# Patient Record
Sex: FEMALE | Race: WHITE | NOT HISPANIC OR LATINO | Employment: OTHER | RURAL
[De-identification: names, ages, dates, MRNs, and addresses within clinical notes are randomized per-mention and may not be internally consistent; named-entity substitution may affect disease eponyms.]

---

## 2020-04-07 ENCOUNTER — HISTORICAL (OUTPATIENT)
Dept: ADMINISTRATIVE | Facility: HOSPITAL | Age: 75
End: 2020-04-07

## 2020-04-07 LAB
ALBUMIN SERPL BCP-MCNC: 3.7 G/DL (ref 3.5–5)
ALBUMIN/GLOB SERPL: 1.1 {RATIO}
ALP SERPL-CCNC: 70 U/L (ref 55–142)
ALT SERPL W P-5'-P-CCNC: 18 U/L (ref 13–56)
AST SERPL W P-5'-P-CCNC: 18 U/L (ref 15–37)
BASOPHILS # BLD AUTO: 0.02 X10E3/UL (ref 0–0.2)
BASOPHILS NFR BLD AUTO: 0.2 % (ref 0–1)
BILIRUB SERPL-MCNC: 0.5 MG/DL (ref 0–1.2)
BILIRUB UR QL STRIP: NEGATIVE MG/DL
BUN SERPL-MCNC: 35 MG/DL (ref 7–18)
CALCIUM SERPL-MCNC: 9 MG/DL (ref 8.5–10.1)
CHLORIDE SERPL-SCNC: 100 MMOL/L (ref 98–107)
CHOLEST SERPL-MCNC: 182 MG/DL (ref 0–200)
CLARITY UR: CLEAR
CO2 SERPL-SCNC: 26 MMOL/L (ref 21–32)
COLOR UR: YELLOW
CREAT SERPL-MCNC: 1.5 MG/DL (ref 0.55–1.02)
EOSINOPHIL # BLD AUTO: 0.18 X10E3/UL (ref 0–0.5)
EOSINOPHIL NFR BLD AUTO: 2.1 % (ref 1–4)
ERYTHROCYTE [DISTWIDTH] IN BLOOD BY AUTOMATED COUNT: 14 % (ref 11.5–14.5)
EST. AVERAGE GLUCOSE BLD GHB EST-MCNC: 127 MG/DL
GLOBULIN SER-MCNC: 3.5 G/DL (ref 2–4)
GLUCOSE SERPL-MCNC: 96 MG/DL (ref 74–106)
GLUCOSE UR STRIP-MCNC: NORMAL MG/DL
HBA1C MFR BLD HPLC: 6.4 % (ref 4.2–6.5)
HCT VFR BLD AUTO: 40.1 % (ref 38–47)
HDLC SERPL-MCNC: 29 MG/DL
HGB BLD-MCNC: 13 G/DL (ref 12–16)
KETONES UR STRIP-SCNC: NEGATIVE MG/DL
LDLC SERPL CALC-MCNC: 111 MG/ML
LEUKOCYTE ESTERASE UR QL STRIP: NEGATIVE LEU/UL
LYMPHOCYTES # BLD AUTO: 2.68 X10E3/UL (ref 1–4.8)
LYMPHOCYTES NFR BLD AUTO: 30.6 % (ref 27–41)
MCH RBC QN AUTO: 28.9 PG (ref 27–31)
MCHC RBC AUTO-ENTMCNC: 32.4 G/DL (ref 32–36)
MCV RBC AUTO: 89 FL (ref 80–96)
MONOCYTES # BLD AUTO: 0.75 X10E3/UL (ref 0–0.8)
MONOCYTES NFR BLD AUTO: 8.6 % (ref 2–6)
MPC BLD CALC-MCNC: 10 FL (ref 9.4–12.4)
NEUTROPHILS # BLD AUTO: 5.13 X10E3/UL (ref 1.8–7.7)
NEUTROPHILS NFR BLD AUTO: 58.5 % (ref 53–65)
NITRITE UR QL STRIP: NEGATIVE
PH UR STRIP: 5.5 PH UNITS (ref 5–8)
PLATELET # BLD AUTO: 255 X10E3/UL (ref 150–450)
POTASSIUM SERPL-SCNC: 4.6 MMOL/L (ref 3.5–5.1)
PROT SERPL-MCNC: 7.2 G/DL (ref 6.4–8.2)
PROT UR QL STRIP: NEGATIVE MG/DL
RBC # BLD AUTO: 4.5 X10E6/UL (ref 4.2–5.4)
RBC # UR STRIP: NEGATIVE ERY/UL
SODIUM SERPL-SCNC: 137 MMOL/L (ref 136–145)
SP GR UR STRIP: 1.02 (ref 1–1.03)
TRIGL SERPL-MCNC: 212 MG/DL
TSH SERPL DL<=0.005 MIU/L-ACNC: 3.77 UIU/ML (ref 0.36–3.7)
UROBILINOGEN UR STRIP-ACNC: 0.2 MG/DL
VLDLC SERPL-MCNC: 42 MG/DL
WBC # BLD AUTO: 8.76 X10E3/UL (ref 4.5–11)

## 2020-04-08 LAB — 25(OH)D3 SERPL-MCNC: 22.6 NG/ML

## 2020-08-12 ENCOUNTER — HISTORICAL (OUTPATIENT)
Dept: ADMINISTRATIVE | Facility: HOSPITAL | Age: 75
End: 2020-08-12

## 2020-08-12 LAB — TSH SERPL DL<=0.005 MIU/L-ACNC: 4.52 UIU/ML (ref 0.36–3.7)

## 2020-09-23 ENCOUNTER — HISTORICAL (OUTPATIENT)
Dept: ADMINISTRATIVE | Facility: HOSPITAL | Age: 75
End: 2020-09-23

## 2020-09-23 LAB
25(OH)D3 SERPL-MCNC: 14.5 NG/ML
ALBUMIN SERPL BCP-MCNC: 3.5 G/DL (ref 3.5–5)
ALBUMIN/GLOB SERPL: 0.9 {RATIO}
ALP SERPL-CCNC: 85 U/L (ref 55–142)
ALT SERPL W P-5'-P-CCNC: 21 U/L (ref 13–56)
ANION GAP SERPL CALCULATED.3IONS-SCNC: 11 MMOL/L (ref 7–16)
AST SERPL W P-5'-P-CCNC: 17 U/L (ref 15–37)
BASOPHILS # BLD AUTO: 0.01 X10E3/UL (ref 0–0.2)
BASOPHILS NFR BLD AUTO: 0.1 % (ref 0–1)
BILIRUB SERPL-MCNC: 0.3 MG/DL (ref 0–1.2)
BILIRUB UR QL STRIP: NEGATIVE MG/DL
BUN SERPL-MCNC: 26 MG/DL (ref 7–18)
CALCIUM SERPL-MCNC: 8.9 MG/DL (ref 8.5–10.1)
CHLORIDE SERPL-SCNC: 103 MMOL/L (ref 98–107)
CHOLEST SERPL-MCNC: 172 MG/DL (ref 0–200)
CLARITY UR: CLEAR
CO2 SERPL-SCNC: 25 MMOL/L (ref 21–32)
COLOR UR: YELLOW
CREAT SERPL-MCNC: 1.49 MG/DL (ref 0.55–1.02)
EOSINOPHIL # BLD AUTO: 0.2 X10E3/UL (ref 0–0.5)
EOSINOPHIL NFR BLD AUTO: 2.3 % (ref 1–4)
ERYTHROCYTE [DISTWIDTH] IN BLOOD BY AUTOMATED COUNT: 13.8 % (ref 11.5–14.5)
EST. AVERAGE GLUCOSE BLD GHB EST-MCNC: 87 MG/DL
GLOBULIN SER-MCNC: 3.9 G/DL (ref 2–4)
GLUCOSE SERPL-MCNC: 143 MG/DL (ref 74–106)
GLUCOSE UR STRIP-MCNC: NORMAL MG/DL
HBA1C MFR BLD HPLC: 5.2 % (ref 4.2–6.5)
HCT VFR BLD AUTO: 39.9 % (ref 38–47)
HDLC SERPL-MCNC: 29 MG/DL
HGB BLD-MCNC: 12.5 G/DL (ref 12–16)
KETONES UR STRIP-SCNC: NEGATIVE MG/DL
LDLC SERPL CALC-MCNC: 100 MG/ML
LEUKOCYTE ESTERASE UR QL STRIP: NEGATIVE LEU/UL
LYMPHOCYTES # BLD AUTO: 2.77 X10E3/UL (ref 1–4.8)
LYMPHOCYTES NFR BLD AUTO: 31.7 % (ref 27–41)
MCH RBC QN AUTO: 28.2 PG (ref 27–31)
MCHC RBC AUTO-ENTMCNC: 31.3 G/DL (ref 32–36)
MCV RBC AUTO: 90 FL (ref 80–96)
MONOCYTES # BLD AUTO: 0.72 X10E3/UL (ref 0–0.8)
MONOCYTES NFR BLD AUTO: 8.2 % (ref 2–6)
MPC BLD CALC-MCNC: 9.9 FL (ref 9.4–12.4)
NEUTROPHILS # BLD AUTO: 5.05 X10E3/UL (ref 1.8–7.7)
NEUTROPHILS NFR BLD AUTO: 57.7 % (ref 53–65)
NITRITE UR QL STRIP: NEGATIVE
PH UR STRIP: 6 PH UNITS (ref 5–8)
PLATELET # BLD AUTO: 245 X10E3/UL (ref 150–450)
POTASSIUM SERPL-SCNC: 4.2 MMOL/L (ref 3.5–5.1)
PROT SERPL-MCNC: 7.4 G/DL (ref 6.4–8.2)
PROT UR QL STRIP: NEGATIVE MG/DL
RBC # BLD AUTO: 4.43 X10E6/UL (ref 4.2–5.4)
RBC # UR STRIP: NEGATIVE ERY/UL
SODIUM SERPL-SCNC: 135 MMOL/L (ref 136–145)
SP GR UR STRIP: 1.01 (ref 1–1.03)
TRIGL SERPL-MCNC: 217 MG/DL
TSH SERPL DL<=0.005 MIU/L-ACNC: 6.32 UIU/ML (ref 0.36–3.7)
UROBILINOGEN UR STRIP-ACNC: 0.2 MG/DL
VLDLC SERPL-MCNC: 43 MG/DL
WBC # BLD AUTO: 8.75 X10E3/UL (ref 4.5–11)

## 2020-10-19 ENCOUNTER — HISTORICAL (OUTPATIENT)
Dept: ADMINISTRATIVE | Facility: HOSPITAL | Age: 75
End: 2020-10-19

## 2020-10-19 LAB
BACTERIA #/AREA URNS HPF: ABNORMAL /HPF
BILIRUB UR QL STRIP: NEGATIVE MG/DL
CLARITY UR: ABNORMAL
CLARITY UR: ABNORMAL
COLOR UR: ABNORMAL
COLOR UR: ABNORMAL
GLUCOSE UR STRIP-MCNC: NORMAL MG/DL
KETONES UR STRIP-SCNC: NEGATIVE MG/DL
LEUKOCYTE ESTERASE UR QL STRIP: ABNORMAL LEU/UL
NITRITE UR QL STRIP: POSITIVE
PH UR STRIP: 6.5 PH UNITS (ref 5–8)
PROT UR QL STRIP: ABNORMAL MG/DL
RBC # UR STRIP: ABNORMAL ERY/UL
RBC #/AREA URNS HPF: ABNORMAL /HPF (ref 0–3)
SP GR UR STRIP: 1.02 (ref 1–1.03)
UROBILINOGEN UR STRIP-ACNC: 1 MG/DL
WBC #/AREA URNS HPF: ABNORMAL /HPF (ref 0–5)
YEAST #/AREA URNS HPF: ABNORMAL /HPF

## 2020-10-22 LAB
REPORT: 38
REPORT: NORMAL

## 2020-10-30 ENCOUNTER — HISTORICAL (OUTPATIENT)
Dept: ADMINISTRATIVE | Facility: HOSPITAL | Age: 75
End: 2020-10-30

## 2020-10-30 LAB
BILIRUB UR QL STRIP: NEGATIVE MG/DL
CLARITY UR: CLEAR
COLOR UR: YELLOW
GLUCOSE UR STRIP-MCNC: NORMAL MG/DL
KETONES UR STRIP-SCNC: NEGATIVE MG/DL
LEUKOCYTE ESTERASE UR QL STRIP: NEGATIVE LEU/UL
NITRITE UR QL STRIP: NEGATIVE
PH UR STRIP: 6 PH UNITS (ref 5–8)
PROT UR QL STRIP: NEGATIVE MG/DL
RBC # UR STRIP: NEGATIVE ERY/UL
SP GR UR STRIP: 1.01 (ref 1–1.03)
UROBILINOGEN UR STRIP-ACNC: 0.2 MG/DL

## 2020-11-16 ENCOUNTER — HISTORICAL (OUTPATIENT)
Dept: ADMINISTRATIVE | Facility: HOSPITAL | Age: 75
End: 2020-11-16

## 2020-11-16 LAB
BACTERIA #/AREA URNS HPF: ABNORMAL /HPF
BILIRUB UR QL STRIP: NEGATIVE MG/DL
CLARITY UR: ABNORMAL
CLARITY UR: ABNORMAL
COLOR UR: YELLOW
COLOR UR: YELLOW
GLUCOSE UR STRIP-MCNC: NORMAL MG/DL
KETONES UR STRIP-SCNC: NEGATIVE MG/DL
LEUKOCYTE ESTERASE UR QL STRIP: ABNORMAL LEU/UL
NITRITE UR QL STRIP: NEGATIVE
PH UR STRIP: 5.5 PH UNITS (ref 5–8)
PROT UR QL STRIP: NEGATIVE MG/DL
RBC # UR STRIP: ABNORMAL ERY/UL
RBC #/AREA URNS HPF: ABNORMAL /HPF (ref 0–3)
SP GR UR STRIP: 1.02 (ref 1–1.03)
TSH SERPL DL<=0.005 MIU/L-ACNC: 3.99 UIU/ML (ref 0.36–3.7)
UROBILINOGEN UR STRIP-ACNC: 0.2 MG/DL
WBC #/AREA URNS HPF: ABNORMAL /HPF (ref 0–5)

## 2020-11-18 LAB
REPORT: 38
REPORT: NORMAL

## 2020-11-23 ENCOUNTER — HISTORICAL (OUTPATIENT)
Dept: ADMINISTRATIVE | Facility: HOSPITAL | Age: 75
End: 2020-11-23

## 2020-11-23 LAB
BACTERIA #/AREA URNS HPF: ABNORMAL /HPF
BILIRUB UR QL STRIP: NEGATIVE MG/DL
CLARITY UR: CLEAR
CLARITY UR: CLEAR
COLOR UR: YELLOW
COLOR UR: YELLOW
GLUCOSE UR STRIP-MCNC: NORMAL MG/DL
KETONES UR STRIP-SCNC: NEGATIVE MG/DL
LEUKOCYTE ESTERASE UR QL STRIP: ABNORMAL LEU/UL
NITRITE UR QL STRIP: NEGATIVE
PH UR STRIP: 6 PH UNITS (ref 5–8)
PROT UR QL STRIP: NEGATIVE MG/DL
RBC # UR STRIP: NEGATIVE ERY/UL
RBC #/AREA URNS HPF: ABNORMAL /HPF (ref 0–3)
SP GR UR STRIP: 1.02 (ref 1–1.03)
UROBILINOGEN UR STRIP-ACNC: 0.2 MG/DL
WBC #/AREA URNS HPF: ABNORMAL /HPF (ref 0–5)

## 2021-01-06 ENCOUNTER — HISTORICAL (OUTPATIENT)
Dept: ADMINISTRATIVE | Facility: HOSPITAL | Age: 76
End: 2021-01-06

## 2021-01-06 LAB
ALBUMIN SERPL BCP-MCNC: 3.6 G/DL (ref 3.5–5)
ALBUMIN/GLOB SERPL: 0.8 {RATIO}
ALP SERPL-CCNC: 71 U/L (ref 55–142)
ALT SERPL W P-5'-P-CCNC: 18 U/L (ref 13–56)
ANION GAP SERPL CALCULATED.3IONS-SCNC: 9.2 MMOL/L (ref 7–16)
AST SERPL W P-5'-P-CCNC: 9 U/L (ref 15–37)
BACTERIA #/AREA URNS HPF: ABNORMAL /HPF
BASOPHILS # BLD AUTO: 0.04 X10E3/UL (ref 0–0.2)
BASOPHILS NFR BLD AUTO: 0.5 % (ref 0–1)
BILIRUB SERPL-MCNC: 0.5 MG/DL (ref 0–1.2)
BILIRUB UR QL STRIP: NEGATIVE MG/DL
BUN SERPL-MCNC: 32 MG/DL (ref 7–18)
BUN/CREAT SERPL: 27
CALCIUM SERPL-MCNC: 9.1 MG/DL (ref 8.5–10.1)
CHLORIDE SERPL-SCNC: 103 MMOL/L (ref 98–107)
CHOLEST SERPL-MCNC: 192 MG/DL
CHOLEST/HDLC SERPL: 5.2 {RATIO}
CLARITY UR: ABNORMAL
CO2 SERPL-SCNC: 28 MMOL/L (ref 21–32)
COLOR UR: YELLOW
CREAT SERPL-MCNC: 1.2 MG/DL (ref 0.5–1.02)
CREAT UR-MCNC: 89 MG/DL (ref 28–217)
EOSINOPHIL # BLD AUTO: 0.14 X10E3/UL (ref 0–0.5)
EOSINOPHIL NFR BLD AUTO: 1.8 % (ref 1–4)
ERYTHROCYTE [DISTWIDTH] IN BLOOD BY AUTOMATED COUNT: 14.4 % (ref 11.5–14.5)
EST. AVERAGE GLUCOSE BLD GHB EST-MCNC: 137 MG/DL
GLOBULIN SER-MCNC: 4.4 G/DL (ref 2–4)
GLUCOSE SERPL-MCNC: 122 MG/DL (ref 74–106)
GLUCOSE UR STRIP-MCNC: NEGATIVE MG/DL
HBA1C MFR BLD HPLC: 6.7 %
HCT VFR BLD AUTO: 43.7 % (ref 38–47)
HDLC SERPL-MCNC: 37 MG/DL
HGB BLD-MCNC: 13.4 G/DL (ref 12–16)
IMM GRANULOCYTES # BLD AUTO: 0.03 X10E3/UL (ref 0–0.04)
IMM GRANULOCYTES NFR BLD: 0.4 % (ref 0–0.4)
KETONES UR STRIP-SCNC: NEGATIVE MG/DL
LDLC SERPL CALC-MCNC: 125 MG/DL
LEUKOCYTE ESTERASE UR QL STRIP: ABNORMAL LEU/UL
LYMPHOCYTES # BLD AUTO: 2.09 X10E3/UL (ref 1–4.8)
LYMPHOCYTES NFR BLD AUTO: 26.9 % (ref 27–41)
MCH RBC QN AUTO: 27.3 PG (ref 27–31)
MCHC RBC AUTO-ENTMCNC: 30.7 G/DL (ref 32–36)
MCV RBC AUTO: 89.2 FL (ref 80–96)
MICROALBUMIN UR-MCNC: 6.7 MG/DL (ref 0–2.8)
MICROALBUMIN/CREAT RATIO PNL UR: 75.3 MG/G (ref 0–30)
MONOCYTES # BLD AUTO: 0.56 X10E3/UL (ref 0–0.8)
MONOCYTES NFR BLD AUTO: 7.2 % (ref 2–6)
MPC BLD CALC-MCNC: 11.1 FL (ref 9.4–12.4)
NEUTROPHILS # BLD AUTO: 4.9 X10E3/UL (ref 1.8–7.7)
NEUTROPHILS NFR BLD AUTO: 63.2 % (ref 53–65)
NITRITE UR QL STRIP: NEGATIVE
NRBC # BLD AUTO: 0 X10E3/UL (ref 0–0)
NRBC, AUTO (.00): 0 /100 (ref 0–0)
PH UR STRIP: 6 PH UNITS (ref 5–8)
PLATELET # BLD AUTO: 243 X10E3/UL (ref 150–400)
POTASSIUM SERPL-SCNC: 4.2 MMOL/L (ref 3.5–5.1)
PROT SERPL-MCNC: 8 G/DL (ref 6.4–8.2)
PROT UR QL STRIP: NEGATIVE MG/DL
RBC # BLD AUTO: 4.9 X10E6/UL (ref 4.2–5.4)
RBC # UR STRIP: ABNORMAL ERY/UL
RBC #/AREA URNS HPF: ABNORMAL /HPF (ref 0–3)
SODIUM SERPL-SCNC: 136 MMOL/L (ref 136–145)
SP GR UR STRIP: 1.02 (ref 1–1.03)
SQUAMOUS #/AREA URNS LPF: ABNORMAL /LPF
T4 FREE SERPL-MCNC: 0.96 NG/DL (ref 0.76–1.46)
TRIGL SERPL-MCNC: 148 MG/DL
TSH SERPL DL<=0.005 MIU/L-ACNC: 7.05 UIU/ML (ref 0.36–3.74)
UROBILINOGEN UR STRIP-ACNC: 0.2 EU/DL
WBC # BLD AUTO: 7.76 X10E3/UL (ref 4.5–11)
WBC #/AREA URNS HPF: ABNORMAL /HPF (ref 0–5)

## 2021-05-11 ENCOUNTER — HISTORICAL (OUTPATIENT)
Dept: ADMINISTRATIVE | Facility: HOSPITAL | Age: 76
End: 2021-05-11

## 2021-05-11 LAB
ALBUMIN SERPL BCP-MCNC: 3.4 G/DL (ref 3.5–5)
ALBUMIN/GLOB SERPL: 0.9 {RATIO}
ALP SERPL-CCNC: 71 U/L (ref 55–142)
ALT SERPL W P-5'-P-CCNC: 51 U/L (ref 13–56)
ANION GAP SERPL CALCULATED.3IONS-SCNC: 11 MMOL/L (ref 7–16)
AST SERPL W P-5'-P-CCNC: 33 U/L (ref 15–37)
BASOPHILS # BLD AUTO: 0.02 X10E3/UL (ref 0–0.2)
BASOPHILS NFR BLD AUTO: 0.3 % (ref 0–1)
BILIRUB SERPL-MCNC: 0.5 MG/DL (ref 0–1.2)
BUN SERPL-MCNC: 29 MG/DL (ref 7–18)
CALCIUM SERPL-MCNC: 8.8 MG/DL (ref 8.5–10.1)
CHLORIDE SERPL-SCNC: 96 MMOL/L (ref 98–107)
CHOLEST SERPL-MCNC: 167 MG/DL (ref 0–200)
CO2 SERPL-SCNC: 28 MMOL/L (ref 21–32)
CREAT SERPL-MCNC: 1.26 MG/DL (ref 0.55–1.02)
EOSINOPHIL # BLD AUTO: 0.25 X10E3/UL (ref 0–0.5)
EOSINOPHIL NFR BLD AUTO: 3.9 % (ref 1–4)
ERYTHROCYTE [DISTWIDTH] IN BLOOD BY AUTOMATED COUNT: 13.9 % (ref 11.5–14.5)
EST. AVERAGE GLUCOSE BLD GHB EST-MCNC: 140 MG/DL
GLOBULIN SER-MCNC: 4 G/DL (ref 2–4)
GLUCOSE SERPL-MCNC: 106 MG/DL (ref 74–106)
HBA1C MFR BLD HPLC: 6.8 % (ref 4.2–6.5)
HCT VFR BLD AUTO: 38.8 % (ref 38–47)
HDLC SERPL-MCNC: 26 MG/DL
HGB BLD-MCNC: 12.2 G/DL (ref 12–16)
LDLC SERPL CALC-MCNC: 89 MG/ML
LYMPHOCYTES # BLD AUTO: 1.73 X10E3/UL (ref 1–4.8)
LYMPHOCYTES NFR BLD AUTO: 26.9 % (ref 27–41)
MCH RBC QN AUTO: 28.6 PG (ref 27–31)
MCHC RBC AUTO-ENTMCNC: 31.4 G/DL (ref 32–36)
MCV RBC AUTO: 91 FL (ref 80–96)
MONOCYTES # BLD AUTO: 0.69 X10E3/UL (ref 0–0.8)
MONOCYTES NFR BLD AUTO: 10.7 % (ref 2–6)
MPC BLD CALC-MCNC: 10.1 FL (ref 9.4–12.4)
NEUTROPHILS # BLD AUTO: 3.75 X10E3/UL (ref 1.8–7.7)
NEUTROPHILS NFR BLD AUTO: 58.2 % (ref 53–65)
PLATELET # BLD AUTO: 223 X10E3/UL (ref 150–450)
POTASSIUM SERPL-SCNC: 4.9 MMOL/L (ref 3.5–5.1)
PROT SERPL-MCNC: 7.4 G/DL (ref 6.4–8.2)
RBC # BLD AUTO: 4.27 X10E6/UL (ref 4.2–5.4)
SODIUM SERPL-SCNC: 130 MMOL/L (ref 136–145)
TRIGL SERPL-MCNC: 258 MG/DL
VLDLC SERPL-MCNC: 52 MG/DL
WBC # BLD AUTO: 6.44 X10E3/UL (ref 4.5–11)

## 2021-05-12 ENCOUNTER — HISTORICAL (OUTPATIENT)
Dept: ADMINISTRATIVE | Facility: HOSPITAL | Age: 76
End: 2021-05-12

## 2021-05-13 LAB
CREAT UR-MCNC: 38 MG/DL (ref 28–217)
MICROALBUMIN UR-MCNC: <0.5 MG/DL (ref 0–2.8)
MICROALBUMIN/CREAT RATIO PNL UR: <30 MG/G (ref 0–30)

## 2021-07-19 ENCOUNTER — LAB REQUISITION (OUTPATIENT)
Dept: LAB | Facility: HOSPITAL | Age: 76
End: 2021-07-19
Payer: MEDICARE

## 2021-07-19 DIAGNOSIS — R30.0 DYSURIA: ICD-10-CM

## 2021-07-19 DIAGNOSIS — R35.0 FREQUENCY OF MICTURITION: ICD-10-CM

## 2021-07-19 LAB
BACTERIA #/AREA URNS HPF: ABNORMAL /HPF
BILIRUB UR QL STRIP: NEGATIVE
CLARITY UR: CLEAR
COLOR UR: YELLOW
GLUCOSE UR STRIP-MCNC: NEGATIVE MG/DL
KETONES UR STRIP-SCNC: NEGATIVE MG/DL
LEUKOCYTE ESTERASE UR QL STRIP: ABNORMAL
NITRITE UR QL STRIP: NEGATIVE
PH UR STRIP: 5.5 PH UNITS
PROT UR QL STRIP: 30
RBC # UR STRIP: ABNORMAL /UL
RBC #/AREA URNS HPF: ABNORMAL /HPF
SP GR UR STRIP: 1.02
SQUAMOUS #/AREA URNS LPF: ABNORMAL /LPF
UROBILINOGEN UR STRIP-ACNC: 0.2 MG/DL
WBC #/AREA URNS HPF: ABNORMAL /HPF

## 2021-07-19 PROCEDURE — 81001 URINALYSIS AUTO W/SCOPE: CPT

## 2021-07-19 PROCEDURE — 81003 URINALYSIS AUTO W/O SCOPE: CPT

## 2021-07-19 PROCEDURE — 87077 CULTURE AEROBIC IDENTIFY: CPT

## 2021-07-19 PROCEDURE — 87186 SC STD MICRODIL/AGAR DIL: CPT | Mod: 91

## 2021-07-21 LAB
UA COMPLETE W REFLEX CULTURE PNL UR: ABNORMAL
UA COMPLETE W REFLEX CULTURE PNL UR: ABNORMAL

## 2021-09-29 ENCOUNTER — OFFICE VISIT (OUTPATIENT)
Dept: FAMILY MEDICINE | Facility: CLINIC | Age: 76
End: 2021-09-29
Payer: MEDICARE

## 2021-09-29 VITALS
HEIGHT: 66 IN | TEMPERATURE: 98 F | SYSTOLIC BLOOD PRESSURE: 189 MMHG | HEART RATE: 59 BPM | BODY MASS INDEX: 35.2 KG/M2 | RESPIRATION RATE: 18 BRPM | DIASTOLIC BLOOD PRESSURE: 83 MMHG | WEIGHT: 219 LBS | OXYGEN SATURATION: 97 %

## 2021-09-29 DIAGNOSIS — E03.9 HYPOTHYROIDISM, UNSPECIFIED TYPE: ICD-10-CM

## 2021-09-29 DIAGNOSIS — I10 ESSENTIAL HYPERTENSION: ICD-10-CM

## 2021-09-29 DIAGNOSIS — F41.9 ANXIETY: ICD-10-CM

## 2021-09-29 DIAGNOSIS — E78.5 HYPERLIPIDEMIA, UNSPECIFIED HYPERLIPIDEMIA TYPE: ICD-10-CM

## 2021-09-29 DIAGNOSIS — E11.9 TYPE 2 DIABETES MELLITUS WITHOUT COMPLICATION, WITHOUT LONG-TERM CURRENT USE OF INSULIN: Primary | ICD-10-CM

## 2021-09-29 LAB
ALBUMIN SERPL BCP-MCNC: 3.6 G/DL (ref 3.5–5)
ALBUMIN/GLOB SERPL: 0.9 {RATIO}
ALP SERPL-CCNC: 68 U/L (ref 55–142)
ALT SERPL W P-5'-P-CCNC: 22 U/L (ref 13–56)
ANION GAP SERPL CALCULATED.3IONS-SCNC: 8 MMOL/L (ref 7–16)
AST SERPL W P-5'-P-CCNC: 17 U/L (ref 15–37)
BASOPHILS # BLD AUTO: 0.03 K/UL (ref 0–0.2)
BASOPHILS NFR BLD AUTO: 0.4 % (ref 0–1)
BILIRUB SERPL-MCNC: 0.5 MG/DL (ref 0–1.2)
BUN SERPL-MCNC: 31 MG/DL (ref 7–18)
BUN/CREAT SERPL: 23 (ref 6–20)
CALCIUM SERPL-MCNC: 9.9 MG/DL (ref 8.5–10.1)
CHLORIDE SERPL-SCNC: 106 MMOL/L (ref 98–107)
CO2 SERPL-SCNC: 29 MMOL/L (ref 21–32)
CREAT SERPL-MCNC: 1.36 MG/DL (ref 0.55–1.02)
DIFFERENTIAL METHOD BLD: ABNORMAL
EOSINOPHIL # BLD AUTO: 0.16 K/UL (ref 0–0.5)
EOSINOPHIL NFR BLD AUTO: 2.2 % (ref 1–4)
ERYTHROCYTE [DISTWIDTH] IN BLOOD BY AUTOMATED COUNT: 14.2 % (ref 11.5–14.5)
EST. AVERAGE GLUCOSE BLD GHB EST-MCNC: 134 MG/DL
GLOBULIN SER-MCNC: 4 G/DL (ref 2–4)
GLUCOSE SERPL-MCNC: 122 MG/DL (ref 74–106)
HBA1C MFR BLD HPLC: 6.6 % (ref 4.5–6.6)
HCT VFR BLD AUTO: 42.4 % (ref 38–47)
HGB BLD-MCNC: 13.3 G/DL (ref 12–16)
IMM GRANULOCYTES # BLD AUTO: 0.04 K/UL (ref 0–0.04)
IMM GRANULOCYTES NFR BLD: 0.6 % (ref 0–0.4)
LYMPHOCYTES # BLD AUTO: 1.71 K/UL (ref 1–4.8)
LYMPHOCYTES NFR BLD AUTO: 23.6 % (ref 27–41)
MCH RBC QN AUTO: 28.7 PG (ref 27–31)
MCHC RBC AUTO-ENTMCNC: 31.4 G/DL (ref 32–36)
MCV RBC AUTO: 91.6 FL (ref 80–96)
MONOCYTES # BLD AUTO: 0.61 K/UL (ref 0–0.8)
MONOCYTES NFR BLD AUTO: 8.4 % (ref 2–6)
MPC BLD CALC-MCNC: 10.2 FL (ref 9.4–12.4)
NEUTROPHILS # BLD AUTO: 4.69 K/UL (ref 1.8–7.7)
NEUTROPHILS NFR BLD AUTO: 64.8 % (ref 53–65)
NRBC # BLD AUTO: 0 X10E3/UL
NRBC, AUTO (.00): 0 %
PLATELET # BLD AUTO: 235 K/UL (ref 150–400)
POTASSIUM SERPL-SCNC: 4.5 MMOL/L (ref 3.5–5.1)
PROT SERPL-MCNC: 7.6 G/DL (ref 6.4–8.2)
RBC # BLD AUTO: 4.63 M/UL (ref 4.2–5.4)
SODIUM SERPL-SCNC: 138 MMOL/L (ref 136–145)
T4 SERPL-MCNC: 10 ΜG/DL (ref 4.8–13.9)
TSH SERPL DL<=0.005 MIU/L-ACNC: 3.87 UIU/ML (ref 0.36–3.74)
WBC # BLD AUTO: 7.24 K/UL (ref 4.5–11)

## 2021-09-29 PROCEDURE — 80053 COMPREHENSIVE METABOLIC PANEL: ICD-10-PCS | Mod: ,,, | Performed by: CLINICAL MEDICAL LABORATORY

## 2021-09-29 PROCEDURE — 99213 OFFICE O/P EST LOW 20 MIN: CPT | Mod: ,,, | Performed by: NURSE PRACTITIONER

## 2021-09-29 PROCEDURE — 85025 COMPLETE CBC W/AUTO DIFF WBC: CPT | Mod: ,,, | Performed by: CLINICAL MEDICAL LABORATORY

## 2021-09-29 PROCEDURE — 83036 HEMOGLOBIN A1C: ICD-10-PCS | Mod: ,,, | Performed by: CLINICAL MEDICAL LABORATORY

## 2021-09-29 PROCEDURE — 80053 COMPREHEN METABOLIC PANEL: CPT | Mod: ,,, | Performed by: CLINICAL MEDICAL LABORATORY

## 2021-09-29 PROCEDURE — 99213 PR OFFICE/OUTPT VISIT, EST, LEVL III, 20-29 MIN: ICD-10-PCS | Mod: ,,, | Performed by: NURSE PRACTITIONER

## 2021-09-29 PROCEDURE — 83036 HEMOGLOBIN GLYCOSYLATED A1C: CPT | Mod: ,,, | Performed by: CLINICAL MEDICAL LABORATORY

## 2021-09-29 PROCEDURE — 84436 ASSAY OF TOTAL THYROXINE: CPT | Mod: ,,, | Performed by: CLINICAL MEDICAL LABORATORY

## 2021-09-29 PROCEDURE — 85025 CBC WITH DIFFERENTIAL: ICD-10-PCS | Mod: ,,, | Performed by: CLINICAL MEDICAL LABORATORY

## 2021-09-29 PROCEDURE — 84436 T4: ICD-10-PCS | Mod: ,,, | Performed by: CLINICAL MEDICAL LABORATORY

## 2021-09-29 PROCEDURE — 84443 TSH: ICD-10-PCS | Mod: ,,, | Performed by: CLINICAL MEDICAL LABORATORY

## 2021-09-29 PROCEDURE — 84443 ASSAY THYROID STIM HORMONE: CPT | Mod: ,,, | Performed by: CLINICAL MEDICAL LABORATORY

## 2021-09-29 RX ORDER — NAPROXEN SODIUM 220 MG/1
81 TABLET, FILM COATED ORAL DAILY
COMMUNITY

## 2021-09-29 RX ORDER — OLMESARTAN MEDOXOMIL AND HYDROCHLOROTHIAZIDE 40/25 40; 25 MG/1; MG/1
1 TABLET ORAL DAILY
Qty: 90 TABLET | Refills: 0 | Status: SHIPPED | OUTPATIENT
Start: 2021-09-29 | End: 2021-10-28 | Stop reason: SDUPTHER

## 2021-09-29 RX ORDER — FENOFIBRATE 145 MG/1
145 TABLET, FILM COATED ORAL DAILY
Qty: 90 TABLET | Refills: 0 | Status: SHIPPED | OUTPATIENT
Start: 2021-09-29 | End: 2021-10-28 | Stop reason: SDUPTHER

## 2021-09-29 RX ORDER — NEBIVOLOL 10 MG/1
10 TABLET ORAL DAILY
COMMUNITY
End: 2021-09-29 | Stop reason: SDUPTHER

## 2021-09-29 RX ORDER — LEVOTHYROXINE SODIUM 75 UG/1
75 TABLET ORAL
Qty: 90 TABLET | Refills: 0 | Status: SHIPPED | OUTPATIENT
Start: 2021-09-29 | End: 2021-10-28 | Stop reason: SDUPTHER

## 2021-09-29 RX ORDER — CLOPIDOGREL BISULFATE 75 MG/1
75 TABLET ORAL DAILY
Qty: 90 TABLET | Refills: 0 | Status: SHIPPED | OUTPATIENT
Start: 2021-09-29 | End: 2021-10-28 | Stop reason: SDUPTHER

## 2021-09-29 RX ORDER — NEBIVOLOL 10 MG/1
10 TABLET ORAL DAILY
Qty: 90 TABLET | Refills: 0 | Status: SHIPPED | OUTPATIENT
Start: 2021-09-29 | End: 2021-10-28 | Stop reason: SDUPTHER

## 2021-09-29 RX ORDER — CHLORPROMAZINE HYDROCHLORIDE 25 MG/1
25 TABLET, FILM COATED ORAL 3 TIMES DAILY PRN
Qty: 90 TABLET | Refills: 0 | Status: SHIPPED | OUTPATIENT
Start: 2021-09-29 | End: 2021-10-28 | Stop reason: SDUPTHER

## 2021-09-29 RX ORDER — METFORMIN HYDROCHLORIDE 500 MG/1
500 TABLET ORAL 2 TIMES DAILY WITH MEALS
COMMUNITY
End: 2021-09-29 | Stop reason: SDUPTHER

## 2021-09-29 RX ORDER — METFORMIN HYDROCHLORIDE 500 MG/1
500 TABLET ORAL 2 TIMES DAILY WITH MEALS
Qty: 360 TABLET | Refills: 0 | Status: SHIPPED | OUTPATIENT
Start: 2021-09-29 | End: 2021-10-28 | Stop reason: SDUPTHER

## 2021-09-29 RX ORDER — OLMESARTAN MEDOXOMIL AND HYDROCHLOROTHIAZIDE 40/25 40; 25 MG/1; MG/1
1 TABLET ORAL DAILY
COMMUNITY
End: 2021-09-29 | Stop reason: SDUPTHER

## 2021-09-29 RX ORDER — CLOPIDOGREL BISULFATE 75 MG/1
75 TABLET ORAL DAILY
COMMUNITY
End: 2021-09-29 | Stop reason: SDUPTHER

## 2021-10-28 ENCOUNTER — OFFICE VISIT (OUTPATIENT)
Dept: FAMILY MEDICINE | Facility: CLINIC | Age: 76
End: 2021-10-28
Payer: MEDICARE

## 2021-10-28 VITALS
HEIGHT: 66 IN | WEIGHT: 215.38 LBS | SYSTOLIC BLOOD PRESSURE: 150 MMHG | BODY MASS INDEX: 34.61 KG/M2 | OXYGEN SATURATION: 98 % | TEMPERATURE: 97 F | DIASTOLIC BLOOD PRESSURE: 76 MMHG | HEART RATE: 65 BPM

## 2021-10-28 DIAGNOSIS — I10 ESSENTIAL HYPERTENSION: ICD-10-CM

## 2021-10-28 DIAGNOSIS — L03.115 CELLULITIS OF RIGHT LOWER LEG: ICD-10-CM

## 2021-10-28 DIAGNOSIS — E78.5 HYPERLIPIDEMIA, UNSPECIFIED HYPERLIPIDEMIA TYPE: ICD-10-CM

## 2021-10-28 DIAGNOSIS — F41.9 ANXIETY: ICD-10-CM

## 2021-10-28 DIAGNOSIS — E11.9 TYPE 2 DIABETES MELLITUS WITHOUT COMPLICATION, WITHOUT LONG-TERM CURRENT USE OF INSULIN: ICD-10-CM

## 2021-10-28 DIAGNOSIS — E03.9 HYPOTHYROIDISM, UNSPECIFIED TYPE: ICD-10-CM

## 2021-10-28 PROCEDURE — 99214 PR OFFICE/OUTPT VISIT, EST, LEVL IV, 30-39 MIN: ICD-10-PCS | Mod: ,,, | Performed by: NURSE PRACTITIONER

## 2021-10-28 PROCEDURE — 99214 OFFICE O/P EST MOD 30 MIN: CPT | Mod: ,,, | Performed by: NURSE PRACTITIONER

## 2021-10-28 RX ORDER — FENOFIBRATE 145 MG/1
145 TABLET, FILM COATED ORAL DAILY
Qty: 90 TABLET | Refills: 0 | Status: SHIPPED | OUTPATIENT
Start: 2021-10-28 | End: 2022-04-26

## 2021-10-28 RX ORDER — LEVOTHYROXINE SODIUM 75 UG/1
75 TABLET ORAL
Qty: 90 TABLET | Refills: 0 | Status: SHIPPED | OUTPATIENT
Start: 2021-10-28 | End: 2022-01-26

## 2021-10-28 RX ORDER — CEPHALEXIN 500 MG/1
500 CAPSULE ORAL EVERY 12 HOURS
COMMUNITY
End: 2022-05-13

## 2021-10-28 RX ORDER — NEBIVOLOL 10 MG/1
10 TABLET ORAL DAILY
Qty: 90 TABLET | Refills: 0 | Status: SHIPPED | OUTPATIENT
Start: 2021-10-28 | End: 2022-01-26

## 2021-10-28 RX ORDER — FLUCONAZOLE 150 MG/1
150 TABLET ORAL DAILY
Qty: 2 TABLET | Refills: 0 | Status: SHIPPED | OUTPATIENT
Start: 2021-10-28 | End: 2021-10-30

## 2021-10-28 RX ORDER — CLOPIDOGREL BISULFATE 75 MG/1
75 TABLET ORAL DAILY
Qty: 90 TABLET | Refills: 0 | Status: SHIPPED | OUTPATIENT
Start: 2021-10-28 | End: 2022-01-26

## 2021-10-28 RX ORDER — VALACYCLOVIR HYDROCHLORIDE 1 G/1
1000 TABLET, FILM COATED ORAL 2 TIMES DAILY
Qty: 28 TABLET | Refills: 1 | Status: SHIPPED | OUTPATIENT
Start: 2021-10-28 | End: 2022-05-13

## 2021-10-28 RX ORDER — ATORVASTATIN CALCIUM 40 MG/1
TABLET, FILM COATED ORAL
Qty: 30 TABLET | Refills: 0 | Status: SHIPPED | OUTPATIENT
Start: 2021-10-28 | End: 2021-12-06 | Stop reason: SDUPTHER

## 2021-10-28 RX ORDER — OLMESARTAN MEDOXOMIL AND HYDROCHLOROTHIAZIDE 40/25 40; 25 MG/1; MG/1
1 TABLET ORAL DAILY
Qty: 90 TABLET | Refills: 0 | Status: SHIPPED | OUTPATIENT
Start: 2021-10-28 | End: 2022-06-02

## 2021-10-28 RX ORDER — NYSTATIN 100000 [USP'U]/ML
4 SUSPENSION ORAL EVERY 6 HOURS
COMMUNITY
End: 2022-05-13

## 2021-10-28 RX ORDER — METFORMIN HYDROCHLORIDE 500 MG/1
500 TABLET ORAL 2 TIMES DAILY WITH MEALS
Qty: 360 TABLET | Refills: 0 | Status: SHIPPED | OUTPATIENT
Start: 2021-10-28 | End: 2022-04-26

## 2021-10-28 RX ORDER — CHLORPROMAZINE HYDROCHLORIDE 25 MG/1
25 TABLET, FILM COATED ORAL 3 TIMES DAILY PRN
Qty: 90 TABLET | Refills: 0 | Status: SHIPPED | OUTPATIENT
Start: 2021-10-28 | End: 2021-12-06 | Stop reason: SDUPTHER

## 2021-10-29 PROBLEM — L03.115 CELLULITIS OF RIGHT LOWER LEG: Status: ACTIVE | Noted: 2021-10-29

## 2021-11-17 DIAGNOSIS — R60.0 EDEMA OF BOTH LOWER LEGS DUE TO PERIPHERAL VENOUS INSUFFICIENCY: Primary | ICD-10-CM

## 2021-11-17 DIAGNOSIS — I87.2 EDEMA OF BOTH LOWER LEGS DUE TO PERIPHERAL VENOUS INSUFFICIENCY: Primary | ICD-10-CM

## 2021-11-21 DIAGNOSIS — I73.9 PVD (PERIPHERAL VASCULAR DISEASE): Primary | ICD-10-CM

## 2021-12-06 DIAGNOSIS — F41.9 ANXIETY: ICD-10-CM

## 2021-12-06 DIAGNOSIS — F29 PSYCHOSIS, UNSPECIFIED PSYCHOSIS TYPE: ICD-10-CM

## 2021-12-06 DIAGNOSIS — E78.5 HYPERLIPIDEMIA, UNSPECIFIED HYPERLIPIDEMIA TYPE: Primary | ICD-10-CM

## 2021-12-06 RX ORDER — ATORVASTATIN CALCIUM 40 MG/1
TABLET, FILM COATED ORAL
Qty: 90 TABLET | Refills: 0 | Status: SHIPPED | OUTPATIENT
Start: 2021-12-06

## 2021-12-06 RX ORDER — CHLORPROMAZINE HYDROCHLORIDE 25 MG/1
25 TABLET, FILM COATED ORAL 3 TIMES DAILY
Qty: 90 TABLET | Refills: 0 | Status: ON HOLD | OUTPATIENT
Start: 2021-12-06 | End: 2022-06-02 | Stop reason: HOSPADM

## 2021-12-21 ENCOUNTER — LAB REQUISITION (OUTPATIENT)
Dept: LAB | Facility: HOSPITAL | Age: 76
End: 2021-12-21
Attending: NURSE PRACTITIONER
Payer: MEDICARE

## 2021-12-21 DIAGNOSIS — E11.9 TYPE 2 DIABETES MELLITUS WITHOUT COMPLICATIONS: ICD-10-CM

## 2021-12-21 DIAGNOSIS — R05.9 COUGH, UNSPECIFIED: ICD-10-CM

## 2021-12-21 LAB
BACTERIA #/AREA URNS HPF: ABNORMAL /HPF
BASOPHILS # BLD AUTO: 0.01 K/UL (ref 0–0.2)
BASOPHILS NFR BLD AUTO: 0.1 % (ref 0–1)
BILIRUB UR QL STRIP: NEGATIVE
CLARITY UR: ABNORMAL
COLOR UR: YELLOW
DIFFERENTIAL METHOD BLD: ABNORMAL
EOSINOPHIL # BLD AUTO: 0.22 K/UL (ref 0–0.5)
EOSINOPHIL NFR BLD AUTO: 2.3 % (ref 1–4)
ERYTHROCYTE [DISTWIDTH] IN BLOOD BY AUTOMATED COUNT: 13.6 % (ref 11.5–14.5)
GLUCOSE UR STRIP-MCNC: NEGATIVE MG/DL
HCT VFR BLD AUTO: 36.5 % (ref 38–47)
HGB BLD-MCNC: 11.9 G/DL (ref 12–16)
KETONES UR STRIP-SCNC: NEGATIVE MG/DL
LEUKOCYTE ESTERASE UR QL STRIP: ABNORMAL
LYMPHOCYTES # BLD AUTO: 1.43 K/UL (ref 1–4.8)
LYMPHOCYTES NFR BLD AUTO: 14.7 % (ref 27–41)
MCH RBC QN AUTO: 29.2 PG (ref 27–31)
MCHC RBC AUTO-ENTMCNC: 32.6 G/DL (ref 32–36)
MCV RBC AUTO: 89.7 FL (ref 80–96)
MONOCYTES # BLD AUTO: 0.8 K/UL (ref 0–0.8)
MONOCYTES NFR BLD AUTO: 8.2 % (ref 2–6)
MPC BLD CALC-MCNC: 11.4 FL (ref 9.4–12.4)
NEUTROPHILS # BLD AUTO: 7.27 K/UL (ref 1.8–7.7)
NEUTROPHILS NFR BLD AUTO: 74.7 % (ref 53–65)
NITRITE UR QL STRIP: POSITIVE
PH UR STRIP: 5.5 PH UNITS
PLATELET # BLD AUTO: 143 K/UL (ref 150–400)
PROT UR QL STRIP: NEGATIVE
RBC # BLD AUTO: 4.07 M/UL (ref 4.2–5.4)
RBC # UR STRIP: ABNORMAL /UL
RBC #/AREA URNS HPF: ABNORMAL /HPF
SP GR UR STRIP: 1.02
UROBILINOGEN UR STRIP-ACNC: 0.2 MG/DL
WBC # BLD AUTO: 9.73 K/UL (ref 4.5–11)
WBC #/AREA URNS HPF: ABNORMAL /HPF

## 2021-12-21 PROCEDURE — 81001 URINALYSIS AUTO W/SCOPE: CPT

## 2021-12-21 PROCEDURE — 85025 COMPLETE CBC W/AUTO DIFF WBC: CPT

## 2021-12-21 PROCEDURE — 87077 CULTURE AEROBIC IDENTIFY: CPT

## 2021-12-21 PROCEDURE — 81003 URINALYSIS AUTO W/O SCOPE: CPT

## 2021-12-21 PROCEDURE — 87086 URINE CULTURE/COLONY COUNT: CPT

## 2021-12-23 LAB — UA COMPLETE W REFLEX CULTURE PNL UR: ABNORMAL

## 2021-12-29 RX ORDER — AZITHROMYCIN 250 MG/1
TABLET, FILM COATED ORAL
COMMUNITY
Start: 2021-12-21 | End: 2022-05-13

## 2022-01-05 ENCOUNTER — LAB REQUISITION (OUTPATIENT)
Dept: LAB | Facility: HOSPITAL | Age: 77
End: 2022-01-05
Attending: FAMILY MEDICINE
Payer: MEDICARE

## 2022-01-05 DIAGNOSIS — R09.81 NASAL CONGESTION: ICD-10-CM

## 2022-01-05 DIAGNOSIS — R05.9 COUGH, UNSPECIFIED: ICD-10-CM

## 2022-01-05 DIAGNOSIS — R50.9 FEVER, UNSPECIFIED: ICD-10-CM

## 2022-01-05 LAB
FLUAV AG UPPER RESP QL IA.RAPID: NEGATIVE
FLUBV AG UPPER RESP QL IA.RAPID: NEGATIVE
RAPID GROUP A STREP: NEGATIVE
SARS-COV+SARS-COV-2 AG RESP QL IA.RAPID: POSITIVE

## 2022-01-05 PROCEDURE — 87426 SARSCOV CORONAVIRUS AG IA: CPT

## 2022-01-05 PROCEDURE — 87804 INFLUENZA ASSAY W/OPTIC: CPT

## 2022-01-05 PROCEDURE — 87880 STREP A ASSAY W/OPTIC: CPT

## 2022-01-10 ENCOUNTER — OFFICE VISIT (OUTPATIENT)
Dept: FAMILY MEDICINE | Facility: CLINIC | Age: 77
End: 2022-01-10
Payer: MEDICARE

## 2022-01-10 DIAGNOSIS — E11.9 TYPE 2 DIABETES MELLITUS WITHOUT COMPLICATION, WITHOUT LONG-TERM CURRENT USE OF INSULIN: ICD-10-CM

## 2022-01-10 DIAGNOSIS — U07.1 COVID-19 VIRUS INFECTION: Primary | ICD-10-CM

## 2022-01-10 DIAGNOSIS — I10 BENIGN ESSENTIAL HTN: ICD-10-CM

## 2022-01-10 PROCEDURE — 99442 PR PHYSICIAN TELEPHONE EVALUATION 11-20 MIN: ICD-10-PCS | Mod: 95,,, | Performed by: FAMILY MEDICINE

## 2022-01-10 PROCEDURE — 99442 PR PHYSICIAN TELEPHONE EVALUATION 11-20 MIN: CPT | Mod: 95,,, | Performed by: FAMILY MEDICINE

## 2022-01-10 NOTE — PROGRESS NOTES
Established Patient - Audio Only Telehealth Visit     The patient location is: home  The chief complaint leading to consultation is: covid pos   Visit type: Virtual visit with audio only (telephone)  Total time spent with patient: 15 min       The reason for the audio only service rather than synchronous audio and video virtual visit was related to technical difficulties or patient preference/necessity.     Each patient to whom I provide medical services by telemedicine is:  (1) informed of the relationship between the physician and patient and the respective role of any other health care provider with respect to management of the patient; and (2) notified that they may decline to receive medical services by telemedicine and may withdraw from such care at any time. Patient verbally consented to receive this service via voice-only telephone call.       HPI: Patient has covid symptoms last week and tested positive on 1/5. Started meds and doing so far ok. Weak in general. Has HH.      Assessment and plan:  See visit problem and plan.                         This service was not originating from a related E/M service provided within the previous 7 days nor will  to an E/M service or procedure within the next 24 hours or my soonest available appointment.  Prevailing standard of care was able to be met in this audio-only visit.

## 2022-03-11 DIAGNOSIS — Z71.89 COMPLEX CARE COORDINATION: ICD-10-CM

## 2022-05-13 ENCOUNTER — HOSPITAL ENCOUNTER (INPATIENT)
Facility: HOSPITAL | Age: 77
LOS: 20 days | Discharge: HOME HEALTH | DRG: 948 | End: 2022-06-02
Attending: HOSPITALIST | Admitting: HOSPITALIST
Payer: MEDICARE

## 2022-05-13 DIAGNOSIS — Z98.890 S/P CAROTID ENDARTERECTOMY: ICD-10-CM

## 2022-05-13 PROBLEM — E03.9 HYPOTHYROIDISM: Chronic | Status: ACTIVE | Noted: 2022-05-13

## 2022-05-13 LAB — GLUCOSE SERPL-MCNC: 163 MG/DL (ref 70–105)

## 2022-05-13 PROCEDURE — 25000003 PHARM REV CODE 250: Performed by: NURSE PRACTITIONER

## 2022-05-13 PROCEDURE — 11000004 HC SNF PRIVATE

## 2022-05-13 PROCEDURE — 92523 SPEECH SOUND LANG COMPREHEN: CPT

## 2022-05-13 PROCEDURE — 82962 GLUCOSE BLOOD TEST: CPT

## 2022-05-13 PROCEDURE — 27000958

## 2022-05-13 PROCEDURE — 97161 PT EVAL LOW COMPLEX 20 MIN: CPT

## 2022-05-13 PROCEDURE — 92610 EVALUATE SWALLOWING FUNCTION: CPT

## 2022-05-13 PROCEDURE — 97166 OT EVAL MOD COMPLEX 45 MIN: CPT

## 2022-05-13 RX ORDER — NEBIVOLOL 5 MG/1
10 TABLET ORAL DAILY
Status: DISCONTINUED | OUTPATIENT
Start: 2022-05-14 | End: 2022-06-02 | Stop reason: HOSPADM

## 2022-05-13 RX ORDER — NAPROXEN SODIUM 220 MG/1
81 TABLET, FILM COATED ORAL DAILY
Status: DISCONTINUED | OUTPATIENT
Start: 2022-05-14 | End: 2022-06-02 | Stop reason: HOSPADM

## 2022-05-13 RX ORDER — MUPIROCIN 20 MG/G
OINTMENT TOPICAL DAILY
Status: DISCONTINUED | OUTPATIENT
Start: 2022-05-14 | End: 2022-06-02 | Stop reason: HOSPADM

## 2022-05-13 RX ORDER — LOSARTAN POTASSIUM 100 MG/1
100 TABLET ORAL DAILY
Status: DISCONTINUED | OUTPATIENT
Start: 2022-05-14 | End: 2022-06-02 | Stop reason: HOSPADM

## 2022-05-13 RX ORDER — CHLORPROMAZINE HYDROCHLORIDE 25 MG/1
25 TABLET, FILM COATED ORAL DAILY
Status: DISCONTINUED | OUTPATIENT
Start: 2022-05-14 | End: 2022-05-13

## 2022-05-13 RX ORDER — AMOXICILLIN 250 MG
1 CAPSULE ORAL 2 TIMES DAILY PRN
Status: DISCONTINUED | OUTPATIENT
Start: 2022-05-13 | End: 2022-06-02 | Stop reason: HOSPADM

## 2022-05-13 RX ORDER — CHLORPROMAZINE HYDROCHLORIDE 25 MG/1
25 TABLET, FILM COATED ORAL DAILY PRN
Status: DISCONTINUED | OUTPATIENT
Start: 2022-05-13 | End: 2022-06-02 | Stop reason: HOSPADM

## 2022-05-13 RX ORDER — ACETAMINOPHEN 325 MG/1
650 TABLET ORAL EVERY 6 HOURS PRN
Status: DISCONTINUED | OUTPATIENT
Start: 2022-05-13 | End: 2022-06-02 | Stop reason: HOSPADM

## 2022-05-13 RX ORDER — LEVOTHYROXINE SODIUM 25 UG/1
75 TABLET ORAL
Status: DISCONTINUED | OUTPATIENT
Start: 2022-05-14 | End: 2022-06-02 | Stop reason: HOSPADM

## 2022-05-13 RX ORDER — TALC
6 POWDER (GRAM) TOPICAL NIGHTLY PRN
Status: DISCONTINUED | OUTPATIENT
Start: 2022-05-13 | End: 2022-06-02 | Stop reason: HOSPADM

## 2022-05-13 RX ORDER — METFORMIN HYDROCHLORIDE 500 MG/1
500 TABLET ORAL 2 TIMES DAILY WITH MEALS
Status: DISCONTINUED | OUTPATIENT
Start: 2022-05-13 | End: 2022-06-02 | Stop reason: HOSPADM

## 2022-05-13 RX ORDER — FENOFIBRATE 145 MG/1
145 TABLET, FILM COATED ORAL DAILY
Status: DISCONTINUED | OUTPATIENT
Start: 2022-05-14 | End: 2022-06-02 | Stop reason: HOSPADM

## 2022-05-13 RX ORDER — CALCIUM CARBONATE 200(500)MG
500 TABLET,CHEWABLE ORAL 2 TIMES DAILY PRN
Status: DISCONTINUED | OUTPATIENT
Start: 2022-05-13 | End: 2022-06-02 | Stop reason: HOSPADM

## 2022-05-13 RX ORDER — ATORVASTATIN CALCIUM 40 MG/1
40 TABLET, FILM COATED ORAL DAILY
Status: DISCONTINUED | OUTPATIENT
Start: 2022-05-14 | End: 2022-06-02 | Stop reason: HOSPADM

## 2022-05-13 RX ORDER — CLOPIDOGREL BISULFATE 75 MG/1
75 TABLET ORAL DAILY
Status: DISCONTINUED | OUTPATIENT
Start: 2022-05-14 | End: 2022-06-02 | Stop reason: HOSPADM

## 2022-05-13 RX ORDER — HYDROCHLOROTHIAZIDE 12.5 MG/1
12.5 TABLET ORAL DAILY
Status: DISCONTINUED | OUTPATIENT
Start: 2022-05-14 | End: 2022-05-30

## 2022-05-13 RX ADMIN — METFORMIN HYDROCHLORIDE 500 MG: 500 TABLET, FILM COATED ORAL at 05:05

## 2022-05-13 NOTE — PLAN OF CARE
Problem: SLP  Goal: SLP Goal  Description: Patient will complete hard glottal attacks and tongue base retractions exercises with mod-max accuracy Independently.   Patient will complete laryngeal elevation exercises with mod-max accuracy Independently.   Patient will complete word finding tasks with 80% accuracy Independently.   Patient will complete problem solving tasks with 80% accuracy Independently.   Patient will complete verbal reasoning tasks with 80% accuracy Independently.   Patient will complete short term memory tasks with 80% accuracy Independently.   Patient will complete demonstrate speech intelligibility at phrase and sentence level with 90% accuracy.      Outcome: Ongoing, Progressing

## 2022-05-13 NOTE — SUBJECTIVE & OBJECTIVE
Interval History: upon arrival    Review of Systems   Constitutional:  Positive for activity change. Negative for fever.   Respiratory:  Negative for cough, shortness of breath and wheezing.    Cardiovascular:  Negative for chest pain.   Gastrointestinal:  Negative for abdominal distention, constipation, diarrhea, nausea and vomiting.   Genitourinary:  Negative for difficulty urinating.   Musculoskeletal:  Negative for neck pain.   Skin:  Negative for color change.   Neurological:  Positive for weakness. Negative for dizziness, light-headedness and numbness.   Objective:     Vital Signs (Most Recent):  Temp: 98.2 °F (36.8 °C) (05/13/22 1241)  Pulse: (!) 116 (05/13/22 1241)  Resp: 20 (05/13/22 1241)  BP: (!) 137/93 (05/13/22 1241)  SpO2: 96 % (05/13/22 1241)   Vital Signs (24h Range):  Temp:  [98.2 °F (36.8 °C)] 98.2 °F (36.8 °C)  Pulse:  [116] 116  Resp:  [20] 20  SpO2:  [96 %] 96 %  BP: (137)/(93) 137/93     Weight: 96.7 kg (213 lb 3 oz)  Body mass index is 34.41 kg/m².    Intake/Output Summary (Last 24 hours) at 5/13/2022 1308  Last data filed at 5/13/2022 1212  Gross per 24 hour   Intake 240 ml   Output --   Net 240 ml      Physical Exam  Constitutional:       General: She is not in acute distress.     Appearance: She is obese.   Neck:      Comments: Noted bruising  and scar from left carotid endarterectomy  Cardiovascular:      Rate and Rhythm: Regular rhythm.      Pulses: Normal pulses.   Pulmonary:      Effort: Pulmonary effort is normal.      Breath sounds: Normal breath sounds.   Abdominal:      Palpations: Abdomen is soft.      Tenderness: There is no abdominal tenderness.   Musculoskeletal:         General: Normal range of motion.      Cervical back: Normal range of motion and neck supple.   Skin:     General: Skin is warm and dry.      Findings: Bruising present.   Neurological:      General: No focal deficit present.      Mental Status: She is alert and oriented to person, place, and time.      Cranial  Nerves: No cranial nerve deficit.      Sensory: No sensory deficit.      Motor: Weakness present.      Comments: Generalized weakness   Psychiatric:         Mood and Affect: Mood normal.         Behavior: Behavior normal.       Significant Labs: All pertinent labs within the past 24 hours have been reviewed.    Significant Imaging: I have reviewed all pertinent imaging results/findings within the past 24 hours.

## 2022-05-13 NOTE — PT/OT/SLP EVAL
Physical Therapy Evaluation    Patient Name:  Michell Dhillon   MRN:  [id number]    Recommendations:     Discharge Recommendations:  home health PT, home health OT   Discharge Equipment Recommendations: walker, rolling   Barriers to discharge: Inaccessible home and Decreased caregiver support    Assessment:     Michell Dhillon is a 77 y.o. female admitted with a medical diagnosis of S/P carotid endarterectomy.  She presents with the following impairments/functional limitations:  weakness, impaired endurance, impaired self care skills, impaired functional mobilty, gait instability, impaired balance, pain, impaired coordination Patient reports she has a step to enter home and prior to admission she was ambulating without AD for part of the day and using a WC for part of the day for household mobility. She demonstrates poor balance and difficulty with standing and walking and will need rehab to return to prior level of function.     Rehab Prognosis: Good; patient would benefit from acute skilled PT services to address these deficits and reach maximum level of function.    Recent Surgery: * No surgery found *      Plan:     During this hospitalization, patient to be seen 5 x/week to address the identified rehab impairments via gait training, therapeutic activities, therapeutic exercises, wheelchair management/training and progress toward the following goals:    · Plan of Care Expires:  06/03/22    Subjective     Chief Complaint: weakness  Patient/Family Comments/goals: return home with family  Pain/Comfort:  · Pain Rating 1: 0/10  · Pain Rating Post-Intervention 1: 0/10    Patients cultural, spiritual, [employer] conflicts given the current situation: no    Living Environment:  Patient lives with family and used a WC intermittently for household mobility   Prior to admission, patients level of function was amb with no AD or used WC.  Equipment used at home: 3-in-1 commode, wheelchair.  DME owned (not currently used):  none.  Upon discharge, patient will have assistance from .    Objective:     Communicated with nurse prior to session.  Patient found supine with    upon PT entry to room.    General Precautions: Standard, fall   Orthopedic Precautions:    Braces:    Respiratory Status: Room air    Exams:  · RLE ROM: WFL  · RLE Strength: 3/5  · LLE ROM: WFL  · LLE Strength: 3/5    Functional Mobility:  · Bed Mobility:     · Supine to Sit: moderate assistance  · Sit to Supine: moderate assistance  · Transfers:     · Sit to Stand:  moderate assistance with no AD  · Gait: pateint took 3 steps to the left to the head of the bed with mod assist and poor foot clearance as well as moderate posterior lean in standing    Therapeutic Activities and Exercises:   eval only.  Educated patient on Plan of care,       AM-PAC 6 CLICK MOBILITY  Total Score:12     Patient left supine with call button in reach.    GOALS:   Multidisciplinary Problems     Physical Therapy Goals        Problem: Physical Therapy    Goal Priority Disciplines Outcome Goal Variances Interventions   Physical Therapy Goal     PT, PT/OT Ongoing, Progressing     Description: STG:  Patient will perform all bed mobility with CGA  Patient will perform sit to stand and SPT with CGA  Patient will ambulate 50 feet with RW assistive device with CGA assistance without LOB.     LTG  Patient will perform bed mobility with mod I.  Patient will perform sit to stand and SPT with SBA  Patient will ambuate 150 feet with RW assistive device with CGA assistance without LOB.   Patient will perform 10 min of LE exercise without rest break to increase LE strength to 4/5.                      History:     Past Medical History:   Diagnosis Date    Arthritis     Diabetes mellitus, type 2     Hyperlipidemia     Hypertension     Hypothyroidism     Transient cerebral ischemic attack, unspecified 04/14/2021       No past surgical history on file.    Time Tracking:     PT Received On:  05/13/22  PT Start Time: 1324     PT Stop Time: 1336  PT Total Time (min): 12 min     Billable Minutes: Evaluation 12 05/13/2022

## 2022-05-13 NOTE — NURSING
Patient arrived to floor via personal vehicle with family.  She was assisted into wheelchair and transferred to room 26.  Lunch arrived as she did, she ate approximately 75%.  Blood sugar checked prior to eating, 163.  Patient states she checks her glucose 1 time in morning when at home.  She also reports that prior to having this CVA, she was able to be up, cooking and taking care of her  and a son that lives with them.  Bandage noted to left neck (endarterectomy done on 5/8/22)  Patient has firm hand grasp bilaterally, she is able to lift both feet/legs, but needs some help to stand and transfer.  Bruising noted bilateral arms related to lab draws and IV's.  Patient denies any needs at this time.

## 2022-05-13 NOTE — PT/OT/SLP EVAL
Occupational Therapy   Evaluation    Name: Michell Dhillon  MRN: [id number]  Admitting Diagnosis:  Recent CVA and endarterectomy  Recent Surgery: L side cervical endarterectomy      Recommendations:     Discharge Recommendations: home with home health, home health OT  Discharge Equipment Recommendations:  grab bar, hip kit, walker, rolling  Barriers to discharge:  None    Assessment:     Michell Dhillon is a 77 y.o. female with a medical diagnosis of recent cva and endarterectomy left side.  She presents with weakness, low endurance, complaint of occasional back pain when standing, decreased mobility and poor balance with fear of falling . Performance deficits affecting function: weakness, impaired endurance, impaired self care skills, impaired functional mobilty, gait instability, impaired balance, decreased lower extremity function, pain.      Rehab Prognosis: Good; patient would benefit from skilled OT services to address these deficits and reach maximum level of function.       Plan:     Patient to be seen 5 x/week to address the above listed problems via self-care/home management, therapeutic activities, therapeutic exercises, wheelchair management/training  · Plan of Care Expires: 06/10/22  · Plan of Care Reviewed with: patient    Subjective     Chief Complaint: weakness and poor balance in standing  Patient/Family Comments/goals: get back home to caring for her  and doing normal activity    Occupational Profile:  Living Environment: home with  and son, one step into home, has ramp, uses w/c or walks short distances in home without AD  Previous level of function: mod I to independent in home ambulator and homemaker, does not drive or get out into community much  Roles and Routines: wife and mother  Equipment Used at Home:  3-in-1 commode, wheelchair  Assistance upon Discharge: mod I AE as needed    Pain/Comfort:  · Pain Rating 1: 0/10    Patients cultural, spiritual, Pentec[employer]alism conflicts given  the current situation: no    Objective:     Communicated with: pt prior to session.  Patient found up in chair with  (no lines or drains) upon OT entry to room.    General Precautions: Standard, fall   Orthopedic Precautions:N/A   Braces: N/A  Respiratory Status: Room air    Occupational Performance:    Bed Mobility:    · Patient completed Rolling/Turning to Left with  contact guard assistance and minimum assistance  · Patient completed Rolling/Turning to Right with contact guard assistance and minimum assistance  · Patient completed Scooting/Bridging with contact guard assistance and minimum assistance  · Patient completed Sit to Supine with minimum assistance and moderate assistance    Functional Mobility/Transfers:  · Patient completed Sit <> Stand Transfer with minimum assistance  with  hand-held assist and grab bars(s)   · Patient completed Bed <> Chair Transfer using Step Transfer technique with minimum assistance with hand-held assist and grab bars(s)  · Functional Mobility: not tested today    Activities of Daily Living:  · Feeding:  stand by assistance setup  · Grooming: minimum assistance for thoroughness  · Bathing: minimum assistance and moderate assistance UB/LB  · Upper Body Dressing: minimum assistance no AE  · Lower Body Dressing: moderate assistance might need ADL equipment  · Toileting: minimum assistance and moderate assistance for handling clothing and managing thorough pericare    Cognitive/Visual Perceptual:  Cognitive/Psychosocial Skills:     -       Oriented to: Person, Place, Time and Situation   -       Follows Commands/attention:Follows multistep  commands  -       Communication: clear/fluent  -       Memory: No Deficits noted  -       Safety awareness/insight to disability: intact   -       Mood/Affect/Coping skills/emotional control: Appropriate to situation, Cooperative and Pleasant    Physical Exam:  Balance:    -       poor in standing; good in sitting  Dominant hand:    -        right  Upper Extremity Range of Motion:     -       Right Upper Extremity: WFL  -       Left Upper Extremity: WFL  Upper Extremity Strength:    -       Right Upper Extremity: -3/5  -       Left Upper Extremity: -3/5   Strength:    -       Right Upper Extremity: 3/5  -       Left Upper Extremity: 3/5  Fine Motor Coordination:    -       Intact  Gross motor coordination:   WFL    AMPAC 6 Click ADL:  AMPAC Total Score: 18    Treatment & Education:  eval completed with OT today  Education:    Patient left HOB elevated with call button in reach    GOALS:   Multidisciplinary Problems     Occupational Therapy Goals        Problem: Occupational Therapy    Goal Priority Disciplines Outcome Interventions   Occupational Therapy Goal     OT, PT/OT Ongoing, Progressing    Description: STG: within 2 weeks  Pt will perform grooming with setup  Pt will bathe with setup  Pt will perform UE dressing with setup  Pt will perform LE dressing with setup  Pt will sit EOB x 30 min with no assistance  Pt will transfer bed/chair/bsc with sba  Pt will perform standing task x 3 min with svn assistance  Pt will tolerate 60 minutes of tx without fatigue      LTG: within 4 weeks  1.Restore to max I with self care and mobility.                      History:     Past Medical History:   Diagnosis Date    Arthritis     Diabetes mellitus, type 2     Hyperlipidemia     Hypertension     Hypothyroidism     Transient cerebral ischemic attack, unspecified 04/14/2021       No past surgical history on file.    Time Tracking:     OT Date of Treatment: 05/13/22  OT Start Time: 1223  OT Stop Time: 1238  OT Total Time (min): 15 min    Billable Minutes:Evaluation 15    5/13/2022

## 2022-05-13 NOTE — PLAN OF CARE
[facility] Medical Surgical Unit  Initial Discharge Assessment       Primary Care Provider: Daryl Moscoso NP    Admission Diagnosis: No admission diagnoses are documented for this encounter.    Admission Date: 5/13/2022  Expected Discharge Date:     Discharge Barriers Identified: None    Payor: MEDICARE / Plan: MEDICARE PART A & B / Product Type: Government /     Extended Emergency Contact Information  Primary Emergency Contact: Mikki Gallardo  Mobile Phone: [phone]  Relation: Daughter  Preferred language: English   needed? No    Discharge Plan A: Home with family, Home Health         [facility] - [place], [place] - [address]  [address]  [place] [address]  Phone: [phone] Fax: [phone]      Initial Assessment (most recent)     Adult Discharge Assessment - 05/13/22 1204        Discharge Assessment    Assessment Type Discharge Planning Assessment     Confirmed/corrected address, phone number and insurance Yes     Source of Information patient     When was your last doctors appointment? --   unable to recall    Communicated NIGEL with patient/caregiver Date not available/Unable to determine     Reason For Admission swing bed services     Lives With child(janell), adult;spouse     Facility Arrived From: [facility]     Do you expect to return to your current living situation? Yes     Do you have help at home or someone to help you manage your care at home? Yes     Who are your caregiver(s) and their phone number(s)? spouse and adult children     Prior to hospitilization cognitive status: Alert/Oriented     Current cognitive status: Alert/Oriented     Walking or Climbing Stairs Difficulty ambulation difficulty, requires equipment     Dressing/Bathing Difficulty bathing difficulty, assistance 1 person     Home Accessibility wheelchair accessible     Home Layout Able to live on 1st floor     Equipment Currently Used at Home bedside commode;wheelchair     Readmission  within 30 days? No     Patient currently being followed by outpatient case management? No     Do you currently have service(s) that help you manage your care at home? Yes     Name and Contact number of agency [facility]     Is the pt/caregiver preference to resume services with current agency Yes     Do you take prescription medications? Yes     Do you have prescription coverage? Yes     Do you have any problems affording any of your prescribed medications? No     Is the patient taking medications as prescribed? yes     Who is going to help you get home at discharge? Mikki [phone]     How do you get to doctors appointments? car, drives self;family or friend will provide     Are you on dialysis? No     Do you take coumadin? No     Discharge Plan A Home with family;Home Health     DME Needed Upon Discharge  other (see comments)   TBD    Discharge Plan discussed with: Patient     Discharge Barriers Identified None                  Mrs. Dhillon is admitted to [facility] for swing bed services following an acute stay at [facility] for CVA. She is A&Ox3 at time of assessmen.  Prior to hospitalization, patient was independent. She is followed by [facility]. PCP is Daryl Moscoso. Pharmacy is [facility]. contact Mikki [phone].

## 2022-05-13 NOTE — PLAN OF CARE
Problem: Occupational Therapy  Goal: Occupational Therapy Goal  Description: STG: within 2 weeks  Pt will perform grooming with setup  Pt will bathe with setup  Pt will perform UE dressing with setup  Pt will perform LE dressing with setup  Pt will sit EOB x 30 min with no assistance  Pt will transfer bed/chair/bsc with sba  Pt will perform standing task x 3 min with svn assistance  Pt will tolerate 60 minutes of tx without fatigue      LTG: within 4 weeks  1.Restore to max I with self care and mobility.     Outcome: Ongoing, Progressing

## 2022-05-13 NOTE — PLAN OF CARE
Problem: Physical Therapy  Goal: Physical Therapy Goal  Description: STG:  Patient will perform all bed mobility with CGA  Patient will perform sit to stand and SPT with CGA  Patient will ambulate 50 feet with RW assistive device with CGA assistance without LOB.     LTG  Patient will perform bed mobility with mod I.  Patient will perform sit to stand and SPT with SBA  Patient will ambuate 150 feet with RW assistive device with CGA assistance without LOB.   Patient will perform 10 min of LE exercise without rest break to increase LE strength to 4/5.     Outcome: Ongoing, Progressing

## 2022-05-13 NOTE — PT/OT/SLP EVAL
"Speech Language Pathology Evaluation  Cognitive/Bedside Swallow    Patient Name:  Michell Dhillon   MRN:  [id number]  Admitting Diagnosis: S/P carotid endarterectomy    Recommendations:                  General Recommendations:  Dysphagia therapy and Cognitive-linguistic therapy  Diet recommendations:  Mechanical soft, Thin (cup edge, no straws)   Aspiration Precautions: 1 bite/sip at a time, Alternating bites/sips, No straws and Standard aspiration precautions   General Precautions: Standard, fall, aspiration (see MBS regarding aspiration risk)  Communication strategies:  increased volume     History:     Past Medical History:   Diagnosis Date    Arthritis     Diabetes mellitus, type 2     Hyperlipidemia     Hypertension     Hypothyroidism     Transient cerebral ischemic attack, unspecified 04/14/2021       No past surgical history on file.    Social and Med History: Patient lives  At home with  and son. Pt with ER visit at [facility] on 5/4 with R sided weakness and slurred speech. Admission with MRI indicating CVA. Pt with L endarderectomy.      Modified Barium Swallow: MBS completed at [facility] indicating: premature loss to pyriforms, prolonged mastication, pooling at pyriforms with poor pharyngeal squeeze, penetration with straw and lack of awareness. Decreased TBR and HLE.  According to pt , "I swallow good able to eat all my food", with no recollection of speech therapy..    Chest X-Rays: see chart    Prior diet: regular thin.    Subjective     Pt alert agreeable to eval  Patient goals: ready to get back home      Pain/Comfort:  ·      Respiratory Status: Room air    Objective:     Cognitive Status:    Attention No obvious deficits observed during eval  Orientation Oriented x4  Memory short term recall moderate  Problem Solving moderate  Safety awareness moderate      Receptive Language:   Comprehension:      WFL    Pragmatics:    WFL    Expressive Language:  Verbal:    WFL      Motor " Speech:  Intelligibility mild at times, depending upon rate    Voice:   WFL  Oral Musculature Evaluation  · Oral Musculature: general weakness  · Dentition: scattered dentition  · Secretion Management: adequate  · Mucosal Quality: adequate  · Mandibular Strength and Mobility: impaired  · Oral Labial Strength and Mobility: WNL  · Lingual Strength and Mobility: impaired strength  · Velar Elevation: WNL  · Buccal Strength and Mobility: WNL  · Volitional Cough: WFL  · Volitional Swallow: WFL  · Voice Prior to PO Intake: clear    Bedside Swallow Eval:   Consistencies Assessed:  · Thin liquids WFL via cup edge x5  · Mixed consistencies WFL  · Soft solids WFL     Oral Phase:   · WFL    Pharyngeal Phase:   · decreased hyolaryngeal excursion to palpation  · no overt clinical signs/symptoms of aspiration    Compensatory Strategies  · None      Assessment:     Michell Dhillon is a 77 y.o. female with an SLP diagnosis of Dysphagia, Dysarthria and Cognitive-Linguistic Impairment.  She presents with difficulty in processing/word finding, recall, and reasoning for safety awareness, mild-moderate dysarthria, and mild- moderate dysphagia with aspiration risk secondary to lack of sensation per MBS report.    Goals:   Multidisciplinary Problems     SLP Goals        Problem: SLP    Goal Priority Disciplines Outcome   SLP Goal     SLP Ongoing, Progressing   Description: Patient will complete hard glottal attacks and tongue base retractions exercises with mod-max accuracy Independently.   Patient will complete laryngeal elevation exercises with mod-max accuracy Independently.   Patient will complete word finding tasks with 80% accuracy Independently.   Patient will complete problem solving tasks with 80% accuracy Independently.   Patient will complete verbal reasoning tasks with 80% accuracy Independently.   Patient will complete short term memory tasks with 80% accuracy Independently.   Patient will complete demonstrate speech  intelligibility at phrase and sentence level with 90% accuracy.                       Plan:     · Patient to be seen:  5 x/week   · Plan of Care expires:  06/10/22  · Plan of Care reviewed with:  patient   · SLP Follow-Up:  Yes       Discharge recommendations:  Discharge Facility/Level of Care Needs: home with home health   Barriers to Discharge:  Level of Skilled Assistance Needed currently    Time Tracking:     SLP Treatment Date:      Speech Start Time:  1300  Speech Stop Time:  1325     Speech Total Time (min):  25 min    Billable Minutes: Eval Swallow 10, Speech/Language 15     05/13/2022

## 2022-05-13 NOTE — HPI
Ms Dhillon is a pleasant 77 yr old WF who has a known hx of HTN, T2DM, Hypothyroidism, osteoarthritis, carotid stenosis, dyslipidemia.  She was admitted to [facility] 05/04 with stroke symptoms.  She was found to have left posterior basal ganglia, corona radiata and lucanar CVA.  She had left carotid endarterectomy on 5/09 and was kept at [facility] until today.  She was transferred here for [facility] for OT/PT and medical management.  Ms Dhillon lives at home with her son.  She reports before the admit, she was able to walk and care for herself.  She is found sitting up in chair, eating lunch in NAD.  She denies pain or discomfort. She is alert, oriented and answers all questions appropriately.  Skin is warm and dry with noted bruising to left side of her neck and bilateral upper arms.  She moves all extremities well but generalized weakness.

## 2022-05-13 NOTE — PROGRESS NOTES
[facility] Medicine  Progress Note    Patient Name: Michell Dhillon  MRN: [id number]  Patient Class: IP- Swing   Admission Date: 5/13/2022  Length of Stay: 0 days  Attending Physician: Elmer Rainey DO  Primary Care Provider: Daryl Moscoso NP        Subjective:     Principal Problem:S/P carotid endarterectomy        HPI:  Ms Dhillon is a pleasant 77 yr old WF who has a known hx of HTN, T2DM, Hypothyroidism, osteoarthritis, carotid stenosis, dyslipidemia.  She was admitted to [facility] 05/04 with stroke symptoms.  She was found to have left posterior basal ganglia, corona radiata and lucanar CVA.  She had left carotid endarterectomy on 5/09 and was kept at [facility] until today.  She was transferred here for [facility] for OT/PT and medical management.  Ms Dhillon lives at home with her son.  She reports before the admit, she was able to walk and care for herself.  She is found sitting up in chair, eating lunch in NAD.  She denies pain or discomfort. She is alert, oriented and answers all questions appropriately.  Skin is warm and dry with noted bruising to left side of her neck and bilateral upper arms.  She moves all extremities well but generalized weakness.  Discussed care with Dr. Rainey who will be the admitting physician.      Overview/Hospital Course:  No notes on file    Interval History: upon arrival    Review of Systems   Constitutional:  Positive for activity change. Negative for fever.   Respiratory:  Negative for cough, shortness of breath and wheezing.    Cardiovascular:  Negative for chest pain.   Gastrointestinal:  Negative for abdominal distention, constipation, diarrhea, nausea and vomiting.   Genitourinary:  Negative for difficulty urinating.   Musculoskeletal:  Negative for neck pain.   Skin:  Negative for color change.   Neurological:  Positive for weakness. Negative for dizziness, light-headedness and numbness.   Objective:     Vital Signs (Most  Recent):  Temp: 98.2 °F (36.8 °C) (05/13/22 1241)  Pulse: (!) 116 (05/13/22 1241)  Resp: 20 (05/13/22 1241)  BP: (!) 137/93 (05/13/22 1241)  SpO2: 96 % (05/13/22 1241)   Vital Signs (24h Range):  Temp:  [98.2 °F (36.8 °C)] 98.2 °F (36.8 °C)  Pulse:  [116] 116  Resp:  [20] 20  SpO2:  [96 %] 96 %  BP: (137)/(93) 137/93     Weight: 96.7 kg (213 lb 3 oz)  Body mass index is 34.41 kg/m².    Intake/Output Summary (Last 24 hours) at 5/13/2022 1308  Last data filed at 5/13/2022 1212  Gross per 24 hour   Intake 240 ml   Output --   Net 240 ml      Physical Exam  Constitutional:       General: She is not in acute distress.     Appearance: She is obese.   Neck:      Comments: Noted bruising  and scar from left carotid endarterectomy  Cardiovascular:      Rate and Rhythm: Regular rhythm.      Pulses: Normal pulses.   Pulmonary:      Effort: Pulmonary effort is normal.      Breath sounds: Normal breath sounds.   Abdominal:      Palpations: Abdomen is soft.      Tenderness: There is no abdominal tenderness.   Musculoskeletal:         General: Normal range of motion.      Cervical back: Normal range of motion and neck supple.   Skin:     General: Skin is warm and dry.      Findings: Bruising present.   Neurological:      General: No focal deficit present.      Mental Status: She is alert and oriented to person, place, and time.      Cranial Nerves: No cranial nerve deficit.      Sensory: No sensory deficit.      Motor: Weakness present.      Comments: Generalized weakness   Psychiatric:         Mood and Affect: Mood normal.         Behavior: Behavior normal.       Significant Labs: All pertinent labs within the past 24 hours have been reviewed.    Significant Imaging: I have reviewed all pertinent imaging results/findings within the past 24 hours.      Assessment/Plan:      * S/P carotid endarterectomy  Keep wound clean and dry  F/u as directed      Hypothyroidism  Continue home medication      Type 2 diabetes mellitus without  complication, without long-term current use of insulin  Monitor lab  Continue metformin        Benign essential HTN  Monitor VS  Losartan/ hctz      VTE Risk Mitigation (From admission, onward)         Ordered     Place RENITA hose  Until discontinued         05/13/22 1213     IP VTE HIGH RISK PATIENT  Once         05/13/22 1213     Place sequential compression device  Until discontinued         05/13/22 1213                Discharge Planning   NIGEL:      Code Status: Full Code   Is the patient medically ready for discharge?:     Reason for patient still in hospital (select all that apply): Other (specify) swingbed  Discharge Plan A: Home with family, Home Health                  JASSI Oh  Department of Hospital Medicine   [facility]

## 2022-05-14 LAB
ANION GAP SERPL CALCULATED.3IONS-SCNC: 14 MMOL/L (ref 7–16)
BASOPHILS # BLD AUTO: 0.03 K/UL (ref 0–0.2)
BASOPHILS NFR BLD AUTO: 0.3 % (ref 0–1)
BUN SERPL-MCNC: 44 MG/DL (ref 7–18)
BUN/CREAT SERPL: 34 (ref 6–20)
CALCIUM SERPL-MCNC: 8.9 MG/DL (ref 8.5–10.1)
CHLORIDE SERPL-SCNC: 94 MMOL/L (ref 98–107)
CO2 SERPL-SCNC: 25 MMOL/L (ref 21–32)
CREAT SERPL-MCNC: 1.28 MG/DL (ref 0.55–1.02)
DIFFERENTIAL METHOD BLD: ABNORMAL
EOSINOPHIL # BLD AUTO: 0.12 K/UL (ref 0–0.5)
EOSINOPHIL NFR BLD AUTO: 1.2 % (ref 1–4)
ERYTHROCYTE [DISTWIDTH] IN BLOOD BY AUTOMATED COUNT: 13.3 % (ref 11.5–14.5)
GLUCOSE SERPL-MCNC: 141 MG/DL (ref 74–106)
HCT VFR BLD AUTO: 40.2 % (ref 38–47)
HGB BLD-MCNC: 13.3 G/DL (ref 12–16)
LYMPHOCYTES # BLD AUTO: 2.04 K/UL (ref 1–4.8)
LYMPHOCYTES NFR BLD AUTO: 19.8 % (ref 27–41)
MCH RBC QN AUTO: 29.4 PG (ref 27–31)
MCHC RBC AUTO-ENTMCNC: 33.1 G/DL (ref 32–36)
MCV RBC AUTO: 88.7 FL (ref 80–96)
MONOCYTES # BLD AUTO: 0.88 K/UL (ref 0–0.8)
MONOCYTES NFR BLD AUTO: 8.5 % (ref 2–6)
MPC BLD CALC-MCNC: 9.1 FL (ref 9.4–12.4)
NEUTROPHILS # BLD AUTO: 7.25 K/UL (ref 1.8–7.7)
NEUTROPHILS NFR BLD AUTO: 70.2 % (ref 53–65)
PLATELET # BLD AUTO: 278 K/UL (ref 150–400)
POTASSIUM SERPL-SCNC: 3.4 MMOL/L (ref 3.5–5.1)
RBC # BLD AUTO: 4.53 M/UL (ref 4.2–5.4)
SODIUM SERPL-SCNC: 130 MMOL/L (ref 136–145)
WBC # BLD AUTO: 10.32 K/UL (ref 4.5–11)

## 2022-05-14 PROCEDURE — 80048 BASIC METABOLIC PNL TOTAL CA: CPT | Performed by: NURSE PRACTITIONER

## 2022-05-14 PROCEDURE — 27000958

## 2022-05-14 PROCEDURE — 25000003 PHARM REV CODE 250: Performed by: HOSPITALIST

## 2022-05-14 PROCEDURE — 85025 COMPLETE CBC W/AUTO DIFF WBC: CPT | Performed by: NURSE PRACTITIONER

## 2022-05-14 PROCEDURE — 36415 COLL VENOUS BLD VENIPUNCTURE: CPT | Performed by: NURSE PRACTITIONER

## 2022-05-14 PROCEDURE — 11000004 HC SNF PRIVATE

## 2022-05-14 PROCEDURE — 25000003 PHARM REV CODE 250: Performed by: NURSE PRACTITIONER

## 2022-05-14 RX ADMIN — CLOPIDOGREL 75 MG: 75 TABLET, FILM COATED ORAL at 08:05

## 2022-05-14 RX ADMIN — ATORVASTATIN CALCIUM 40 MG: 40 TABLET, FILM COATED ORAL at 08:05

## 2022-05-14 RX ADMIN — NEBIVOLOL HYDROCHLORIDE 10 MG: 5 TABLET ORAL at 08:05

## 2022-05-14 RX ADMIN — LEVOTHYROXINE SODIUM 75 MCG: 0.03 TABLET ORAL at 05:05

## 2022-05-14 RX ADMIN — MUPIROCIN: 20 OINTMENT TOPICAL at 08:05

## 2022-05-14 RX ADMIN — HYDROCHLOROTHIAZIDE 12.5 MG: 12.5 TABLET ORAL at 08:05

## 2022-05-14 RX ADMIN — ANTACID TABLETS 500 MG: 500 TABLET, CHEWABLE ORAL at 12:05

## 2022-05-14 RX ADMIN — FENOFIBRATE 145 MG: 145 TABLET, FILM COATED ORAL at 08:05

## 2022-05-14 RX ADMIN — LOSARTAN POTASSIUM 100 MG: 100 TABLET, FILM COATED ORAL at 08:05

## 2022-05-14 RX ADMIN — METFORMIN HYDROCHLORIDE 500 MG: 500 TABLET, FILM COATED ORAL at 07:05

## 2022-05-14 RX ADMIN — METFORMIN HYDROCHLORIDE 500 MG: 500 TABLET, FILM COATED ORAL at 05:05

## 2022-05-14 RX ADMIN — ASPIRIN 81 MG 81 MG: 81 TABLET ORAL at 08:05

## 2022-05-15 PROCEDURE — 25000003 PHARM REV CODE 250: Performed by: HOSPITALIST

## 2022-05-15 PROCEDURE — 27000958

## 2022-05-15 PROCEDURE — 25000003 PHARM REV CODE 250: Performed by: NURSE PRACTITIONER

## 2022-05-15 PROCEDURE — 11000004 HC SNF PRIVATE

## 2022-05-15 RX ADMIN — NEBIVOLOL HYDROCHLORIDE 10 MG: 5 TABLET ORAL at 08:05

## 2022-05-15 RX ADMIN — LOSARTAN POTASSIUM 100 MG: 100 TABLET, FILM COATED ORAL at 08:05

## 2022-05-15 RX ADMIN — ATORVASTATIN CALCIUM 40 MG: 40 TABLET, FILM COATED ORAL at 08:05

## 2022-05-15 RX ADMIN — CLOPIDOGREL 75 MG: 75 TABLET, FILM COATED ORAL at 08:05

## 2022-05-15 RX ADMIN — HYDROCHLOROTHIAZIDE 12.5 MG: 12.5 TABLET ORAL at 08:05

## 2022-05-15 RX ADMIN — FENOFIBRATE 145 MG: 145 TABLET, FILM COATED ORAL at 08:05

## 2022-05-15 RX ADMIN — MUPIROCIN: 20 OINTMENT TOPICAL at 08:05

## 2022-05-15 RX ADMIN — ASPIRIN 81 MG 81 MG: 81 TABLET ORAL at 08:05

## 2022-05-15 RX ADMIN — METFORMIN HYDROCHLORIDE 500 MG: 500 TABLET, FILM COATED ORAL at 08:05

## 2022-05-15 RX ADMIN — LEVOTHYROXINE SODIUM 75 MCG: 0.03 TABLET ORAL at 05:05

## 2022-05-15 RX ADMIN — METFORMIN HYDROCHLORIDE 500 MG: 500 TABLET, FILM COATED ORAL at 05:05

## 2022-05-16 PROBLEM — M79.644 FINGER PAIN, RIGHT: Status: ACTIVE | Noted: 2022-05-16

## 2022-05-16 LAB
ANION GAP SERPL CALCULATED.3IONS-SCNC: 10 MMOL/L (ref 7–16)
BASOPHILS # BLD AUTO: 0.03 K/UL (ref 0–0.2)
BASOPHILS NFR BLD AUTO: 0.3 % (ref 0–1)
BUN SERPL-MCNC: 37 MG/DL (ref 7–18)
BUN/CREAT SERPL: 29 (ref 6–20)
CALCIUM SERPL-MCNC: 8.8 MG/DL (ref 8.5–10.1)
CHLORIDE SERPL-SCNC: 92 MMOL/L (ref 98–107)
CO2 SERPL-SCNC: 30 MMOL/L (ref 21–32)
CREAT SERPL-MCNC: 1.26 MG/DL (ref 0.55–1.02)
DIFFERENTIAL METHOD BLD: ABNORMAL
EOSINOPHIL # BLD AUTO: 0.05 K/UL (ref 0–0.5)
EOSINOPHIL NFR BLD AUTO: 0.5 % (ref 1–4)
ERYTHROCYTE [DISTWIDTH] IN BLOOD BY AUTOMATED COUNT: 13.3 % (ref 11.5–14.5)
ERYTHROCYTE [SEDIMENTATION RATE] IN BLOOD BY WESTERGREN METHOD: 45 MM/HR (ref 0–30)
GLUCOSE SERPL-MCNC: 216 MG/DL (ref 74–106)
HCT VFR BLD AUTO: 42 % (ref 38–47)
HGB BLD-MCNC: 13.9 G/DL (ref 12–16)
LYMPHOCYTES # BLD AUTO: 1.16 K/UL (ref 1–4.8)
LYMPHOCYTES NFR BLD AUTO: 11 % (ref 27–41)
MCH RBC QN AUTO: 29.6 PG (ref 27–31)
MCHC RBC AUTO-ENTMCNC: 33.1 G/DL (ref 32–36)
MCV RBC AUTO: 89.6 FL (ref 80–96)
MONOCYTES # BLD AUTO: 0.63 K/UL (ref 0–0.8)
MONOCYTES NFR BLD AUTO: 6 % (ref 2–6)
MPC BLD CALC-MCNC: 9.1 FL (ref 9.4–12.4)
NEUTROPHILS # BLD AUTO: 8.7 K/UL (ref 1.8–7.7)
NEUTROPHILS NFR BLD AUTO: 82.2 % (ref 53–65)
PLATELET # BLD AUTO: 343 K/UL (ref 150–400)
POTASSIUM SERPL-SCNC: 3.6 MMOL/L (ref 3.5–5.1)
RBC # BLD AUTO: 4.69 M/UL (ref 4.2–5.4)
SODIUM SERPL-SCNC: 128 MMOL/L (ref 136–145)
URATE SERPL-MCNC: 6.3 MG/DL (ref 2.6–6)
WBC # BLD AUTO: 10.57 K/UL (ref 4.5–11)

## 2022-05-16 PROCEDURE — 25000003 PHARM REV CODE 250: Performed by: HOSPITALIST

## 2022-05-16 PROCEDURE — 97110 THERAPEUTIC EXERCISES: CPT

## 2022-05-16 PROCEDURE — 85651 RBC SED RATE NONAUTOMATED: CPT | Performed by: HOSPITALIST

## 2022-05-16 PROCEDURE — 36415 COLL VENOUS BLD VENIPUNCTURE: CPT | Performed by: HOSPITALIST

## 2022-05-16 PROCEDURE — 11000004 HC SNF PRIVATE

## 2022-05-16 PROCEDURE — 92507 TX SP LANG VOICE COMM INDIV: CPT

## 2022-05-16 PROCEDURE — 80048 BASIC METABOLIC PNL TOTAL CA: CPT | Performed by: HOSPITALIST

## 2022-05-16 PROCEDURE — 25000003 PHARM REV CODE 250: Performed by: NURSE PRACTITIONER

## 2022-05-16 PROCEDURE — 99305 1ST NF CARE MODERATE MDM 35: CPT | Mod: AI,,, | Performed by: HOSPITALIST

## 2022-05-16 PROCEDURE — 84550 ASSAY OF BLOOD/URIC ACID: CPT | Performed by: HOSPITALIST

## 2022-05-16 PROCEDURE — 85025 COMPLETE CBC W/AUTO DIFF WBC: CPT | Performed by: HOSPITALIST

## 2022-05-16 PROCEDURE — 92526 ORAL FUNCTION THERAPY: CPT

## 2022-05-16 PROCEDURE — 99305 PR NURSING FACILITY CARE, INIT, MOD SEVERITY: ICD-10-PCS | Mod: AI,,, | Performed by: HOSPITALIST

## 2022-05-16 PROCEDURE — 97116 GAIT TRAINING THERAPY: CPT

## 2022-05-16 PROCEDURE — 27000958

## 2022-05-16 PROCEDURE — 97535 SELF CARE MNGMENT TRAINING: CPT

## 2022-05-16 RX ADMIN — NEBIVOLOL HYDROCHLORIDE 10 MG: 5 TABLET ORAL at 08:05

## 2022-05-16 RX ADMIN — ASPIRIN 81 MG 81 MG: 81 TABLET ORAL at 08:05

## 2022-05-16 RX ADMIN — HYDROCHLOROTHIAZIDE 12.5 MG: 12.5 TABLET ORAL at 08:05

## 2022-05-16 RX ADMIN — METFORMIN HYDROCHLORIDE 500 MG: 500 TABLET, FILM COATED ORAL at 04:05

## 2022-05-16 RX ADMIN — METFORMIN HYDROCHLORIDE 500 MG: 500 TABLET, FILM COATED ORAL at 08:05

## 2022-05-16 RX ADMIN — ATORVASTATIN CALCIUM 40 MG: 40 TABLET, FILM COATED ORAL at 08:05

## 2022-05-16 RX ADMIN — CLOPIDOGREL 75 MG: 75 TABLET, FILM COATED ORAL at 08:05

## 2022-05-16 RX ADMIN — MUPIROCIN: 20 OINTMENT TOPICAL at 08:05

## 2022-05-16 RX ADMIN — FENOFIBRATE 145 MG: 145 TABLET, FILM COATED ORAL at 08:05

## 2022-05-16 RX ADMIN — LEVOTHYROXINE SODIUM 75 MCG: 0.03 TABLET ORAL at 05:05

## 2022-05-16 RX ADMIN — LOSARTAN POTASSIUM 100 MG: 100 TABLET, FILM COATED ORAL at 08:05

## 2022-05-16 NOTE — PROGRESS NOTES
Clinical Pharmacy Chart Review Note      Admit Date: 5/13/2022   LOS: 3 days       Michell Dhillon is a 77 y.o. female admitted to SNF for PT/OT after hospitalization for CVA.    Active Hospital Problems    Diagnosis  POA    *S/P carotid endarterectomy [Z98.890]  Not Applicable    Finger pain, right [M79.644]  Yes    Hypothyroidism [E03.9]  Yes     Chronic    Benign essential HTN [I10]  Yes    Type 2 diabetes mellitus without complication, without long-term current use of insulin [E11.9]  Yes      Resolved Hospital Problems   No resolved problems to display.     Review of patient's allergies indicates:   Allergen Reactions    Bactrim [sulfamethoxazole-trimethoprim]     Norvasc [amlodipine]     Omnicef [cefdinir]     Penicillin      Patient Active Problem List    Diagnosis Date Noted    Finger pain, right 05/16/2022    S/P carotid endarterectomy 05/13/2022    Hypothyroidism 05/13/2022    COVID-19 virus infection 01/10/2022    Benign essential HTN 01/10/2022    Type 2 diabetes mellitus without complication, without long-term current use of insulin 01/10/2022    Cellulitis of right lower leg 10/29/2021       Scheduled Meds:    aspirin  81 mg Oral Daily    atorvastatin  40 mg Oral Daily    clopidogreL  75 mg Oral Daily    fenofibrate  145 mg Oral Daily    hydroCHLOROthiazide  12.5 mg Oral Daily    levothyroxine  75 mcg Oral Before breakfast    losartan  100 mg Oral Daily    metFORMIN  500 mg Oral BID WM    mupirocin   Topical (Top) Daily    nebivoloL  10 mg Oral Daily     Continuous Infusions:   PRN Meds: acetaminophen, calcium carbonate, chlorproMAZINE, melatonin, senna-docusate 8.6-50 mg    OBJECTIVE:     Vital Signs (Last 24H)  Temp:  [97.4 °F (36.3 °C)-97.9 °F (36.6 °C)]   Pulse:  [69-70]   Resp:  [20]   BP: (128-137)/(67-68)   SpO2:  [94 %-95 %]     Laboratory:  CBC:   Recent Labs   Lab 05/14/22  0512 05/16/22  0936   WBC 10.32 10.57   RBC 4.53 4.69   HGB 13.3 13.9   HCT 40.2 42.0   PLT  278 343   MCV 88.7 89.6   MCH 29.4 29.6   MCHC 33.1 33.1     BMP:   Recent Labs   Lab 05/14/22  0512   *   *   K 3.4*   CL 94*   CO2 25   BUN 44*   CREATININE 1.28*   CALCIUM 8.9     .    ASSESSMENT/PLAN:     ECrCl=42.5.  Weekly swing bed medication regimen review complete.  Will continue to monitor. TSH 09-29-21 was 3.870, with T4 total 10.0.  Tyler Will, Pharm. D.  Director of Pharmacy  [facility]  [phone]  [email or web address]

## 2022-05-16 NOTE — PT/OT/SLP PROGRESS
"Occupational Therapy  Treatment    Michell Dhillon   MRN: [id number]   Admitting Diagnosis: S/P carotid endarterectomy    OT Date of Treatment: 05/16/22   OT Start Time: 1100  OT Stop Time: 1125  OT Total Time (min): 25 min    Billable Minutes:  Self Care/Home Management 8 and Therapeutic Activity 17    OT/YONI: OT          General Precautions: Standard, fall  Orthopedic Precautions: N/A  Braces:    Respiratory Status: Room air         Subjective:  Communicated with pt and CNA prior to session.  Pt reports having some back pain today; OT recommended moist heat to lower back, but pt refused stating, "It will be ok."       Objective:        Functional Mobility:  Bed Mobility:  Not tested today       Transfers: not attempted today        Functional Ambulation: not attempted today with OT    Activities of Daily Living:     Today, pt able to complete hand washing at sinkside with svn and setup    Balance:   Static Sit: FAIR+: Able to take MINIMAL challenges from all directions  Dynamic Sit: FAIR+: Maintains balance through MINIMAL excursions of active trunk motion  Static Stand: not tested  Dynamic stand: not tested    Therapeutic Activities and Exercises:  OT engaged pt in UBE x level 1 lowest level resist with B hands in pain free motion as OT assessed pt throughout exercise for comfort.  Pt completed slowly but with only min RB's and F/R motion as directed by OT.  After this, OT assisted pt back to her room to complete handwashing to prepare for lunch being served.  Pt handled sinkside grooming well.  She asked if she could rest in bed for awhile after OT session; OT recommended sitting upright for lunch, then resting afterward in bed.  Pt agreeable.      AM-PAC 6 CLICK ADL   How much help from another person does this patient currently need?   1 = Unable, Total/Dependent Assistance  2 = A lot, Maximum/Moderate Assistance  3 = A little, Minimum/Contact Guard/Supervision  4 = None, Modified [place]/Independent      " "    AM-PAC Raw Score CMS "G-Code Modifier Level of Impairment Assistance   6 % Total / Unable   7 - 8 CM 80 - 100% Maximal Assist   9-13 CL 60 - 80% Moderate Assist   14 - 19 CK 40 - 60% Moderate Assist   20 - 22 CJ 20 - 40% Minimal Assist   23 CI 1-20% SBA / CGA   24 CH 0% Independent/ Mod I       Patient left up in chair with call button in reach    ASSESSMENT:  Michell Dhillon is a 77 y.o. female with a medical diagnosis of S/P carotid endarterectomy and presents with no new complaints.    Rehab identified problem list/impairments: Rehab identified problem list/impairments: weakness, impaired endurance, impaired self care skills, impaired functional mobilty, gait instability, impaired balance, decreased lower extremity function, pain    Rehab potential is excellent.    Activity tolerance: Excellent    Discharge recommendations: Discharge Facility/Level of Care Needs: home with home health, home health OT     Barriers to discharge: Barriers to Discharge: None    Equipment recommendations: grab bar, hip kit, walker, rolling     GOALS:   Multidisciplinary Problems     Occupational Therapy Goals        Problem: Occupational Therapy    Goal Priority Disciplines Outcome Interventions   Occupational Therapy Goal     OT, PT/OT Ongoing, Progressing    Description: STG: within 2 weeks  Pt will perform grooming with setup  Pt will bathe with setup  Pt will perform UE dressing with setup  Pt will perform LE dressing with setup  Pt will sit EOB x 30 min with no assistance  Pt will transfer bed/chair/bsc with sba  Pt will perform standing task x 3 min with svn assistance  Pt will tolerate 60 minutes of tx without fatigue      LTG: within 4 weeks  1.Restore to max I with self care and mobility.                      Plan:  Patient to be seen 5 x/week to address the above listed problems via self-care/home management, therapeutic activities, therapeutic exercises, wheelchair management/training  Plan of Care expires: " 06/10/22  Plan of Care reviewed with: patient         05/16/2022

## 2022-05-16 NOTE — PT/OT/SLP PROGRESS
Speech Language Pathology Treatment    Patient Name:  Michell Dhillon   MRN:  [id number]  Admitting Diagnosis: S/P carotid endarterectomy    Recommendations:                 General Recommendations:  Dysphagia therapy and Cognitive-linguistic therapy  Diet recommendations:  Mechanical soft, Liquid Diet Level: Thin (cup edge, no straws)   Aspiration Precautions: 1 bite/sip at a time, No straws, Remain upright 30 minutes post meal and Standard aspiration precautions   General Precautions: Standard, fall, aspiration (see MBS regarding aspiration risk)  Communication strategies:  none    Subjective     Pt alert at bedside for skilled services  Patient goals: none stated      Respiratory Status: Room air    Objective:     Has the patient been evaluated by SLP for swallowing?   Yes  Keep patient NPO? No   Current Respiratory Status:        [facility] patient regarding swallowing history in prior hospital  With recommendation via MBS of no straws, soft diet. Pt with return understanding and indicating increased coughing episodes with water filled mug via straw at bedside. [facility] for cup edge with retrieval of water for patient.  Trials cup edge x7, no overt s/sx aspiration,  with upright positioning of bed via patient, given model via ST.     Hard glottal attacks x25 reps in increments of 5,  with model via ST and carryover with min verbal instructions. Pt completed TBR tasks x10 reps with ease.     Aide assisted with restroom.    ST return with tasks for word finding and processing x30 trials with 100% accuracy and increased response time noted. Verbal reasoning tasks x12 trials with 87% accuracy, mild delay.     Assessment:     Michell Dhillon is a 77 y.o. female with an SLP diagnosis of Dysphagia, Dysarthria and Cognitive-Linguistic Impairment.  She presents with difficulty in processing/word finding, recall, and reasoning for safety awareness, mild-moderate dysarthria, and mild- moderate dysphagia with aspiration risk  secondary to lack of sensation per MBS report.    Goals:   Multidisciplinary Problems     SLP Goals        Problem: SLP    Goal Priority Disciplines Outcome   SLP Goal     SLP Ongoing, Progressing   Description: Patient will complete hard glottal attacks and tongue base retractions exercises with mod-max accuracy Independently.   Patient will complete laryngeal elevation exercises with mod-max accuracy Independently.   Patient will complete word finding tasks with 80% accuracy Independently.   Patient will complete problem solving tasks with 80% accuracy Independently.   Patient will complete verbal reasoning tasks with 80% accuracy Independently.   Patient will complete short term memory tasks with 80% accuracy Independently.   Patient will complete demonstrate speech intelligibility at phrase and sentence level with 90% accuracy.                       Plan:     · Patient to be seen:  5 x/week   · Plan of Care expires:  06/10/22  · Plan of Care reviewed with:  patient   · SLP Follow-Up:  Yes       Discharge recommendations:  home with home health   Barriers to Discharge:  Level of Skilled Assistance Needed currently    Time Tracking:     SLP Treatment Date:   05/16/22  Speech Start Time:  1440  Speech Stop Time:  1510     Speech Total Time (min):  30 min    Billable Minutes: Speech Therapy Individual 15 Swallow DysfunctionTreatment 15    05/16/2022

## 2022-05-16 NOTE — PLAN OF CARE
Problem: Diabetes Comorbidity  Goal: Blood Glucose Level Within Targeted Range  Outcome: Ongoing, Progressing     Problem: Fall Injury Risk  Goal: Absence of Fall and Fall-Related Injury  Outcome: Ongoing, Progressing     Problem: Skin Injury Risk Increased  Goal: Skin Health and Integrity  Outcome: Ongoing, Progressing

## 2022-05-16 NOTE — PT/OT/SLP PROGRESS
Physical Therapy Treatment    Patient Name:  Michell Dhillon   MRN:  [id number]    Recommendations:     Discharge Recommendations:  home health PT, home health OT   Discharge Equipment Recommendations: walker, rolling   Barriers to discharge: Decreased caregiver support    Assessment:     Michell Dhillon is a 77 y.o. female admitted with a medical diagnosis of S/P carotid endarterectomy.  She presents with the following impairments/functional limitations:  weakness, impaired endurance, impaired self care skills, impaired functional mobilty, gait instability, impaired balance Patient with much improved mobility today verses eval on Friday.  Some fatigue noted but much improved overall.    Rehab Prognosis: Good; patient would benefit from acute skilled PT services to address these deficits and reach maximum level of function.    Recent Surgery: * No surgery found *      Plan:     During this hospitalization, patient to be seen 5 x/week to address the identified rehab impairments via gait training, therapeutic activities, therapeutic exercises and progress toward the following goals:    · Plan of Care Expires:  06/03/22    Subjective     Chief Complaint: weakness  Patient/Family Comments/goals: returnhome with family    Pain/Comfort:  · Pain Rating 1: 0/10  · Pain Rating Post-Intervention 1: 0/10      Objective:     Communicated with nurse prior to session.  Patient found supine with   upon PT entry to room.     General Precautions: Standard, fall   Orthopedic Precautions:    Braces:    Respiratory Status: Room air     Functional Mobility:  · Bed Mobility:     · Supine to Sit: minimum assistance  · Sit to Supine: minimum assistance  · Transfers:     · Sit to Stand:  minimum assistance with rolling walker  · Bed to Chair: minimum assistance with  rolling walker  using  Stand Pivot  · Gait: patient amb 40 feet with RW and CGA and frequent verbal cues for upright stance and to look ahead.  She rested x 1 min then amb 30  feet with RW and CG and verbal cues.        AM-PAC 6 CLICK MOBILITY  Turning over in bed (including adjusting bedclothes, sheets and blankets)?: 3  Sitting down on and standing up from a chair with arms (e.g., wheelchair, bedside commode, etc.): 3  Moving from lying on back to sitting on the side of the bed?: 3  Moving to and from a bed to a chair (including a wheelchair)?: 3  Need to walk in hospital room?: 3  Climbing 3-5 steps with a railing?: 1  Basic Mobility Total Score: 16       Therapeutic Activities and Exercises:   AP, LAQ, seated march, hip abd/add, add squeezes all x 20 each  Sit to stand training x 3 with min assist  SPT x 1 with min to CGA    Patient left supine with call button in reach..    GOALS:   Multidisciplinary Problems     Physical Therapy Goals        Problem: Physical Therapy    Goal Priority Disciplines Outcome Goal Variances Interventions   Physical Therapy Goal     PT, PT/OT Ongoing, Progressing     Description: STG:  Patient will perform all bed mobility with CGA  Patient will perform sit to stand and SPT with CGA  Patient will ambulate 50 feet with RW assistive device with CGA assistance without LOB.     LTG  Patient will perform bed mobility with mod I.  Patient will perform sit to stand and SPT with SBA  Patient will ambuate 150 feet with RW assistive device with CGA assistance without LOB.   Patient will perform 10 min of LE exercise without rest break to increase LE strength to 4/5.                      Time Tracking:     PT Received On: 05/16/22  PT Start Time: 1245     PT Stop Time: 1309  PT Total Time (min): 24 min     Billable Minutes: Gait Training 10 and Therapeutic Exercise 13    Treatment Type: Treatment  PT/PTA: PT     PTA Visit Number: 0     05/16/2022

## 2022-05-16 NOTE — SUBJECTIVE & OBJECTIVE
Past Medical History:   Diagnosis Date    Arthritis     Diabetes mellitus, type 2     Hyperlipidemia     Hypertension     Hypothyroidism     Transient cerebral ischemic attack, unspecified 04/14/2021       No past surgical history on file.    Review of patient's allergies indicates:   Allergen Reactions    Bactrim [sulfamethoxazole-trimethoprim]     Norvasc [amlodipine]     Omnicef [cefdinir]     Penicillin        No current facility-administered medications on file prior to encounter.     Current Outpatient Medications on File Prior to Encounter   Medication Sig    aspirin 81 MG Chew Take 81 mg by mouth once daily.    atorvastatin (LIPITOR) 40 MG tablet TAKE 1 TABLET ONCE DAILY FOR CHOLESTEROL.....    chlorproMAZINE (THORAZINE) 25 MG tablet Take 1 tablet (25 mg total) by mouth 3 (three) times daily.    clopidogreL (PLAVIX) 75 mg tablet Take 1 tablet (75 mg total) by mouth once daily.    fenofibrate (TRICOR) 145 MG tablet Take 1 tablet (145 mg total) by mouth once daily.    levothyroxine (SYNTHROID) 75 MCG tablet Take 1 tablet (75 mcg total) by mouth before breakfast.    metFORMIN (GLUCOPHAGE) 500 MG tablet Take 1 tablet (500 mg total) by mouth 2 (two) times daily with meals.    nebivoloL (BYSTOLIC) 10 MG Tab Take 1 tablet (10 mg total) by mouth once daily.    olmesartan-hydrochlorothiazide (BENICAR HCT) 40-25 mg per tablet Take 1 tablet by mouth once daily.     Family History       Problem Relation (Age of Onset)    Hypertension Mother, Father, Sister          Tobacco Use    Smoking status: Never Smoker    Smokeless tobacco: Never Used   Substance and Sexual Activity    Alcohol use: Never    Drug use: Never    Sexual activity: Not on file     Review of Systems   Constitutional:  Negative for appetite change, chills and fever.   Respiratory:  Negative for cough, shortness of breath and wheezing.    Cardiovascular:  Negative for chest pain, palpitations and leg swelling.   Gastrointestinal:  Negative for abdominal  distention, diarrhea, nausea and vomiting.   Genitourinary:  Negative for dysuria.   Musculoskeletal:  Positive for arthralgias.   Skin:  Negative for rash.   Neurological:  Positive for weakness. Negative for dizziness, seizures and syncope.   Psychiatric/Behavioral:  Negative for agitation, behavioral problems and confusion.    All other systems reviewed and are negative.  Objective:     Vital Signs (Most Recent):  Temp: 97.4 °F (36.3 °C) (05/16/22 0702)  Pulse: 69 (05/16/22 0702)  Resp: 20 (05/16/22 0702)  BP: 128/68 (05/16/22 0702)  SpO2: (!) 94 % (05/16/22 0702)   Vital Signs (24h Range):  Temp:  [97.4 °F (36.3 °C)-97.9 °F (36.6 °C)] 97.4 °F (36.3 °C)  Pulse:  [69-70] 69  Resp:  [20] 20  SpO2:  [94 %-95 %] 94 %  BP: (128-137)/(67-68) 128/68     Weight: 96.7 kg (213 lb 3 oz)  Body mass index is 34.41 kg/m².    Physical Exam  Vitals reviewed.   Constitutional:       General: She is not in acute distress.     Appearance: Normal appearance.   HENT:      Head: Normocephalic and atraumatic.   Eyes:      General: No scleral icterus.     Extraocular Movements: Extraocular movements intact.      Conjunctiva/sclera: Conjunctivae normal.      Pupils: Pupils are equal, round, and reactive to light.   Cardiovascular:      Rate and Rhythm: Normal rate and regular rhythm.      Heart sounds: No murmur heard.    No friction rub. No gallop.   Pulmonary:      Effort: Pulmonary effort is normal. No respiratory distress.      Breath sounds: Normal breath sounds. No wheezing or rales.   Abdominal:      General: Abdomen is flat. Bowel sounds are normal. There is no distension.      Palpations: Abdomen is soft.      Tenderness: There is no abdominal tenderness. There is no guarding.   Musculoskeletal:         General: No swelling.      Comments: 1st digit on R hand slightly swollen, tender    Skin:     General: Skin is warm and dry.      Coloration: Skin is not jaundiced.      Findings: No rash.      Comments: Incision on Left side of  neck, dressed, no drainage    Neurological:      General: No focal deficit present.      Mental Status: She is alert and oriented to person, place, and time.      Sensory: No sensory deficit.      Motor: Weakness present.   Psychiatric:         Mood and Affect: Mood normal.         Thought Content: Thought content normal.         Judgment: Judgment normal.         CRANIAL NERVES     CN III, IV, VI   Pupils are equal, round, and reactive to light.     Significant Labs: All pertinent labs within the past 24 hours have been reviewed.  Recent Lab Results       None            Significant Imaging: I have reviewed all pertinent imaging results/findings within the past 24 hours.

## 2022-05-16 NOTE — H&P
[facility] Surgical [facility] Medicine  History & Physical    Patient Name: Michell Dhillon  MRN: [id number]  Patient Class: IP- Swing  Admission Date: 5/13/2022  Attending Physician: Elmer Rainey DO   Primary Care Provider: Daryl Moscoso NP         Patient information was obtained from patient, past medical records and ER records.     Subjective:     Principal Problem:S/P carotid endarterectomy    Chief Complaint: No chief complaint on file.       HPI: Ms Dhillon is a pleasant 77 yr old WF who has a known hx of HTN, T2DM, Hypothyroidism, osteoarthritis, carotid stenosis, dyslipidemia.  She was admitted to [facility] 05/04 with stroke symptoms.  She was found to have left posterior basal ganglia, corona radiata and lucanar CVA.  She had left carotid endarterectomy on 5/09 and was kept at [facility] until today[facility]  [facility].  Ms Dhillon lives at home with her son.  She reports before the admit, she was able to walk and care for herself.  She is found sitting up in chair, eating lunch in NAD.  She denies pain or discomfort. She is alert, oriented and answers all questions appropriately.  Skin is warm and dry with noted bruising to left side of her neck and bilateral upper arms.  She moves all extremities well but generalized weakness.        Past Medical History:   Diagnosis Date    Arthritis     Diabetes mellitus, type 2     Hyperlipidemia     Hypertension     Hypothyroidism     Transient cerebral ischemic attack, unspecified 04/14/2021       No past surgical history on file.    Review of patient's allergies indicates:   Allergen Reactions    Bactrim [sulfamethoxazole-trimethoprim]     Norvasc [amlodipine]     Omnicef [cefdinir]     Penicillin        No current facility-administered medications on file prior to encounter.     Current Outpatient Medications on File Prior to Encounter   Medication Sig    aspirin 81 MG  Chew Take 81 mg by mouth once daily.    atorvastatin (LIPITOR) 40 MG tablet TAKE 1 TABLET ONCE DAILY FOR CHOLESTEROL.....    chlorproMAZINE (THORAZINE) 25 MG tablet Take 1 tablet (25 mg total) by mouth 3 (three) times daily.    clopidogreL (PLAVIX) 75 mg tablet Take 1 tablet (75 mg total) by mouth once daily.    fenofibrate (TRICOR) 145 MG tablet Take 1 tablet (145 mg total) by mouth once daily.    levothyroxine (SYNTHROID) 75 MCG tablet Take 1 tablet (75 mcg total) by mouth before breakfast.    metFORMIN (GLUCOPHAGE) 500 MG tablet Take 1 tablet (500 mg total) by mouth 2 (two) times daily with meals.    nebivoloL (BYSTOLIC) 10 MG Tab Take 1 tablet (10 mg total) by mouth once daily.    olmesartan-hydrochlorothiazide (BENICAR HCT) 40-25 mg per tablet Take 1 tablet by mouth once daily.     Family History       Problem Relation (Age of Onset)    Hypertension Mother, Father, Sister          Tobacco Use    Smoking status: Never Smoker    Smokeless tobacco: Never Used   Substance and Sexual Activity    Alcohol use: Never    Drug use: Never    Sexual activity: Not on file     Review of Systems   Constitutional:  Negative for appetite change, chills and fever.   Respiratory:  Negative for cough, shortness of breath and wheezing.    Cardiovascular:  Negative for chest pain, palpitations and leg swelling.   Gastrointestinal:  Negative for abdominal distention, diarrhea, nausea and vomiting.   Genitourinary:  Negative for dysuria.   Musculoskeletal:  Positive for arthralgias.   Skin:  Negative for rash.   Neurological:  Positive for weakness. Negative for dizziness, seizures and syncope.   Psychiatric/Behavioral:  Negative for agitation, behavioral problems and confusion.    All other systems reviewed and are negative.  Objective:     Vital Signs (Most Recent):  Temp: 97.4 °F (36.3 °C) (05/16/22 0702)  Pulse: 69 (05/16/22 0702)  Resp: 20 (05/16/22 0702)  BP: 128/68 (05/16/22 0702)  SpO2: (!) 94 % (05/16/22 0702)    Vital Signs (24h Range):  Temp:  [97.4 °F (36.3 °C)-97.9 °F (36.6 °C)] 97.4 °F (36.3 °C)  Pulse:  [69-70] 69  Resp:  [20] 20  SpO2:  [94 %-95 %] 94 %  BP: (128-137)/(67-68) 128/68     Weight: 96.7 kg (213 lb 3 oz)  Body mass index is 34.41 kg/m².    Physical Exam  Vitals reviewed.   Constitutional:       General: She is not in acute distress.     Appearance: Normal appearance.   HENT:      Head: Normocephalic and atraumatic.   Eyes:      General: No scleral icterus.     Extraocular Movements: Extraocular movements intact.      Conjunctiva/sclera: Conjunctivae normal.      Pupils: Pupils are equal, round, and reactive to light.   Cardiovascular:      Rate and Rhythm: Normal rate and regular rhythm.      Heart sounds: No murmur heard.    No friction rub. No gallop.   Pulmonary:      Effort: Pulmonary effort is normal. No respiratory distress.      Breath sounds: Normal breath sounds. No wheezing or rales.   Abdominal:      General: Abdomen is flat. Bowel sounds are normal. There is no distension.      Palpations: Abdomen is soft.      Tenderness: There is no abdominal tenderness. There is no guarding.   Musculoskeletal:         General: No swelling.      Comments: 1st digit on R hand slightly swollen, tender    Skin:     General: Skin is warm and dry.      Coloration: Skin is not jaundiced.      Findings: No rash.      Comments: Incision on Left side of neck, dressed, no drainage    Neurological:      General: No focal deficit present.      Mental Status: She is alert and oriented to person, place, and time.      Sensory: No sensory deficit.      Motor: Weakness present.   Psychiatric:         Mood and Affect: Mood normal.         Thought Content: Thought content normal.         Judgment: Judgment normal.         CRANIAL NERVES     CN III, IV, VI   Pupils are equal, round, and reactive to light.     Significant Labs: All pertinent labs within the past 24 hours have been reviewed.  Recent Lab Results       None             Significant Imaging: I have reviewed all pertinent imaging results/findings within the past 24 hours.    Assessment/Plan:     * S/P carotid endarterectomy  Keep wound clean and dry  F/u as directed      Finger pain, right  Could by OA, check uric acid.       Hypothyroidism  Continue home medication      Type 2 diabetes mellitus without complication, without long-term current use of insulin  Monitor lab  Continue metformin        Benign essential HTN  Monitor VS  Losartan/ hctz        VTE Risk Mitigation (From admission, onward)         Ordered     Place RENITA hose  Until discontinued         05/13/22 1213     IP VTE HIGH RISK PATIENT  Once         05/13/22 1213     Place sequential compression device  Until discontinued         05/13/22 1213                   Elmer Rainey DO  Department of Hospital Medicine   [facility] - Medical Surgical Unit

## 2022-05-17 PROCEDURE — 97530 THERAPEUTIC ACTIVITIES: CPT

## 2022-05-17 PROCEDURE — 97535 SELF CARE MNGMENT TRAINING: CPT

## 2022-05-17 PROCEDURE — 92526 ORAL FUNCTION THERAPY: CPT

## 2022-05-17 PROCEDURE — 25000003 PHARM REV CODE 250: Performed by: NURSE PRACTITIONER

## 2022-05-17 PROCEDURE — 11000004 HC SNF PRIVATE

## 2022-05-17 PROCEDURE — 25000003 PHARM REV CODE 250: Performed by: HOSPITALIST

## 2022-05-17 PROCEDURE — 97110 THERAPEUTIC EXERCISES: CPT | Mod: CQ

## 2022-05-17 PROCEDURE — 97110 THERAPEUTIC EXERCISES: CPT

## 2022-05-17 PROCEDURE — 97116 GAIT TRAINING THERAPY: CPT | Mod: CQ

## 2022-05-17 PROCEDURE — 27000958

## 2022-05-17 RX ADMIN — MUPIROCIN: 20 OINTMENT TOPICAL at 08:05

## 2022-05-17 RX ADMIN — ASPIRIN 81 MG 81 MG: 81 TABLET ORAL at 08:05

## 2022-05-17 RX ADMIN — FENOFIBRATE 145 MG: 145 TABLET, FILM COATED ORAL at 08:05

## 2022-05-17 RX ADMIN — METFORMIN HYDROCHLORIDE 500 MG: 500 TABLET, FILM COATED ORAL at 08:05

## 2022-05-17 RX ADMIN — HYDROCHLOROTHIAZIDE 12.5 MG: 12.5 TABLET ORAL at 08:05

## 2022-05-17 RX ADMIN — NEBIVOLOL HYDROCHLORIDE 10 MG: 5 TABLET ORAL at 08:05

## 2022-05-17 RX ADMIN — LOSARTAN POTASSIUM 100 MG: 100 TABLET, FILM COATED ORAL at 08:05

## 2022-05-17 RX ADMIN — ATORVASTATIN CALCIUM 40 MG: 40 TABLET, FILM COATED ORAL at 08:05

## 2022-05-17 RX ADMIN — LEVOTHYROXINE SODIUM 75 MCG: 0.03 TABLET ORAL at 06:05

## 2022-05-17 RX ADMIN — METFORMIN HYDROCHLORIDE 500 MG: 500 TABLET, FILM COATED ORAL at 04:05

## 2022-05-17 RX ADMIN — CLOPIDOGREL 75 MG: 75 TABLET, FILM COATED ORAL at 08:05

## 2022-05-17 NOTE — PT/OT/SLP PROGRESS
Physical Therapy Treatment    Patient Name:  Michell Dhillon   MRN:  [id number]    Recommendations:     Discharge Recommendations:  home health OT, home health PT   Discharge Equipment Recommendations: walker, rolling   Barriers to discharge: Inaccessible home and Decreased caregiver support    Assessment:     Michell Dhillon is a 77 y.o. female admitted with a medical diagnosis of S/P carotid endarterectomy.  She presents with the following impairments/functional limitations:  weakness, impaired endurance, impaired self care skills, impaired functional mobilty, gait instability, impaired balance. Pt.displays good motivation and participation with PT tx. Good progress toward goals with inproved tolerance to functional activity displayed.     Rehab Prognosis: Good; patient would benefit from acute skilled PT services to address these deficits and reach maximum level of function.    Recent Surgery: * No surgery found *      Plan:     During this hospitalization, patient to be seen 5 x/week to address the identified rehab impairments via gait training, therapeutic activities, therapeutic exercises and progress toward the following goals:    · Plan of Care Expires:  06/03/22    Subjective     Chief Complaint: Weakness in Right Knee  Patient/Family Comments/goals: Pt. States she wants to try in therapy so she can get home.  Pain/Comfort:  · Pain Rating 1: 0/10      Objective:     Communicated with SN and OT prior to session.  Patient found supine with   upon PT entry to room.     General Precautions: Standard, fall   Orthopedic Precautions:N/A   Braces: N/A  Respiratory Status: Room air     Functional Mobility:  · Bed Mobility:     · Rolling Left:  minimum assistance  · Scooting: stand by assistance  · Bridging: supervision  · Supine to Sit: minimum assistance with VC for proper form for ease of transfer  · Sit to Supine: minimum assistance to negotiate B LEs into bed.   · Transfers:     · Sit to Stand:  minimum  assistance and moderate assistance with rolling walker  · Bed to Chair: minimum assistance and moderate assistance with  rolling walker  using  Step Transfer  · Toilet Transfer: minimum assistance with  rolling walker  using  Step Transfer  · Gait: Pt. performed 2 x 42 feet amb. with RW and CGA to Min A @ gait belt for balance and wt. shift with VC for increased step lengthm upright posture and forward gaze.       AM-PAC 6 CLICK MOBILITY  Turning over in bed (including adjusting bedclothes, sheets and blankets)?: 3  Sitting down on and standing up from a chair with arms (e.g., wheelchair, bedside commode, etc.): 3  Moving from lying on back to sitting on the side of the bed?: 3  Moving to and from a bed to a chair (including a wheelchair)?: 3  Need to walk in hospital room?: 3  Climbing 3-5 steps with a railing?: 1  Basic Mobility Total Score: 16       Therapeutic Activities and Exercises:   Pt. Performed Supine: Hip ABD/ ADD, Heel slides with manual resist into ext and SLR x 10 reps each B and seated LAQs with 10 second hols x 5 reps each B.    Patient left HOB elevated with call button in reach and Aide notified of pt. Having used BSC during PT tx.     GOALS:   Multidisciplinary Problems     Physical Therapy Goals        Problem: Physical Therapy    Goal Priority Disciplines Outcome Goal Variances Interventions   Physical Therapy Goal     PT, PT/OT Ongoing, Progressing     Description: STG:  Patient will perform all bed mobility with CGA  Patient will perform sit to stand and SPT with CGA  Patient will ambulate 50 feet with RW assistive device with CGA assistance without LOB.     LTG  Patient will perform bed mobility with mod I.  Patient will perform sit to stand and SPT with SBA  Patient will ambuate 150 feet with RW assistive device with CGA assistance without LOB.   Patient will perform 10 min of LE exercise without rest break to increase LE strength to 4/5.                      Time Tracking:     PT Received  On: 05/17/22  PT Start Time: 1410     PT Stop Time: 1444  PT Total Time (min): 34 min     Billable Minutes: Gait Training 13 and Therapeutic Exercise 19    Treatment Type: Treatment  PT/PTA: PTA     PTA Visit Number: 1     05/17/2022

## 2022-05-17 NOTE — PT/OT/SLP PROGRESS
Speech Language Pathology Treatment    Patient Name:  Michell Dhillon   MRN:  [id number]  Admitting Diagnosis: S/P carotid endarterectomy    Recommendations:                 General Recommendations:  Dysphagia therapy and Cognitive-linguistic therapy  Diet recommendations:  Mechanical soft, Liquid Diet Level: Thin (cup edge, no straws)   Aspiration Precautions: 1 bite/sip at a time, No straws, Remain upright 30 minutes post meal and Standard aspiration precautions   General Precautions: Standard, fall, aspiration (see MBS regarding aspiration risk)  Communication strategies:  none    Subjective     Pt alert at bedside for skilled services  Patient goals: none stated      Respiratory Status: Room air    Objective:     Has the patient been evaluated by SLP for swallowing?   Yes  Keep patient NPO? No   Current Respiratory Status:        Hard glottal attacks x25 reps in increments of 5,  with model via ST and carryover with min verbal instructions. Pt completed TBR tasks x15 reps with ease.   Breath hold tasks for increased hyolaryngeal excursion x8 reps with ease.  Pt completed PO trials x8 via cup edge with no overt s/sx aspiration.      Assessment:     Michell Dhillon is a 77 y.o. female with an SLP diagnosis of Dysphagia, Dysarthria and Cognitive-Linguistic Impairment.  She presents with difficulty in processing/word finding, recall, and reasoning for safety awareness, mild-moderate dysarthria, and mild- moderate dysphagia with aspiration risk secondary to lack of sensation per MBS report.    Goals:   Multidisciplinary Problems     SLP Goals        Problem: SLP    Goal Priority Disciplines Outcome   SLP Goal     SLP Ongoing, Progressing   Description: Patient will complete hard glottal attacks and tongue base retractions exercises with mod-max accuracy Independently.   Patient will complete laryngeal elevation exercises with mod-max accuracy Independently.   Patient will complete word finding tasks with 80% accuracy  Independently.   Patient will complete problem solving tasks with 80% accuracy Independently.   Patient will complete verbal reasoning tasks with 80% accuracy Independently.   Patient will complete short term memory tasks with 80% accuracy Independently.   Patient will complete demonstrate speech intelligibility at phrase and sentence level with 90% accuracy.                       Plan:     · Patient to be seen:  5 x/week   · Plan of Care expires:  06/10/22  · Plan of Care reviewed with:  patient   · SLP Follow-Up:  Yes       Discharge recommendations:  home with home health   Barriers to Discharge:  Level of Skilled Assistance Needed currently    Time Tracking:     SLP Treatment Date:   05/17/22  Speech Start Time:  1255  Speech Stop Time:  1315     Speech Total Time (min):  20 min    Billable MinutesSwallow DysfunctionTreatment 20    05/17/2022

## 2022-05-17 NOTE — PLAN OF CARE
Problem: Adult Inpatient Plan of Care  Goal: Absence of Hospital-Acquired Illness or Injury  Outcome: Ongoing, Progressing   Hand washing is encouraged.    Ongoing SW/CM Assessment/Plan of Care Note     See SW/CM flowsheets for goals and other objective data.    Patient/Family discharge goal (s):  Goal #1: Psychosocial needs assessed  Goal #2: Extended Care Facility discharge arranged  Goal #3: Establish present or future health care decison-maker    PT Recommendation:  Recommendation for Discharge: PT WI: Home       OT Recommendation:  Recommendations for Discharge: OT WI: Post acute therapy, Sub-acute nursing home       SLP Recommendation:       Disposition:       Progress note:   Updated pt that he was medically accepted to [facility] (pending insurance authorization). Pt is now refusing to go to a SNF. He said his S.O, Loretta, works part time and his teenage (16 & 17 yr olds) children are home to help. Writer informed pt that it is not considered a \"safe\" d/c to entrust his care to underage children (he disagrees). Writer also told him he will need to demonstrate he can safely use a WW; transfer and ambulate for PT to clear him for d/c. Pt wants to plan d/c today at 1:30pm. He is receptive to home therapy if recommended. Writer sent a Secure Chat to CANDACE Martinez; Junie, RN; Merline RN; Carolina, PT and MAKI Mosher re: above

## 2022-05-17 NOTE — PROGRESS NOTES
[facility] Surgical Unit  Adult Nutrition  First Assessment Note         Reason for Assessment  Reason For Assessment: consult  Nutrition Risk Screen: no indicators present  Malnutrition  Is Patient Malnourished: No  Nutrition Diagnosis  Overweight   related to PTA, predictive history of excessive caloric intake and inadequate physical activity as evidenced by   BMI 34.41    Nutrition Diagnosis Status: Chronic/ continues      Nutrition Risk  Level of Risk/Frequency of Follow-up: moderate   Chewing or Swallowing Difficulty?: Poor Dentition  Estimated/Assessed Needs  RMR (Lassen-St. Jeor Equation): 1463.75 Activity Factor: 1 Injury Factor: 1     Temp: 97.9 °F (36.6 °C)Oral  Weight Used For Calorie Calculations: 96.7 kg (213 lb 3 oz)   Energy Need Method: Lassen-St Jeor Energy Calorie Requirements (kcal): 1464  Weight Used For Protein Calculations: 68 kg (149 lb 14.6 oz)  Protein Requirements: 55-68  Estimated Fluid Requirement Method: RDA Method    RDA Method (mL): 1464     Nutrition Prescription / Recommendations  Recommendation/Intervention: continue current diet  Goals: pt will meet est nutr needs  Nutrition Goal Status: new  Communication of RD Recs: discussed on rounds  Current Diet Order: cardiac mech soft  Nutrition Order Comments: 50-75% meal intake  Recommended Diet: Heart Healthy and Mechanical Soft with chopped meats  Recommended Oral Supplement: No Oral Supplements  Is Nutrition Support Recommended: No  Is Education Recommended: No  Monitor and Evaluation  % current Intake: P.O. intake of 50 - 75 %  % intake to meet estimated needs: 50 - 75 %  Food and Nutrient Intake: energy intake, food and beverage intake  Food and Nutrient Adminstration: diet order  Anthropometric Measurements: height/length, weight, weight change, body mass index  Biochemical Data, Medical Tests and Procedures: electrolyte and renal panel, glucose/endocrine profile  Nutrition-Focused Physical Findings: skin  Oral  "Calories (kcal): 1800  Oral Protein (gm): 75  Energy Calories Required: meeting needs  Protein Required: meeting needs  Tolerance: tolerating  Current Medical Diagnosis and Past Medical History     Past Medical History:   Diagnosis Date    Arthritis     Diabetes mellitus, type 2     Hyperlipidemia     Hypertension     Hypothyroidism     Transient cerebral ischemic attack, unspecified 04/14/2021     Nutrition/Diet History  Spiritual, Cultural Beliefs, Congregation Practices, Values that Affect Care: no  Food Allergies: NKFA  Lab/Procedures/Meds  Recent Labs   Lab 05/16/22  0936   *   K 3.6   BUN 37*   CREATININE 1.26*   *   CALCIUM 8.8   CL 92*     Last A1c:   Lab Results   Component Value Date    HGBA1C 6.7 (H) 05/04/2022     Lab Results   Component Value Date    RBC 4.69 05/16/2022    HGB 13.9 05/16/2022    HCT 42.0 05/16/2022    MCV 89.6 05/16/2022    MCH 29.6 05/16/2022    MCHC 33.1 05/16/2022     Pertinent Labs Reviewed: reviewed  Pertinent Labs Comments: Na 128. Cl 92, BUN 37, Cr1.26, Glu 216  Pertinent Medications Reviewed: reviewed  Pertinent Medications Comments: atorvastatin, HCTZ, losartan, metformin  Anthropometrics  Temp: 97.9 °F (36.6 °C)  Height Method: Stated  Height: 5' 6" (167.6 cm)  Height (inches): 66 in  Weight Method: Bed Scale  Weight: 96.7 kg (213 lb 3 oz)  Weight (lb): 213.19 lb  Ideal Body Weight (IBW), Female: 130 lb  % Ideal Body Weight, Female (lb): 163.99 %  BMI (Calculated): 34.4  BMI Grade: 30 - 34.9- obesity - grade I     Nutrition by Nursing  Diet/Nutrition Received: mechanical/dental soft  Intake (%): 50%  Diet/Feeding Assistance: tray set-up  Diet/Feeding Tolerance: fair  Last Bowel Movement: 05/16/22              Nutrition Follow-Up  RD Follow-up?: Yes  Assessment and Plan  No new Assessment & Plan notes have been filed under this hospital service since the last note was generated.  Service: Nutrition       "

## 2022-05-17 NOTE — PT/OT/SLP PROGRESS
Occupational Therapy   Treatment    Name: Michell Dhillon  MRN: [id number]  Admitting Diagnosis:  S/P carotid endarterectomy       Recommendations:     Discharge Recommendations: home with home health, home health OT  Discharge Equipment Recommendations:  grab bar, hip kit, walker, rolling  Barriers to discharge:  None    Assessment:     Michell Dhillon is a 77 y.o. female with a medical diagnosis of S/P carotid endarterectomy.  She presents with no new complaints; needs encouragement to remain upright in w/c throughout day, asking to go back to bed for resting due to back pain. Performance deficits affecting function are weakness, impaired endurance, impaired self care skills, impaired functional mobilty, gait instability, impaired balance, decreased lower extremity function, pain.     Rehab Prognosis:  Good; patient would benefit from continued skilled OT services to address these deficits and reach maximum level of function.       Plan:     Patient to be seen 5 x/week to address the above listed problems via self-care/home management, therapeutic activities, therapeutic exercises, wheelchair management/training  · Plan of Care Expires: 06/10/22  · Plan of Care Reviewed with: patient    Subjective     Pain/Comfort:  ·  no complaints today during session    Objective:     Communicated with: pt prior to session.  Patient found up in chair with  (no lines or drains) upon OT entry to room.    General Precautions: Standard, fall   Orthopedic Precautions:N/A   Braces:    Respiratory Status: Room air     Occupational Performance:     Bed Mobility:  Not addressed today    Functional Mobility/Transfers:    · Functional Mobility: not addressed today    Activities of Daily Living:  · Grooming: modified independence sinkside from seated position      [facility] 6 Click ADL:      Treatment & Education:  OT engaged pt in UBE x 15 min with UBE at level 2 resist for endurance training; then , pt able to complete pinch and pull task  with green theraputty to demonstrate improved FM and  for impacting self care skills overall.  OT assisted pt back to her room to accomplish hand washing at sinkside and preparation of lunch items.  Pt needed min assist to open small containers and adjust items as needed for optimal effectiveness.    Patient left up in chair with call button in reachEducation:      GOALS:   Multidisciplinary Problems     Occupational Therapy Goals        Problem: Occupational Therapy    Goal Priority Disciplines Outcome Interventions   Occupational Therapy Goal     OT, PT/OT Ongoing, Progressing    Description: STG: within 2 weeks  Pt will perform grooming with setup  Pt will bathe with setup  Pt will perform UE dressing with setup  Pt will perform LE dressing with setup  Pt will sit EOB x 30 min with no assistance  Pt will transfer bed/chair/bsc with sba  Pt will perform standing task x 3 min with svn assistance  Pt will tolerate 60 minutes of tx without fatigue      LTG: within 4 weeks  1.Restore to max I with self care and mobility.                      Time Tracking:     OT Date of Treatment: 05/17/22  OT Start Time: 1046  OT Stop Time: 1130  OT Total Time (min): 44 min    Billable Minutes:Self Care/Home Management 8  Therapeutic Activity 20  Therapeutic Exercise 16    OT/YONI: OT          5/17/2022

## 2022-05-17 NOTE — PLAN OF CARE
Problem: Diabetes Comorbidity  Goal: Blood Glucose Level Within Targeted Range  Outcome: Ongoing, Progressing     Problem: Impaired Wound Healing  Goal: Optimal Wound Healing  Outcome: Ongoing, Progressing

## 2022-05-18 LAB
BACTERIA #/AREA URNS HPF: ABNORMAL /HPF
BILIRUB UR QL STRIP: NEGATIVE
CLARITY UR: ABNORMAL
COLOR UR: YELLOW
GLUCOSE SERPL-MCNC: 172 MG/DL (ref 70–105)
GLUCOSE SERPL-MCNC: 189 MG/DL (ref 70–105)
GLUCOSE UR STRIP-MCNC: NEGATIVE MG/DL
KETONES UR STRIP-SCNC: NEGATIVE MG/DL
LEUKOCYTE ESTERASE UR QL STRIP: ABNORMAL
NITRITE UR QL STRIP: NEGATIVE
PH UR STRIP: 5.5 PH UNITS
PROT UR QL STRIP: >=300
RBC # UR STRIP: ABNORMAL /UL
RBC #/AREA URNS HPF: ABNORMAL /HPF
SP GR UR STRIP: 1.02
UROBILINOGEN UR STRIP-ACNC: 0.2 MG/DL
WBC #/AREA URNS HPF: ABNORMAL /HPF

## 2022-05-18 PROCEDURE — 87086 URINE CULTURE/COLONY COUNT: CPT | Performed by: NURSE PRACTITIONER

## 2022-05-18 PROCEDURE — 97116 GAIT TRAINING THERAPY: CPT

## 2022-05-18 PROCEDURE — 81001 URINALYSIS AUTO W/SCOPE: CPT | Performed by: NURSE PRACTITIONER

## 2022-05-18 PROCEDURE — 25000003 PHARM REV CODE 250: Performed by: NURSE PRACTITIONER

## 2022-05-18 PROCEDURE — 99307 SBSQ NF CARE SF MDM 10: CPT | Mod: ,,, | Performed by: HOSPITALIST

## 2022-05-18 PROCEDURE — 27000958

## 2022-05-18 PROCEDURE — 25000003 PHARM REV CODE 250: Performed by: HOSPITALIST

## 2022-05-18 PROCEDURE — 87077 CULTURE AEROBIC IDENTIFY: CPT | Performed by: NURSE PRACTITIONER

## 2022-05-18 PROCEDURE — 82962 GLUCOSE BLOOD TEST: CPT

## 2022-05-18 PROCEDURE — 97110 THERAPEUTIC EXERCISES: CPT

## 2022-05-18 PROCEDURE — 99307 PR NURSING FAC CARE, SUBSEQ, IMPROVING: ICD-10-PCS | Mod: ,,, | Performed by: HOSPITALIST

## 2022-05-18 PROCEDURE — 11000004 HC SNF PRIVATE

## 2022-05-18 PROCEDURE — 92507 TX SP LANG VOICE COMM INDIV: CPT

## 2022-05-18 PROCEDURE — 63600175 PHARM REV CODE 636 W HCPCS: Performed by: HOSPITALIST

## 2022-05-18 RX ORDER — CIPROFLOXACIN 250 MG/1
250 TABLET, FILM COATED ORAL EVERY 12 HOURS
Status: DISCONTINUED | OUTPATIENT
Start: 2022-05-19 | End: 2022-05-21

## 2022-05-18 RX ORDER — ALLOPURINOL 100 MG/1
100 TABLET ORAL DAILY
Status: DISCONTINUED | OUTPATIENT
Start: 2022-05-18 | End: 2022-06-02 | Stop reason: HOSPADM

## 2022-05-18 RX ADMIN — ALLOPURINOL 100 MG: 100 TABLET ORAL at 11:05

## 2022-05-18 RX ADMIN — MUPIROCIN: 20 OINTMENT TOPICAL at 08:05

## 2022-05-18 RX ADMIN — NEBIVOLOL HYDROCHLORIDE 10 MG: 5 TABLET ORAL at 08:05

## 2022-05-18 RX ADMIN — METFORMIN HYDROCHLORIDE 500 MG: 500 TABLET, FILM COATED ORAL at 07:05

## 2022-05-18 RX ADMIN — HYDROCHLOROTHIAZIDE 12.5 MG: 12.5 TABLET ORAL at 08:05

## 2022-05-18 RX ADMIN — ASPIRIN 81 MG 81 MG: 81 TABLET ORAL at 08:05

## 2022-05-18 RX ADMIN — CLOPIDOGREL 75 MG: 75 TABLET, FILM COATED ORAL at 08:05

## 2022-05-18 RX ADMIN — LOSARTAN POTASSIUM 100 MG: 100 TABLET, FILM COATED ORAL at 08:05

## 2022-05-18 RX ADMIN — FENOFIBRATE 145 MG: 145 TABLET, FILM COATED ORAL at 08:05

## 2022-05-18 RX ADMIN — LIDOCAINE HYDROCHLORIDE 1 G: 10 INJECTION, SOLUTION INFILTRATION; PERINEURAL at 08:05

## 2022-05-18 RX ADMIN — LEVOTHYROXINE SODIUM 75 MCG: 0.03 TABLET ORAL at 06:05

## 2022-05-18 RX ADMIN — METFORMIN HYDROCHLORIDE 500 MG: 500 TABLET, FILM COATED ORAL at 04:05

## 2022-05-18 RX ADMIN — ATORVASTATIN CALCIUM 40 MG: 40 TABLET, FILM COATED ORAL at 08:05

## 2022-05-18 NOTE — PT/OT/SLP PROGRESS
Occupational Therapy   Treatment    Name: Michell Dhillon  MRN: [id number]  Admitting Diagnosis:  S/P carotid endarterectomy       Recommendations:     Discharge Recommendations: home with home health, home health OT  Discharge Equipment Recommendations:  grab bar, hip kit, walker, rolling  Barriers to discharge:  None    Assessment:     Michell Dhillon is a 77 y.o. female with a medical diagnosis of S/P carotid endarterectomy.  She presents with no new px's. Performance deficits affecting function are weakness, impaired endurance, impaired self care skills, impaired functional mobilty, gait instability, impaired balance, decreased lower extremity function, pain.     Rehab Prognosis:  Good; patient would benefit from continued skilled OT services to address these deficits and reach maximum level of function.       Plan:     Patient to be seen 5 x/week to address the above listed problems via self-care/home management, therapeutic activities, therapeutic exercises, wheelchair management/training  · Plan of Care Expires: 06/10/22  · Plan of Care Reviewed with: patient    Subjective     Pain/Comfort:  ·  none reported to OT today    Objective:     Communicated with: pt prior to session.  Patient found up in chair with  (no lines or drains) upon OT entry to room.    General Precautions: Standard, fall   Orthopedic Precautions:N/A   Braces:    Respiratory Status: Room air     Occupational Performance:     Bed Mobility:    · Not assessed today    Functional Mobility/Transfers:  See PT    Activities of Daily Living:  · Feeding:  supervision to setup  · Grooming: supervision with occasional vc's      [facility] 6 Click ADL:      Treatment & Education:  OT engaged pt in b pulleys to promote AROM and improved reach and mobility  Of UB for impacting self care including UB dressing.  In addition, pt performed UBE with BUE, lowest resist due to endarterectomy, for conditioning and endurance.  Pt tolerance improving, thought pt tired  after completing PT session prior to OT session today.      Patient left up in chair with call button in reach and nurse and pt lunch on table presentEducation:      GOALS:   Multidisciplinary Problems     Occupational Therapy Goals        Problem: Occupational Therapy    Goal Priority Disciplines Outcome Interventions   Occupational Therapy Goal     OT, PT/OT Ongoing, Progressing    Description: STG: within 2 weeks  Pt will perform grooming with setup  Pt will bathe with setup  Pt will perform UE dressing with setup  Pt will perform LE dressing with setup  Pt will sit EOB x 30 min with no assistance  Pt will transfer bed/chair/bsc with sba  Pt will perform standing task x 3 min with svn assistance  Pt will tolerate 60 minutes of tx without fatigue      LTG: within 4 weeks  1.Restore to max I with self care and mobility.                      Time Tracking:     OT Date of Treatment: 05/18/22  OT Start Time: 1047  OT Stop Time: 1115  OT Total Time (min): 28 min    Billable Minutes:Therapeutic Exercise 28    OT/YONI: OT          5/18/2022

## 2022-05-18 NOTE — PT/OT/SLP PROGRESS
Speech Language Pathology Treatment    Patient Name:  Michell Dhillon   MRN:  [id number]  Admitting Diagnosis: S/P carotid endarterectomy    Recommendations:                 General Recommendations:  Dysphagia therapy and Cognitive-linguistic therapy  Diet recommendations:  Mechanical soft, Liquid Diet Level: Thin (cup edge, no straws)   Aspiration Precautions: 1 bite/sip at a time, No straws, Remain upright 30 minutes post meal and Standard aspiration precautions   General Precautions: Standard, fall, aspiration (see MBS regarding aspiration risk)  Communication strategies:  none    Subjective     Pt alert this am with treatment in therapy room  Patient goals: none stated      Respiratory Status: Room air    Objective:     Has the patient been evaluated by SLP for swallowing?   Yes  Keep patient NPO? No   Current Respiratory Status:        Tasks for attention, reasoning, recall completed via hands on task. Pt required verbal instructions and model for initiation with 90% accuracy noted. Task for sequential thought process completed with redirection x2 and adequate time. Pt with error awareness.     Assessment:     Michell Dhillon is a 77 y.o. female with an SLP diagnosis of Dysphagia, Dysarthria and Cognitive-Linguistic Impairment.  She presents with difficulty in processing/word finding, recall, and reasoning for safety awareness, mild-moderate dysarthria, and mild- moderate dysphagia with aspiration risk secondary to lack of sensation per MBS report.    Goals:   Multidisciplinary Problems     SLP Goals        Problem: SLP    Goal Priority Disciplines Outcome   SLP Goal     SLP Ongoing, Progressing   Description: Patient will complete hard glottal attacks and tongue base retractions exercises with mod-max accuracy Independently.   Patient will complete laryngeal elevation exercises with mod-max accuracy Independently.   Patient will complete word finding tasks with 80% accuracy Independently.   Patient will  complete problem solving tasks with 80% accuracy Independently.   Patient will complete verbal reasoning tasks with 80% accuracy Independently.   Patient will complete short term memory tasks with 80% accuracy Independently.   Patient will complete demonstrate speech intelligibility at phrase and sentence level with 90% accuracy.                       Plan:     · Patient to be seen:  5 x/week   · Plan of Care expires:  06/10/22  · Plan of Care reviewed with:  patient   · SLP Follow-Up:  Yes       Discharge recommendations:  home with home health   Barriers to Discharge:  Level of Skilled Assistance Needed currently    Time Tracking:     SLP Treatment Date:   05/18/22  Speech Start Time:  0852  Speech Stop Time:  0920  Speech Total Time (min):  28    Billable MinutesSwallow DysfunctionTreatment 28    05/18/2022

## 2022-05-18 NOTE — HOSPITAL COURSE
5/18 Follow up today, she is doing much better since Friday.  Was only able to stand.  Now ambulating 20-30 feet with breaks with walker.  Good mood.  Motivated.     5/20 Yesterday was more lethargic, UA checked and full of bacteria, started on ABX.  She is better, still a little sluggish but more alert and did a little more with therapy yesterday.     5/21 Urine cultured results show resistance to Cipro, will change to Bactrim DS, patient stated that her allergy to Bactrim is itching, will give Benadryl to help relieve itching if that occurs.     5/22; Patient c/o reddened area to left wrist, patient states that it is not itching, will order hydrocortisone cream to be applied BID.     5/27 Patient laying supine in bed with eyes open in NAD. Daughter at bedside. States she is feeling better today. Did chair exercises per PT today because she said she didn't feel like walking today. Patient did walk 35 steps yesterday. Patient encouraged to participate in full PT regimen. Repeat UA and BMP done. Urine shows no infection. Sodium has increased from 130 yesterday to 135 today. Patient rounding performed with Dr. Aguilar per video.    05/28 Na up to 135, discussed pt status and lab with Dr. Rainey, Will DC IV fluids.     5/30 Feels good, ready to go home.  Will need equipment set up.     6/1 Plan was to go home but now family wants NH placement.     Now patient's family decided to take her home.  Will DC home.  Follow up with PCP and specialists.  Stopped HCTZ due to dehydration.

## 2022-05-18 NOTE — SUBJECTIVE & OBJECTIVE
Interval History: ambulating now with walker, further daily.     Review of Systems   Respiratory:  Negative for shortness of breath.    Cardiovascular:  Negative for chest pain.   Gastrointestinal:  Negative for abdominal pain, nausea and vomiting.   Psychiatric/Behavioral:  Negative for agitation and confusion.    All other systems reviewed and are negative.  Objective:     Vital Signs (Most Recent):  Temp: 98.1 °F (36.7 °C) (05/18/22 0702)  Pulse: 74 (05/18/22 0702)  Resp: 20 (05/18/22 0702)  BP: 122/65 (05/18/22 0702)  SpO2: 95 % (05/18/22 0702) Vital Signs (24h Range):  Temp:  [98.1 °F (36.7 °C)-98.6 °F (37 °C)] 98.1 °F (36.7 °C)  Pulse:  [74-80] 74  Resp:  [20] 20  SpO2:  [95 %-97 %] 95 %  BP: (122-145)/(65-68) 122/65     Weight: 96.7 kg (213 lb 3 oz)  Body mass index is 34.41 kg/m².    Intake/Output Summary (Last 24 hours) at 5/18/2022 1007  Last data filed at 5/18/2022 0632  Gross per 24 hour   Intake 960 ml   Output --   Net 960 ml      Physical Exam  Vitals reviewed.   Constitutional:       General: She is not in acute distress.     Appearance: Normal appearance.   HENT:      Head: Normocephalic and atraumatic.   Eyes:      General: No scleral icterus.     Extraocular Movements: Extraocular movements intact.      Conjunctiva/sclera: Conjunctivae normal.      Pupils: Pupils are equal, round, and reactive to light.   Cardiovascular:      Rate and Rhythm: Normal rate and regular rhythm.      Heart sounds: No murmur heard.    No friction rub. No gallop.   Pulmonary:      Effort: Pulmonary effort is normal. No respiratory distress.      Breath sounds: Normal breath sounds. No wheezing or rales.   Abdominal:      General: Abdomen is flat. Bowel sounds are normal. There is no distension.      Palpations: Abdomen is soft.      Tenderness: There is no abdominal tenderness. There is no guarding.   Musculoskeletal:         General: No swelling.      Comments: 1st digit on R hand slightly swollen, tender    Skin:      General: Skin is warm and dry.      Coloration: Skin is not jaundiced.      Findings: No rash.      Comments: Incision on Left side of neck, dressed, no drainage    Neurological:      General: No focal deficit present.      Mental Status: She is alert and oriented to person, place, and time.      Sensory: No sensory deficit.      Comments: Getting better with ambulation.    Psychiatric:         Mood and Affect: Mood normal.         Thought Content: Thought content normal.         Judgment: Judgment normal.       Significant Labs: All pertinent labs within the past 24 hours have been reviewed.  Recent Lab Results       None            Significant Imaging: I have reviewed all pertinent imaging results/findings within the past 24 hours.

## 2022-05-18 NOTE — NURSING
During patient visitation family noticed  Increased slurring in speech and weakness. New symptoms reported to NP for evaluation and assessment. CT head and UA ordered. Currently awaiting test results. Small skin tear to left lower extremity due to bumping wheelchair during transfer. Skin fold positioned on top of open area and covered with non adherent dressing.

## 2022-05-18 NOTE — PROGRESS NOTES
[facility] Medicine  Progress Note    Patient Name: Michell Dhillon  MRN: [id number]  Patient Class: IP- Swing   Admission Date: 5/13/2022  Length of Stay: 5 days  Attending Physician: Elmer Rainey DO  Primary Care Provider: Daryl Moscoso NP        Subjective:     Principal Problem:S/P carotid endarterectomy    [facility] Admission Certification    I certify that skilled nursing facility services are required to be given on an inpatient basis due to the following required skilled nursing and/or skilled rehabilitation services on a continuing basis:    management & evaluation of a patient plan of care that requires skilled nursing or rehabilitation services    I estimate that the additional period of SNF inpatient care will be 7-14 days.    Plans for post SNF care are: Home Care  ______________________________________________________________________        HPI:  Ms Dhillon is a pleasant 77 yr old WF who has a known hx of HTN, T2DM, Hypothyroidism, osteoarthritis, carotid stenosis, dyslipidemia.  She was admitted to [facility] 05/04 with stroke symptoms.  She was found to have left posterior basal ganglia, corona radiata and lucanar CVA.  She had left carotid endarterectomy on 5/09 and was kept at [facility] until today.  She was transferred here for [facility] for OT/PT and medical management.  Ms Dhillon lives at home with her son.  She reports before the admit, she was able to walk and care for herself.  She is found sitting up in chair, eating lunch in NAD.  She denies pain or discomfort. She is alert, oriented and answers all questions appropriately.  Skin is warm and dry with noted bruising to left side of her neck and bilateral upper arms.  She moves all extremities well but generalized weakness.        Overview/Hospital Course:  5/18 Follow up today, she is doing much better since Friday.  Was only able to stand.  Now  ambulating 20-30 feet with breaks with walker.  Good mood.  Motivated.       Interval History: ambulating now with walker, further daily.     Review of Systems   Respiratory:  Negative for shortness of breath.    Cardiovascular:  Negative for chest pain.   Gastrointestinal:  Negative for abdominal pain, nausea and vomiting.   Psychiatric/Behavioral:  Negative for agitation and confusion.    All other systems reviewed and are negative.  Objective:     Vital Signs (Most Recent):  Temp: 98.1 °F (36.7 °C) (05/18/22 0702)  Pulse: 74 (05/18/22 0702)  Resp: 20 (05/18/22 0702)  BP: 122/65 (05/18/22 0702)  SpO2: 95 % (05/18/22 0702) Vital Signs (24h Range):  Temp:  [98.1 °F (36.7 °C)-98.6 °F (37 °C)] 98.1 °F (36.7 °C)  Pulse:  [74-80] 74  Resp:  [20] 20  SpO2:  [95 %-97 %] 95 %  BP: (122-145)/(65-68) 122/65     Weight: 96.7 kg (213 lb 3 oz)  Body mass index is 34.41 kg/m².    Intake/Output Summary (Last 24 hours) at 5/18/2022 1007  Last data filed at 5/18/2022 0632  Gross per 24 hour   Intake 960 ml   Output --   Net 960 ml      Physical Exam  Vitals reviewed.   Constitutional:       General: She is not in acute distress.     Appearance: Normal appearance.   HENT:      Head: Normocephalic and atraumatic.   Eyes:      General: No scleral icterus.     Extraocular Movements: Extraocular movements intact.      Conjunctiva/sclera: Conjunctivae normal.      Pupils: Pupils are equal, round, and reactive to light.   Cardiovascular:      Rate and Rhythm: Normal rate and regular rhythm.      Heart sounds: No murmur heard.    No friction rub. No gallop.   Pulmonary:      Effort: Pulmonary effort is normal. No respiratory distress.      Breath sounds: Normal breath sounds. No wheezing or rales.   Abdominal:      General: Abdomen is flat. Bowel sounds are normal. There is no distension.      Palpations: Abdomen is soft.      Tenderness: There is no abdominal tenderness. There is no guarding.   Musculoskeletal:         General: No  swelling.      Comments: 1st digit on R hand slightly swollen, tender    Skin:     General: Skin is warm and dry.      Coloration: Skin is not jaundiced.      Findings: No rash.      Comments: Incision on Left side of neck, dressed, no drainage    Neurological:      General: No focal deficit present.      Mental Status: She is alert and oriented to person, place, and time.      Sensory: No sensory deficit.      Comments: Getting better with ambulation.    Psychiatric:         Mood and Affect: Mood normal.         Thought Content: Thought content normal.         Judgment: Judgment normal.       Significant Labs: All pertinent labs within the past 24 hours have been reviewed.  Recent Lab Results       None            Significant Imaging: I have reviewed all pertinent imaging results/findings within the past 24 hours.      Assessment/Plan:      * S/P carotid endarterectomy  Keep wound clean and dry  F/u as directed      Finger pain, right  ESR 45, Uric acid up.  Start Allopurinol.     Hypothyroidism  Continue home medication      Type 2 diabetes mellitus without complication, without long-term current use of insulin  Monitor lab  Continue metformin        Benign essential HTN  Monitor VS  Losartan/ hctz        VTE Risk Mitigation (From admission, onward)         Ordered     Place RENITA hose  Until discontinued         05/13/22 1213     IP VTE HIGH RISK PATIENT  Once         05/13/22 1213     Place sequential compression device  Until discontinued         05/13/22 1213                Discharge Planning   NIGEL:      Code Status: Full Code   Is the patient medically ready for discharge?:     Reason for patient still in hospital (select all that apply): Laboratory test, Treatment and PT / OT recommendations  Discharge Plan A: Home with family, Home                  Elmer Rainey DO  Department of Hospital Medicine   [facility] Surgical [facility]

## 2022-05-18 NOTE — PT/OT/SLP PROGRESS
Physical Therapy Treatment    Patient Name:  Michell Dhillon   MRN:  [id number]    Recommendations:     Discharge Recommendations:  home health PT, home health OT   Discharge Equipment Recommendations: walker, rolling   Barriers to discharge: Decreased caregiver support    Assessment:     Michell Dhillon is a 77 y.o. female admitted with a medical diagnosis of S/P carotid endarterectomy.  She presents with the following impairments/functional limitations:  weakness, impaired endurance, impaired self care skills, impaired functional mobilty, gait instability, impaired balance Patient with much improved gait distance today and less fatigue noted.    Rehab Prognosis: Good; patient would benefit from acute skilled PT services to address these deficits and reach maximum level of function.    Recent Surgery: * No surgery found *      Plan:     During this hospitalization, patient to be seen 5 x/week to address the identified rehab impairments via gait training, therapeutic activities, therapeutic exercises and progress toward the following goals:    · Plan of Care Expires:  06/03/22    Subjective     Chief Complaint: weakness  Patient/Family Comments/goals: return home with family   Pain/Comfort:  · Pain Rating 1: 0/10  · Pain Rating Post-Intervention 1: 0/10      Objective:     Communicated with nurse prior to session.  Patient found up in chair with   upon PT entry to room.     General Precautions: Standard, fall   Orthopedic Precautions:N/A   Braces:    Respiratory Status: Room air     Functional Mobility:  · Bed Mobility:     · Sit to Supine: minimum assistance  · Transfers:     · Sit to Stand:  contact guard assistance with rolling walker  · Gait: patient ambulated 62 feet with RW and CGA with intermittent verbal cues to look ahead and stand upright.  She rested x 1 min then ambulated 100 feet with RW and CGA with improved upright stance and improved forward gaze with mild fatigue with gait.       AM-PAC 6 CLICK  MOBILITY  Turning over in bed (including adjusting bedclothes, sheets and blankets)?: 3  Sitting down on and standing up from a chair with arms (e.g., wheelchair, bedside commode, etc.): 3  Moving from lying on back to sitting on the side of the bed?: 3  Moving to and from a bed to a chair (including a wheelchair)?: 3  Need to walk in hospital room?: 3  Climbing 3-5 steps with a railing?: 2  Basic Mobility Total Score: 17       Therapeutic Activities and Exercises:   AP, LAQ, seated march, hip abd/add and hip add squeezes x 30 each LE  Sit to stand x 5 with CGA and verbal cues for eccentric control with lowering   SPT training x 2 with verbal cues for safety and to ensure seat is directly behind her before sitting.     Patient left supine with call button in reach..    GOALS:   Multidisciplinary Problems     Physical Therapy Goals        Problem: Physical Therapy    Goal Priority Disciplines Outcome Goal Variances Interventions   Physical Therapy Goal     PT, PT/OT Ongoing, Progressing     Description: STG:  Patient will perform all bed mobility with CGA  Patient will perform sit to stand and SPT with CGA  Patient will ambulate 50 feet with RW assistive device with CGA assistance without LOB.     LTG  Patient will perform bed mobility with mod I.  Patient will perform sit to stand and SPT with SBA  Patient will ambuate 150 feet with RW assistive device with CGA assistance without LOB.   Patient will perform 10 min of LE exercise without rest break to increase LE strength to 4/5.                      Time Tracking:     PT Received On: 05/18/22  PT Start Time: 0948     PT Stop Time: 1014  PT Total Time (min): 26 min     Billable Minutes: Gait Training 15 and Therapeutic Exercise 11    Treatment Type: Treatment  PT/PTA: PT     PTA Visit Number: 0     05/18/2022

## 2022-05-18 NOTE — PLAN OF CARE
[facility] Medical Surgical Unit  Discharge Reassessment    Primary Care Provider: Daryl Moscoso NP    Expected Discharge Date:     Reassessment (most recent)     Discharge Reassessment - 05/18/22 0824        Discharge Reassessment    Assessment Type Discharge Planning Reassessment     Did the patient's condition or plan change since previous assessment? No     Discharge Plan discussed with: Patient     Communicated NIGEL with patient/caregiver Yes     Discharge Plan A Home with family;Home     DME Needed Upon Discharge  other (see comments)   TBD    Discharge Barriers Identified None     Why the patient remains in the hospital Requires continued medical care               Patient discussed in multidisciplinary meeting. Patient is doing well in therapy. [facility]

## 2022-05-19 PROCEDURE — 25000003 PHARM REV CODE 250: Performed by: NURSE PRACTITIONER

## 2022-05-19 PROCEDURE — 92507 TX SP LANG VOICE COMM INDIV: CPT

## 2022-05-19 PROCEDURE — 92526 ORAL FUNCTION THERAPY: CPT

## 2022-05-19 PROCEDURE — 97110 THERAPEUTIC EXERCISES: CPT

## 2022-05-19 PROCEDURE — 25000003 PHARM REV CODE 250: Performed by: HOSPITALIST

## 2022-05-19 PROCEDURE — 11000004 HC SNF PRIVATE

## 2022-05-19 PROCEDURE — 27000958

## 2022-05-19 PROCEDURE — 97110 THERAPEUTIC EXERCISES: CPT | Mod: CQ

## 2022-05-19 PROCEDURE — 97530 THERAPEUTIC ACTIVITIES: CPT | Mod: CQ

## 2022-05-19 RX ADMIN — ALLOPURINOL 100 MG: 100 TABLET ORAL at 08:05

## 2022-05-19 RX ADMIN — MUPIROCIN: 20 OINTMENT TOPICAL at 08:05

## 2022-05-19 RX ADMIN — LEVOTHYROXINE SODIUM 75 MCG: 0.03 TABLET ORAL at 06:05

## 2022-05-19 RX ADMIN — NEBIVOLOL HYDROCHLORIDE 10 MG: 5 TABLET ORAL at 08:05

## 2022-05-19 RX ADMIN — LOSARTAN POTASSIUM 100 MG: 100 TABLET, FILM COATED ORAL at 08:05

## 2022-05-19 RX ADMIN — CIPROFLOXACIN 250 MG: 250 TABLET, FILM COATED ORAL at 08:05

## 2022-05-19 RX ADMIN — ASPIRIN 81 MG 81 MG: 81 TABLET ORAL at 08:05

## 2022-05-19 RX ADMIN — HYDROCHLOROTHIAZIDE 12.5 MG: 12.5 TABLET ORAL at 08:05

## 2022-05-19 RX ADMIN — METFORMIN HYDROCHLORIDE 500 MG: 500 TABLET, FILM COATED ORAL at 04:05

## 2022-05-19 RX ADMIN — ATORVASTATIN CALCIUM 40 MG: 40 TABLET, FILM COATED ORAL at 08:05

## 2022-05-19 RX ADMIN — CLOPIDOGREL 75 MG: 75 TABLET, FILM COATED ORAL at 08:05

## 2022-05-19 RX ADMIN — METFORMIN HYDROCHLORIDE 500 MG: 500 TABLET, FILM COATED ORAL at 08:05

## 2022-05-19 RX ADMIN — FENOFIBRATE 145 MG: 145 TABLET, FILM COATED ORAL at 08:05

## 2022-05-19 NOTE — PT/OT/SLP PROGRESS
"Speech Language Pathology Treatment    Patient Name:  Michell Dhillon   MRN:  [id number]  Admitting Diagnosis: S/P carotid endarterectomy    Recommendations:                 General Recommendations:  Dysphagia therapy and Cognitive-linguistic therapy  Diet recommendations:  Mechanical soft, Liquid Diet Level: Thin (cup edge, no straws)   Aspiration Precautions: 1 bite/sip at a time, No straws, Remain upright 30 minutes post meal and Standard aspiration precautions   General Precautions: Standard, fall, aspiration (see MBS regarding aspiration risk)  Communication strategies:  none    Subjective     Pt alert in treatment room.  Patient goals: none stated      Respiratory Status: Room air    Objective:     Has the patient been evaluated by SLP for swallowing?   Yes  Keep patient NPO? No   Current Respiratory Status:        Tasks for attention, recall of verbal directions for patterncompleted via hands on task.  Pt with 80% accuracy, delayed response at times with repetition required.  Decreased alertness noted.     Pt with tasks for increased pharyngeal strength and response via HGA and pharyngeal strengthening tasks x14 reps with mod assist for increased effort.     Pt indicated not feeling well today, nursing indicated UTI with "spell" last night of weakness. Nursing assisted with pt transfer to bed with daughter present.     Assessment:     Michell Dhillon is a 77 y.o. female with an SLP diagnosis of Dysphagia, Dysarthria and Cognitive-Linguistic Impairment.  She presents with difficulty in processing/word finding, recall, and reasoning for safety awareness, mild-moderate dysarthria, and mild- moderate dysphagia with aspiration risk secondary to lack of sensation per MBS report.    Goals:   Multidisciplinary Problems     SLP Goals        Problem: SLP    Goal Priority Disciplines Outcome   SLP Goal     SLP Ongoing, Progressing   Description: Patient will complete hard glottal attacks and tongue base retractions " exercises with mod-max accuracy Independently.   Patient will complete laryngeal elevation exercises with mod-max accuracy Independently.   Patient will complete word finding tasks with 80% accuracy Independently.   Patient will complete problem solving tasks with 80% accuracy Independently.   Patient will complete verbal reasoning tasks with 80% accuracy Independently.   Patient will complete short term memory tasks with 80% accuracy Independently.   Patient will complete demonstrate speech intelligibility at phrase and sentence level with 90% accuracy.                       Plan:     · Patient to be seen: 3x/week, POC modified to 3x/week treatment secondary to overall progression  · Plan of Care expires:  06/10/22  · Plan of Care reviewed with:  patient   · SLP Follow-Up:  Yes       Discharge recommendations:  home with home health   Barriers to Discharge:  Level of Skilled Assistance Needed currently    Time Tracking:     SLP Treatment Date:   05/19/22  Speech Start Time:  1430  Speech Stop Time:  1455  Speech Total Time (min):  25    Billable Minutes   Speech Language 15 Swallow DysfunctionTreatment 10    05/19/2022

## 2022-05-19 NOTE — PT/OT/SLP PROGRESS
Physical Therapy Treatment    Patient Name:  Michell Dhillon   MRN:  [id number]    Recommendations:     Discharge Recommendations:  home health PT, home health OT   Discharge Equipment Recommendations: walker, rolling   Barriers to discharge: Decreased caregiver support    Assessment:     Michell Dhillon is a 77 y.o. female admitted with a medical diagnosis of S/P carotid endarterectomy.  She presents with the following impairments/functional limitations:  weakness, impaired endurance, impaired self care skills, impaired functional mobilty, gait instability, impaired balance Patient required increased assistance with transfer and gait training this treatment session. Patient required visual, verbal, and tactile cues for proper form and to stay on tasks with exercises. Patient with no reports of adverse effects after completion of treatment.     Rehab Prognosis: Good; patient would benefit from acute skilled PT services to address these deficits and reach maximum level of function.    Recent Surgery: * No surgery found *      Plan:     During this hospitalization, patient to be seen 5 x/week to address the identified rehab impairments via gait training, therapeutic activities, therapeutic exercises and progress toward the following goals:    · Plan of Care Expires:  06/03/22    Subjective     Chief Complaint: weakness  Patient/Family Comments/goals: Patient reported that she felt weak, but had no other complaints.   Pain/Comfort:  Pain Rating 1: 0/10      Objective:     Communicated with nurse prior to session.  Patient found supine with  son present upon entry to room.     General Precautions: Standard, fall   Orthopedic Precautions:N/A   Braces: N/A  Respiratory Status: Room air     Functional Mobility:  · Bed Mobility:     · Rolling Left:  moderate assistance  · Supine to Sit: moderate assistance  · Transfers:     · Sit to Stand:  moderate assistance with rolling walker  · Bed to Chair: moderate assistance with   "rolling walker  using  Stand Pivot  · Gait: 38 feet with moderate assistance due to posterior and right side leaning.   · Balance: sitting and standing balance with moderate assistance to mainatin balance.       AM-PAC 6 CLICK MOBILITY  Turning over in bed (including adjusting bedclothes, sheets and blankets)?: 3  Sitting down on and standing up from a chair with arms (e.g., wheelchair, bedside commode, etc.): 2  Moving from lying on back to sitting on the side of the bed?: 2  Moving to and from a bed to a chair (including a wheelchair)?: 2  Need to walk in hospital room?: 2  Climbing 3-5 steps with a railing?: 2  Basic Mobility Total Score: 13       Therapeutic Activities and Exercises:  LAQ, seated march, hip add squeezes, seated heel raises x 20 each LE, Glute sets 20 x 3"   Sit to stand x 5 with moderate assistance and verbal cues for eccentric control with lowering     Patient left up in chair with call button in reach and Son present..    GOALS:   Multidisciplinary Problems     Physical Therapy Goals        Problem: Physical Therapy    Goal Priority Disciplines Outcome Goal Variances Interventions   Physical Therapy Goal     PT, PT/OT Ongoing, Progressing     Description: STG:  Patient will perform all bed mobility with CGA  Patient will perform sit to stand and SPT with CGA  Patient will ambulate 50 feet with RW assistive device with CGA assistance without LOB.     LTG  Patient will perform bed mobility with mod I.  Patient will perform sit to stand and SPT with SBA  Patient will ambuate 150 feet with RW assistive device with CGA assistance without LOB.   Patient will perform 10 min of LE exercise without rest break to increase LE strength to 4/5.                      Time Tracking:     PT Received On: 05/19/22  PT Start Time: 1250     PT Stop Time: 1325  PT Total Time (min): 35 min     Billable Minutes: Gait Training 5, Therapeutic Activity 10 and Therapeutic Exercise 20    Treatment Type: Treatment  PT/PTA: " PTA     PTA Visit Number: 1     IZABEL Balderas   05/19/2022

## 2022-05-19 NOTE — PT/OT/SLP PROGRESS
Occupational Therapy   Treatment    Name: Michell Dhillon  MRN: [id number]  Admitting Diagnosis:  S/P carotid endarterectomy       Recommendations:     Discharge Recommendations: home with home health, home health OT  Discharge Equipment Recommendations:  grab bar, hip kit, walker, rolling  Barriers to discharge:       Assessment:     Michell Dhillon is a 77 y.o. female with a medical diagnosis of S/P carotid endarterectomy.  She presents with generalized muscle weakness and a decline in functional abilities. Performance deficits affecting function are  .     Rehab Prognosis:  Good and Fair; patient would benefit from acute skilled OT services to address these deficits and reach maximum level of function.       Plan:     Patient to be seen 5 x/week to address the above listed problems via self-care/home management, therapeutic activities, therapeutic exercises, wheelchair management/training  · Plan of Care Expires: 06/10/22  · Plan of Care Reviewed with: patient    Subjective     Pain/Comfort:       Objective:     Communicated with: nurse prior to session.  Patient found up in chair with   upon OT entry to room.    General Precautions: Standard, fall   Orthopedic Precautions:N/A   Braces:    Respiratory Status: Room air     Treatment & Education:  Patient facilitated in BUE strengthening and endurance building exercise as it relates to increased safety and independence with self care and functional mobility tasks.  Patient completed the following exercise:  UBE with minimum resistance: 3 minute intervals x 3 with Patient provided with a 2 minute rest break between each interval due to overall deconditioned status.   Patient performed 1# dumbbell exercises bicep curls 3x10 BUE  Patient performed calibrated  strengthening tool @ level: 1 minute interval x 2 at bilateral hands    Patient left up in chair awaiting SLPEducation:      GOALS:   Multidisciplinary Problems     Occupational Therapy Goals        Problem:  Occupational Therapy    Goal Priority Disciplines Outcome Interventions   Occupational Therapy Goal     OT, PT/OT Ongoing, Progressing    Description: STG: within 2 weeks  Pt will perform grooming with setup  Pt will bathe with setup  Pt will perform UE dressing with setup  Pt will perform LE dressing with setup  Pt will sit EOB x 30 min with no assistance  Pt will transfer bed/chair/bsc with sba  Pt will perform standing task x 3 min with svn assistance  Pt will tolerate 60 minutes of tx without fatigue      LTG: within 4 weeks  1.Restore to max I with self care and mobility.                      Time Tracking:     OT Date of Treatment: 05/19/22  OT Start Time: 1401  OT Stop Time: 1427  OT Total Time (min): 26 min    Billable Minutes:Therapeutic Exercise x26               5/19/2022

## 2022-05-20 PROBLEM — N39.0 UTI (URINARY TRACT INFECTION): Status: ACTIVE | Noted: 2022-05-20

## 2022-05-20 PROCEDURE — 99308 SBSQ NF CARE LOW MDM 20: CPT | Mod: ,,, | Performed by: HOSPITALIST

## 2022-05-20 PROCEDURE — 97110 THERAPEUTIC EXERCISES: CPT | Mod: CQ

## 2022-05-20 PROCEDURE — 11000004 HC SNF PRIVATE

## 2022-05-20 PROCEDURE — 97530 THERAPEUTIC ACTIVITIES: CPT

## 2022-05-20 PROCEDURE — 25000003 PHARM REV CODE 250: Performed by: NURSE PRACTITIONER

## 2022-05-20 PROCEDURE — 99308 PR NURSING FAC CARE, SUBSEQ, MINOR COMPLIC: ICD-10-PCS | Mod: ,,, | Performed by: HOSPITALIST

## 2022-05-20 PROCEDURE — 97110 THERAPEUTIC EXERCISES: CPT

## 2022-05-20 PROCEDURE — 97116 GAIT TRAINING THERAPY: CPT | Mod: CQ

## 2022-05-20 PROCEDURE — 27000958

## 2022-05-20 PROCEDURE — 25000003 PHARM REV CODE 250: Performed by: HOSPITALIST

## 2022-05-20 RX ADMIN — ASPIRIN 81 MG 81 MG: 81 TABLET ORAL at 08:05

## 2022-05-20 RX ADMIN — CLOPIDOGREL 75 MG: 75 TABLET, FILM COATED ORAL at 08:05

## 2022-05-20 RX ADMIN — METFORMIN HYDROCHLORIDE 500 MG: 500 TABLET, FILM COATED ORAL at 04:05

## 2022-05-20 RX ADMIN — LOSARTAN POTASSIUM 100 MG: 100 TABLET, FILM COATED ORAL at 08:05

## 2022-05-20 RX ADMIN — HYDROCHLOROTHIAZIDE 12.5 MG: 12.5 TABLET ORAL at 08:05

## 2022-05-20 RX ADMIN — CIPROFLOXACIN 250 MG: 250 TABLET, FILM COATED ORAL at 08:05

## 2022-05-20 RX ADMIN — ATORVASTATIN CALCIUM 40 MG: 40 TABLET, FILM COATED ORAL at 08:05

## 2022-05-20 RX ADMIN — METFORMIN HYDROCHLORIDE 500 MG: 500 TABLET, FILM COATED ORAL at 08:05

## 2022-05-20 RX ADMIN — MUPIROCIN: 20 OINTMENT TOPICAL at 08:05

## 2022-05-20 RX ADMIN — NEBIVOLOL HYDROCHLORIDE 10 MG: 5 TABLET ORAL at 08:05

## 2022-05-20 RX ADMIN — ALLOPURINOL 100 MG: 100 TABLET ORAL at 08:05

## 2022-05-20 RX ADMIN — LEVOTHYROXINE SODIUM 75 MCG: 0.03 TABLET ORAL at 05:05

## 2022-05-20 RX ADMIN — FENOFIBRATE 145 MG: 145 TABLET, FILM COATED ORAL at 08:05

## 2022-05-20 RX ADMIN — ACETAMINOPHEN 650 MG: 325 TABLET ORAL at 07:05

## 2022-05-20 NOTE — SUBJECTIVE & OBJECTIVE
Interval History: a little sluggish yesterday but better since on ABX    Review of Systems   Respiratory:  Negative for shortness of breath.    Cardiovascular:  Negative for chest pain.   Gastrointestinal:  Negative for abdominal pain, nausea and vomiting.   Neurological:  Positive for weakness.   Psychiatric/Behavioral:  Negative for agitation and confusion.    All other systems reviewed and are negative.  Objective:     Vital Signs (Most Recent):  Temp: 97.9 °F (36.6 °C) (05/20/22 0702)  Pulse: 75 (05/20/22 0702)  Resp: 18 (05/20/22 0702)  BP: (!) 107/56 (05/20/22 0702)  SpO2: 98 % (05/20/22 0702)   Vital Signs (24h Range):  Temp:  [97.5 °F (36.4 °C)-97.9 °F (36.6 °C)] 97.9 °F (36.6 °C)  Pulse:  [69-75] 75  Resp:  [18] 18  SpO2:  [98 %-99 %] 98 %  BP: (107-109)/(50-56) 107/56     Weight: 96.7 kg (213 lb 3 oz)  Body mass index is 34.41 kg/m².    Intake/Output Summary (Last 24 hours) at 5/20/2022 1017  Last data filed at 5/20/2022 0915  Gross per 24 hour   Intake 780 ml   Output --   Net 780 ml      Physical Exam  Vitals reviewed.   Constitutional:       General: She is not in acute distress.     Appearance: Normal appearance.   HENT:      Head: Normocephalic and atraumatic.   Eyes:      General: No scleral icterus.     Extraocular Movements: Extraocular movements intact.      Conjunctiva/sclera: Conjunctivae normal.      Pupils: Pupils are equal, round, and reactive to light.   Cardiovascular:      Rate and Rhythm: Normal rate and regular rhythm.      Heart sounds: No murmur heard.    No friction rub. No gallop.   Pulmonary:      Effort: Pulmonary effort is normal. No respiratory distress.      Breath sounds: Normal breath sounds. No wheezing or rales.   Abdominal:      General: Abdomen is flat. Bowel sounds are normal. There is no distension.      Palpations: Abdomen is soft.      Tenderness: There is no abdominal tenderness. There is no guarding.   Musculoskeletal:         General: No swelling.   Skin:      General: Skin is warm and dry.      Coloration: Skin is not jaundiced.      Findings: No rash.      Comments: Incision on Left side of neck, dressed, no drainage    Neurological:      General: No focal deficit present.      Mental Status: She is alert and oriented to person, place, and time.      Sensory: No sensory deficit.      Comments: Getting better with ambulation.    Psychiatric:         Mood and Affect: Mood normal.         Thought Content: Thought content normal.         Judgment: Judgment normal.       Significant Labs: All pertinent labs within the past 24 hours have been reviewed.  Recent Lab Results       None            Significant Imaging: I have reviewed all pertinent imaging results/findings within the past 24 hours.

## 2022-05-20 NOTE — PROGRESS NOTES
[facility] Surgical [facility] Medicine  Progress Note    Patient Name: Michell Dhillon  MRN: [id number]  Patient Class: IP- Swing   Admission Date: 5/13/2022  Length of Stay: 7 days  Attending Physician: Elmer Rainey DO  Primary Care Provider: Daryl Moscoso NP        Subjective:     Principal Problem:S/P carotid endarterectomy        HPI:  Ms Dhillon is a pleasant 77 yr old WF who has a known hx of HTN, T2DM, Hypothyroidism, osteoarthritis, carotid stenosis, dyslipidemia.  She was admitted to [facility] 05/04 with stroke symptoms.  She was found to have left posterior basal ganglia, corona radiata and lucanar CVA.  She had left carotid endarterectomy on 5/09 and was kept at [facility] until today.  She was transferred here for [facility] for OT/PT and medical management.  Ms Dhillon lives at home with her son.  She reports before the admit, she was able to walk and care for herself.  She is found sitting up in chair, eating lunch in NAD.  She denies pain or discomfort. She is alert, oriented and answers all questions appropriately.  Skin is warm and dry with noted bruising to left side of her neck and bilateral upper arms.  She moves all extremities well but generalized weakness.        Overview/Hospital Course:  5/18 Follow up today, she is doing much better since Friday.  Was only able to stand.  Now ambulating 20-30 feet with breaks with walker.  Good mood.  Motivated.     5/20 Yesterday was more lethargic, UA checked and full of bacteria, started on ABX.  She is better, still a little sluggish but more alert and did a little more with therapy yesterday.       Interval History: a little sluggish yesterday but better since on ABX    Review of Systems   Respiratory:  Negative for shortness of breath.    Cardiovascular:  Negative for chest pain.   Gastrointestinal:  Negative for abdominal pain, nausea and vomiting.   Neurological:  Positive for weakness.   Psychiatric/Behavioral:  Negative  for agitation and confusion.    All other systems reviewed and are negative.  Objective:     Vital Signs (Most Recent):  Temp: 97.9 °F (36.6 °C) (05/20/22 0702)  Pulse: 75 (05/20/22 0702)  Resp: 18 (05/20/22 0702)  BP: (!) 107/56 (05/20/22 0702)  SpO2: 98 % (05/20/22 0702)   Vital Signs (24h Range):  Temp:  [97.5 °F (36.4 °C)-97.9 °F (36.6 °C)] 97.9 °F (36.6 °C)  Pulse:  [69-75] 75  Resp:  [18] 18  SpO2:  [98 %-99 %] 98 %  BP: (107-109)/(50-56) 107/56     Weight: 96.7 kg (213 lb 3 oz)  Body mass index is 34.41 kg/m².    Intake/Output Summary (Last 24 hours) at 5/20/2022 1017  Last data filed at 5/20/2022 0915  Gross per 24 hour   Intake 780 ml   Output --   Net 780 ml      Physical Exam  Vitals reviewed.   Constitutional:       General: She is not in acute distress.     Appearance: Normal appearance.   HENT:      Head: Normocephalic and atraumatic.   Eyes:      General: No scleral icterus.     Extraocular Movements: Extraocular movements intact.      Conjunctiva/sclera: Conjunctivae normal.      Pupils: Pupils are equal, round, and reactive to light.   Cardiovascular:      Rate and Rhythm: Normal rate and regular rhythm.      Heart sounds: No murmur heard.    No friction rub. No gallop.   Pulmonary:      Effort: Pulmonary effort is normal. No respiratory distress.      Breath sounds: Normal breath sounds. No wheezing or rales.   Abdominal:      General: Abdomen is flat. Bowel sounds are normal. There is no distension.      Palpations: Abdomen is soft.      Tenderness: There is no abdominal tenderness. There is no guarding.   Musculoskeletal:         General: No swelling.   Skin:     General: Skin is warm and dry.      Coloration: Skin is not jaundiced.      Findings: No rash.      Comments: Incision on Left side of neck, dressed, no drainage    Neurological:      General: No focal deficit present.      Mental Status: She is alert and oriented to person, place, and time.      Sensory: No sensory deficit.       Comments: Getting better with ambulation.    Psychiatric:         Mood and Affect: Mood normal.         Thought Content: Thought content normal.         Judgment: Judgment normal.       Significant Labs: All pertinent labs within the past 24 hours have been reviewed.  Recent Lab Results       None            Significant Imaging: I have reviewed all pertinent imaging results/findings within the past 24 hours.      Assessment/Plan:      * S/P carotid endarterectomy  Keep wound clean and dry  F/u as directed      UTI (urinary tract infection)  Started ABX, follow culture      Finger pain, right  ESR 45, Uric acid up.  Start Allopurinol.     Hypothyroidism  Continue home medication      Type 2 diabetes mellitus without complication, without long-term current use of insulin  Monitor lab  Continue metformin        Benign essential HTN  Monitor VS  Losartan/ hctz        VTE Risk Mitigation (From admission, onward)         Ordered     Place RENITA hose  Until discontinued         05/13/22 1213     IP VTE HIGH RISK PATIENT  Once         05/13/22 1213     Place sequential compression device  Until discontinued         05/13/22 1213                Discharge Planning   NIGEL:      Code Status: Full Code   Is the patient medically ready for discharge?:     Reason for patient still in hospital (select all that apply): Laboratory test, Treatment and PT / OT recommendations  Discharge Plan A: Home with family, Home                  Elmer Rainey DO  Department of Hospital Medicine   [facility] Surgical Unit

## 2022-05-20 NOTE — PT/OT/SLP PROGRESS
Physical Therapy Treatment    Patient Name:  Michell Dhillon   MRN:  [id number]    Recommendations:     Discharge Recommendations:  home health PT, home health OT   Discharge Equipment Recommendations: walker, rolling   Barriers to discharge: None    Assessment:     Michell Dhillon is a 77 y.o. female admitted with a medical diagnosis of S/P carotid endarterectomy.  She presents with the following impairments/functional limitations:  weakness .    Rehab Prognosis: Good; patient would benefit from acute skilled PT services to address these deficits and reach maximum level of function.    Recent Surgery: * No surgery found *       Received Plan of Care per Liat Douglass PT     Plan:     During this hospitalization, patient to be seen 5 x/week to address the identified rehab impairments via gait training, therapeutic activities, therapeutic exercises and progress toward the following goals:    · Plan of Care Expires:  06/03/22    Subjective     Chief Complaint: none  Patient/Family Comments/goals: pt in bed on arrival, has been up in the chair, but agrees to PT Tx; family (son?) present  Pain/Comfort:  · Pain Rating 1: 0/10      Objective:     Communicated with AVA Alfonso prior to session.  Patient found supine with   upon PT entry to room.     General Precautions: Standard, fall   Orthopedic Precautions:N/A   Braces:    Respiratory Status: Room air     Functional Mobility:  · Bed Mobility:     · Supine to Sit: minimum assistance  · Sit to Supine: minimum assistance  · Transfers:     · Sit to Stand:  minimum assistance with rolling walker  · Bed to Chair: minimum assistance and moderate assistance with  rolling walker  using  Step Transfer  · Gait: 45ft x 2 with RW, min assist for proper and safe technique and walker use as pt tends to let it get too far ahead; pt fatigues easliy and develops forward and/or right lateral lean      AM-PAC 6 CLICK MOBILITY  Turning over in bed (including adjusting bedclothes,  sheets and blankets)?: 3  Sitting down on and standing up from a chair with arms (e.g., wheelchair, bedside commode, etc.): 3  Moving from lying on back to sitting on the side of the bed?: 3  Moving to and from a bed to a chair (including a wheelchair)?: 3  Need to walk in hospital room?: 3  Climbing 3-5 steps with a railing?: 2  Basic Mobility Total Score: 17       Therapeutic Activities and Exercises:   supine BLE exercises x 20 repetitions each: ankle pumps, hip abduction, straight leg raises, heel slides, bridging  Sitting edge of bed x 15 repetitions each: marching, long arc quads, hip abduction, ankle rocking     Patient left supine with call button in reach and family present..    GOALS:   Multidisciplinary Problems     Physical Therapy Goals        Problem: Physical Therapy    Goal Priority Disciplines Outcome Goal Variances Interventions   Physical Therapy Goal     PT, PT/OT Ongoing, Progressing     Description: STG:  Patient will perform all bed mobility with CGA  Patient will perform sit to stand and SPT with CGA  Patient will ambulate 50 feet with RW assistive device with CGA assistance without LOB.     LTG  Patient will perform bed mobility with mod I.  Patient will perform sit to stand and SPT with SBA  Patient will ambuate 150 feet with RW assistive device with CGA assistance without LOB.   Patient will perform 10 min of LE exercise without rest break to increase LE strength to 4/5.                      Time Tracking:     PT Received On: 05/20/22  PT Start Time: 1355     PT Stop Time: 1427  PT Total Time (min): 32 min     Billable Minutes: Gait Training 16 and Therapeutic Exercise 16    Treatment Type: Treatment  PT/PTA: PTA     PTA Visit Number: 2     05/20/2022

## 2022-05-20 NOTE — PT/OT/SLP PROGRESS
Occupational Therapy   Treatment    Name: Michell Dhillon  MRN: [id number]  Admitting Diagnosis:  S/P carotid endarterectomy       Recommendations:     Discharge Recommendations: home with home health, home health OT  Discharge Equipment Recommendations:  grab bar, hip kit, walker, rolling  Barriers to discharge:  None    Assessment:     Michell Dhillon is a 77 y.o. female with a medical diagnosis of S/P carotid endarterectomy.  She presents with complaints of fatigue and tested positive for UTI yesterday. Performance deficits affecting function are weakness, impaired endurance, impaired self care skills, impaired functional mobilty, gait instability, impaired balance, decreased lower extremity function, pain.     Rehab Prognosis:  Good; patient would benefit from continued skilled OT services to address these deficits and reach maximum level of function.       Plan:     Patient to be seen 5 x/week to address the above listed problems via self-care/home management, therapeutic activities, therapeutic exercises, wheelchair management/training  · Plan of Care Expires: 06/10/22  · Plan of Care Reviewed with: patient    Subjective     Pain/Comfort:  ·  overall no complaints other than fatigue    Objective:     Communicated with: pt and her son prior to session.  Patient found up in chair with  (no lines or drains) upon OT entry to room.    General Precautions: Standard, fall   Orthopedic Precautions:N/A   Braces:    Respiratory Status: Room air     Occupational Performance:     Bed Mobility:    · Patient completed Rolling/Turning to Right with minimum assistance and moderate assistance  · Patient completed Scooting/Bridging with minimum assistance and moderate assistance  · Patient completed Supine to Sit with moderate assistance     Functional Mobility/Transfers:  · Patient completed Sit <> Stand Transfer with minimum assistance  with  hand-held assist and grab bars(s)   · Patient completed Bed <> Chair Transfer using  Step Transfer technique with minimum assistance with hand-held assist and grab bars(s)  · Functional Mobility: not attempted today    Activities of Daily Living:  · Grooming: supervision needing vc's today for progression to next sequenced step of handwashing, mentally foggy      AMPAC 6 Click ADL:      Treatment & Education:  OT engaged pt in tabletop towel push 0# x 6 min in multiple directions to impact core stamina/strength and forward leaning ability as well as UB endurance.  Then, she completed green putty at tabletop searching for small objects and promoting strength of fingers and hands for impacting self care and overall handling of small objects for grooming and hygiene and self feeding.    Patient left up in chair with call button in reach and son presentEducation:      GOALS:   Multidisciplinary Problems     Occupational Therapy Goals        Problem: Occupational Therapy    Goal Priority Disciplines Outcome Interventions   Occupational Therapy Goal     OT, PT/OT Ongoing, Progressing    Description: STG: within 2 weeks  Pt will perform grooming with setup  Pt will bathe with setup  Pt will perform UE dressing with setup  Pt will perform LE dressing with setup  Pt will sit EOB x 30 min with no assistance  Pt will transfer bed/chair/bsc with sba  Pt will perform standing task x 3 min with svn assistance  Pt will tolerate 60 minutes of tx without fatigue      LTG: within 4 weeks  1.Restore to max I with self care and mobility.                      Time Tracking:     OT Date of Treatment: 05/20/22  OT Start Time: 1031  OT Stop Time: 1115  OT Total Time (min): 44 min    Billable Minutes:Therapeutic Activity 25  Therapeutic Exercise 19    OT/YONI: OT          5/20/2022

## 2022-05-20 NOTE — PLAN OF CARE
Problem: Impaired Wound Healing  Goal: Optimal Wound Healing  Outcome: Ongoing, Progressing  Intervention: Promote Wound Healing  Flowsheets (Taken 5/20/2022 1631)  Oral Nutrition Promotion:   rest periods promoted   safe use of adaptive equipment encouraged  Sleep/Rest Enhancement: room darkened  Activity Management: Ambulated -L4  Pain Management Interventions: breathing exercises utilized   Plan of care reviewed with patient. Patients status ongoing progressing.

## 2022-05-20 NOTE — PLAN OF CARE
Problem: SLP  Goal: SLP Goal  Description: Patient will complete hard glottal attacks and tongue base retractions exercises with mod-max accuracy Independently.   Patient will complete laryngeal elevation exercises with mod-max accuracy Independently.   Patient will complete verbal reasoning tasks with 80% accuracy Independently.   Patient will complete short term memory tasks with 90% accuracy Independently.         Outcome: Ongoing, Progressing

## 2022-05-21 LAB
ANION GAP SERPL CALCULATED.3IONS-SCNC: 12 MMOL/L (ref 7–16)
BASOPHILS # BLD AUTO: 0.01 K/UL (ref 0–0.2)
BASOPHILS NFR BLD AUTO: 0.1 % (ref 0–1)
BUN SERPL-MCNC: 50 MG/DL (ref 7–18)
BUN/CREAT SERPL: 26 (ref 6–20)
CALCIUM SERPL-MCNC: 8.1 MG/DL (ref 8.5–10.1)
CHLORIDE SERPL-SCNC: 87 MMOL/L (ref 98–107)
CO2 SERPL-SCNC: 27 MMOL/L (ref 21–32)
CREAT SERPL-MCNC: 1.9 MG/DL (ref 0.55–1.02)
DIFFERENTIAL METHOD BLD: ABNORMAL
EOSINOPHIL # BLD AUTO: 0.01 K/UL (ref 0–0.5)
EOSINOPHIL NFR BLD AUTO: 0.1 % (ref 1–4)
ERYTHROCYTE [DISTWIDTH] IN BLOOD BY AUTOMATED COUNT: 13 % (ref 11.5–14.5)
GLUCOSE SERPL-MCNC: 118 MG/DL (ref 74–106)
HCT VFR BLD AUTO: 35.7 % (ref 38–47)
HGB BLD-MCNC: 12 G/DL (ref 12–16)
LYMPHOCYTES # BLD AUTO: 0.59 K/UL (ref 1–4.8)
LYMPHOCYTES NFR BLD AUTO: 5.4 % (ref 27–41)
MCH RBC QN AUTO: 29.1 PG (ref 27–31)
MCHC RBC AUTO-ENTMCNC: 33.6 G/DL (ref 32–36)
MCV RBC AUTO: 86.4 FL (ref 80–96)
MONOCYTES # BLD AUTO: 0.78 K/UL (ref 0–0.8)
MONOCYTES NFR BLD AUTO: 7.2 % (ref 2–6)
MPC BLD CALC-MCNC: 9.4 FL (ref 9.4–12.4)
NEUTROPHILS # BLD AUTO: 9.46 K/UL (ref 1.8–7.7)
NEUTROPHILS NFR BLD AUTO: 87.2 % (ref 53–65)
PLATELET # BLD AUTO: 180 K/UL (ref 150–400)
POTASSIUM SERPL-SCNC: 4.4 MMOL/L (ref 3.5–5.1)
RBC # BLD AUTO: 4.13 M/UL (ref 4.2–5.4)
SODIUM SERPL-SCNC: 122 MMOL/L (ref 136–145)
UA COMPLETE W REFLEX CULTURE PNL UR: ABNORMAL
WBC # BLD AUTO: 10.85 K/UL (ref 4.5–11)

## 2022-05-21 PROCEDURE — 36415 COLL VENOUS BLD VENIPUNCTURE: CPT | Performed by: NURSE PRACTITIONER

## 2022-05-21 PROCEDURE — 27000958

## 2022-05-21 PROCEDURE — 80048 BASIC METABOLIC PNL TOTAL CA: CPT | Performed by: NURSE PRACTITIONER

## 2022-05-21 PROCEDURE — 25000003 PHARM REV CODE 250: Performed by: HOSPITALIST

## 2022-05-21 PROCEDURE — 85025 COMPLETE CBC W/AUTO DIFF WBC: CPT | Performed by: NURSE PRACTITIONER

## 2022-05-21 PROCEDURE — 25000003 PHARM REV CODE 250

## 2022-05-21 PROCEDURE — 11000004 HC SNF PRIVATE

## 2022-05-21 PROCEDURE — 25000003 PHARM REV CODE 250: Performed by: NURSE PRACTITIONER

## 2022-05-21 RX ORDER — DIPHENHYDRAMINE HCL 25 MG
25 TABLET ORAL EVERY 6 HOURS PRN
Status: DISCONTINUED | OUTPATIENT
Start: 2022-05-21 | End: 2022-06-02 | Stop reason: HOSPADM

## 2022-05-21 RX ORDER — SULFAMETHOXAZOLE AND TRIMETHOPRIM 800; 160 MG/1; MG/1
1 TABLET ORAL 2 TIMES DAILY
Status: COMPLETED | OUTPATIENT
Start: 2022-05-21 | End: 2022-05-26

## 2022-05-21 RX ADMIN — CIPROFLOXACIN 250 MG: 250 TABLET, FILM COATED ORAL at 08:05

## 2022-05-21 RX ADMIN — DIPHENHYDRAMINE HYDROCHLORIDE 25 MG: 25 TABLET ORAL at 08:05

## 2022-05-21 RX ADMIN — MUPIROCIN: 20 OINTMENT TOPICAL at 08:05

## 2022-05-21 RX ADMIN — ASPIRIN 81 MG 81 MG: 81 TABLET ORAL at 08:05

## 2022-05-21 RX ADMIN — LOSARTAN POTASSIUM 100 MG: 100 TABLET, FILM COATED ORAL at 08:05

## 2022-05-21 RX ADMIN — ACETAMINOPHEN 650 MG: 325 TABLET ORAL at 04:05

## 2022-05-21 RX ADMIN — FENOFIBRATE 145 MG: 145 TABLET, FILM COATED ORAL at 08:05

## 2022-05-21 RX ADMIN — HYDROCHLOROTHIAZIDE 12.5 MG: 12.5 TABLET ORAL at 08:05

## 2022-05-21 RX ADMIN — METFORMIN HYDROCHLORIDE 500 MG: 500 TABLET, FILM COATED ORAL at 08:05

## 2022-05-21 RX ADMIN — LEVOTHYROXINE SODIUM 75 MCG: 0.03 TABLET ORAL at 06:05

## 2022-05-21 RX ADMIN — METFORMIN HYDROCHLORIDE 500 MG: 500 TABLET, FILM COATED ORAL at 05:05

## 2022-05-21 RX ADMIN — ATORVASTATIN CALCIUM 40 MG: 40 TABLET, FILM COATED ORAL at 08:05

## 2022-05-21 RX ADMIN — CLOPIDOGREL 75 MG: 75 TABLET, FILM COATED ORAL at 08:05

## 2022-05-21 RX ADMIN — SULFAMETHOXAZOLE AND TRIMETHOPRIM 1 TABLET: 800; 160 TABLET ORAL at 08:05

## 2022-05-21 RX ADMIN — ALLOPURINOL 100 MG: 100 TABLET ORAL at 08:05

## 2022-05-21 RX ADMIN — NEBIVOLOL HYDROCHLORIDE 10 MG: 5 TABLET ORAL at 08:05

## 2022-05-22 PROCEDURE — 11000004 HC SNF PRIVATE

## 2022-05-22 PROCEDURE — 25000003 PHARM REV CODE 250: Performed by: HOSPITALIST

## 2022-05-22 PROCEDURE — 27000958

## 2022-05-22 PROCEDURE — 25000003 PHARM REV CODE 250: Performed by: NURSE PRACTITIONER

## 2022-05-22 PROCEDURE — 25000003 PHARM REV CODE 250

## 2022-05-22 RX ADMIN — LEVOTHYROXINE SODIUM 75 MCG: 0.03 TABLET ORAL at 05:05

## 2022-05-22 RX ADMIN — LOSARTAN POTASSIUM 100 MG: 100 TABLET, FILM COATED ORAL at 08:05

## 2022-05-22 RX ADMIN — MUPIROCIN: 20 OINTMENT TOPICAL at 08:05

## 2022-05-22 RX ADMIN — NEBIVOLOL HYDROCHLORIDE 10 MG: 5 TABLET ORAL at 08:05

## 2022-05-22 RX ADMIN — HYDROCHLOROTHIAZIDE 12.5 MG: 12.5 TABLET ORAL at 08:05

## 2022-05-22 RX ADMIN — METFORMIN HYDROCHLORIDE 500 MG: 500 TABLET, FILM COATED ORAL at 04:05

## 2022-05-22 RX ADMIN — DIPHENHYDRAMINE HYDROCHLORIDE 25 MG: 25 TABLET ORAL at 09:05

## 2022-05-22 RX ADMIN — METFORMIN HYDROCHLORIDE 500 MG: 500 TABLET, FILM COATED ORAL at 07:05

## 2022-05-22 RX ADMIN — SULFAMETHOXAZOLE AND TRIMETHOPRIM 1 TABLET: 800; 160 TABLET ORAL at 08:05

## 2022-05-22 RX ADMIN — FENOFIBRATE 145 MG: 145 TABLET, FILM COATED ORAL at 08:05

## 2022-05-22 RX ADMIN — ASPIRIN 81 MG 81 MG: 81 TABLET ORAL at 08:05

## 2022-05-22 RX ADMIN — ALLOPURINOL 100 MG: 100 TABLET ORAL at 08:05

## 2022-05-22 RX ADMIN — CLOPIDOGREL 75 MG: 75 TABLET, FILM COATED ORAL at 08:05

## 2022-05-22 RX ADMIN — SULFAMETHOXAZOLE AND TRIMETHOPRIM 1 TABLET: 800; 160 TABLET ORAL at 09:05

## 2022-05-22 RX ADMIN — DIPHENHYDRAMINE HYDROCHLORIDE 25 MG: 25 TABLET ORAL at 08:05

## 2022-05-22 RX ADMIN — ATORVASTATIN CALCIUM 40 MG: 40 TABLET, FILM COATED ORAL at 08:05

## 2022-05-22 NOTE — NURSING
"While making rounds this am, noted 3 small red areas on left arm, not raised, patient states "they don't itch", daughter in room, states "she had that the last time she took bactrim", no other c/o, notifed ER NP, ENRIQUE Carrasco, stated to give bactrim with benadryl.  "

## 2022-05-22 NOTE — NURSING
Patient sitting up in w/c visiting with family, no c/o voiced, no further rash noted at present, no c/o itching.

## 2022-05-23 PROCEDURE — 97530 THERAPEUTIC ACTIVITIES: CPT

## 2022-05-23 PROCEDURE — 25000003 PHARM REV CODE 250: Performed by: NURSE PRACTITIONER

## 2022-05-23 PROCEDURE — 97116 GAIT TRAINING THERAPY: CPT | Mod: CQ

## 2022-05-23 PROCEDURE — 97110 THERAPEUTIC EXERCISES: CPT | Mod: CQ

## 2022-05-23 PROCEDURE — 25000003 PHARM REV CODE 250

## 2022-05-23 PROCEDURE — 11000004 HC SNF PRIVATE

## 2022-05-23 PROCEDURE — 97110 THERAPEUTIC EXERCISES: CPT

## 2022-05-23 PROCEDURE — 25000003 PHARM REV CODE 250: Performed by: HOSPITALIST

## 2022-05-23 PROCEDURE — 27000958

## 2022-05-23 RX ORDER — SODIUM CHLORIDE 9 MG/ML
INJECTION, SOLUTION INTRAVENOUS CONTINUOUS
Status: DISCONTINUED | OUTPATIENT
Start: 2022-05-23 | End: 2022-05-28

## 2022-05-23 RX ADMIN — LOSARTAN POTASSIUM 100 MG: 100 TABLET, FILM COATED ORAL at 08:05

## 2022-05-23 RX ADMIN — SODIUM CHLORIDE: 9 INJECTION, SOLUTION INTRAVENOUS at 10:05

## 2022-05-23 RX ADMIN — MELATONIN 6 MG: at 08:05

## 2022-05-23 RX ADMIN — SULFAMETHOXAZOLE AND TRIMETHOPRIM 1 TABLET: 800; 160 TABLET ORAL at 08:05

## 2022-05-23 RX ADMIN — LEVOTHYROXINE SODIUM 75 MCG: 0.03 TABLET ORAL at 05:05

## 2022-05-23 RX ADMIN — ALLOPURINOL 100 MG: 100 TABLET ORAL at 08:05

## 2022-05-23 RX ADMIN — METFORMIN HYDROCHLORIDE 500 MG: 500 TABLET, FILM COATED ORAL at 08:05

## 2022-05-23 RX ADMIN — NEBIVOLOL HYDROCHLORIDE 10 MG: 5 TABLET ORAL at 08:05

## 2022-05-23 RX ADMIN — DIPHENHYDRAMINE HYDROCHLORIDE 25 MG: 25 TABLET ORAL at 08:05

## 2022-05-23 RX ADMIN — FENOFIBRATE 145 MG: 145 TABLET, FILM COATED ORAL at 08:05

## 2022-05-23 RX ADMIN — CLOPIDOGREL 75 MG: 75 TABLET, FILM COATED ORAL at 08:05

## 2022-05-23 RX ADMIN — MUPIROCIN: 20 OINTMENT TOPICAL at 08:05

## 2022-05-23 RX ADMIN — ATORVASTATIN CALCIUM 40 MG: 40 TABLET, FILM COATED ORAL at 08:05

## 2022-05-23 RX ADMIN — ANTACID TABLETS 500 MG: 500 TABLET, CHEWABLE ORAL at 08:05

## 2022-05-23 RX ADMIN — SODIUM CHLORIDE: 9 INJECTION, SOLUTION INTRAVENOUS at 09:05

## 2022-05-23 RX ADMIN — ASPIRIN 81 MG 81 MG: 81 TABLET ORAL at 08:05

## 2022-05-23 RX ADMIN — METFORMIN HYDROCHLORIDE 500 MG: 500 TABLET, FILM COATED ORAL at 04:05

## 2022-05-23 RX ADMIN — HYDROCHLOROTHIAZIDE 12.5 MG: 12.5 TABLET ORAL at 08:05

## 2022-05-23 NOTE — PLAN OF CARE
Problem: Electrolyte Imbalance  Goal: Electrolyte Balance  Outcome: Ongoing, Progressing  Intervention: Monitor and Manage Electrolyte Imbalance  Flowsheets (Taken 5/23/2022 1606)  Fluid/Electrolyte Management: intravenous fluid replacement initiated   Plan of care reviewed with patient. Patients status ongoing progressing.

## 2022-05-23 NOTE — PROGRESS NOTES
Clinical Pharmacy Chart Review Note      Admit Date: 5/13/2022   LOS: 10 days       Michell Dhillon is a 77 y.o. female admitted to SNF for PT/OT after hospitalization for CVA    Active Hospital Problems    Diagnosis  POA    *S/P carotid endarterectomy [Z98.890]  Not Applicable    UTI (urinary tract infection) [N39.0]  Yes    Finger pain, right [M79.644]  Yes    Hypothyroidism [E03.9]  Yes     Chronic    Benign essential HTN [I10]  Yes    Type 2 diabetes mellitus without complication, without long-term current use of insulin [E11.9]  Yes      Resolved Hospital Problems   No resolved problems to display.     Review of patient's allergies indicates:   Allergen Reactions    Bactrim [sulfamethoxazole-trimethoprim]     Norvasc [amlodipine]     Omnicef [cefdinir]     Penicillin      Patient Active Problem List    Diagnosis Date Noted    UTI (urinary tract infection) 05/20/2022    Finger pain, right 05/16/2022    S/P carotid endarterectomy 05/13/2022    Hypothyroidism 05/13/2022    COVID-19 virus infection 01/10/2022    Benign essential HTN 01/10/2022    Type 2 diabetes mellitus without complication, without long-term current use of insulin 01/10/2022    Cellulitis of right lower leg 10/29/2021       Scheduled Meds:    allopurinoL  100 mg Oral Daily    aspirin  81 mg Oral Daily    atorvastatin  40 mg Oral Daily    clopidogreL  75 mg Oral Daily    fenofibrate  145 mg Oral Daily    hydroCHLOROthiazide  12.5 mg Oral Daily    levothyroxine  75 mcg Oral Before breakfast    losartan  100 mg Oral Daily    metFORMIN  500 mg Oral BID WM    mupirocin   Topical (Top) Daily    nebivoloL  10 mg Oral Daily    sulfamethoxazole-trimethoprim 800-160mg  1 tablet Oral BID     Continuous Infusions:    sodium chloride 0.9% 75 mL/hr at 05/23/22 0928     PRN Meds: acetaminophen, calcium carbonate, chlorproMAZINE, diphenhydrAMINE, melatonin, senna-docusate 8.6-50 mg    OBJECTIVE:     Vital Signs (Last 24H)  Temp:   [97.6 °F (36.4 °C)-97.9 °F (36.6 °C)]   Pulse:  [63-68]   Resp:  [18-20]   BP: (134-137)/(64-71)   SpO2:  [95 %]     Laboratory:  CBC:   Recent Labs   Lab 05/21/22  0508   WBC 10.85   RBC 4.13*   HGB 12.0   HCT 35.7*      MCV 86.4   MCH 29.1   MCHC 33.6     BMP:   Recent Labs   Lab 05/21/22  0508   *   *   K 4.4   CL 87*   CO2 27   BUN 50*   CREATININE 1.90*   CALCIUM 8.1*     .    ASSESSMENT/PLAN:     ECrCl=28.9.  Reported Na 122 to hospitalist and NP today.  Fluids started.  Awaiting repeat lab for bactrim ds dosing and metformin dosing.  Weekly swing bed medication regimen review complete.  Will continue to monitor.  Tyler Will, Pharm. D.  Director of Pharmacy  [facility]  [phone]  [email or web address]

## 2022-05-23 NOTE — PT/OT/SLP PROGRESS
Physical Therapy Treatment    Patient Name:  Michell Dhillon   MRN:  [id number]    Recommendations:     Discharge Recommendations:  home health PT, home health OT   Discharge Equipment Recommendations: walker, rolling   Barriers to discharge: None    Assessment:     Michell Dhillon is a 77 y.o. female admitted with a medical diagnosis of S/P carotid endarterectomy.  She presents with the following impairments/functional limitations:  weakness . Patient is motivated and agreeable to participate in therapy tasks. Patient required occasional rest breaks due to fatigue. Patient with no adverse effects after completion of treatment session.     Rehab Prognosis: Good; patient would benefit from acute skilled PT services to address these deficits and reach maximum level of function.    Recent Surgery: * No surgery found *       Received Plan of Care per Liat Douglass PT     Plan:     During this hospitalization, patient to be seen 5 x/week to address the identified rehab impairments via gait training, therapeutic activities, therapeutic exercises and progress toward the following goals:    · Plan of Care Expires:  06/03/22    Subjective     Chief Complaint: Patient reported that her R foot was numb.   Patient/Family Comments/goals: Patient is received from ST and is agreeable to PT treatment.  Pain/Comfort:  Pain Rating 1: 0/10      Objective:     Communicated with ST prior to session. Patient found up in chair with peripheral IV.     General Precautions: Standard, fall   Orthopedic Precautions:N/A   Braces: N/A  Respiratory Status: Room air     Functional Mobility:  · Sit to stand with minimal assistance and cues for proper hand placement  · Gait: 20 feet with RW, min/mod assist and cues for proper and safe technique and walker use, to decrease forward lean, and pathway direction       AM-PAC 6 CLICK MOBILITY  Turning over in bed (including adjusting bedclothes, sheets and blankets)?: 3  Sitting down on and  standing up from a chair with arms (e.g., wheelchair, bedside commode, etc.): 3  Moving from lying on back to sitting on the side of the bed?: 3  Moving to and from a bed to a chair (including a wheelchair)?: 3  Need to walk in hospital room?: 2  Climbing 3-5 steps with a railing?: 2  Basic Mobility Total Score: 16       Therapeutic Activities and Exercises:  LAQs, seated march, hip add squeezes, seated heel raises, HS curls and hip abduction with red TB, all x 20 each LE,   Sit to stand x 4 with minimal assistance and verbal cues for eccentric control with lowering     Patient left up in chair with call button in reach and family present..    GOALS:   Multidisciplinary Problems     Physical Therapy Goals        Problem: Physical Therapy    Goal Priority Disciplines Outcome Goal Variances Interventions   Physical Therapy Goal     PT, PT/OT Ongoing, Progressing     Description: STG:  Patient will perform all bed mobility with CGA  Patient will perform sit to stand and SPT with CGA  Patient will ambulate 50 feet with RW assistive device with CGA assistance without LOB.     LTG  Patient will perform bed mobility with mod I.  Patient will perform sit to stand and SPT with SBA  Patient will ambuate 150 feet with RW assistive device with CGA assistance without LOB.   Patient will perform 10 min of LE exercise without rest break to increase LE strength to 4/5.                      Time Tracking:     PT Received On: 05/23/22  PT Start Time: 1349     PT Stop Time: 1433  PT Total Time (min): 44 min     Billable Minutes: Gait Training 10 and Therapeutic Exercise 34    Treatment Type: Treatment  PT/PTA: PTA     PTA Visit Number: 3     IZABEL Balderas  05/23/2022

## 2022-05-23 NOTE — PLAN OF CARE
Problem: Occupational Therapy  Goal: Occupational Therapy Goal  Description: STG: within 2 weeks  Pt will perform grooming with setup  MET  Pt will bathe with setup  ONGOING/PROGRESSING  Pt will perform UE dressing with setup ONGOING/PROGRESSING  Pt will perform LE dressing with setup  ONGOING/PROGRESSING  Pt will sit EOB x 30 min with no assistance  MET  Pt will transfer bed/chair/bsc with sba  ONGOING/PROGRESSING  Pt will perform standing task x 3 min with svn assistance  ONGOING/PROGRESSING  Pt will tolerate 60 minutes of tx without fatigue  ONGOING/PROGRESSING      LTG: within 4 weeks  1.Restore to max I with self care and mobility.    Patient has had setback of UTI and needing AB's, increased fatigue and some mild confusion noted.     Outcome: Ongoing, Progressing

## 2022-05-23 NOTE — PT/OT/SLP PROGRESS
Speech Language Pathology Treatment    Patient Name:  Michell Dhillon   MRN:  [id number]  Admitting Diagnosis: S/P carotid endarterectomy    Recommendations:                 General Recommendations:  Dysphagia therapy and Cognitive-linguistic therapy  Diet recommendations:  Mechanical soft, Liquid Diet Level: Thin (cup edge, no straws)   Aspiration Precautions: 1 bite/sip at a time, No straws, Remain upright 30 minutes post meal and Standard aspiration precautions   General Precautions: Standard, fall, aspiration (see MBS regarding aspiration risk)  Communication strategies:  none    Subjective     Pt alert in treatment room.  Patient goals: none stated      Respiratory Status: Room air    Objective:     Has the patient been evaluated by SLP for swallowing?   Yes  Keep patient NPO? No   Current Respiratory Status:        Hands on reasoning task for sustained attention and recall of directions completed with 92% accuracy and error awareness with ease.   Task for increased problem solving skills completed in 23/24 trials accurately with decreased error awareness noted.     Assessment:     Michell Dhillon is a 77 y.o. female with an SLP diagnosis of Dysphagia, Dysarthria and Cognitive-Linguistic Impairment.  She presents with difficulty in processing/word finding, recall, and reasoning for safety awareness, mild-moderate dysarthria, and mild- moderate dysphagia with aspiration risk secondary to lack of sensation per MBS report.    Goals:   Multidisciplinary Problems     SLP Goals        Problem: SLP    Goal Priority Disciplines Outcome   SLP Goal     SLP Ongoing, Progressing   Description: Patient will complete hard glottal attacks and tongue base retractions exercises with mod-max accuracy Independently.   Patient will complete laryngeal elevation exercises with mod-max accuracy Independently.   Patient will complete verbal reasoning tasks with 80% accuracy Independently.   Patient will complete short term memory  tasks with 90% accuracy Independently.                          Plan:     · Patient to be seen: 3x/week  · Plan of Care expires:  06/10/22  · Plan of Care reviewed with:  patient   · SLP Follow-Up:  Yes       Discharge recommendations:  home with home health   Barriers to Discharge:  Level of Skilled Assistance Needed currently    Time Tracking:     SLP Treatment Date:   05/23/22  Speech Start Time:  1330  Speech Stop Time:  1348  Speech Total Time (min):  18    Billable Minutes   Speech Language treatment 18    05/23/2022

## 2022-05-23 NOTE — PT/OT/SLP PROGRESS
"Occupational Therapy  Treatment    Michell Dhillon   MRN: [id number]   Admitting Diagnosis: S/P carotid endarterectomy    OT Date of Treatment: 05/23/22   OT Start Time: 1250  OT Stop Time: 1330  OT Total Time (min): 40 min    Billable Minutes:  Therapeutic Activity 15 and Therapeutic Exercise 25    OT/YONI: OT          General Precautions: Standard, fall  Orthopedic Precautions: N/A  Braces:    Respiratory Status: Room air         Subjective:  Communicated with pt and CNA prior to session.  CNA reports, "she isn't doing as much for herself now as she was when she first came in."  Pain/Comfort  Pain Rating 1: 0/10    Objective:        Functional Mobility:  Bed Mobility:  Mod a/min a       Transfers:  Min a but very slowly and increased VC's and TC's needed for completing sequencing safely        Functional Ambulation: see PT    Activities of Daily Living:     Overall min a at bedside or sinkside    Balance:   Static Sit: GOOD: Takes MODERATE challenges from all directions  Dynamic Sit: GOOD: Maintains balance through MODERATE excursions of active trunk movement  Static Stand: POOR+: Needs MINIMAL assist to maintain  Dynamic stand: POOR+: Needs MIN (minimal ) assist during gait    Therapeutic Activities and Exercises:  OT engaged pt in UBE x 15 min at level 2 resist for improving strength and endurance of UB.  Additionally, pt completed green putty jean carlos search for FM skills.  OT also assisted pt and CNA with pt transfer to [facility] earlier in the morning, providing instructions and education as needed throughout for safety and hand placement for optimal completion.    AM-PAC 6 CLICK ADL   How much help from another person does this patient currently need?   1 = Unable, Total/Dependent Assistance  2 = A lot, Maximum/Moderate Assistance  3 = A little, Minimum/Contact Guard/Supervision  4 = None, Modified [place]/Independent    Putting on and taking off regular lower body clothing? : 3  Bathing (including washing, " "rinsing, drying)?: 3  Toileting, which includes using toilet, bedpan, or urinal? : 3  Putting on and taking off regular upper body clothing?: 3  Taking care of personal grooming such as brushing teeth?: 3  Eating meals?: 4  Daily Activity Total Score: 19     AM-PAC Raw Score CMS "G-Code Modifier Level of Impairment Assistance   6 % Total / Unable   7 - 8 CM 80 - 100% Maximal Assist   9-13 CL 60 - 80% Moderate Assist   14 - 19 CK 40 - 60% Moderate Assist   20 - 22 CJ 20 - 40% Minimal Assist   23 CI 1-20% SBA / CGA   24 CH 0% Independent/ Mod I       Patient left up in chair with SLP present    ASSESSMENT:  Michell Dhillon is a 77 y.o. female with a medical diagnosis of S/P carotid endarterectomy and presents with having need of AB change since culture showed bacteria was not sensitive to first treatment; pt should make better improvement over next few days sinc this change.    Rehab identified problem list/impairments: Rehab identified problem list/impairments: weakness, impaired endurance, impaired self care skills, impaired functional mobilty, gait instability, impaired balance, decreased lower extremity function, pain    Rehab potential is good.    Activity tolerance: Good    Discharge recommendations: Discharge Facility/Level of Care Needs: home with home health, home health OT     Barriers to discharge: Barriers to Discharge: None    Equipment recommendations: grab bar, hip kit, walker, rolling     GOALS:   Multidisciplinary Problems     Occupational Therapy Goals        Problem: Occupational Therapy    Goal Priority Disciplines Outcome Interventions   Occupational Therapy Goal     OT, PT/OT Ongoing, Progressing    Description: STG: within 2 weeks  Pt will perform grooming with setup  MET  Pt will bathe with setup  ONGOING/PROGRESSING  Pt will perform UE dressing with setup ONGOING/PROGRESSING  Pt will perform LE dressing with setup  ONGOING/PROGRESSING  Pt will sit EOB x 30 min with no assistance  " MET  Pt will transfer bed/chair/bsc with sba  ONGOING/PROGRESSING  Pt will perform standing task x 3 min with svn assistance  ONGOING/PROGRESSING  Pt will tolerate 60 minutes of tx without fatigue  ONGOING/PROGRESSING      LTG: within 4 weeks  1.Restore to max I with self care and mobility.    Patient has had setback of UTI and needing AB's, increased fatigue and some mild confusion noted.                      Plan:  Patient to be seen 5 x/week to address the above listed problems via self-care/home management, therapeutic activities, therapeutic exercises, wheelchair management/training  Plan of Care expires: 06/10/22  Plan of Care reviewed with: patient         05/23/2022

## 2022-05-24 LAB
ALBUMIN SERPL BCP-MCNC: 2.4 G/DL (ref 3.5–5)
ALBUMIN/GLOB SERPL: 0.6 {RATIO}
ALP SERPL-CCNC: 62 U/L (ref 55–142)
ALT SERPL W P-5'-P-CCNC: 19 U/L (ref 13–56)
ANION GAP SERPL CALCULATED.3IONS-SCNC: 8 MMOL/L (ref 7–16)
AST SERPL W P-5'-P-CCNC: 23 U/L (ref 15–37)
BILIRUB SERPL-MCNC: 0.4 MG/DL (ref 0–1.2)
BUN SERPL-MCNC: 26 MG/DL (ref 7–18)
BUN/CREAT SERPL: 17 (ref 6–20)
CALCIUM SERPL-MCNC: 8.6 MG/DL (ref 8.5–10.1)
CHLORIDE SERPL-SCNC: 90 MMOL/L (ref 98–107)
CO2 SERPL-SCNC: 26 MMOL/L (ref 21–32)
CREAT SERPL-MCNC: 1.49 MG/DL (ref 0.55–1.02)
GLOBULIN SER-MCNC: 4.1 G/DL (ref 2–4)
GLUCOSE SERPL-MCNC: 105 MG/DL (ref 74–106)
POTASSIUM SERPL-SCNC: 4.2 MMOL/L (ref 3.5–5.1)
PROT SERPL-MCNC: 6.5 G/DL (ref 6.4–8.2)
SODIUM SERPL-SCNC: 120 MMOL/L (ref 136–145)

## 2022-05-24 PROCEDURE — 97530 THERAPEUTIC ACTIVITIES: CPT

## 2022-05-24 PROCEDURE — 11000004 HC SNF PRIVATE

## 2022-05-24 PROCEDURE — 97116 GAIT TRAINING THERAPY: CPT | Mod: CQ

## 2022-05-24 PROCEDURE — 27000958

## 2022-05-24 PROCEDURE — 80053 COMPREHEN METABOLIC PANEL: CPT

## 2022-05-24 PROCEDURE — 25000003 PHARM REV CODE 250: Performed by: HOSPITALIST

## 2022-05-24 PROCEDURE — 97110 THERAPEUTIC EXERCISES: CPT

## 2022-05-24 PROCEDURE — 36415 COLL VENOUS BLD VENIPUNCTURE: CPT

## 2022-05-24 PROCEDURE — 25000003 PHARM REV CODE 250

## 2022-05-24 PROCEDURE — 92507 TX SP LANG VOICE COMM INDIV: CPT

## 2022-05-24 PROCEDURE — 25000003 PHARM REV CODE 250: Performed by: NURSE PRACTITIONER

## 2022-05-24 PROCEDURE — 97110 THERAPEUTIC EXERCISES: CPT | Mod: CQ

## 2022-05-24 RX ADMIN — ATORVASTATIN CALCIUM 40 MG: 40 TABLET, FILM COATED ORAL at 08:05

## 2022-05-24 RX ADMIN — MELATONIN 6 MG: at 08:05

## 2022-05-24 RX ADMIN — SULFAMETHOXAZOLE AND TRIMETHOPRIM 1 TABLET: 800; 160 TABLET ORAL at 08:05

## 2022-05-24 RX ADMIN — METFORMIN HYDROCHLORIDE 500 MG: 500 TABLET, FILM COATED ORAL at 08:05

## 2022-05-24 RX ADMIN — SODIUM CHLORIDE: 9 INJECTION, SOLUTION INTRAVENOUS at 11:05

## 2022-05-24 RX ADMIN — LOSARTAN POTASSIUM 100 MG: 100 TABLET, FILM COATED ORAL at 08:05

## 2022-05-24 RX ADMIN — FENOFIBRATE 145 MG: 145 TABLET, FILM COATED ORAL at 08:05

## 2022-05-24 RX ADMIN — NEBIVOLOL HYDROCHLORIDE 10 MG: 5 TABLET ORAL at 08:05

## 2022-05-24 RX ADMIN — HYDROCHLOROTHIAZIDE 12.5 MG: 12.5 TABLET ORAL at 08:05

## 2022-05-24 RX ADMIN — CLOPIDOGREL 75 MG: 75 TABLET, FILM COATED ORAL at 08:05

## 2022-05-24 RX ADMIN — ALLOPURINOL 100 MG: 100 TABLET ORAL at 08:05

## 2022-05-24 RX ADMIN — ASPIRIN 81 MG 81 MG: 81 TABLET ORAL at 08:05

## 2022-05-24 RX ADMIN — METFORMIN HYDROCHLORIDE 500 MG: 500 TABLET, FILM COATED ORAL at 04:05

## 2022-05-24 RX ADMIN — MUPIROCIN: 20 OINTMENT TOPICAL at 09:05

## 2022-05-24 RX ADMIN — LEVOTHYROXINE SODIUM 75 MCG: 0.03 TABLET ORAL at 06:05

## 2022-05-24 RX ADMIN — DIPHENHYDRAMINE HYDROCHLORIDE 25 MG: 25 TABLET ORAL at 08:05

## 2022-05-24 NOTE — PT/OT/SLP PROGRESS
Occupational Therapy   Treatment    Name: Michell Dhillon  MRN: [id number]  Admitting Diagnosis:  S/P carotid endarterectomy       Recommendations:     Discharge Recommendations: home with home health, home health OT  Discharge Equipment Recommendations:  grab bar, hip kit, walker, rolling  Barriers to discharge:  None    Assessment:     Michell Dhillon is a 77 y.o. female with a medical diagnosis of S/P carotid endarterectomy.  She presents with no new complaints, agreeable to tx. Performance deficits affecting function are weakness, impaired endurance, impaired self care skills, impaired functional mobilty, gait instability, impaired balance, decreased lower extremity function, pain.     Rehab Prognosis:  Good; patient would benefit from continued skilled OT services to address these deficits and reach maximum level of function.       Plan:     Patient to be seen 5 x/week to address the above listed problems via self-care/home management, therapeutic activities, therapeutic exercises, wheelchair management/training  · Plan of Care Expires: 06/10/22  · Plan of Care Reviewed with: patient    Subjective     Pain/Comfort:  ·      Objective:     Communicated with: pt and pt son prior to session.  Patient found up in chair with  (no lines or drains) upon OT entry to room.    General Precautions: Standard, fall   Orthopedic Precautions:N/A   Braces:    Respiratory Status: Room air     Occupational Performance:     Bed Mobility:    · Not attempted today    Functional Mobility/Transfers:    · Functional Mobility: none attempted with OT today    Activities of Daily Living:  · Feeding:  supervision for setup of meal tray      [facility] 6 Click ADL:      Treatment & Education:  OT engaged pt in endurance and strength training exercises including UBE x 15 min with minimal RB's and level 2 resist, then 2# dowel exercises in frontal pom x 20 reps each direction with good results.  Education provided to pt and son regarding activity  and discussion of current meds and hydration needs.      Patient left up in chair with call button in reach and son presentEducation:      GOALS:   Multidisciplinary Problems     Occupational Therapy Goals        Problem: Occupational Therapy    Goal Priority Disciplines Outcome Interventions   Occupational Therapy Goal     OT, PT/OT Ongoing, Progressing    Description: STG: within 2 weeks  Pt will perform grooming with setup  MET  Pt will bathe with setup  ONGOING/PROGRESSING  Pt will perform UE dressing with setup ONGOING/PROGRESSING  Pt will perform LE dressing with setup  ONGOING/PROGRESSING  Pt will sit EOB x 30 min with no assistance  MET  Pt will transfer bed/chair/bsc with sba  ONGOING/PROGRESSING  Pt will perform standing task x 3 min with svn assistance  ONGOING/PROGRESSING  Pt will tolerate 60 minutes of tx without fatigue  ONGOING/PROGRESSING      LTG: within 4 weeks  1.Restore to max I with self care and mobility.    Patient has had setback of UTI and needing AB's, increased fatigue and some mild confusion noted.                      Time Tracking:     OT Date of Treatment: 05/24/22  OT Start Time: 1230  OT Stop Time: 1309  OT Total Time (min): 39 min    Billable Minutes:Therapeutic Activity 8  Therapeutic Exercise 31    OT/YONI: OT          5/24/2022

## 2022-05-24 NOTE — PT/OT/SLP PROGRESS
Speech Language Pathology Treatment    Patient Name:  Michell Dhillon   MRN:  [id number]  Admitting Diagnosis: S/P carotid endarterectomy    Recommendations:                 General Recommendations:  Dysphagia therapy and Cognitive-linguistic therapy  Diet recommendations:  Mechanical soft, Liquid Diet Level: Thin (cup edge, no straws)   Aspiration Precautions: 1 bite/sip at a time, No straws, Remain upright 30 minutes post meal and Standard aspiration precautions   General Precautions: Standard, fall, aspiration (see MBS regarding aspiration risk)  Communication strategies:  none    Subjective     Pt alert in treatment room.  Patient goals: none stated      Respiratory Status: Room air    Objective:     Has the patient been evaluated by SLP for swallowing?   Yes  Keep patient NPO? No   Current Respiratory Status:        Hands on reasoning task for sorting and following multistep directions completed with 88% accuracy.   Task for increased word finding skills x10 trials with mod cues for initiation increased independence as continued.     Assessment:     Michell Dhillon is a 77 y.o. female with an SLP diagnosis of Dysphagia, Dysarthria and Cognitive-Linguistic Impairment.  She presents with difficulty in processing/word finding, recall, and reasoning for safety awareness, mild-moderate dysarthria, and mild- moderate dysphagia with aspiration risk secondary to lack of sensation per MBS report.    Goals:   Multidisciplinary Problems     SLP Goals        Problem: SLP    Goal Priority Disciplines Outcome   SLP Goal     SLP Ongoing, Progressing   Description: Patient will complete hard glottal attacks and tongue base retractions exercises with mod-max accuracy Independently.   Patient will complete laryngeal elevation exercises with mod-max accuracy Independently.   Patient will complete verbal reasoning tasks with 80% accuracy Independently.   Patient will complete short term memory tasks with 90% accuracy  Independently.                          Plan:     · Patient to be seen: 3x/week  · Plan of Care expires:  06/10/22  · Plan of Care reviewed with:  patient   · SLP Follow-Up:  Yes       Discharge recommendations:  home with home health   Barriers to Discharge:  Level of Skilled Assistance Needed currently    Time Tracking:     SLP Treatment Date:   05/24/22  Speech Start Time:  1055  Speech Stop Time:  1030  Speech Total Time (min):  25    Billable Minutes   Speech Language treatment 25 05/24/2022

## 2022-05-24 NOTE — PT/OT/SLP PROGRESS
Physical Therapy Treatment    Patient Name:  Michell Dhillon   MRN:  [id number]    Recommendations:     Discharge Recommendations:  home health PT, home health OT   Discharge Equipment Recommendations: walker, rolling   Barriers to discharge: None    Assessment:     Michell Dhillon is a 77 y.o. female admitted with a medical diagnosis of S/P carotid endarterectomy.  She presents with the following impairments/functional limitations:  weakness .    Rehab Prognosis: Good; patient would benefit from acute skilled PT services to address these deficits and reach maximum level of function.    Recent Surgery: * No surgery found *      Plan:     During this hospitalization, patient to be seen 5 x/week to address the identified rehab impairments via gait training, therapeutic activities, therapeutic exercises and progress toward the following goals:    · Plan of Care Expires:  06/03/22    Subjective     Chief Complaint: tired  Patient/Family Comments/goals: pt agrees to PT Tx after just having occupational therapy Tx; son present  Pain/Comfort:  · Pain Rating 1: 0/10      Objective:     Communicated with AVA Alfonso prior to session.  Patient found up in chair with peripheral IV upon PT entry to room.     General Precautions: Standard, fall   Orthopedic Precautions:N/A   Braces:    Respiratory Status: Room air     Functional Mobility:  · Bed Mobility:     · Scooting: stand by assistance  · Bridging: stand by assistance  · Sit to Supine: contact guard assistance and minimum assistance  · Transfers:     · Sit to Stand:  contact guard assistance and minimum assistance with rolling walker  · Bed to Chair: minimum assistance with  rolling walker  using  Step Transfer  · Toilet Transfer: minimum assistance with  rolling walker  using  Step Transfer  · Gait: 25ft with RW, CGA/Filemon for IV pole; focus on safety with walker use, posture correction      AM-PAC 6 CLICK MOBILITY  Turning over in bed (including adjusting bedclothes,  sheets and blankets)?: 3  Sitting down on and standing up from a chair with arms (e.g., wheelchair, bedside commode, etc.): 3  Moving from lying on back to sitting on the side of the bed?: 3  Moving to and from a bed to a chair (including a wheelchair)?: 3  Need to walk in hospital room?: 3  Climbing 3-5 steps with a railing?: 2  Basic Mobility Total Score: 17       Therapeutic Activities and Exercises:  Sitting BLE exercises x 20 repetitions each: marching, long arc quads, hip abduction, ankle rocking  Supine BLE exercises x 20 repetitions each: ankle pumps, quad sets, glute sets, hip abduction, straight leg raises, heel slides, bridging    Patient left supine with all lines intact, call button in reach and son present..    GOALS:   Multidisciplinary Problems     Physical Therapy Goals        Problem: Physical Therapy    Goal Priority Disciplines Outcome Goal Variances Interventions   Physical Therapy Goal     PT, PT/OT Ongoing, Progressing     Description: STG:  Patient will perform all bed mobility with CGA  Patient will perform sit to stand and SPT with CGA  Patient will ambulate 50 feet with RW assistive device with CGA assistance without LOB.     LTG  Patient will perform bed mobility with mod I.  Patient will perform sit to stand and SPT with SBA  Patient will ambuate 150 feet with RW assistive device with CGA assistance without LOB.   Patient will perform 10 min of LE exercise without rest break to increase LE strength to 4/5.                      Time Tracking:     PT Received On: 05/24/22  PT Start Time: 1312     PT Stop Time: 1337  PT Total Time (min): 25 min     Billable Minutes: Gait Training 12 and Therapeutic Exercise 13    Treatment Type: Treatment  PT/PTA: PTA     PTA Visit Number: 4     05/24/2022

## 2022-05-24 NOTE — PLAN OF CARE
Problem: Diabetes Comorbidity  Goal: Blood Glucose Level Within Targeted Range  5/24/2022 1701 by Naty Yates RN  Outcome: Ongoing, Progressing  5/24/2022 1701 by Naty Yates RN  Outcome: Ongoing, Progressing  Intervention: Monitor and Manage Glycemia  Flowsheets (Taken 5/24/2022 1701)  Glycemic Management:   carbohydrate replacement provided   oral hydration promoted

## 2022-05-25 PROCEDURE — 92507 TX SP LANG VOICE COMM INDIV: CPT

## 2022-05-25 PROCEDURE — 97530 THERAPEUTIC ACTIVITIES: CPT

## 2022-05-25 PROCEDURE — 25000003 PHARM REV CODE 250: Performed by: NURSE PRACTITIONER

## 2022-05-25 PROCEDURE — 97110 THERAPEUTIC EXERCISES: CPT | Mod: CQ

## 2022-05-25 PROCEDURE — 27000958

## 2022-05-25 PROCEDURE — 11000004 HC SNF PRIVATE

## 2022-05-25 PROCEDURE — 97530 THERAPEUTIC ACTIVITIES: CPT | Mod: CQ

## 2022-05-25 PROCEDURE — 25000003 PHARM REV CODE 250

## 2022-05-25 PROCEDURE — 25000003 PHARM REV CODE 250: Performed by: HOSPITALIST

## 2022-05-25 PROCEDURE — 97110 THERAPEUTIC EXERCISES: CPT

## 2022-05-25 PROCEDURE — 97116 GAIT TRAINING THERAPY: CPT | Mod: CQ

## 2022-05-25 RX ADMIN — MUPIROCIN: 20 OINTMENT TOPICAL at 08:05

## 2022-05-25 RX ADMIN — LEVOTHYROXINE SODIUM 75 MCG: 0.03 TABLET ORAL at 06:05

## 2022-05-25 RX ADMIN — SULFAMETHOXAZOLE AND TRIMETHOPRIM 1 TABLET: 800; 160 TABLET ORAL at 08:05

## 2022-05-25 RX ADMIN — SODIUM CHLORIDE: 9 INJECTION, SOLUTION INTRAVENOUS at 03:05

## 2022-05-25 RX ADMIN — DIPHENHYDRAMINE HYDROCHLORIDE 25 MG: 25 TABLET ORAL at 08:05

## 2022-05-25 RX ADMIN — LOSARTAN POTASSIUM 100 MG: 100 TABLET, FILM COATED ORAL at 08:05

## 2022-05-25 RX ADMIN — METFORMIN HYDROCHLORIDE 500 MG: 500 TABLET, FILM COATED ORAL at 05:05

## 2022-05-25 RX ADMIN — ATORVASTATIN CALCIUM 40 MG: 40 TABLET, FILM COATED ORAL at 08:05

## 2022-05-25 RX ADMIN — HYDROCHLOROTHIAZIDE 12.5 MG: 12.5 TABLET ORAL at 08:05

## 2022-05-25 RX ADMIN — CLOPIDOGREL 75 MG: 75 TABLET, FILM COATED ORAL at 08:05

## 2022-05-25 RX ADMIN — SODIUM CHLORIDE: 9 INJECTION, SOLUTION INTRAVENOUS at 02:05

## 2022-05-25 RX ADMIN — FENOFIBRATE 145 MG: 145 TABLET, FILM COATED ORAL at 08:05

## 2022-05-25 RX ADMIN — NEBIVOLOL HYDROCHLORIDE 10 MG: 5 TABLET ORAL at 08:05

## 2022-05-25 RX ADMIN — ALLOPURINOL 100 MG: 100 TABLET ORAL at 08:05

## 2022-05-25 RX ADMIN — ASPIRIN 81 MG 81 MG: 81 TABLET ORAL at 08:05

## 2022-05-25 RX ADMIN — METFORMIN HYDROCHLORIDE 500 MG: 500 TABLET, FILM COATED ORAL at 08:05

## 2022-05-25 NOTE — PT/OT/SLP PROGRESS
Physical Therapy Treatment    Patient Name:  Michell Dhillon   MRN:  [id number]    Recommendations:     Discharge Recommendations:  home health PT, home health OT   Discharge Equipment Recommendations: walker, rolling   Barriers to discharge: None    Assessment:     Michell Dhillon is a 77 y.o. female admitted with a medical diagnosis of S/P carotid endarterectomy.  She presents with the following impairments/functional limitations:  weakness. LPTA provided patient with visual and verbal cues for correct form, speed of movement, and for correct hand placement during sit to stands and while standing to increase safety. Patient with no reports of increased pain throughout treatment session.    Rehab Prognosis: Good; patient would benefit from acute skilled PT services to address these deficits and reach maximum level of function.    Recent Surgery: * No surgery found *      Plan:     During this hospitalization, patient to be seen 5 x/week to address the identified rehab impairments via gait training, therapeutic activities, therapeutic exercises and progress toward the following goals:    · Plan of Care Expires:  06/03/22    Subjective     Chief Complaint: Pt without complaints   Patient/Family Comments/goals: Pt agreeable to PT treatment session.   Pain/Comfort:  Pain Rating 1: 0/10      Objective:      Patient found supine with peripheral IV upon PT entry to room.     General Precautions: Standard, fall   Orthopedic Precautions:N/A   Braces: N/A  Respiratory Status: Room air     Functional Mobility:  · Bed Mobility:     · Supine to sitting EOB SBA   · Sit to Stand:  Moderate and minimum assistance with rolling walker  · Bed to Chair: moderate assistance with  rolling walker  using  Step Transfer  · Gait: 40ft with RW, Filemon; and with cues for posture and pathway direction       AM-PAC 6 CLICK MOBILITY  Turning over in bed (including adjusting bedclothes, sheets and blankets)?: 4  Sitting down on and standing up from  a chair with arms (e.g., wheelchair, bedside commode, etc.): 3  Moving from lying on back to sitting on the side of the bed?: 4  Moving to and from a bed to a chair (including a wheelchair)?: 2  Need to walk in hospital room?: 3  Climbing 3-5 steps with a railing?: 2  Basic Mobility Total Score: 18       Therapeutic Activities and Exercises:  Sitting EOB BLE exercises x 20 repetitions each: marching, long arc quads, hip adduction, heel raises, heel slides  Sit to stands 2 x from EOB x4 from wheel chair with min to moderate assistance to maintain balance.   Patient left up in chair with all lines intact, call button in reach and daughter in law present..    GOALS:   Multidisciplinary Problems     Physical Therapy Goals        Problem: Physical Therapy    Goal Priority Disciplines Outcome Goal Variances Interventions   Physical Therapy Goal     PT, PT/OT Ongoing, Progressing     Description: STG:  Patient will perform all bed mobility with CGA  Patient will perform sit to stand and SPT with CGA  Patient will ambulate 50 feet with RW assistive device with CGA assistance without LOB.     LTG  Patient will perform bed mobility with mod I.  Patient will perform sit to stand and SPT with SBA  Patient will ambuate 150 feet with RW assistive device with CGA assistance without LOB.   Patient will perform 10 min of LE exercise without rest break to increase LE strength to 4/5.                      Time Tracking:     PT Received On: 05/25/22  PT Start Time: 1515     PT Stop Time: 1555  PT Total Time (min): 40 min     Billable Minutes: Gait Training 10, Therapeutic Activity 10 and Therapeutic Exercise 20    Treatment Type: Treatment  PT/PTA: PTA     PTA Visit Number: 5   IZABEL Balderas   05/25/2022

## 2022-05-25 NOTE — PLAN OF CARE
[facility] Medical Surgical Unit  Discharge Reassessment    Primary Care Provider: Daryl Moscoso NP    Expected Discharge Date:     Reassessment (most recent)     Discharge Reassessment - 05/25/22 1057        Discharge Reassessment    Assessment Type Discharge Planning Reassessment     Did the patient's condition or plan change since previous assessment? No     Discharge Plan discussed with: Patient;Adult children     Communicated NIGEL with patient/caregiver Yes     Discharge Plan A Home with family;Home Health     DME Needed Upon Discharge  none     Discharge Barriers Identified None     Why the patient remains in the hospital Requires continued medical care        Post-Acute Status    Discharge Delays None known at this time               Patient discussed in multidisciplinary meeting. PT/OT reports she is doing well but continues encouragement to participate. NIGEL 6/3. Family aware

## 2022-05-25 NOTE — PLAN OF CARE
Problem: SLP  Goal: SLP Goal  Description: Patient will complete hard glottal attacks and tongue base retractions exercises with mod-max accuracy Independently. MET  Patient will complete laryngeal elevation exercises with mod-max accuracy Independently. MET  Patient will complete verbal reasoning tasks with 80% accuracy Independently. progressing  Patient will complete short term memory tasks with 90% accuracy Independently. progressing        Outcome: Ongoing, Progressing

## 2022-05-25 NOTE — PT/OT/SLP PROGRESS
Speech Language Pathology Treatment    Patient Name:  Michell Dhillon   MRN:  [id number]  Admitting Diagnosis: S/P carotid endarterectomy    Recommendations:                 General Recommendations:  Dysphagia therapy and Cognitive-linguistic therapy  Diet recommendations:  Mechanical soft, Liquid Diet Level: Thin (cup edge, no straws)   Aspiration Precautions: 1 bite/sip at a time, No straws, Remain upright 30 minutes post meal and Standard aspiration precautions   General Precautions: Standard, fall, aspiration (see MBS regarding aspiration risk)  Communication strategies:  none    Subjective     Pt alert in treatment room this date.   Patient goals: none stated      Respiratory Status: Room air    Objective:     Has the patient been evaluated by SLP for swallowing?   Yes  Keep patient NPO? No   Current Respiratory Status:        Hands on reasoning task for increased attention and determining items that are alike with 85% accuracy given assist at times, rest break as needed. Extra time required for task completion this date.     Assessment:     Michell Dhillon is a 77 y.o. female with an SLP diagnosis of Dysphagia, Dysarthria and Cognitive-Linguistic Impairment.  She presents with difficulty in processing/word finding, recall, and reasoning for safety awareness, mild-moderate dysarthria, and mild- moderate dysphagia with aspiration risk secondary to lack of sensation per MBS report.    Goals:   Multidisciplinary Problems     SLP Goals        Problem: SLP    Goal Priority Disciplines Outcome   SLP Goal     SLP Ongoing, Progressing   Description: Patient will complete hard glottal attacks and tongue base retractions exercises with mod-max accuracy Independently. MET  Patient will complete laryngeal elevation exercises with mod-max accuracy Independently. MET  Patient will complete verbal reasoning tasks with 80% accuracy Independently. progressing  Patient will complete short term memory tasks with 90% accuracy  Independently. progressing                         Plan:     · Patient to be seen: 3x/week  · Plan of Care expires:  06/10/22  · Plan of Care reviewed with:  patient   · SLP Follow-Up:  Yes       Discharge recommendations:  home with home health   Barriers to Discharge:  Level of Skilled Assistance Needed currently    Time Tracking:     SLP Treatment Date:   05/25/22  Speech Start Time:  0948  Speech Stop Time: 1015  Speech Total Time (min):  27    Billable Minutes   Speech Language treatment 27    05/25/2022

## 2022-05-25 NOTE — PT/OT/SLP PROGRESS
Occupational Therapy   Treatment    Name: Michell Dhillon  MRN: [id number]  Admitting Diagnosis:  S/P carotid endarterectomy       Recommendations:     Discharge Recommendations: home with home health, home health OT  Discharge Equipment Recommendations:  grab bar, hip kit, walker, rolling  Barriers to discharge:  None    Assessment:     Michell Dhillon is a 77 y.o. female with a medical diagnosis of S/P carotid endarterectomy.  She presents with no new complaints. Performance deficits affecting function are weakness, impaired endurance, impaired self care skills, impaired functional mobilty, gait instability, impaired balance, decreased lower extremity function, pain.     Rehab Prognosis:  Fair; patient would benefit from continued skilled OT services to address these deficits and reach maximum level of function.       Plan:     Patient to be seen 5 x/week to address the above listed problems via self-care/home management, therapeutic activities, therapeutic exercises, wheelchair management/training  · Plan of Care Expires: 06/10/22  · Plan of Care Reviewed with: patient    Subjective     Pain/Comfort:  ·  none reported    Objective:     Communicated with: pt and SLP prior to session.  Patient found up in chair with IV line upon intitiating OT tx    General Precautions: Standard, fall   Orthopedic Precautions:N/A   Braces:    Respiratory Status: Room air     Occupational Performance:     Bed Mobility:    Pt already up in w/c upon OT initiation today    Functional Mobility/Transfers:  · See PT    Activities of Daily Living:  · Not addressed today; pt CNA states pt will wash her UB but cannot reach her LB for self care; CNA states pt also appears to be limited in motivation to progress to more independent level.  Plan to discuss with pt and family at next available time for d/c planning needs.      [facility] 6 Click ADL:      Treatment & Education:  OT engaged pt in UE strength training and FM skills including UBE at  level 3 resist x 10 min, then tabletop scrub x 8 min with 5# weighted towel in multiple directions to promote UB strength and stamina with subsequent core ability as well to promote skills for light home mgmt from seated position.  Pt also completed green putty jean carlos search today for FM skill improvement.      Patient left up in chair with CNA notified and son presentEducation:      GOALS:   Multidisciplinary Problems     Occupational Therapy Goals        Problem: Occupational Therapy    Goal Priority Disciplines Outcome Interventions   Occupational Therapy Goal     OT, PT/OT Ongoing, Progressing    Description: STG: within 2 weeks  Pt will perform grooming with setup  MET  Pt will bathe with setup  ONGOING/PROGRESSING  Pt will perform UE dressing with setup ONGOING/PROGRESSING  Pt will perform LE dressing with setup  ONGOING/PROGRESSING  Pt will sit EOB x 30 min with no assistance  MET  Pt will transfer bed/chair/bsc with sba  ONGOING/PROGRESSING  Pt will perform standing task x 3 min with svn assistance  ONGOING/PROGRESSING  Pt will tolerate 60 minutes of tx without fatigue  ONGOING/PROGRESSING      LTG: within 4 weeks  1.Restore to max I with self care and mobility.    Patient has had setback of UTI and needing AB's, increased fatigue and some mild confusion noted.                      Time Tracking:     OT Date of Treatment: 05/25/22  OT Start Time: 1025  OT Stop Time: 1103  OT Total Time (min): 38 min    Billable Minutes:Therapeutic Activity 15  Therapeutic Exercise 23    OT/YNOI: OT          5/25/2022

## 2022-05-26 LAB
ANION GAP SERPL CALCULATED.3IONS-SCNC: 12 MMOL/L (ref 7–16)
BUN SERPL-MCNC: 26 MG/DL (ref 7–18)
BUN/CREAT SERPL: 17 (ref 6–20)
CALCIUM SERPL-MCNC: 9 MG/DL (ref 8.5–10.1)
CHLORIDE SERPL-SCNC: 97 MMOL/L (ref 98–107)
CO2 SERPL-SCNC: 25 MMOL/L (ref 21–32)
CREAT SERPL-MCNC: 1.57 MG/DL (ref 0.55–1.02)
GLUCOSE SERPL-MCNC: 123 MG/DL (ref 74–106)
POTASSIUM SERPL-SCNC: 4.4 MMOL/L (ref 3.5–5.1)
SODIUM SERPL-SCNC: 130 MMOL/L (ref 136–145)

## 2022-05-26 PROCEDURE — 97116 GAIT TRAINING THERAPY: CPT

## 2022-05-26 PROCEDURE — 25000003 PHARM REV CODE 250

## 2022-05-26 PROCEDURE — 97110 THERAPEUTIC EXERCISES: CPT

## 2022-05-26 PROCEDURE — 27000958

## 2022-05-26 PROCEDURE — 36415 COLL VENOUS BLD VENIPUNCTURE: CPT | Performed by: NURSE PRACTITIONER

## 2022-05-26 PROCEDURE — 11000004 HC SNF PRIVATE

## 2022-05-26 PROCEDURE — 97530 THERAPEUTIC ACTIVITIES: CPT

## 2022-05-26 PROCEDURE — 80048 BASIC METABOLIC PNL TOTAL CA: CPT | Performed by: NURSE PRACTITIONER

## 2022-05-26 PROCEDURE — 25000003 PHARM REV CODE 250: Performed by: NURSE PRACTITIONER

## 2022-05-26 PROCEDURE — 25000003 PHARM REV CODE 250: Performed by: HOSPITALIST

## 2022-05-26 RX ADMIN — LEVOTHYROXINE SODIUM 75 MCG: 0.03 TABLET ORAL at 05:05

## 2022-05-26 RX ADMIN — ALLOPURINOL 100 MG: 100 TABLET ORAL at 08:05

## 2022-05-26 RX ADMIN — HYDROCHLOROTHIAZIDE 12.5 MG: 12.5 TABLET ORAL at 08:05

## 2022-05-26 RX ADMIN — MUPIROCIN: 20 OINTMENT TOPICAL at 08:05

## 2022-05-26 RX ADMIN — METFORMIN HYDROCHLORIDE 500 MG: 500 TABLET, FILM COATED ORAL at 04:05

## 2022-05-26 RX ADMIN — NEBIVOLOL HYDROCHLORIDE 10 MG: 5 TABLET ORAL at 08:05

## 2022-05-26 RX ADMIN — ATORVASTATIN CALCIUM 40 MG: 40 TABLET, FILM COATED ORAL at 08:05

## 2022-05-26 RX ADMIN — SULFAMETHOXAZOLE AND TRIMETHOPRIM 1 TABLET: 800; 160 TABLET ORAL at 08:05

## 2022-05-26 RX ADMIN — CLOPIDOGREL 75 MG: 75 TABLET, FILM COATED ORAL at 08:05

## 2022-05-26 RX ADMIN — ASPIRIN 81 MG 81 MG: 81 TABLET ORAL at 08:05

## 2022-05-26 RX ADMIN — SODIUM CHLORIDE: 9 INJECTION, SOLUTION INTRAVENOUS at 07:05

## 2022-05-26 RX ADMIN — METFORMIN HYDROCHLORIDE 500 MG: 500 TABLET, FILM COATED ORAL at 08:05

## 2022-05-26 RX ADMIN — DIPHENHYDRAMINE HYDROCHLORIDE 25 MG: 25 TABLET ORAL at 08:05

## 2022-05-26 RX ADMIN — FENOFIBRATE 145 MG: 145 TABLET, FILM COATED ORAL at 08:05

## 2022-05-26 RX ADMIN — LOSARTAN POTASSIUM 100 MG: 100 TABLET, FILM COATED ORAL at 08:05

## 2022-05-26 RX ADMIN — SODIUM CHLORIDE: 9 INJECTION, SOLUTION INTRAVENOUS at 04:05

## 2022-05-26 NOTE — PT/OT/SLP PROGRESS
Occupational Therapy   Treatment    Name: Michell Dhillon  MRN: [id number]  Admitting Diagnosis:  S/P carotid endarterectomy       Recommendations:     Discharge Recommendations: home with home health, home health OT  Discharge Equipment Recommendations:  grab bar, hip kit, walker, rolling  Barriers to discharge:  None    Assessment:     Michell Dhillon is a 77 y.o. female with a medical diagnosis of S/P carotid endarterectomy.  She presents with no new complaints today. Performance deficits affecting function are weakness, impaired endurance, impaired self care skills, impaired functional mobilty, gait instability, impaired balance, decreased lower extremity function, pain.     Rehab Prognosis:  Fair, pt has exhibited learned helplessness within hospital environment and is expected to become more independent within home environment in caregiver role to her family; patient would benefit from continued skilled OT services to address these deficits and reach maximum level of function.   Recommend home health upon return home to family    Plan:     Patient to be seen 5 x/week to address the above listed problems via self-care/home management, therapeutic activities, therapeutic exercises, wheelchair management/training  · Plan of Care Expires: 06/10/22  · Plan of Care Reviewed with: patient    Subjective     Pain/Comfort:  ·  no new complaints    Objective:     Communicated with: pt and her son prior to session.  Patient found up in chair with IV line upon OT entry to room.  Educated pt and her son that pt will need to increase her  Level of independence within this environment between now and d/c to home.    General Precautions: Standard, fall   Orthopedic Precautions:N/A   Braces:    Respiratory Status: Room air     Occupational Performance:     Bed Mobility:    · Min a overall    Functional Mobility/Transfers:  See PT    Activities of Daily Living:  · Feeding:  supervision setup only  · Grooming: supervision needs  VC's occasionally  · Bathing: minimum assistance sinkside; pt reports she has a walk in shower at home  · Upper Body Dressing: minimum assistance no AE  · Lower Body Dressing: minimum assistance no AE  · Toileting: minimum assistance BSC      [facility] 6 Click ADL:      Treatment & Education:  OT engaged pt in pulleys x 8 min with VC's and TC's for proper technique and needed a few RB's throughout.  After this, pt completed UBE x 15 min at level 2 resist, needing multiple RB's to complete with slower speed today.  OT also educated pt and her son on progression of tasks and assessing her skills over next week in determination of her self care and functional mobility skills.  Both agreed verbally they understood but did not appear to be motivated to comply sufficiently.    Patient left up in chair with call button in reachEducation:      GOALS:   Multidisciplinary Problems     Occupational Therapy Goals        Problem: Occupational Therapy    Goal Priority Disciplines Outcome Interventions   Occupational Therapy Goal     OT, PT/OT Ongoing, Progressing    Description: STG: within 2 weeks  Pt will perform grooming with setup  MET  Pt will bathe with setup  ONGOING/PROGRESSING  Pt will perform UE dressing with setup ONGOING/PROGRESSING  Pt will perform LE dressing with setup  ONGOING/PROGRESSING  Pt will sit EOB x 30 min with no assistance  MET  Pt will transfer bed/chair/bsc with sba  ONGOING/PROGRESSING  Pt will perform standing task x 3 min with svn assistance  ONGOING/PROGRESSING  Pt will tolerate 60 minutes of tx without fatigue  ONGOING/PROGRESSING      LTG: within 4 weeks  1.Restore to max I with self care and mobility.    Patient has had setback of UTI and needing AB's, increased fatigue and some mild confusion noted.                      Time Tracking:     OT Date of Treatment: 05/26/22  OT Start Time: 1040  OT Stop Time: 1118  OT Total Time (min): 38 min    Billable Minutes:Therapeutic Activity 15  Therapeutic  Exercise 23    OT/YONI: OT          5/26/2022

## 2022-05-26 NOTE — PROGRESS NOTES
[facility] Surgical Unit  Adult Nutrition  Follow-up Note         Reason for Assessment  Reason For Assessment: RD follow-up  Nutrition Risk Screen: no indicators present  Malnutrition  Is Patient Malnourished: No  Nutrition Diagnosis  No Nutritional Diagnosis at this time  Nutrition Risk  Level of Risk/Frequency of Follow-up: moderate - high   Chewing or Swallowing Difficulty?: Poor Dentition  Estimated/Assessed Needs  RMR (Duryea-St. Jeor Equation): 1439.75 Activity Factor: 1 Injury Factor: 1     Temp: 98 °F (36.7 °C)Oral  Weight Used For Calorie Calculations: 94.3 kg (207 lb 14.3 oz)   Energy Need Method: Duryea-St Jeor Energy Calorie Requirements (kcal): 1440  Weight Used For Protein Calculations: 68 kg (149 lb 14.6 oz)  Protein Requirements: 55-68  Estimated Fluid Requirement Method: RDA Method    RDA Method (mL): 1440     Nutrition Prescription / Recommendations  Recommendation/Intervention: continue current diet  Goals: pt will meet est nutr needs  Nutrition Goal Status: progressing towards goal  Communication of RD Recs: discussed on rounds  Current Diet Order: cardiac diet  Nutrition Order Comments: 50-75%  meal intake documented  Recommended Diet: Heart Healthy , mech soft  Recommended Oral Supplement: No Oral Supplements  Is Nutrition Support Recommended: No  Is Education Recommended: No  Monitor and Evaluation  % current Intake: P.O. intake of 50 - 75 %  % intake to meet estimated needs: 50 - 75 %  Food and Nutrient Intake: energy intake, food and beverage intake  Food and Nutrient Adminstration: diet order  Anthropometric Measurements: height/length, weight, weight change, body mass index  Biochemical Data, Medical Tests and Procedures: electrolyte and renal panel, glucose/endocrine profile  Nutrition-Focused Physical Findings: skin  Oral Calories (kcal): 1510  Oral Protein (gm): 63  Energy Calories Required: meeting needs  Protein Required: meeting needs  Tolerance: tolerating  Current  "Medical Diagnosis and Past Medical History     Past Medical History:   Diagnosis Date    Arthritis     Diabetes mellitus, type 2     Hyperlipidemia     Hypertension     Hypothyroidism     Transient cerebral ischemic attack, unspecified 04/14/2021     Nutrition/Diet History  Spiritual, Cultural Beliefs, Christianity Practices, Values that Affect Care: no  Food Allergies: NKFA  Lab/Procedures/Meds  Recent Labs   Lab 05/24/22  0635 05/26/22  0934   * 130*   K 4.2 4.4   BUN 26* 26*   CREATININE 1.49* 1.57*    123*   CALCIUM 8.6 9.0   ALBUMIN 2.4*  --    CL 90* 97*   ALT 19  --    AST 23  --      Last A1c:   Lab Results   Component Value Date    HGBA1C 6.7 (H) 05/04/2022     Lab Results   Component Value Date    RBC 4.13 (L) 05/21/2022    HGB 12.0 05/21/2022    HCT 35.7 (L) 05/21/2022    MCV 86.4 05/21/2022    MCH 29.1 05/21/2022    MCHC 33.6 05/21/2022     Pertinent Labs Reviewed: reviewed  Pertinent Labs Comments: Na 128. Cl 92, BUN 37, Cr1.26, Glu 216  Pertinent Medications Reviewed: reviewed  Pertinent Medications Comments: atorvastatin, HCTZ, losartan, metformin  Anthropometrics  Temp: 98 °F (36.7 °C)  Height Method: Stated  Height: 5' 6" (167.6 cm)  Height (inches): 66 in  Weight Method: Standard Scale  Weight: 94.3 kg (207 lb 14.3 oz)  Weight (lb): 207.9 lb  Ideal Body Weight (IBW), Female: 130 lb  % Ideal Body Weight, Female (lb): 163.99 %  BMI (Calculated): 33.6  BMI Grade: 30 - 34.9- obesity - grade I     Nutrition by Nursing  Diet/Nutrition Received: mechanical/dental soft  Intake (%): 75%  Diet/Feeding Assistance: tray set-up  Diet/Feeding Tolerance: fair  Last Bowel Movement: 05/23/22              Nutrition Follow-Up  RD Follow-up?: Yes  Assessment and Plan  No new Assessment & Plan notes have been filed under this hospital service since the last note was generated.  Service: Nutrition     "

## 2022-05-26 NOTE — PLAN OF CARE
Problem: Physical Therapy  Goal: Physical Therapy Goal  Description: STG:  Patient will perform all bed mobility with CGA - completed with CGA todat  Patient will perform sit to stand and SPT with CGA - min to CGA needed today  Patient will ambulate 50 feet with RW assistive device with CGA assistance without LOB. - completed today    LTG ( all progressing)  Patient will perform bed mobility with mod I.  Patient will perform sit to stand and SPT with SBA  Patient will ambuate 150 feet with RW assistive device with CGA assistance without LOB.   Patient will perform 10 min of LE exercise without rest break to increase LE strength to 4/5.     Outcome: Ongoing, Progressing

## 2022-05-26 NOTE — PLAN OF CARE
Problem: Adult Inpatient Plan of Care  Goal: Patient-Specific Goal (Individualized)  Outcome: Ongoing, Progressing

## 2022-05-26 NOTE — PT/OT/SLP PROGRESS
Physical Therapy Treatment    Patient Name:  Michell Dhillon   MRN:  [id number]    Recommendations:     Discharge Recommendations:  home health OT, home health PT   Discharge Equipment Recommendations: walker, rolling   Barriers to discharge: Decreased caregiver support    Assessment:     Michell Dhillon is a 77 y.o. female admitted with a medical diagnosis of S/P carotid endarterectomy.  She presents with the following impairments/functional limitations:  weakness, impaired endurance, impaired self care skills, impaired functional mobilty, gait instability, impaired balance Patient with overall good effort and improving endurance and mobility.    Rehab Prognosis: Good; patient would benefit from acute skilled PT services to address these deficits and reach maximum level of function.    Recent Surgery: * No surgery found *      Plan:     During this hospitalization, patient to be seen 5 x/week to address the identified rehab impairments via gait training, therapeutic activities, therapeutic exercises and progress toward the following goals:    · Plan of Care Expires:  06/03/22    Subjective     Chief Complaint: weakness  Patient/Family Comments/goals: return home with family  Pain/Comfort:  · Pain Rating 1: 0/10  · Pain Rating Post-Intervention 1: 0/10      Objective:     Communicated with nurse prior to session.  Patient found supine with peripheral IV upon PT entry to room.     General Precautions: Standard, fall   Orthopedic Precautions:N/A   Braces: N/A  Respiratory Status: Room air     Functional Mobility:  · Bed Mobility:     · Supine to Sit: contact guard assistance and minimum assistance  · Transfers:     · Sit to Stand:  contact guard assistance and minimum assistance with rolling walker  · Toilet Transfer: contact guard assistance and minimum assistance with  rolling walker  using  Stand Pivot  · Gait: patient ambulated 65 feet with RW and CGA with intermittent verbal cues for upright stance and to look  ahead.  She rested x 1 min then ambulated an additional 70 feet with RW and CGA and continued verbal cues.       AM-PAC 6 CLICK MOBILITY  Turning over in bed (including adjusting bedclothes, sheets and blankets)?: 4  Sitting down on and standing up from a chair with arms (e.g., wheelchair, bedside commode, etc.): 3  Moving from lying on back to sitting on the side of the bed?: 3  Moving to and from a bed to a chair (including a wheelchair)?: 3  Need to walk in hospital room?: 3  Climbing 3-5 steps with a railing?: 2  Basic Mobility Total Score: 18       Therapeutic Activities and Exercises:   AP, LAQ, seated march, seated hip abd, and add squeezes all x 30 reps each  Sit to stand training x 8 with CGA to min assist and verbal cues for anterior weight shift during transfer  SPT training bed to [facility] with CGA to min assist and verbal cues for safety    Patient left up in chair with call button in reach and son present..    GOALS:   Multidisciplinary Problems     Physical Therapy Goals        Problem: Physical Therapy    Goal Priority Disciplines Outcome Goal Variances Interventions   Physical Therapy Goal     PT, PT/OT Ongoing, Progressing     Description: STG:  Patient will perform all bed mobility with CGA  Patient will perform sit to stand and SPT with CGA  Patient will ambulate 50 feet with RW assistive device with CGA assistance without LOB.     LTG  Patient will perform bed mobility with mod I.  Patient will perform sit to stand and SPT with SBA  Patient will ambuate 150 feet with RW assistive device with CGA assistance without LOB.   Patient will perform 10 min of LE exercise without rest break to increase LE strength to 4/5.                      Time Tracking:     PT Received On: 05/26/22  PT Start Time: 1430     PT Stop Time: 1456  PT Total Time (min): 26 min     Billable Minutes: Gait Training 15 and Therapeutic Exercise 11    Treatment Type: Treatment, 6th Visit  PT/PTA: PT     PTA Visit Number: 0      05/26/2022

## 2022-05-27 LAB
ANION GAP SERPL CALCULATED.3IONS-SCNC: 10 MMOL/L (ref 7–16)
BASOPHILS # BLD AUTO: 0.02 K/UL (ref 0–0.2)
BASOPHILS NFR BLD AUTO: 0.3 % (ref 0–1)
BILIRUB UR QL STRIP: NEGATIVE
BUN SERPL-MCNC: 23 MG/DL (ref 7–18)
BUN/CREAT SERPL: 17 (ref 6–20)
CALCIUM SERPL-MCNC: 9.1 MG/DL (ref 8.5–10.1)
CHLORIDE SERPL-SCNC: 104 MMOL/L (ref 98–107)
CLARITY UR: CLEAR
CO2 SERPL-SCNC: 26 MMOL/L (ref 21–32)
COLOR UR: YELLOW
CREAT SERPL-MCNC: 1.38 MG/DL (ref 0.55–1.02)
DIFFERENTIAL METHOD BLD: ABNORMAL
EOSINOPHIL # BLD AUTO: 0.18 K/UL (ref 0–0.5)
EOSINOPHIL NFR BLD AUTO: 2.5 % (ref 1–4)
ERYTHROCYTE [DISTWIDTH] IN BLOOD BY AUTOMATED COUNT: 14.1 % (ref 11.5–14.5)
GLUCOSE SERPL-MCNC: 106 MG/DL (ref 74–106)
GLUCOSE UR STRIP-MCNC: NEGATIVE MG/DL
HCT VFR BLD AUTO: 35.6 % (ref 38–47)
HGB BLD-MCNC: 11.3 G/DL (ref 12–16)
KETONES UR STRIP-SCNC: NEGATIVE MG/DL
LEUKOCYTE ESTERASE UR QL STRIP: NEGATIVE
LYMPHOCYTES # BLD AUTO: 1.52 K/UL (ref 1–4.8)
LYMPHOCYTES NFR BLD AUTO: 21.2 % (ref 27–41)
MCH RBC QN AUTO: 29 PG (ref 27–31)
MCHC RBC AUTO-ENTMCNC: 31.7 G/DL (ref 32–36)
MCV RBC AUTO: 91.3 FL (ref 80–96)
MONOCYTES # BLD AUTO: 0.52 K/UL (ref 0–0.8)
MONOCYTES NFR BLD AUTO: 7.3 % (ref 2–6)
MPC BLD CALC-MCNC: 8.7 FL (ref 9.4–12.4)
NEUTROPHILS # BLD AUTO: 4.92 K/UL (ref 1.8–7.7)
NEUTROPHILS NFR BLD AUTO: 68.7 % (ref 53–65)
NITRITE UR QL STRIP: NEGATIVE
PH UR STRIP: 6.5 PH UNITS
PLATELET # BLD AUTO: 266 K/UL (ref 150–400)
POTASSIUM SERPL-SCNC: 5.2 MMOL/L (ref 3.5–5.1)
PROT UR QL STRIP: NEGATIVE
RBC # BLD AUTO: 3.9 M/UL (ref 4.2–5.4)
RBC # UR STRIP: NEGATIVE /UL
SODIUM SERPL-SCNC: 135 MMOL/L (ref 136–145)
SP GR UR STRIP: 1.01
UROBILINOGEN UR STRIP-ACNC: 0.2 MG/DL
WBC # BLD AUTO: 7.16 K/UL (ref 4.5–11)

## 2022-05-27 PROCEDURE — 11000004 HC SNF PRIVATE

## 2022-05-27 PROCEDURE — 27000958

## 2022-05-27 PROCEDURE — 81003 URINALYSIS AUTO W/O SCOPE: CPT | Performed by: EMERGENCY MEDICINE

## 2022-05-27 PROCEDURE — 80048 BASIC METABOLIC PNL TOTAL CA: CPT | Performed by: EMERGENCY MEDICINE

## 2022-05-27 PROCEDURE — 97110 THERAPEUTIC EXERCISES: CPT | Mod: CQ

## 2022-05-27 PROCEDURE — 25000003 PHARM REV CODE 250: Performed by: NURSE PRACTITIONER

## 2022-05-27 PROCEDURE — 97110 THERAPEUTIC EXERCISES: CPT

## 2022-05-27 PROCEDURE — 85025 COMPLETE CBC W/AUTO DIFF WBC: CPT | Performed by: EMERGENCY MEDICINE

## 2022-05-27 PROCEDURE — 36415 COLL VENOUS BLD VENIPUNCTURE: CPT | Performed by: EMERGENCY MEDICINE

## 2022-05-27 PROCEDURE — 97535 SELF CARE MNGMENT TRAINING: CPT

## 2022-05-27 PROCEDURE — 25000003 PHARM REV CODE 250: Performed by: HOSPITALIST

## 2022-05-27 RX ADMIN — METFORMIN HYDROCHLORIDE 500 MG: 500 TABLET, FILM COATED ORAL at 05:05

## 2022-05-27 RX ADMIN — ASPIRIN 81 MG 81 MG: 81 TABLET ORAL at 08:05

## 2022-05-27 RX ADMIN — SODIUM CHLORIDE: 9 INJECTION, SOLUTION INTRAVENOUS at 10:05

## 2022-05-27 RX ADMIN — FENOFIBRATE 145 MG: 145 TABLET, FILM COATED ORAL at 08:05

## 2022-05-27 RX ADMIN — LEVOTHYROXINE SODIUM 75 MCG: 0.03 TABLET ORAL at 05:05

## 2022-05-27 RX ADMIN — NEBIVOLOL HYDROCHLORIDE 10 MG: 5 TABLET ORAL at 08:05

## 2022-05-27 RX ADMIN — ATORVASTATIN CALCIUM 40 MG: 40 TABLET, FILM COATED ORAL at 08:05

## 2022-05-27 RX ADMIN — LOSARTAN POTASSIUM 100 MG: 100 TABLET, FILM COATED ORAL at 08:05

## 2022-05-27 RX ADMIN — HYDROCHLOROTHIAZIDE 12.5 MG: 12.5 TABLET ORAL at 08:05

## 2022-05-27 RX ADMIN — SENNOSIDES AND DOCUSATE SODIUM 1 TABLET: 50; 8.6 TABLET ORAL at 08:05

## 2022-05-27 RX ADMIN — METFORMIN HYDROCHLORIDE 500 MG: 500 TABLET, FILM COATED ORAL at 08:05

## 2022-05-27 RX ADMIN — ALLOPURINOL 100 MG: 100 TABLET ORAL at 08:05

## 2022-05-27 RX ADMIN — SODIUM CHLORIDE: 9 INJECTION, SOLUTION INTRAVENOUS at 11:05

## 2022-05-27 RX ADMIN — MUPIROCIN: 20 OINTMENT TOPICAL at 08:05

## 2022-05-27 RX ADMIN — CLOPIDOGREL 75 MG: 75 TABLET, FILM COATED ORAL at 08:05

## 2022-05-27 NOTE — PLAN OF CARE
Problem: Adult Inpatient Plan of Care  Goal: Optimal Comfort and Wellbeing  Outcome: Ongoing, Progressing  Goal: Readiness for Transition of Care  Outcome: Ongoing, Progressing     Problem: Diabetes Comorbidity  Goal: Blood Glucose Level Within Targeted Range  Outcome: Ongoing, Progressing     Problem: Fall Injury Risk  Goal: Absence of Fall and Fall-Related Injury  Outcome: Ongoing, Progressing

## 2022-05-27 NOTE — PT/OT/SLP PROGRESS
Physical Therapy Treatment    Patient Name:  Michell Dhillon   MRN:  [id number]    Recommendations:     Discharge Recommendations:  home health OT, home health PT   Discharge Equipment Recommendations: walker, rolling   Barriers to discharge: None    Assessment:     Michell Dhillon is a 77 y.o. female admitted with a medical diagnosis of S/P carotid endarterectomy.  She presents with the following impairments/functional limitations:  weakness .    Rehab Prognosis: Good; patient would benefit from acute skilled PT services to address these deficits and reach maximum level of function.    Recent Surgery: * No surgery found *       continue per Plan of Care per Liat Douglass PT    Plan:     During this hospitalization, patient to be seen 5 x/week to address the identified rehab impairments via gait training, therapeutic activities, therapeutic exercises and progress toward the following goals:    · Plan of Care Expires:  06/03/22    Subjective     Chief Complaint: none  Patient/Family Comments/goals: CNA just finished assisting pt with toileting, daughter present; pt agrees to PT tx  Pain/Comfort:  · Pain Rating 1: 0/10      Objective:     Communicated with AVA Dickerson prior to session.  Patient found up in chair with peripheral IV upon PT entry to room.     General Precautions: Standard, fall   Orthopedic Precautions:N/A   Braces:    Respiratory Status: Room air     Functional Mobility:  · Bed Mobility:     · Sit to Supine: contact guard assistance  · Transfers:     · Sit to Stand:  contact guard assistance and minimum assistance with rolling walker  · Wheel Chair to bed: contact guard assistance and minimum assistance with  wc armrests and bedrail   using  Step Transfer  · Balance: static and dynamic standing balance requires BUE support, CGA-[place]; VC's/TC's for posture correction      AM-PAC 6 CLICK MOBILITY  Turning over in bed (including adjusting bedclothes, sheets and blankets)?: 4  Sitting down on and  standing up from a chair with arms (e.g., wheelchair, bedside commode, etc.): 3  Moving from lying on back to sitting on the side of the bed?: 3  Moving to and from a bed to a chair (including a wheelchair)?: 3  Need to walk in hospital room?: 3  Climbing 3-5 steps with a railing?: 2  Basic Mobility Total Score: 18       Therapeutic Activities and Exercises:  Seated BLE pedal exerciser x 6 minutes   Seated BLE exercises x 20 repetitions each: marching, long arc quads, hip abduction, ankle rocking, scapular retraction  Standing BLE exercises x 10 repetitions each with BUE support at walker: marching (f/b sitting rest break), mini squats (f/b sitting rest break), hip abduction (f/b sitting rest break), posturing (bilateral scapular retraction, head up, trunk extension to neutral  Sit to stand for a total of 5 times with exercises and transfer back to bed      Patient left supine with call button in reach and daughter present..    GOALS:   Multidisciplinary Problems     Physical Therapy Goals        Problem: Physical Therapy    Goal Priority Disciplines Outcome Goal Variances Interventions   Physical Therapy Goal     PT, PT/OT Ongoing, Progressing     Description: STG:  Patient will perform all bed mobility with CGA  Patient will perform sit to stand and SPT with CGA  Patient will ambulate 50 feet with RW assistive device with CGA assistance without LOB.     LTG  Patient will perform bed mobility with mod I.  Patient will perform sit to stand and SPT with SBA  Patient will ambuate 150 feet with RW assistive device with CGA assistance without LOB.   Patient will perform 10 min of LE exercise without rest break to increase LE strength to 4/5.                      Time Tracking:     PT Received On: 05/27/22  PT Start Time: 1230     PT Stop Time: 1258  PT Total Time (min): 28 min     Billable Minutes: Therapeutic Exercise 28    Treatment Type: Treatment  PT/PTA: PTA     PTA Visit Number: 1 05/27/2022

## 2022-05-27 NOTE — PT/OT/SLP PROGRESS
Occupational Therapy   Treatment    Name: Michell Dhillon  MRN: [id number]  Admitting Diagnosis:  S/P carotid endarterectomy       Recommendations:     Discharge Recommendations: home with home health, home health OT  Discharge Equipment Recommendations:  grab bar, hip kit, walker, rolling  Barriers to discharge:       Assessment:     Michell Dhillon is a 77 y.o. female with a medical diagnosis of S/P carotid endarterectomy.  She presents with decreased weakness and endurance. Performance deficits affecting function are  .     Rehab Prognosis:  Good; patient would benefit from acute skilled OT services to address these deficits and reach maximum level of function.       Plan:     Patient to be seen 5 x/week to address the above listed problems via self-care/home management, therapeutic activities, therapeutic exercises, wheelchair management/training  · Plan of Care Expires: 06/10/22  · Plan of Care Reviewed with: patient    Subjective     Pain/Comfort:  · Pain Rating 1: 0/10    Objective:     Communicated with: RN prior to session.  Patient found up in chair with   upon OT entry to room.    General Precautions: Standard, fall   Orthopedic Precautions:N/A   Braces:    Respiratory Status: Room air     Occupational Performance:     Bed Mobility:    · Pt up in chair     Functional Mobility/Transfers:  · Pt did not transfer this visit  · Functional Mobility: W/C mobility performed in [address]    Activities of Daily Living:  · Bathing: modified independence Pt completed bathing at [facility] 6 Click ADL: 17    Treatment & Education:  Pt used BUEs to complete 15 min on arm bike, 30 table slides in forward flex and sh IR/ER, 2 x10 elbow flex with 2# therabar, and placed graded pins on horizontal bar to increase her strength and endurance for improved performance in ADLs.    Patient left up in chair with call button in reachEducation:      GOALS:   Multidisciplinary Problems     Occupational Therapy Goals         Problem: Occupational Therapy    Goal Priority Disciplines Outcome Interventions   Occupational Therapy Goal     OT, PT/OT Ongoing, Progressing    Description: STG: within 2 weeks  Pt will perform grooming with setup  MET  Pt will bathe with setup  ONGOING/PROGRESSING  Pt will perform UE dressing with setup ONGOING/PROGRESSING  Pt will perform LE dressing with setup  ONGOING/PROGRESSING  Pt will sit EOB x 30 min with no assistance  MET  Pt will transfer bed/chair/bsc with sba  ONGOING/PROGRESSING  Pt will perform standing task x 3 min with svn assistance  ONGOING/PROGRESSING  Pt will tolerate 60 minutes of tx without fatigue  ONGOING/PROGRESSING      LTG: within 4 weeks  1.Restore to max I with self care and mobility.    Patient has had setback of UTI and needing AB's, increased fatigue and some mild confusion noted.                      Time Tracking:     OT Date of Treatment: 05/27/22  OT Start Time: 0915  OT Stop Time: 0953  OT Total Time (min): 38 min    Billable Minutes:Self Care/Home Management 15  Therapeutic Exercise 23    OT/OYNI: OT          5/27/2022

## 2022-05-28 PROCEDURE — 27000958

## 2022-05-28 PROCEDURE — 25000003 PHARM REV CODE 250: Performed by: HOSPITALIST

## 2022-05-28 PROCEDURE — 11000004 HC SNF PRIVATE

## 2022-05-28 PROCEDURE — 25000003 PHARM REV CODE 250: Performed by: NURSE PRACTITIONER

## 2022-05-28 RX ORDER — POLYVINYL ALCOHOL 14 MG/ML
1 SOLUTION/ DROPS OPHTHALMIC
Status: DISCONTINUED | OUTPATIENT
Start: 2022-05-28 | End: 2022-06-02 | Stop reason: HOSPADM

## 2022-05-28 RX ADMIN — POLYVINYL ALCOHOL 1 DROP: 14 SOLUTION/ DROPS OPHTHALMIC at 08:05

## 2022-05-28 RX ADMIN — MUPIROCIN: 20 OINTMENT TOPICAL at 08:05

## 2022-05-28 RX ADMIN — METFORMIN HYDROCHLORIDE 500 MG: 500 TABLET, FILM COATED ORAL at 08:05

## 2022-05-28 RX ADMIN — HYDROCHLOROTHIAZIDE 12.5 MG: 12.5 TABLET ORAL at 08:05

## 2022-05-28 RX ADMIN — CLOPIDOGREL 75 MG: 75 TABLET, FILM COATED ORAL at 08:05

## 2022-05-28 RX ADMIN — ASPIRIN 81 MG 81 MG: 81 TABLET ORAL at 08:05

## 2022-05-28 RX ADMIN — LOSARTAN POTASSIUM 100 MG: 100 TABLET, FILM COATED ORAL at 08:05

## 2022-05-28 RX ADMIN — ALLOPURINOL 100 MG: 100 TABLET ORAL at 08:05

## 2022-05-28 RX ADMIN — ATORVASTATIN CALCIUM 40 MG: 40 TABLET, FILM COATED ORAL at 08:05

## 2022-05-28 RX ADMIN — NEBIVOLOL HYDROCHLORIDE 10 MG: 5 TABLET ORAL at 08:05

## 2022-05-28 RX ADMIN — METFORMIN HYDROCHLORIDE 500 MG: 500 TABLET, FILM COATED ORAL at 05:05

## 2022-05-28 RX ADMIN — FENOFIBRATE 145 MG: 145 TABLET, FILM COATED ORAL at 08:05

## 2022-05-28 RX ADMIN — LEVOTHYROXINE SODIUM 75 MCG: 0.03 TABLET ORAL at 05:05

## 2022-05-28 NOTE — SUBJECTIVE & OBJECTIVE
Review of Systems   Constitutional: Negative.    HENT: Negative.     Eyes: Negative.    Respiratory:  Negative for apnea, cough, choking, chest tightness, shortness of breath, wheezing and stridor.    Cardiovascular:  Negative for chest pain, palpitations and leg swelling.   Gastrointestinal: Negative.    Endocrine: Negative.    Genitourinary: Negative.    Musculoskeletal: Negative.    Allergic/Immunologic: Negative.    Neurological: Negative.    Hematological: Negative.    Psychiatric/Behavioral: Negative.     Objective:     Vital Signs (Most Recent):  Temp: 97.8 °F (36.6 °C) (05/27/22 2001)  Pulse: 62 (05/27/22 2001)  Resp: 20 (05/27/22 2001)  BP: (!) 154/71 (05/27/22 2001)  SpO2: 97 % (05/27/22 2001)   Vital Signs (24h Range):  Temp:  [97.8 °F (36.6 °C)-98 °F (36.7 °C)] 97.8 °F (36.6 °C)  Pulse:  [62-69] 62  Resp:  [18-20] 20  SpO2:  [97 %-98 %] 97 %  BP: (141-154)/(70-71) 154/71     Weight: 99.7 kg (219 lb 12.8 oz)  Body mass index is 35.48 kg/m².    Intake/Output Summary (Last 24 hours) at 5/27/2022 2158  Last data filed at 5/27/2022 1800  Gross per 24 hour   Intake 900 ml   Output 900 ml   Net 0 ml      Physical Exam  Constitutional:       Appearance: Normal appearance.   HENT:      Mouth/Throat:      Mouth: Mucous membranes are moist.   Eyes:      Extraocular Movements: Extraocular movements intact.      Conjunctiva/sclera: Conjunctivae normal.   Cardiovascular:      Rate and Rhythm: Normal rate and regular rhythm.      Pulses: Normal pulses.      Heart sounds: Normal heart sounds. No murmur heard.    No friction rub. No gallop.   Pulmonary:      Effort: Pulmonary effort is normal.      Breath sounds: Normal breath sounds.   Abdominal:      General: Abdomen is flat. Bowel sounds are normal. There is no distension.      Palpations: Abdomen is soft. There is no mass.      Tenderness: There is no abdominal tenderness. There is no right CVA tenderness, left CVA tenderness, guarding or rebound.      Hernia: No  hernia is present.   Musculoskeletal:         General: Normal range of motion.      Cervical back: Normal range of motion and neck supple.   Skin:     General: Skin is warm and dry.      Capillary Refill: Capillary refill takes 2 to 3 seconds.   Neurological:      General: No focal deficit present.      Mental Status: She is alert and oriented to person, place, and time.   Psychiatric:         Mood and Affect: Mood normal.         Behavior: Behavior normal.         Thought Content: Thought content normal.         Judgment: Judgment normal.       Significant Labs: All pertinent labs within the past 24 hours have been reviewed.  BMP:   Recent Labs   Lab 05/27/22  1701      *   K 5.2*      CO2 26   BUN 23*   CREATININE 1.38*   CALCIUM 9.1     Urine Studies:   Recent Labs   Lab 05/27/22  1802   COLORU Yellow   APPEARANCEUA Clear   PHUR 6.5   SPECGRAV 1.015   PROTEINUA Negative   GLUCUA Negative   KETONESU Negative   BILIRUBINUA Negative   OCCULTUA Negative   NITRITE Negative   UROBILINOGEN 0.2   LEUKOCYTESUR Negative       Significant Imaging: I have reviewed all pertinent imaging results/findings within the past 24 hours.

## 2022-05-28 NOTE — PROGRESS NOTES
[facility] Medicine  Progress Note    Patient Name: Michell Dhillon  MRN: [id number]  Patient Class: IP- Swing   Admission Date: 5/13/2022  Length of Stay: 15 days  Attending Physician: Elmer Rainey DO  Primary Care Provider: Daryl Moscoso NP        Subjective:     Principal Problem:S/P carotid endarterectomy        HPI:  Ms Dhillon is a pleasant 77 yr old WF who has a known hx of HTN, T2DM, Hypothyroidism, osteoarthritis, carotid stenosis, dyslipidemia.  She was admitted to [facility] 05/04 with stroke symptoms.  She was found to have left posterior basal ganglia, corona radiata and lucanar CVA.  She had left carotid endarterectomy on 5/09 and was kept at [facility] until today.  She was transferred here for [facility] for OT/PT and medical management.  Ms Dhillon lives at home with her son.  She reports before the admit, she was able to walk and care for herself.  She is found sitting up in chair, eating lunch in NAD.  She denies pain or discomfort. She is alert, oriented and answers all questions appropriately.  Skin is warm and dry with noted bruising to left side of her neck and bilateral upper arms.  She moves all extremities well but generalized weakness.        Overview/Hospital Course:  5/18 Follow up today, she is doing much better since Friday.  Was only able to stand.  Now ambulating 20-30 feet with breaks with walker.  Good mood.  Motivated.     5/20 Yesterday was more lethargic, UA checked and full of bacteria, started on ABX.  She is better, still a little sluggish but more alert and did a little more with therapy yesterday.     5/21 Urine cultured results show resistance to Cipro, will change to Bactrim DS, patient stated that her allergy to Bactrim is itching, will give Benadryl to help relieve itching if that occurs.     5/22; Patient c/o reddened area to left wrist, patient states that it is not itching, will order hydrocortisone cream to be applied  BID.     5/27 Patient laying supine in bed with eyes open in NAD. Daughter at bedside. States she is feeling better today. Did chair exercises per PT today because she said she didn't feel like walking today. Patient did walk 35 steps yesterday. Patient encouraged to participate in full PT regimen. Repeat UA and BMP done. Urine shows no infection. Sodium has increased from 130 yesterday to 135 today. Patient rounding performed with Dr. Aguilar per video.          No new subjective & objective note has been filed under this hospital service since the last note was generated.      Assessment/Plan:      * S/P carotid endarterectomy  Keep wound clean and dry  F/u as directed      UTI (urinary tract infection)  Started ABX, follow culture      Finger pain, right  ESR 45, Uric acid up.  Start Allopurinol.     Hypothyroidism  Continue home medication      Type 2 diabetes mellitus without complication, without long-term current use of insulin  Monitor lab  Continue metformin        Benign essential HTN  Monitor VS  Losartan/ hctz      VTE Risk Mitigation (From admission, onward)         Ordered     Place RENITA hose  Until discontinued         05/13/22 1213     IP VTE HIGH RISK PATIENT  Once         05/13/22 1213     Place sequential compression device  Until discontinued         05/13/22 1213                Discharge Planning   NIGEL:      Code Status: Full Code   Is the patient medically ready for discharge?: No    Reason for patient still in hospital (select all that apply): Patient trending condition  Discharge Plan A: Home with family, Home Health   Discharge Delays: None known at this time              JASSI Tapia  Department of Hospital Medicine   [facility]

## 2022-05-28 NOTE — NURSING
"Patient requested help with bedside commode.  CNA was not finished cleaning the patient when patient's grandson came into the room.  Both CNA and patient told him that she was not finished and asked him to step out.  He refused.  Dilma Noel LPN went into room to ask him to step out.  He loudly stated "aint' nobody gonna tell me what to do with my grandma", but then stormed out of the room.  I spoke with patient's son and explained to him that the patient deserves more respect than he was showing his grandmother  and that our staff certainly deserves more respect.     Patient's son went to parking lot and instructed the grandson to come in and apologize for his actions.    "

## 2022-05-28 NOTE — PLAN OF CARE
Problem: Adult Inpatient Plan of Care  Goal: Plan of Care Review  Outcome: Ongoing, Progressing  Goal: Patient-Specific Goal (Individualized)  Outcome: Ongoing, Progressing  Goal: Absence of Hospital-Acquired Illness or Injury  Outcome: Ongoing, Progressing  Goal: Optimal Comfort and Wellbeing  Outcome: Ongoing, Progressing  Goal: Readiness for Transition of Care  Outcome: Ongoing, Progressing     Problem: Diabetes Comorbidity  Goal: Blood Glucose Level Within Targeted Range  Outcome: Ongoing, Progressing     Problem: Fall Injury Risk  Goal: Absence of Fall and Fall-Related Injury  Outcome: Ongoing, Progressing     Problem: Skin Injury Risk Increased  Goal: Skin Health and Integrity  Outcome: Ongoing, Progressing     Problem: Impaired Wound Healing  Goal: Optimal Wound Healing  Outcome: Ongoing, Progressing     Problem: Electrolyte Imbalance  Goal: Electrolyte Balance  Outcome: Ongoing, Progressing

## 2022-05-29 PROCEDURE — 27000958

## 2022-05-29 PROCEDURE — 11000004 HC SNF PRIVATE

## 2022-05-29 PROCEDURE — 25000003 PHARM REV CODE 250: Performed by: NURSE PRACTITIONER

## 2022-05-29 PROCEDURE — 25000003 PHARM REV CODE 250: Performed by: HOSPITALIST

## 2022-05-29 RX ADMIN — ATORVASTATIN CALCIUM 40 MG: 40 TABLET, FILM COATED ORAL at 08:05

## 2022-05-29 RX ADMIN — LOSARTAN POTASSIUM 100 MG: 100 TABLET, FILM COATED ORAL at 08:05

## 2022-05-29 RX ADMIN — METFORMIN HYDROCHLORIDE 500 MG: 500 TABLET, FILM COATED ORAL at 08:05

## 2022-05-29 RX ADMIN — NEBIVOLOL HYDROCHLORIDE 10 MG: 5 TABLET ORAL at 08:05

## 2022-05-29 RX ADMIN — HYDROCHLOROTHIAZIDE 12.5 MG: 12.5 TABLET ORAL at 08:05

## 2022-05-29 RX ADMIN — ASPIRIN 81 MG 81 MG: 81 TABLET ORAL at 08:05

## 2022-05-29 RX ADMIN — ANTACID TABLETS 500 MG: 500 TABLET, CHEWABLE ORAL at 03:05

## 2022-05-29 RX ADMIN — LEVOTHYROXINE SODIUM 75 MCG: 0.03 TABLET ORAL at 05:05

## 2022-05-29 RX ADMIN — CLOPIDOGREL 75 MG: 75 TABLET, FILM COATED ORAL at 08:05

## 2022-05-29 RX ADMIN — MUPIROCIN: 20 OINTMENT TOPICAL at 08:05

## 2022-05-29 RX ADMIN — ALLOPURINOL 100 MG: 100 TABLET ORAL at 08:05

## 2022-05-29 RX ADMIN — METFORMIN HYDROCHLORIDE 500 MG: 500 TABLET, FILM COATED ORAL at 05:05

## 2022-05-29 RX ADMIN — POLYVINYL ALCOHOL 1 DROP: 14 SOLUTION/ DROPS OPHTHALMIC at 01:05

## 2022-05-29 RX ADMIN — FENOFIBRATE 145 MG: 145 TABLET, FILM COATED ORAL at 08:05

## 2022-05-29 NOTE — NURSING
Rounded on pt and pt c/o of watery eyes , reported it to FORTINO Bajwa NP in the  ER, New order given.

## 2022-05-30 PROBLEM — N17.9 ACUTE ON CHRONIC RENAL FAILURE: Status: ACTIVE | Noted: 2022-05-30

## 2022-05-30 PROBLEM — N18.9 ACUTE ON CHRONIC RENAL FAILURE: Status: ACTIVE | Noted: 2022-05-30

## 2022-05-30 PROCEDURE — 25000003 PHARM REV CODE 250: Performed by: NURSE PRACTITIONER

## 2022-05-30 PROCEDURE — 97535 SELF CARE MNGMENT TRAINING: CPT

## 2022-05-30 PROCEDURE — 25000003 PHARM REV CODE 250: Performed by: HOSPITALIST

## 2022-05-30 PROCEDURE — 99308 SBSQ NF CARE LOW MDM 20: CPT | Mod: ,,, | Performed by: HOSPITALIST

## 2022-05-30 PROCEDURE — 25000003 PHARM REV CODE 250

## 2022-05-30 PROCEDURE — 27000958

## 2022-05-30 PROCEDURE — 99308 PR NURSING FAC CARE, SUBSEQ, MINOR COMPLIC: ICD-10-PCS | Mod: ,,, | Performed by: HOSPITALIST

## 2022-05-30 PROCEDURE — 11000004 HC SNF PRIVATE

## 2022-05-30 PROCEDURE — 97110 THERAPEUTIC EXERCISES: CPT

## 2022-05-30 RX ADMIN — ALLOPURINOL 100 MG: 100 TABLET ORAL at 08:05

## 2022-05-30 RX ADMIN — HYDROCHLOROTHIAZIDE 12.5 MG: 12.5 TABLET ORAL at 08:05

## 2022-05-30 RX ADMIN — NEBIVOLOL HYDROCHLORIDE 10 MG: 5 TABLET ORAL at 08:05

## 2022-05-30 RX ADMIN — ASPIRIN 81 MG 81 MG: 81 TABLET ORAL at 08:05

## 2022-05-30 RX ADMIN — ATORVASTATIN CALCIUM 40 MG: 40 TABLET, FILM COATED ORAL at 08:05

## 2022-05-30 RX ADMIN — LOSARTAN POTASSIUM 100 MG: 100 TABLET, FILM COATED ORAL at 08:05

## 2022-05-30 RX ADMIN — METFORMIN HYDROCHLORIDE 500 MG: 500 TABLET, FILM COATED ORAL at 07:05

## 2022-05-30 RX ADMIN — MUPIROCIN: 20 OINTMENT TOPICAL at 08:05

## 2022-05-30 RX ADMIN — FENOFIBRATE 145 MG: 145 TABLET, FILM COATED ORAL at 08:05

## 2022-05-30 RX ADMIN — CLOPIDOGREL 75 MG: 75 TABLET, FILM COATED ORAL at 08:05

## 2022-05-30 RX ADMIN — LEVOTHYROXINE SODIUM 75 MCG: 0.03 TABLET ORAL at 06:05

## 2022-05-30 RX ADMIN — DIPHENHYDRAMINE HYDROCHLORIDE 25 MG: 25 TABLET ORAL at 04:05

## 2022-05-30 RX ADMIN — METFORMIN HYDROCHLORIDE 500 MG: 500 TABLET, FILM COATED ORAL at 04:05

## 2022-05-30 NOTE — PT/OT/SLP PROGRESS
Occupational Therapy   Treatment    Name: Michell Dhillon  MRN: [id number]  Admitting Diagnosis:  S/P carotid endarterectomy       Recommendations:     Discharge Recommendations: home with home health, home health OT  Discharge Equipment Recommendations:  grab bar, hip kit, walker, rolling  Barriers to discharge:       Assessment:     Michell Dhillon is a 77 y.o. female with a medical diagnosis of S/P carotid endarterectomy.  She presents with weakness. Performance deficits affecting function are  .     Rehab Prognosis:  Good; patient would benefit from acute skilled OT services to address these deficits and reach maximum level of function.       Plan:     Patient to be seen 5 x/week to address the above listed problems via self-care/home management, therapeutic activities, therapeutic exercises, wheelchair management/training  · Plan of Care Expires: 06/10/22  · Plan of Care Reviewed with: patient    Subjective     Pain/Comfort:  ·      Objective:     Communicated with: RN prior to session.  Patient found supine with   upon OT entry to room.    General Precautions: Standard, fall   Orthopedic Precautions:N/A   Braces:    Respiratory Status: Room air     Occupational Performance:     Bed Mobility:    · Patient completed Supine to Sit with minimum assistance     Functional Mobility/Transfers:  · Patient completed Sit <> Stand Transfer with moderate assistance  with  hand-held assist   · Patient completed Toilet Transfer Stand Pivot technique with moderate assistance with  hand-held assist  · Functional Mobility:     Activities of Daily Living:  · Bathing: modified independence Pt completed Upper/loser body dressing with Min A after set up  · Upper Body Dressing: modified independence Pt donned pullover gown I'ly after setup  · Lower Body Dressing: moderate assistance Pt donned panties over B LEs I'ly, Pt required mod assist to stand up and pull panties up       [facility] 6 Click ADL:      Treatment & Education:  Pt  completed bathing seating in WC at sink. Pt required Assistance to stand up and to transfer to BSC. Pt was I  With toileting hygiene. Pt tolerated all skilled OT services with no complaints.    Patient left up in chair with call button in reachEducation:      GOALS:   Multidisciplinary Problems     Occupational Therapy Goals        Problem: Occupational Therapy    Goal Priority Disciplines Outcome Interventions   Occupational Therapy Goal     OT, PT/OT Ongoing, Progressing    Description: STG: within 2 weeks  Pt will perform grooming with setup  MET  Pt will bathe with setup  ONGOING/PROGRESSING  Pt will perform UE dressing with setup ONGOING/PROGRESSING  Pt will perform LE dressing with setup  ONGOING/PROGRESSING  Pt will sit EOB x 30 min with no assistance  MET  Pt will transfer bed/chair/bsc with sba  ONGOING/PROGRESSING  Pt will perform standing task x 3 min with svn assistance  ONGOING/PROGRESSING  Pt will tolerate 60 minutes of tx without fatigue  ONGOING/PROGRESSING      LTG: within 4 weeks  1.Restore to max I with self care and mobility.    Patient has had setback of UTI and needing AB's, increased fatigue and some mild confusion noted.                      Time Tracking:     OT Date of Treatment: 05/30/22  OT Start Time: 0835  OT Stop Time: 0900  OT Total Time (min): 25 min    Billable Minutes:Self Care/Home Management 25    OT/YONI: OT          5/30/2022

## 2022-05-30 NOTE — PROGRESS NOTES
[facility] Medicine  Progress Note    Patient Name: Michell Dhillon  MRN: [id number]  Patient Class: IP- Swing   Admission Date: 5/13/2022  Length of Stay: 17 days  Attending Physician: Elmer Rainey DO  Primary Care Provider: Daryl Moscoso NP        Subjective:     Principal Problem:S/P carotid endarterectomy        HPI:  Ms Dhillon is a pleasant 77 yr old WF who has a known hx of HTN, T2DM, Hypothyroidism, osteoarthritis, carotid stenosis, dyslipidemia.  She was admitted to [facility] 05/04 with stroke symptoms.  She was found to have left posterior basal ganglia, corona radiata and lucanar CVA.  She had left carotid endarterectomy on 5/09 and was kept at [facility] until today.  She was transferred here for [facility] for OT/PT and medical management.  Ms Dhillon lives at home with her son.  She reports before the admit, she was able to walk and care for herself.  She is found sitting up in chair, eating lunch in NAD.  She denies pain or discomfort. She is alert, oriented and answers all questions appropriately.  Skin is warm and dry with noted bruising to left side of her neck and bilateral upper arms.  She moves all extremities well but generalized weakness.        Overview/Hospital Course:  5/18 Follow up today, she is doing much better since Friday.  Was only able to stand.  Now ambulating 20-30 feet with breaks with walker.  Good mood.  Motivated.     5/20 Yesterday was more lethargic, UA checked and full of bacteria, started on ABX.  She is better, still a little sluggish but more alert and did a little more with therapy yesterday.     5/21 Urine cultured results show resistance to Cipro, will change to Bactrim DS, patient stated that her allergy to Bactrim is itching, will give Benadryl to help relieve itching if that occurs.     5/22; Patient c/o reddened area to left wrist, patient states that it is not itching, will order hydrocortisone cream to be applied  BID.     5/27 Patient laying supine in bed with eyes open in NAD. Daughter at bedside. States she is feeling better today. Did chair exercises per PT today because she said she didn't feel like walking today. Patient did walk 35 steps yesterday. Patient encouraged to participate in full PT regimen. Repeat UA and BMP done. Urine shows no infection. Sodium has increased from 130 yesterday to 135 today. Patient rounding performed with Dr. Aguilar per video.    05/28 Na up to 135, discussed pt status and lab with Dr. Rainey, Will DC IV fluids.     5/30 Feels good, ready to go home.  Will need equipment set up.       Interval History: No major issues, doing well.     Review of Systems   Respiratory:  Negative for shortness of breath.    Cardiovascular:  Negative for chest pain.   Gastrointestinal:  Negative for abdominal pain, nausea and vomiting.   Psychiatric/Behavioral:  Negative for agitation and confusion.    All other systems reviewed and are negative.  Objective:     Vital Signs (Most Recent):  Temp: 98 °F (36.7 °C) (05/30/22 0702)  Pulse: 78 (05/30/22 0702)  Resp: 18 (05/30/22 0702)  BP: (!) 180/90 (05/30/22 0702)  SpO2: 96 % (05/30/22 0702)   Vital Signs (24h Range):  Temp:  [98 °F (36.7 °C)-98.1 °F (36.7 °C)] 98 °F (36.7 °C)  Pulse:  [77-78] 78  Resp:  [18-20] 18  SpO2:  [96 %-97 %] 96 %  BP: (180-181)/(90-92) 180/90     Weight: 99.7 kg (219 lb 12.8 oz)  Body mass index is 35.48 kg/m².    Intake/Output Summary (Last 24 hours) at 5/30/2022 0941  Last data filed at 5/30/2022 0855  Gross per 24 hour   Intake 2010 ml   Output 500 ml   Net 1510 ml      Physical Exam  Vitals reviewed.   Constitutional:       General: She is not in acute distress.     Appearance: Normal appearance.   HENT:      Head: Normocephalic and atraumatic.   Eyes:      General: No scleral icterus.     Extraocular Movements: Extraocular movements intact.      Conjunctiva/sclera: Conjunctivae normal.      Pupils: Pupils are equal, round, and  reactive to light.   Cardiovascular:      Rate and Rhythm: Normal rate and regular rhythm.      Heart sounds: No murmur heard.    No friction rub. No gallop.   Pulmonary:      Effort: Pulmonary effort is normal. No respiratory distress.      Breath sounds: Normal breath sounds. No wheezing or rales.   Abdominal:      General: Abdomen is flat. Bowel sounds are normal. There is no distension.      Palpations: Abdomen is soft.      Tenderness: There is no abdominal tenderness. There is no guarding.   Musculoskeletal:         General: No swelling.   Skin:     General: Skin is warm and dry.      Coloration: Skin is not jaundiced.      Findings: No rash.      Comments: Incision on Left side of neck, dressed, no drainage    Neurological:      General: No focal deficit present.      Mental Status: She is alert and oriented to person, place, and time.      Sensory: No sensory deficit.      Comments: Getting better with ambulation.    Psychiatric:         Mood and Affect: Mood normal.         Behavior: Behavior normal.         Thought Content: Thought content normal.       Significant Labs: All pertinent labs within the past 24 hours have been reviewed.  Recent Lab Results       None            Significant Imaging: I have reviewed all pertinent imaging results/findings within the past 24 hours.      Assessment/Plan:      * S/P carotid endarterectomy  Keep wound clean and dry  F/u as directed      Acute on chronic renal failure  Better now after hydration.  Stopping diuretic.  Will need to monitor at home.       UTI (urinary tract infection)  Started ABX, follow culture      Finger pain, right  ESR 45, Uric acid up.  Start Allopurinol.     Hypothyroidism  Continue home medication      Type 2 diabetes mellitus without complication, without long-term current use of insulin  Monitor lab  Continue metformin        Benign essential HTN  Monitor VS  Losartan/ hctz        VTE Risk Mitigation (From admission, onward)         Ordered      Place RENITA hose  Until discontinued         05/13/22 1213     IP VTE HIGH RISK PATIENT  Once         05/13/22 1213     Place sequential compression device  Until discontinued         05/13/22 1213                Discharge Planning   NIGEL:      Code Status: Full Code   Is the patient medically ready for discharge?: No    Reason for patient still in hospital (select all that apply): Patient trending condition and PT / OT recommendations  Discharge Plan A: Home with family, Home Health   Discharge Delays: None known at this time              Elmer Rainey DO  Department of Hospital Medicine   [facility]

## 2022-05-30 NOTE — PLAN OF CARE
Problem: Adult Inpatient Plan of Care  Goal: Plan of Care Review  Outcome: Ongoing, Progressing  Goal: Patient-Specific Goal (Individualized)  Outcome: Ongoing, Progressing  Goal: Absence of Hospital-Acquired Illness or Injury  Outcome: Ongoing, Progressing  Goal: Optimal Comfort and Wellbeing  Outcome: Ongoing, Progressing  Goal: Readiness for Transition of Care  Outcome: Ongoing, Progressing     Problem: Diabetes Comorbidity  Goal: Blood Glucose Level Within Targeted Range  Outcome: Ongoing, Progressing     Problem: Fall Injury Risk  Goal: Absence of Fall and Fall-Related Injury  Outcome: Ongoing, Progressing     Problem: Skin Injury Risk Increased  Goal: Skin Health and Integrity  Outcome: Ongoing, Progressing     Problem: Impaired Wound Healing  Goal: Optimal Wound Healing  Outcome: Ongoing, Progressing     Problem: Electrolyte Imbalance  Goal: Electrolyte Balance  Outcome: Ongoing, Progressing     Problem: Fluid and Electrolyte Imbalance (Acute Kidney Injury/Impairment)  Goal: Fluid and Electrolyte Balance  Outcome: Ongoing, Progressing     Problem: Oral Intake Inadequate (Acute Kidney Injury/Impairment)  Goal: Optimal Nutrition Intake  Outcome: Ongoing, Progressing     Problem: Renal Function Impairment (Acute Kidney Injury/Impairment)  Goal: Effective Renal Function  Outcome: Ongoing, Progressing

## 2022-05-30 NOTE — NURSING
No further c/o itching to rt. Lower leg at this time, rt.lower leg remains red. Will monitor. Call bell within reach.

## 2022-05-30 NOTE — PT/OT/SLP PROGRESS
Occupational Therapy   Treatment    Name: Michell Dhillon  MRN: [id number]  Admitting Diagnosis:  S/P carotid endarterectomy       Recommendations:     Discharge Recommendations: home with home health, home health OT  Discharge Equipment Recommendations:  grab bar, hip kit, walker, rolling  Barriers to discharge:       Assessment:     Michell Dhillon is a 77 y.o. female with a medical diagnosis of S/P carotid endarterectomy.  She presents with weakness. Performance deficits affecting function are  .     Rehab Prognosis:  Good; patient would benefit from acute skilled OT services to address these deficits and reach maximum level of function.       Plan:     Patient to be seen 5 x/week to address the above listed problems via self-care/home management, therapeutic activities, therapeutic exercises, wheelchair management/training  · Plan of Care Expires: 06/10/22  · Plan of Care Reviewed with: patient    Subjective     Pain/Comfort:  ·      Objective:     Communicated with: RN prior to session.  Patient found up in chair with   upon OT entry to room.    General Precautions: Standard, fall   Orthopedic Precautions:N/A   Braces:    Respiratory Status: Room air     Occupational Performance:     Bed Mobility:    ·      Functional Mobility/Transfers:  ·   · Functional Mobility: Pt ambulated ~ 20 ft using Wc as ambulation device and CGA for safety    Activities of Daily Living:  ·       [facility] 6 Click ADL:      Treatment & Education:  Pt completed 15 min on arm bike, stood at mat and placed pegs in peg board x 5 min, and Fm activity consisting of pulling pennies from green theraputty to increase her strength and endurance for improved ADL performance    Patient left up in chair with call button in reachEducation:      GOALS:   Multidisciplinary Problems     Occupational Therapy Goals        Problem: Occupational Therapy    Goal Priority Disciplines Outcome Interventions   Occupational Therapy Goal     OT, PT/OT Ongoing,  Progressing    Description: STG: within 2 weeks  Pt will perform grooming with setup  MET  Pt will bathe with setup  ONGOING/PROGRESSING  Pt will perform UE dressing with setup ONGOING/PROGRESSING  Pt will perform LE dressing with setup  ONGOING/PROGRESSING  Pt will sit EOB x 30 min with no assistance  MET  Pt will transfer bed/chair/bsc with sba  ONGOING/PROGRESSING  Pt will perform standing task x 3 min with svn assistance  ONGOING/PROGRESSING  Pt will tolerate 60 minutes of tx without fatigue  ONGOING/PROGRESSING      LTG: within 4 weeks  1.Restore to max I with self care and mobility.    Patient has had setback of UTI and needing AB's, increased fatigue and some mild confusion noted.                      Time Tracking:     OT Date of Treatment: 05/30/22  OT Start Time: 1010  OT Stop Time: 1045  OT Total Time (min): 35 min    Billable Minutes:Therapeutic Exercise 35    OT/YONI: OT          5/30/2022

## 2022-05-30 NOTE — PLAN OF CARE
Problem: Adult Inpatient Plan of Care  Goal: Readiness for Transition of Care  Outcome: Ongoing, Progressing   Patient will be able to transfer to bedside commode or wheel chair with out incidents.

## 2022-05-30 NOTE — SUBJECTIVE & OBJECTIVE
Interval History: No major issues, doing well.     Review of Systems   Respiratory:  Negative for shortness of breath.    Cardiovascular:  Negative for chest pain.   Gastrointestinal:  Negative for abdominal pain, nausea and vomiting.   Psychiatric/Behavioral:  Negative for agitation and confusion.    All other systems reviewed and are negative.  Objective:     Vital Signs (Most Recent):  Temp: 98 °F (36.7 °C) (05/30/22 0702)  Pulse: 78 (05/30/22 0702)  Resp: 18 (05/30/22 0702)  BP: (!) 180/90 (05/30/22 0702)  SpO2: 96 % (05/30/22 0702)   Vital Signs (24h Range):  Temp:  [98 °F (36.7 °C)-98.1 °F (36.7 °C)] 98 °F (36.7 °C)  Pulse:  [77-78] 78  Resp:  [18-20] 18  SpO2:  [96 %-97 %] 96 %  BP: (180-181)/(90-92) 180/90     Weight: 99.7 kg (219 lb 12.8 oz)  Body mass index is 35.48 kg/m².    Intake/Output Summary (Last 24 hours) at 5/30/2022 0941  Last data filed at 5/30/2022 0855  Gross per 24 hour   Intake 2010 ml   Output 500 ml   Net 1510 ml      Physical Exam  Vitals reviewed.   Constitutional:       General: She is not in acute distress.     Appearance: Normal appearance.   HENT:      Head: Normocephalic and atraumatic.   Eyes:      General: No scleral icterus.     Extraocular Movements: Extraocular movements intact.      Conjunctiva/sclera: Conjunctivae normal.      Pupils: Pupils are equal, round, and reactive to light.   Cardiovascular:      Rate and Rhythm: Normal rate and regular rhythm.      Heart sounds: No murmur heard.    No friction rub. No gallop.   Pulmonary:      Effort: Pulmonary effort is normal. No respiratory distress.      Breath sounds: Normal breath sounds. No wheezing or rales.   Abdominal:      General: Abdomen is flat. Bowel sounds are normal. There is no distension.      Palpations: Abdomen is soft.      Tenderness: There is no abdominal tenderness. There is no guarding.   Musculoskeletal:         General: No swelling.   Skin:     General: Skin is warm and dry.      Coloration: Skin is not  jaundiced.      Findings: No rash.      Comments: Incision on Left side of neck, dressed, no drainage    Neurological:      General: No focal deficit present.      Mental Status: She is alert and oriented to person, place, and time.      Sensory: No sensory deficit.      Comments: Getting better with ambulation.    Psychiatric:         Mood and Affect: Mood normal.         Behavior: Behavior normal.         Thought Content: Thought content normal.       Significant Labs: All pertinent labs within the past 24 hours have been reviewed.  Recent Lab Results       None            Significant Imaging: I have reviewed all pertinent imaging results/findings within the past 24 hours.

## 2022-05-30 NOTE — NURSING
Patient c/o itching on rt. Lower leg, rt. Lower leg is red, no warmth noted, notified MD, instructed to watch it ,patient and daughter verbalized understanding, benadryl 25mg given po prn, will monitor.

## 2022-05-31 PROCEDURE — 25000003 PHARM REV CODE 250: Performed by: HOSPITALIST

## 2022-05-31 PROCEDURE — 27000958

## 2022-05-31 PROCEDURE — 30200315 PPD INTRADERMAL TEST REV CODE 302

## 2022-05-31 PROCEDURE — 97530 THERAPEUTIC ACTIVITIES: CPT

## 2022-05-31 PROCEDURE — 97535 SELF CARE MNGMENT TRAINING: CPT

## 2022-05-31 PROCEDURE — 97116 GAIT TRAINING THERAPY: CPT

## 2022-05-31 PROCEDURE — 25000003 PHARM REV CODE 250: Performed by: NURSE PRACTITIONER

## 2022-05-31 PROCEDURE — 11000004 HC SNF PRIVATE

## 2022-05-31 PROCEDURE — 97110 THERAPEUTIC EXERCISES: CPT

## 2022-05-31 PROCEDURE — 86580 TB INTRADERMAL TEST: CPT

## 2022-05-31 RX ADMIN — FENOFIBRATE 145 MG: 145 TABLET, FILM COATED ORAL at 08:05

## 2022-05-31 RX ADMIN — LOSARTAN POTASSIUM 100 MG: 100 TABLET, FILM COATED ORAL at 08:05

## 2022-05-31 RX ADMIN — TUBERCULIN PURIFIED PROTEIN DERIVATIVE 5 UNITS: 5 INJECTION, SOLUTION INTRADERMAL at 01:05

## 2022-05-31 RX ADMIN — METFORMIN HYDROCHLORIDE 500 MG: 500 TABLET, FILM COATED ORAL at 08:05

## 2022-05-31 RX ADMIN — ASPIRIN 81 MG 81 MG: 81 TABLET ORAL at 08:05

## 2022-05-31 RX ADMIN — METFORMIN HYDROCHLORIDE 500 MG: 500 TABLET, FILM COATED ORAL at 04:05

## 2022-05-31 RX ADMIN — CLOPIDOGREL 75 MG: 75 TABLET, FILM COATED ORAL at 08:05

## 2022-05-31 RX ADMIN — NEBIVOLOL HYDROCHLORIDE 10 MG: 5 TABLET ORAL at 08:05

## 2022-05-31 RX ADMIN — ALLOPURINOL 100 MG: 100 TABLET ORAL at 08:05

## 2022-05-31 RX ADMIN — ATORVASTATIN CALCIUM 40 MG: 40 TABLET, FILM COATED ORAL at 08:05

## 2022-05-31 RX ADMIN — LEVOTHYROXINE SODIUM 75 MCG: 0.03 TABLET ORAL at 06:05

## 2022-05-31 RX ADMIN — MUPIROCIN: 20 OINTMENT TOPICAL at 01:05

## 2022-05-31 NOTE — PLAN OF CARE
Patient's family requesting transfer to the nursing home. Will discuss with patient and make referral.

## 2022-05-31 NOTE — PLAN OF CARE
Problem: Occupational Therapy  Goal: Occupational Therapy Goal  Description: STG: within 2 weeks  Pt will perform grooming with setup  MET  Pt will bathe with setup  MET  Pt will perform UE dressing with setup MET  Pt will perform LE dressing with setup  MET overall  Pt will sit EOB x 30 min with no assistance  MET  Pt will transfer bed/chair/bsc with sba  MET  Pt will perform standing task x 3 min with svn assistance  MET  Pt will tolerate 60 minutes of tx without fatigue  MET      LTG: within 4 weeks  1.Restore to max I with self care and mobility.    Patient is requesting to go home; she has achieved max gains in this environment and is expected to make a full recovery at home with family svn, though today, OT was informed that family is not seeking nursing home placement possibly.  Recommend home health services and family svn at home dependent upon pt wishes     Outcome: Ongoing, Progressing

## 2022-05-31 NOTE — PLAN OF CARE
Problem: Occupational Therapy  Goal: Occupational Therapy Goal  Description: STG: within 2 weeks  Pt will perform grooming with setup  MET  Pt will bathe with setup  MET  Pt will perform UE dressing with setup MET  Pt will perform LE dressing with setup  MET overall  Pt will sit EOB x 30 min with no assistance  MET  Pt will transfer bed/chair/bsc with sba  MET  Pt will perform standing task x 3 min with svn assistance  MET  Pt will tolerate 60 minutes of tx without fatigue  MET      LTG: within 4 weeks  1.Restore to max I with self care and mobility.    Patient is requesting to go home; she has achieved max gains in this environment and is expected to make a full recovery at home with family svn, though today, OT was informed that family is now seeking nursing home placement possibly.  Recommend home health services and family svn at home dependent upon pt wishes     5/31/2022 1102 by Charisse Pereyra OT  Outcome: Ongoing, Progressing  5/31/2022 1040 by Charisse Pereyra OT  Outcome: Ongoing, Progressing

## 2022-05-31 NOTE — PT/OT/SLP PROGRESS
Occupational Therapy  Treatment    Michell Dhillon   MRN: [id number]   Admitting Diagnosis: S/P carotid endarterectomy    OT Date of Treatment: 05/31/22   OT Start Time: 0755  OT Stop Time: 0900  OT Total Time (min): 65 min    Billable Minutes:  Self Care/Home Management 35 and Therapeutic Activity 30    OT/YONI: OT          General Precautions: Standard, fall  Orthopedic Precautions: N/A (none)  Braces: N/A  Respiratory Status: Room air         Subjective:  Communicated with pt and pt son prior to session.  Though given opportunity, Pt son did not ask any questions or provide any feedback as OT discussed with patient her current abilities and prognosis for d/c to home.  Pain/Comfort  Pain Rating 1: 0/10    Objective:  Patient found with:  (no lines or drains)     Functional Mobility:  Bed Mobility:  Mod I with hospital bed rails for assist       Transfers: bed to wheel chair with SBA         Functional Ambulation: see PT, pt completed w/c propulsion in hallway today needing tutorial regarding ensuring chair propels forward effectively and how to turn left and right more effectively    Activities of Daily Living:     Feeding adaptive equipment: none needed     UE adaptive equipment: none needed     LE adaptive equipment: none needed                    Bathing adaptive equipment: grab bar and shower chair    Balance:   Static Sit: GOOD: Takes MODERATE challenges from all directions  Dynamic Sit: GOOD: Maintains balance through MODERATE excursions of active trunk movement  Static Stand: FAIR+: Takes MINIMAL challenges from all directions  Dynamic stand: FAIR+: Needs CLOSE SUPERVISION during gait and is able to right self with minor LOB    Therapeutic Activities and Exercises:  OT engaged pt in w/c propulsion task in hallway as pt notes she uses wheelchair in home as needed for alternative method of mobility.  In addition, OT had pt complete UBE for endurance training at level 3 resist x 10 min.  Pt completed ADL's as  "noted above with OT this morning; pt and son present for discussion regarding d/c needs and recommendation of home health therapy services and family svn for optimal safety.  Pt son left upon OT intitiation of pt sinkside bathing.  Pt son did not ask any questions or attempt to make clarification of any discussion items with OT this date though opportunity provided.  Later, OT learned family had called to ask for transfer to nursing facility for pt.  Unclear what pt decision will be, but SB coordinator noted she would be discussing with pt today.    AM-PAC 6 CLICK ADL   How much help from another person does this patient currently need?   1 = Unable, Total/Dependent Assistance  2 = A lot, Maximum/Moderate Assistance  3 = A little, Minimum/Contact Guard/Supervision  4 = None, Modified [place]/Independent    Putting on and taking off regular lower body clothing? : 3  Bathing (including washing, rinsing, drying)?: 4  Toileting, which includes using toilet, bedpan, or urinal? : 4  Putting on and taking off regular upper body clothing?: 4  Taking care of personal grooming such as brushing teeth?: 4  Eating meals?: 4  Daily Activity Total Score: 23     AM-PAC Raw Score CMS "G-Code Modifier Level of Impairment Assistance   6 % Total / Unable   7 - 8 CM 80 - 100% Maximal Assist   9-13 CL 60 - 80% Moderate Assist   14 - 19 CK 40 - 60% Moderate Assist   20 - 22 CJ 20 - 40% Minimal Assist   23 CI 1-20% SBA / CGA   24 CH 0% Independent/ Mod I       Patient left up in chair with call button in reach and son present    ASSESSMENT:  Michell Dhillon is a 77 y.o. female with a medical diagnosis of S/P carotid endarterectomy and presents with no new complaints.    Rehab identified problem list/impairments: Rehab identified problem list/impairments: weakness    Rehab potential is excellent.    Activity tolerance: Excellent    Discharge recommendations: Discharge Facility/Level of Care Needs:  (family is seeking transfer " to nursing home per SB coordinator as of this morning 5/31/2022)     Barriers to discharge: Barriers to Discharge: None    Equipment recommendations: grab bar, hip kit, walker, rolling     GOALS:   Multidisciplinary Problems     Occupational Therapy Goals        Problem: Occupational Therapy    Goal Priority Disciplines Outcome Interventions   Occupational Therapy Goal     OT, PT/OT Ongoing, Progressing    Description: STG: within 2 weeks  Pt will perform grooming with setup  MET  Pt will bathe with setup  MET  Pt will perform UE dressing with setup MET  Pt will perform LE dressing with setup  MET overall  Pt will sit EOB x 30 min with no assistance  MET  Pt will transfer bed/chair/bsc with sba  MET  Pt will perform standing task x 3 min with svn assistance  MET  Pt will tolerate 60 minutes of tx without fatigue  MET      LTG: within 4 weeks  1.Restore to max I with self care and mobility.    Patient is requesting to go home; she has achieved max gains in this environment and is expected to make a full recovery at home with family svn, though today, OT was informed that family is now seeking nursing home placement possibly.  Recommend home health services and family svn at home dependent upon pt wishes                      Plan:  Patient to be seen 5 x/week to address the above listed problems via self-care/home management, therapeutic activities, therapeutic exercises, wheelchair management/training  Plan of Care expires: 06/10/22  Plan of Care reviewed with: patient, son         05/31/2022

## 2022-05-31 NOTE — PLAN OF CARE
Problem: Adult Inpatient Plan of Care  Goal: Plan of Care Review  Outcome: Met  Goal: Patient-Specific Goal (Individualized)  Outcome: Met

## 2022-05-31 NOTE — PT/OT/SLP PROGRESS
Physical Therapy Treatment    Patient Name:  Michell Dhillon   MRN:  [id number]    Recommendations:     Discharge Recommendations:  home health PT, home health OT   Discharge Equipment Recommendations: walker, rolling   Barriers to discharge: Decreased caregiver support    Assessment:     Michell Dhillon is a 77 y.o. female admitted with a medical diagnosis of S/P carotid endarterectomy.  She presents with the following impairments/functional limitations:  weakness, impaired endurance, impaired self care skills, gait instability, impaired functional mobilty, impaired balance patient with good improvement in gait distance and stability with walking   .    Rehab Prognosis: Good; patient would benefit from acute skilled PT services to address these deficits and reach maximum level of function.    Recent Surgery: * No surgery found *      Plan:     During this hospitalization, patient to be seen 5 x/week to address the identified rehab impairments via gait training, therapeutic activities, therapeutic exercises and progress toward the following goals:    · Plan of Care Expires:  06/03/22    Subjective     Chief Complaint: weakness  Patient/Family Comments/goals: return home with family  Pain/Comfort:  · Pain Rating 1: 0/10  · Pain Rating Post-Intervention 1: 0/10      Objective:     Communicated with nurse prior to session.  Patient found supine with peripheral IV upon PT entry to room.     General Precautions: Standard, fall   Orthopedic Precautions:N/A   Braces:    Respiratory Status: Room air     Functional Mobility:  · Bed Mobility:     · Supine to Sit: minimum assistance  · Sit to Supine: modified independence  · Transfers:     · Sit to Stand:  contact guard assistance with rolling walker  · Gait: patient ambulated 120 feet x 2 with RW and less verbal cuing needed to stand upright.       AM-PAC 6 CLICK MOBILITY  Turning over in bed (including adjusting bedclothes, sheets and blankets)?: 4  Sitting down on and  standing up from a chair with arms (e.g., wheelchair, bedside commode, etc.): 3  Moving from lying on back to sitting on the side of the bed?: 4  Moving to and from a bed to a chair (including a wheelchair)?: 3  Need to walk in hospital room?: 3  Climbing 3-5 steps with a railing?: 2  Basic Mobility Total Score: 19       Therapeutic Activities and Exercises:   Floor bike x 5 min  AP, LAQ, red band hip abd, red band seated marches, add squeezes, red band hip ext x 30 each     Patient left supine with call button in reach and family present..    GOALS:   Multidisciplinary Problems     Physical Therapy Goals        Problem: Physical Therapy    Goal Priority Disciplines Outcome Goal Variances Interventions   Physical Therapy Goal     PT, PT/OT Ongoing, Progressing     Description: STG:  Patient will perform all bed mobility with CGA  Patient will perform sit to stand and SPT with CGA  Patient will ambulate 50 feet with RW assistive device with CGA assistance without LOB.     LTG  Patient will perform bed mobility with mod I.  Patient will perform sit to stand and SPT with SBA  Patient will ambuate 150 feet with RW assistive device with CGA assistance without LOB.   Patient will perform 10 min of LE exercise without rest break to increase LE strength to 4/5.                      Time Tracking:     PT Received On: 05/31/22  PT Start Time: 1235     PT Stop Time: 1308  PT Total Time (min): 33 min     Billable Minutes: Gait Training 15 and Therapeutic Exercise 18    Treatment Type: Treatment  PT/PTA: PT     PTA Visit Number: 0     05/31/2022

## 2022-06-01 LAB
FLUAV AG UPPER RESP QL IA.RAPID: NEGATIVE
FLUBV AG UPPER RESP QL IA.RAPID: NEGATIVE
SARS-COV+SARS-COV-2 AG RESP QL IA.RAPID: NEGATIVE

## 2022-06-01 PROCEDURE — 97110 THERAPEUTIC EXERCISES: CPT

## 2022-06-01 PROCEDURE — 97530 THERAPEUTIC ACTIVITIES: CPT | Mod: CQ

## 2022-06-01 PROCEDURE — 99307 PR NURSING FAC CARE, SUBSEQ, IMPROVING: ICD-10-PCS | Mod: ,,, | Performed by: HOSPITALIST

## 2022-06-01 PROCEDURE — 87428 SARSCOV & INF VIR A&B AG IA: CPT

## 2022-06-01 PROCEDURE — 25000003 PHARM REV CODE 250: Performed by: NURSE PRACTITIONER

## 2022-06-01 PROCEDURE — 25000003 PHARM REV CODE 250: Performed by: HOSPITALIST

## 2022-06-01 PROCEDURE — 11000004 HC SNF PRIVATE

## 2022-06-01 PROCEDURE — 27000958

## 2022-06-01 PROCEDURE — 92507 TX SP LANG VOICE COMM INDIV: CPT

## 2022-06-01 PROCEDURE — 99307 SBSQ NF CARE SF MDM 10: CPT | Mod: ,,, | Performed by: HOSPITALIST

## 2022-06-01 PROCEDURE — 97530 THERAPEUTIC ACTIVITIES: CPT

## 2022-06-01 PROCEDURE — 97116 GAIT TRAINING THERAPY: CPT | Mod: CQ

## 2022-06-01 RX ORDER — HYDROCORTISONE 1 %
CREAM (GRAM) TOPICAL 2 TIMES DAILY
Status: DISCONTINUED | OUTPATIENT
Start: 2022-06-01 | End: 2022-06-02 | Stop reason: HOSPADM

## 2022-06-01 RX ADMIN — NEBIVOLOL HYDROCHLORIDE 10 MG: 5 TABLET ORAL at 08:06

## 2022-06-01 RX ADMIN — METFORMIN HYDROCHLORIDE 500 MG: 500 TABLET, FILM COATED ORAL at 05:06

## 2022-06-01 RX ADMIN — FENOFIBRATE 145 MG: 145 TABLET, FILM COATED ORAL at 08:06

## 2022-06-01 RX ADMIN — HYDROCORTISONE: 1 CREAM TOPICAL at 11:06

## 2022-06-01 RX ADMIN — ASPIRIN 81 MG 81 MG: 81 TABLET ORAL at 08:06

## 2022-06-01 RX ADMIN — METFORMIN HYDROCHLORIDE 500 MG: 500 TABLET, FILM COATED ORAL at 07:06

## 2022-06-01 RX ADMIN — MUPIROCIN: 20 OINTMENT TOPICAL at 08:06

## 2022-06-01 RX ADMIN — HYDROCORTISONE: 1 CREAM TOPICAL at 09:06

## 2022-06-01 RX ADMIN — LOSARTAN POTASSIUM 100 MG: 100 TABLET, FILM COATED ORAL at 08:06

## 2022-06-01 RX ADMIN — ALLOPURINOL 100 MG: 100 TABLET ORAL at 08:06

## 2022-06-01 RX ADMIN — ATORVASTATIN CALCIUM 40 MG: 40 TABLET, FILM COATED ORAL at 08:06

## 2022-06-01 RX ADMIN — CLOPIDOGREL 75 MG: 75 TABLET, FILM COATED ORAL at 08:06

## 2022-06-01 RX ADMIN — LEVOTHYROXINE SODIUM 75 MCG: 0.03 TABLET ORAL at 06:06

## 2022-06-01 NOTE — PT/OT/SLP PROGRESS
Speech Language Pathology Treatment    Patient Name:  Michell Dhillon   MRN:  [id number]  Admitting Diagnosis: S/P carotid endarterectomy    Recommendations:                 General Recommendations:  Dysphagia therapy and Cognitive-linguistic therapy  Diet recommendations:  Mechanical soft, Liquid Diet Level: Thin (cup edge, no straws)   Aspiration Precautions: 1 bite/sip at a time, No straws, Remain upright 30 minutes post meal and Standard aspiration precautions   General Precautions: Standard, fall, aspiration (see MBS regarding aspiration risk)  Communication strategies:  none    Subjective     Pt alert in room this date.   Patient goals: none stated      Respiratory Status: Room air    Objective:     Has the patient been evaluated by SLP for swallowing?   Yes  Keep patient NPO? No   Current Respiratory Status:        Hands on reasoning task for increased sequential thought, memory, and reasoning with 100% accuracy.  Sorting and problem solving task completed with 97% accuracy and min cues.  Daughter present for treatment with ST goldberg provided regarding continued cognitive tasks for ease of carryover at home.     Assessment:     Michell Dhillon is a 77 y.o. female with an SLP diagnosis of Dysphagia, Dysarthria and Cognitive-Linguistic Impairment.  She presents with difficulty in processing/word finding, recall, and reasoning for safety awareness, mild-moderate dysarthria, and mild- moderate dysphagia with aspiration risk secondary to lack of sensation per MBS report.    Goals:   Multidisciplinary Problems     SLP Goals        Problem: SLP    Goal Priority Disciplines Outcome   SLP Goal     SLP Ongoing, Progressing   Description: Patient will complete hard glottal attacks and tongue base retractions exercises with mod-max accuracy Independently. MET  Patient will complete laryngeal elevation exercises with mod-max accuracy Independently. MET  Patient will complete verbal reasoning tasks with 80% accuracy  Independently. progressing  Patient will complete short term memory tasks with 90% accuracy Independently. progressing                         Plan:     · Patient to be seen: 3x/week  · Plan of Care expires:  06/10/22  · Plan of Care reviewed with:  patient   · SLP Follow-Up:  Yes       Discharge recommendations:  home with home health   Barriers to Discharge:  Level of Skilled Assistance Needed currently    Time Tracking:     SLP Treatment Date:   05/25/22  Speech Start Time:  1325  Speech Stop Time: 1350  Speech Total Time (min):  25  Billable Minutes   Speech Language treatment 25 06/01/2022

## 2022-06-01 NOTE — PT/OT/SLP PROGRESS
"Occupational Therapy   Treatment    Name: Michell Dhillon  MRN: [id number]  Admitting Diagnosis:  S/P carotid endarterectomy       Recommendations:     Discharge Recommendations:  (family is seeking transfer to nursing home per SB coordinator as of this morning 5/31/2022)  Discharge Equipment Recommendations:  grab bar, hip kit, walker, rolling  Barriers to discharge:  None    Assessment:     Michell Dhillon is a 77 y.o. female with a medical diagnosis of S/P carotid endarterectomy.  She presents with reports of feeling much better than she did last week. Performance deficits affecting function are weakness.     Rehab Prognosis:  Good; patient would benefit from continued skilled OT services to address these deficits and reach maximum level of function in home environment with home health therapy in place; family had begun nursing home placement consult earlier this week, though it is unclear why they decided on this direction of d/c planning.   Pt daughter approached OT for discussion today regarding d/c placement and recommendations.  OT recommends trial at home with svn for next few weeks to try home health therapy and to see how pt completes home care/mgmt skills with safety and good cognitive awareness.  Daughter states, "I don't want her to go live in the nursing home; I'd rather quit my job than have her do that.  I just thought she could go over there for more therapy because my brother said they had the best therapy and could get her better."  OT supplied daughter with information regarding pt current skills and abilities including gait distance with PT in the past couple days.  Daughter relates at end of conversation, "I'll be out of school in 2 weeks, so I can be there with her.  I'll talk to her about going home, then I'll let the SB coordinator know what we plan to do."  OT also educated daughter that we will order any needed AE to go home with pt prior to d/c as well as setup home health consult.  " Daughter appeared to understand well and ready to make decision as a family with pt with new information provided.    Plan:     Patient to be seen 5 x/week to address the above listed problems via self-care/home management, therapeutic activities, therapeutic exercises, wheelchair management/training  · Plan of Care Expires: 06/10/22  · Plan of Care Reviewed with: patient, son    Subjective     Pain/Comfort:  ·  mild back pain from sitting up in w/c    Objective:     Communicated with: pt prior to session; pt daughter post session.  Patient found up in chair with  (no lines or drains) upon OT entry to room; her son lying on sofa couch.    General Precautions: Standard, fall   Orthopedic Precautions:N/A (none)   Braces:    Respiratory Status: Room air     Occupational Performance:     Bed Mobility:    · Mod i    Functional Mobility/Transfers:  · Pt completing SBA transfers at this time as well as propelling w/c in hallway with SBA, needing cueing for optimal efficiency at times    Activities of Daily Living:  · Overall SBA with sinkside bathing and dressing as well as grooming/hygiene      [facility] 6 Click ADL:      Treatment & Education:  OT engaged pt in standing task today to assess standing tolerance with tabletop task.  Pt able to stand over 6.5 minutes without complaints to complete task in simulation of kitchen or home prep task at tabletop.  Additionally, pt completed UBE x 15 min for endurance training with level 3 resist.  OT assisted pt to hallway where pt was instructed to begin w/c propulsion to get back to her room.  Pt able to propel w/c with sba and cues as needed for optimal effectiveness through doorway.  OT recommending pt have svn at home for optimal safety.  Feel with modification of home and environment, pt should be able to safely remain in home rather than permanently living in a LTC facility.  Pt remains fairly high level functioning with self care and simple home mgmt tasks with SBA in  home.    Patient left up in chair with call button in reach and son presentEducation:      GOALS:   Multidisciplinary Problems     Occupational Therapy Goals        Problem: Occupational Therapy    Goal Priority Disciplines Outcome Interventions   Occupational Therapy Goal     OT, PT/OT Ongoing, Progressing    Description: STG: within 2 weeks  Pt will perform grooming with setup  MET  Pt will bathe with setup  MET  Pt will perform UE dressing with setup MET  Pt will perform LE dressing with setup  MET overall  Pt will sit EOB x 30 min with no assistance  MET  Pt will transfer bed/chair/bsc with sba  MET  Pt will perform standing task x 3 min with svn assistance  MET  Pt will tolerate 60 minutes of tx without fatigue  MET      LTG: within 4 weeks  1.Restore to max I with self care and mobility.    Patient is requesting to go home; she has achieved max gains in this environment and is expected to make a full recovery at home with family svn, though today, OT was informed that family is now seeking nursing home placement possibly.  Recommend home health services and family svn at home dependent upon pt wishes                      Time Tracking:     OT Date of Treatment: 06/01/22  OT Start Time: 1045  OT Stop Time: 1123  OT Total Time (min): 38 min    Billable Minutes:Therapeutic Activity 23  Therapeutic Exercise 15    OT/YONI: OT          6/1/2022

## 2022-06-01 NOTE — CARE UPDATE
Referral faxed to [facility] in [place]. Lashanda received information, will call back with update

## 2022-06-01 NOTE — PT/OT/SLP PROGRESS
Physical Therapy Treatment    Patient Name:  Michell Dhillon   MRN:  [id number]    Recommendations:     Discharge Recommendations:  home health PT, home health OT   Discharge Equipment Recommendations: walker, rolling   Barriers to discharge: Decreased caregiver support    Assessment:     Michell Dhillon is a 77 y.o. female admitted with a medical diagnosis of S/P carotid endarterectomy.  She presents with the following impairments/functional limitations:  weakness, impaired endurance, impaired self care skills, gait instability, impaired functional mobilty, impaired balance Patient continues to have improved gait quality and stability, only requiring occasional cues for upright posture. Patient also required less assistance during transfers this treatment session compared to last week. Patient required occasional rest breaks due to fatigue. Patient with no reports of adverse effects after completion of treatment session.      Rehab Prognosis: Good; patient would benefit from acute skilled PT services to address these deficits and reach maximum level of function.    Recent Surgery: * No surgery found *      Plan:     During this hospitalization, patient to be seen 5 x/week to address the identified rehab impairments via gait training, therapeutic activities, therapeutic exercises and progress toward the following goals:    · Plan of Care Expires:  06/03/22    Subjective     Chief Complaint: Patient with no complaints this treatment session.   Patient/Family Comments/goals: return home with family  Pain/Comfort:  Pain Rating 1: 0/10      Objective:     Patient found supine, and was pleasant and agreeable to PT treatment session.     General Precautions: Standard, fall   Orthopedic Precautions:N/A   Braces: N/A  Respiratory Status: Room air     Functional Mobility:  · Bed Mobility:     · Rolling Left:  modified independence  · Supine to Sit: stand by assistance and contact guard assistance  · Transfers:     · Sit to  Stand:  minimum assistance with rolling walker  · Gait: Patient ambulated approximately 115 feet with RW with Min A / CGA, with patient's daughter following with wheelchair.        AM-PAC 6 CLICK MOBILITY  Turning over in bed (including adjusting bedclothes, sheets and blankets)?: 4  Sitting down on and standing up from a chair with arms (e.g., wheelchair, bedside commode, etc.): 3  Moving from lying on back to sitting on the side of the bed?: 4  Moving to and from a bed to a chair (including a wheelchair)?: 3  Need to walk in hospital room?: 3  Climbing 3-5 steps with a railing?: 2  Basic Mobility Total Score: 19       Therapeutic Activities and Exercises:  Seated heel raises, LAQ, red band hip abd, seated marches, red band HS curls x 20 each     Standing marching 10 x 2 each, Sit to stand 5 x 2, Dynamic standing balance performed prior to toileting.     Patient left on bedside commode with daughter and Shavonne, CNA  present     GOALS:   Multidisciplinary Problems     Physical Therapy Goals        Problem: Physical Therapy    Goal Priority Disciplines Outcome Goal Variances Interventions   Physical Therapy Goal     PT, PT/OT Ongoing, Progressing     Description: STG:  Patient will perform all bed mobility with CGA  Patient will perform sit to stand and SPT with CGA  Patient will ambulate 50 feet with RW assistive device with CGA assistance without LOB.     LTG  Patient will perform bed mobility with mod I.  Patient will perform sit to stand and SPT with SBA  Patient will ambuate 150 feet with RW assistive device with CGA assistance without LOB.   Patient will perform 10 min of LE exercise without rest break to increase LE strength to 4/5.                      Time Tracking:     PT Received On: 06/01/22  PT Start Time: 1247     PT Stop Time: 1322  PT Total Time (min): 35 min     Billable Minutes: Gait Training 10, Therapeutic Activity 20 and Therapeutic Exercise 5    Treatment Type: Treatment  PT/PTA: PTA     PTA Visit  Number: 1     IZABEL Balderas   06/01/2022

## 2022-06-01 NOTE — SUBJECTIVE & OBJECTIVE
Interval History: Doing well, now looking at NH placement.     Review of Systems   Respiratory:  Negative for shortness of breath.    Cardiovascular:  Negative for chest pain.   Gastrointestinal:  Negative for abdominal pain, nausea and vomiting.   Psychiatric/Behavioral:  Negative for agitation and confusion.    All other systems reviewed and are negative.  Objective:     Vital Signs (Most Recent):  Temp: 97.6 °F (36.4 °C) (06/01/22 0702)  Pulse: 64 (06/01/22 0702)  Resp: 18 (06/01/22 0702)  BP: (!) 137/54 (06/01/22 0702)  SpO2: 96 % (06/01/22 0702)   Vital Signs (24h Range):  Temp:  [97.5 °F (36.4 °C)-97.6 °F (36.4 °C)] 97.6 °F (36.4 °C)  Pulse:  [64-85] 64  Resp:  [18-20] 18  SpO2:  [96 %-98 %] 96 %  BP: (137-160)/(54-88) 137/54     Weight: 99.7 kg (219 lb 12.8 oz)  Body mass index is 35.48 kg/m².    Intake/Output Summary (Last 24 hours) at 6/1/2022 0952  Last data filed at 6/1/2022 0904  Gross per 24 hour   Intake 720 ml   Output 1350 ml   Net -630 ml      Physical Exam  Vitals reviewed.   Constitutional:       General: She is not in acute distress.     Appearance: Normal appearance.   HENT:      Head: Normocephalic and atraumatic.   Eyes:      General: No scleral icterus.     Extraocular Movements: Extraocular movements intact.      Conjunctiva/sclera: Conjunctivae normal.      Pupils: Pupils are equal, round, and reactive to light.   Cardiovascular:      Rate and Rhythm: Normal rate and regular rhythm.      Heart sounds: No murmur heard.    No friction rub. No gallop.   Pulmonary:      Effort: Pulmonary effort is normal. No respiratory distress.      Breath sounds: Normal breath sounds. No wheezing or rales.   Abdominal:      General: Abdomen is flat. Bowel sounds are normal. There is no distension.      Palpations: Abdomen is soft.      Tenderness: There is no abdominal tenderness. There is no guarding.   Musculoskeletal:         General: No swelling.   Skin:     General: Skin is warm and dry.       Coloration: Skin is not jaundiced.      Findings: No rash.      Comments: Incision on Left side of neck, dressed, no drainage    Neurological:      General: No focal deficit present.      Mental Status: She is alert and oriented to person, place, and time.      Sensory: No sensory deficit.      Comments: Getting better with ambulation.    Psychiatric:         Mood and Affect: Mood normal.         Behavior: Behavior normal.         Thought Content: Thought content normal.       Significant Labs: All pertinent labs within the past 24 hours have been reviewed.  Recent Lab Results       None            Significant Imaging: I have reviewed all pertinent imaging results/findings within the past 24 hours.

## 2022-06-01 NOTE — PROGRESS NOTES
[facility] Medicine  Progress Note    Patient Name: Michell Dhillon  MRN: [id number]  Patient Class: IP- Swing   Admission Date: 5/13/2022  Length of Stay: 19 days  Attending Physician: Elmer Rainey DO  Primary Care Provider: Daryl Moscoso NP        Subjective:     Principal Problem:S/P carotid endarterectomy        HPI:  Ms Dhillon is a pleasant 77 yr old WF who has a known hx of HTN, T2DM, Hypothyroidism, osteoarthritis, carotid stenosis, dyslipidemia.  She was admitted to [facility] 05/04 with stroke symptoms.  She was found to have left posterior basal ganglia, corona radiata and lucanar CVA.  She had left carotid endarterectomy on 5/09 and was kept at [facility] until today.  She was transferred here for [facility] for OT/PT and medical management.  Ms Dhillon lives at home with her son.  She reports before the admit, she was able to walk and care for herself.  She is found sitting up in chair, eating lunch in NAD.  She denies pain or discomfort. She is alert, oriented and answers all questions appropriately.  Skin is warm and dry with noted bruising to left side of her neck and bilateral upper arms.  She moves all extremities well but generalized weakness.        Overview/Hospital Course:  5/18 Follow up today, she is doing much better since Friday.  Was only able to stand.  Now ambulating 20-30 feet with breaks with walker.  Good mood.  Motivated.     5/20 Yesterday was more lethargic, UA checked and full of bacteria, started on ABX.  She is better, still a little sluggish but more alert and did a little more with therapy yesterday.     5/21 Urine cultured results show resistance to Cipro, will change to Bactrim DS, patient stated that her allergy to Bactrim is itching, will give Benadryl to help relieve itching if that occurs.     5/22; Patient c/o reddened area to left wrist, patient states that it is not itching, will order hydrocortisone cream to be applied  Physical Therapy Daily Treatment    Visit Count: 4/25                           Plan of Care: 5/9/2019 Through: 8/1/2019  Insurance Information: Medical necessity, auth after 25  Referred by: Piter Banks,*; Next provider visit (if known/scheduled): 7/15/19  Medical Diagnosis (from order):  337.9 (ICD-9-CM) - G90.1 (ICD-10-CM) - Dysautonomia (CMS/HCC)  Diagnosis Precautions: syncope, orthostatic hypotension, on beta blockers    SUBJECTIVE   States she has been doing alright overall, has the most pain right away in the morning. She states she hasn't noticed any \"shut down\" episodes in the last week, but did have to go to the ED due to pain. She states she has come up with an exercise plan for her okay and bad days.    Patient about 10 minutes late to session.   Current Pain (0-10 scale): 2  Functional Change: none new    OBJECTIVE       Treatment   Therapeutic Exercise: all performed in submaximal WB environment   Forward walking  L10  Side stepping L10  Backwards walking L10  kickboard kicking L10 back to front   Squat holds with resisted arm flexion pulls Right/Left   Split stance holds with resisted HADD  Noodle planks  Forward step ups Right/Left   Run back to front L10   Walk back to front L20      Skilled input: verbal instruction/cues, tactile instruction/cues, as detailed above    Home Program:   Exercise: Date issued Date DC Comments   double leg bridges, supine marching, supine pull apart 5/14/19                               Writer verbally educated the patient and received verbal consent from the patient on hand placement, positioning of patient, and techniques to be performed today including enter/exit pool independently with ladder as described above and how they are pertinent to the patient's plan of care.      Suggestions for next session as indicated: progress per plan of care, LE strengthening, cardiovascular training program    ASSESSMENT   Patient did well with all interventions today.  BID.     5/27 Patient laying supine in bed with eyes open in NAD. Daughter at bedside. States she is feeling better today. Did chair exercises per PT today because she said she didn't feel like walking today. Patient did walk 35 steps yesterday. Patient encouraged to participate in full PT regimen. Repeat UA and BMP done. Urine shows no infection. Sodium has increased from 130 yesterday to 135 today. Patient rounding performed with Dr. Aguilar per video.    05/28 Na up to 135, discussed pt status and lab with Dr. Rainey, Will DC IV fluids.     5/30 Feels good, ready to go home.  Will need equipment set up.     6/1 Plan was to go home but now family wants NH placement.       Interval History: Doing well, now looking at NH placement.     Review of Systems   Respiratory:  Negative for shortness of breath.    Cardiovascular:  Negative for chest pain.   Gastrointestinal:  Negative for abdominal pain, nausea and vomiting.   Psychiatric/Behavioral:  Negative for agitation and confusion.    All other systems reviewed and are negative.  Objective:     Vital Signs (Most Recent):  Temp: 97.6 °F (36.4 °C) (06/01/22 0702)  Pulse: 64 (06/01/22 0702)  Resp: 18 (06/01/22 0702)  BP: (!) 137/54 (06/01/22 0702)  SpO2: 96 % (06/01/22 0702)   Vital Signs (24h Range):  Temp:  [97.5 °F (36.4 °C)-97.6 °F (36.4 °C)] 97.6 °F (36.4 °C)  Pulse:  [64-85] 64  Resp:  [18-20] 18  SpO2:  [96 %-98 %] 96 %  BP: (137-160)/(54-88) 137/54     Weight: 99.7 kg (219 lb 12.8 oz)  Body mass index is 35.48 kg/m².    Intake/Output Summary (Last 24 hours) at 6/1/2022 0952  Last data filed at 6/1/2022 0904  Gross per 24 hour   Intake 720 ml   Output 1350 ml   Net -630 ml      Physical Exam  Vitals reviewed.   Constitutional:       General: She is not in acute distress.     Appearance: Normal appearance.   HENT:      Head: Normocephalic and atraumatic.   Eyes:      General: No scleral icterus.     Extraocular Movements: Extraocular movements intact.       She was very challenged with core control during resisted arm movements and with attempting planks, though part of her challenge may have been due to focus vs weakness.   Pain after treatment (patient reported, 0-10 scale): NR  Result of above outlined education: Verbalizes understanding and Demonstrates understanding    THERAPY DAILY BILLING   Insurance: [facility] [facility] [employer]. N/A    Evaluation Procedures:  No evaluation codes were used on this date of service    Timed Procedures:  Therapeutic Exercise, 35 minutes    Untimed Procedures:  No untimed codes were used on this date of service    Total Treatment Time: 35 minutes   Conjunctiva/sclera: Conjunctivae normal.      Pupils: Pupils are equal, round, and reactive to light.   Cardiovascular:      Rate and Rhythm: Normal rate and regular rhythm.      Heart sounds: No murmur heard.    No friction rub. No gallop.   Pulmonary:      Effort: Pulmonary effort is normal. No respiratory distress.      Breath sounds: Normal breath sounds. No wheezing or rales.   Abdominal:      General: Abdomen is flat. Bowel sounds are normal. There is no distension.      Palpations: Abdomen is soft.      Tenderness: There is no abdominal tenderness. There is no guarding.   Musculoskeletal:         General: No swelling.   Skin:     General: Skin is warm and dry.      Coloration: Skin is not jaundiced.      Findings: No rash.      Comments: Incision on Left side of neck, dressed, no drainage    Neurological:      General: No focal deficit present.      Mental Status: She is alert and oriented to person, place, and time.      Sensory: No sensory deficit.      Comments: Getting better with ambulation.    Psychiatric:         Mood and Affect: Mood normal.         Behavior: Behavior normal.         Thought Content: Thought content normal.       Significant Labs: All pertinent labs within the past 24 hours have been reviewed.  Recent Lab Results       None            Significant Imaging: I have reviewed all pertinent imaging results/findings within the past 24 hours.      Assessment/Plan:      * S/P carotid endarterectomy  Keep wound clean and dry  F/u as directed      Acute on chronic renal failure  Better now after hydration.  Stopping diuretic.  Will need to monitor at home.       UTI (urinary tract infection)  Started ABX, treated according to culture.       Finger pain, right  ESR 45, Uric acid up.  Start Allopurinol.     Hypothyroidism  Continue home medication      Type 2 diabetes mellitus without complication, without long-term current use of insulin  Monitor lab  Continue metformin        Benign essential  HTN  Monitor VS  Losartan.  Stopped HCTZ due to dehydration.       VTE Risk Mitigation (From admission, onward)         Ordered     Place RENITA hose  Until discontinued         05/13/22 1213     IP VTE HIGH RISK PATIENT  Once         05/13/22 1213     Place sequential compression device  Until discontinued         05/13/22 1213                Discharge Planning   NIGEL:      Code Status: Full Code   Is the patient medically ready for discharge?: No    Reason for patient still in hospital (select all that apply): PT / OT recommendations and Pending disposition  Discharge Plan A: Home with family, Home Health   Discharge Delays: None known at this time              Elmer Rainey DO  Department of Hospital Medicine   [facility] Surgical [facility]

## 2022-06-02 VITALS
SYSTOLIC BLOOD PRESSURE: 127 MMHG | DIASTOLIC BLOOD PRESSURE: 49 MMHG | RESPIRATION RATE: 20 BRPM | OXYGEN SATURATION: 96 % | HEART RATE: 62 BPM | WEIGHT: 219.81 LBS | TEMPERATURE: 98 F | HEIGHT: 66 IN | BODY MASS INDEX: 35.32 KG/M2

## 2022-06-02 PROBLEM — M79.644 FINGER PAIN, RIGHT: Status: RESOLVED | Noted: 2022-05-16 | Resolved: 2022-06-02

## 2022-06-02 PROBLEM — N18.9 ACUTE ON CHRONIC RENAL FAILURE: Status: RESOLVED | Noted: 2022-05-30 | Resolved: 2022-06-02

## 2022-06-02 PROBLEM — Z98.890 S/P CAROTID ENDARTERECTOMY: Status: RESOLVED | Noted: 2022-05-13 | Resolved: 2022-06-02

## 2022-06-02 PROBLEM — N17.9 ACUTE ON CHRONIC RENAL FAILURE: Status: RESOLVED | Noted: 2022-05-30 | Resolved: 2022-06-02

## 2022-06-02 PROBLEM — N39.0 UTI (URINARY TRACT INFECTION): Status: RESOLVED | Noted: 2022-05-20 | Resolved: 2022-06-02

## 2022-06-02 PROCEDURE — 97110 THERAPEUTIC EXERCISES: CPT

## 2022-06-02 PROCEDURE — 99316 PR NURSING FAC DISCHRGE DAY,MORE 30 MIN: ICD-10-PCS | Mod: ,,, | Performed by: HOSPITALIST

## 2022-06-02 PROCEDURE — 25000003 PHARM REV CODE 250: Performed by: NURSE PRACTITIONER

## 2022-06-02 PROCEDURE — 99316 NF DSCHRG MGMT 30 MIN+: CPT | Mod: ,,, | Performed by: HOSPITALIST

## 2022-06-02 PROCEDURE — 97530 THERAPEUTIC ACTIVITIES: CPT

## 2022-06-02 PROCEDURE — 25000003 PHARM REV CODE 250: Performed by: HOSPITALIST

## 2022-06-02 PROCEDURE — 97530 THERAPEUTIC ACTIVITIES: CPT | Mod: CQ

## 2022-06-02 PROCEDURE — 27000958

## 2022-06-02 PROCEDURE — 97116 GAIT TRAINING THERAPY: CPT | Mod: CQ

## 2022-06-02 RX ORDER — CHLORPROMAZINE HYDROCHLORIDE 25 MG/1
25 TABLET, FILM COATED ORAL DAILY PRN
Qty: 30 TABLET | Refills: 1 | Status: SHIPPED | OUTPATIENT
Start: 2022-06-02 | End: 2023-06-02

## 2022-06-02 RX ORDER — ALLOPURINOL 100 MG/1
100 TABLET ORAL DAILY
Qty: 30 TABLET | Refills: 1 | Status: SHIPPED | OUTPATIENT
Start: 2022-06-03 | End: 2023-08-08 | Stop reason: CLARIF

## 2022-06-02 RX ORDER — OLMESARTAN MEDOXOMIL 40 MG/1
40 TABLET ORAL DAILY
Qty: 90 TABLET | Refills: 3 | Status: SHIPPED | OUTPATIENT
Start: 2022-06-02 | End: 2023-06-02

## 2022-06-02 RX ADMIN — ALLOPURINOL 100 MG: 100 TABLET ORAL at 08:06

## 2022-06-02 RX ADMIN — LOSARTAN POTASSIUM 100 MG: 100 TABLET, FILM COATED ORAL at 08:06

## 2022-06-02 RX ADMIN — CLOPIDOGREL 75 MG: 75 TABLET, FILM COATED ORAL at 08:06

## 2022-06-02 RX ADMIN — LEVOTHYROXINE SODIUM 75 MCG: 0.03 TABLET ORAL at 06:06

## 2022-06-02 RX ADMIN — HYDROCORTISONE: 1 CREAM TOPICAL at 08:06

## 2022-06-02 RX ADMIN — ASPIRIN 81 MG 81 MG: 81 TABLET ORAL at 08:06

## 2022-06-02 RX ADMIN — METFORMIN HYDROCHLORIDE 500 MG: 500 TABLET, FILM COATED ORAL at 08:06

## 2022-06-02 RX ADMIN — MUPIROCIN: 20 OINTMENT TOPICAL at 08:06

## 2022-06-02 RX ADMIN — FENOFIBRATE 145 MG: 145 TABLET, FILM COATED ORAL at 08:06

## 2022-06-02 RX ADMIN — NEBIVOLOL HYDROCHLORIDE 10 MG: 5 TABLET ORAL at 08:06

## 2022-06-02 RX ADMIN — ATORVASTATIN CALCIUM 40 MG: 40 TABLET, FILM COATED ORAL at 08:06

## 2022-06-02 NOTE — DISCHARGE SUMMARY
[facility] Surgical Unit  Hospital Medicine  Discharge Summary      Patient Name: Michell Dhillon  MRN: [id number]  Patient Class: IP- Swing  Admission Date: 5/13/2022  Hospital Length of Stay: 20 days  Discharge Date and Time:  06/02/2022 10:57 AM  Attending Physician: Elmer Rainey DO   Discharging Provider: Elmer Rainey DO  Primary Care Provider: Daryl Moscoso NP      HPI:   Ms Dhillon is a pleasant 77 yr old WF who has a known hx of HTN, T2DM, Hypothyroidism, osteoarthritis, carotid stenosis, dyslipidemia.  She was admitted to [facility] 05/04 with stroke symptoms.  She was found to have left posterior basal ganglia, corona radiata and lucanar CVA.  She had left carotid endarterectomy on 5/09 and was kept at [facility] until today.  She was transferred here for [facility] for OT/PT and medical management.  Ms Dhillon lives at home with her son.  She reports before the admit, she was able to walk and care for herself.  She is found sitting up in chair, eating lunch in NAD.  She denies pain or discomfort. She is alert, oriented and answers all questions appropriately.  Skin is warm and dry with noted bruising to left side of her neck and bilateral upper arms.  She moves all extremities well but generalized weakness.        * No surgery found *      Hospital Course:   5/18 Follow up today, she is doing much better since Friday.  Was only able to stand.  Now ambulating 20-30 feet with breaks with walker.  Good mood.  Motivated.     5/20 Yesterday was more lethargic, UA checked and full of bacteria, started on ABX.  She is better, still a little sluggish but more alert and did a little more with therapy yesterday.     5/21 Urine cultured results show resistance to Cipro, will change to Bactrim DS, patient stated that her allergy to Bactrim is itching, will give Benadryl to help relieve itching if that occurs.     5/22; Patient c/o reddened area to left wrist, patient states that it is not  itching, will order hydrocortisone cream to be applied BID.     5/27 Patient laying supine in bed with eyes open in NAD. Daughter at bedside. States she is feeling better today. Did chair exercises per PT today because she said she didn't feel like walking today. Patient did walk 35 steps yesterday. Patient encouraged to participate in full PT regimen. Repeat UA and BMP done. Urine shows no infection. Sodium has increased from 130 yesterday to 135 today. Patient rounding performed with Dr. Aguilar per video.    05/28 Na up to 135, discussed pt status and lab with Dr. Rainey, Will DC IV fluids.     5/30 Feels good, ready to go home.  Will need equipment set up.     6/1 Plan was to go home but now family wants NH placement.     Now patient's family decided to take her home.  Will DC home.  Follow up with PCP and specialists.  Stopped HCTZ due to dehydration.        Goals of Care Treatment Preferences:  Code Status: Full Code      Consults:   Consults (From admission, onward)        Status Ordering Provider     Inpatient consult to Registered Dietitian/Nutritionist  Once        Provider:  (Not yet assigned)    Completed EFRAIN LAMBERT          No new Assessment & Plan notes have been filed under this hospital service since the last note was generated.  Service: Hospital Medicine    Final Active Diagnoses:    Diagnosis Date Noted POA    Hypothyroidism [E03.9] 05/13/2022 Yes     Chronic    Benign essential HTN [I10] 01/10/2022 Yes    Type 2 diabetes mellitus without complication, without long-term current use of insulin [E11.9] 01/10/2022 Yes      Problems Resolved During this Admission:    Diagnosis Date Noted Date Resolved POA    PRINCIPAL PROBLEM:  S/P carotid endarterectomy [Z98.890] 05/13/2022 06/02/2022 Not Applicable    Acute on chronic renal failure [N17.9, N18.9] 05/30/2022 06/02/2022 No    UTI (urinary tract infection) [N39.0] 05/20/2022 06/02/2022 Yes    Finger pain, right [M79.644] 05/16/2022  06/02/2022 Yes       Discharged Condition: good    Disposition: Home-Health Care [facility]    Follow Up:   Follow-up Information     Daryl Moscoso NP. Go on 6/9/2022.    Specialty: Family Medicine  Why: appointment time is at 1:00 pm.  Contact information:  [address] [place] [address]  [phone]             Kailee Ames MD. Go on 6/7/2022.    Specialty: Vascular Surgery  Why: appointment time is at 10:30.  Contact information:  [address] [place] [address]  [address]  [place] [address]  [phone]             Gabi Rodas MD Follow up on 6/7/2022.    Specialty: Neurology  Why: appointment time is at 1:00 pm.  [address]  Contact information:  [address] [facility]  [place] [address]  [phone]             [facility] Follow up.    Specialty: Home Health Services  Contact information:  [address]  [place] [address]  [phone]             Daryl Moscoso NP Follow up.    Specialty: Family Medicine  Why: as scheduled  Contact information:  [address] [place] [address]  [phone]                       Patient Instructions:   No discharge procedures on file.    Significant Diagnostic Studies: Labs: BMP: No results for input(s): GLU, NA, K, CL, CO2, BUN, CREATININE, CALCIUM, MG in the last 48 hours.    Pending Diagnostic Studies:     None         Medications:  Reconciled Home Medications:      Medication List      START taking these medications    allopurinoL 100 MG tablet  Commonly known as: ZYLOPRIM  Take 1 tablet (100 mg total) by mouth once daily.  Start taking on: Marily 3, 2022     olmesartan 40 MG tablet  Commonly known as: BENICAR  Take 1 tablet (40 mg total) by mouth once daily.        CHANGE how you take these medications    chlorproMAZINE 25 MG tablet  Commonly known as: THORAZINE  Take 1 tablet (25 mg total) by mouth daily as needed (anxiety).  What changed:   · when to take this  · reasons to take this        CONTINUE taking these medications    aspirin 81 MG Chew  Take 81 mg by mouth  once daily.     atorvastatin 40 MG tablet  Commonly known as: LIPITOR  TAKE 1 TABLET ONCE DAILY FOR CHOLESTEROL.....     clopidogreL 75 mg tablet  Commonly known as: PLAVIX  Take 1 tablet (75 mg total) by mouth once daily.     fenofibrate 145 MG tablet  Commonly known as: TRICOR  Take 1 tablet (145 mg total) by mouth once daily.     levothyroxine 75 MCG tablet  Commonly known as: SYNTHROID  Take 1 tablet (75 mcg total) by mouth before breakfast.     metFORMIN 500 MG tablet  Commonly known as: GLUCOPHAGE  Take 1 tablet (500 mg total) by mouth 2 (two) times daily with meals.     nebivoloL 10 MG Tab  Commonly known as: BYSTOLIC  Take 1 tablet (10 mg total) by mouth once daily.        STOP taking these medications    azithromycin 250 MG tablet  Commonly known as: Z-MACK     cephALEXin 500 MG capsule  Commonly known as: KEFLEX     cetirizine 10 MG tablet  Commonly known as: ZYRTEC     nystatin 100,000 unit/mL suspension  Commonly known as: MYCOSTATIN     olmesartan-hydrochlorothiazide 40-25 mg per tablet  Commonly known as: BENICAR HCT     valACYclovir 1000 MG tablet  Commonly known as: VALTREX            Indwelling Lines/Drains at time of discharge:   Lines/Drains/Airways     None                 Time spent on the discharge of patient: 35 minutes         Elmer Rainey DO  Department of Hospital Medicine  [facility] Medical Surgical Unit

## 2022-06-02 NOTE — PLAN OF CARE
Problem: Adult Inpatient Plan of Care  Goal: Absence of Hospital-Acquired Illness or Injury  Outcome: Adequate for Care Transition  Goal: Optimal Comfort and Wellbeing  Outcome: Adequate for Care Transition  Goal: Readiness for Transition of Care  Outcome: Adequate for Care Transition     Problem: Diabetes Comorbidity  Goal: Blood Glucose Level Within Targeted Range  Outcome: Adequate for Care Transition     Problem: Diabetes Comorbidity  Goal: Blood Glucose Level Within Targeted Range  Outcome: Adequate for Care Transition     Problem: Fall Injury Risk  Goal: Absence of Fall and Fall-Related Injury  Outcome: Adequate for Care Transition     Problem: Impaired Wound Healing  Goal: Optimal Wound Healing  Outcome: Adequate for Care Transition     Problem: Electrolyte Imbalance  Goal: Electrolyte Balance  Outcome: Adequate for Care Transition     Problem: Fluid and Electrolyte Imbalance (Acute Kidney Injury/Impairment)  Goal: Fluid and Electrolyte Balance  Outcome: Adequate for Care Transition     Problem: Oral Intake Inadequate (Acute Kidney Injury/Impairment)  Goal: Optimal Nutrition Intake  Outcome: Adequate for Care Transition     Problem: Renal Function Impairment (Acute Kidney Injury/Impairment)  Goal: Effective Renal Function  Outcome: Adequate for Care Transition

## 2022-06-02 NOTE — PLAN OF CARE
[facility] - Medical Surgical Unit  Discharge Final Note    Primary Care Provider: Daryl Moscoso NP    Expected Discharge Date: 6/2/2022    Final Discharge Note (most recent)     Final Note - 06/02/22 1024        Final Note    Assessment Type Final Discharge Note     Anticipated Discharge Disposition Home-Health Care [facility]     What phone number can be called within the next 1-3 days to see how you are doing after discharge? [phone]     Hospital Resources/Appts/Education Provided Provided patient/caregiver with written discharge plan information        Post-Acute Status    Post-Acute Authorization Home Health     Home Health Status Referrals Sent     Discharge Delays None known at this time                 Important Message from Medicare             Contact Info     Daryl Moscoso NP   Specialty: Family Medicine   Relationship: PCP - General    [address] [place] [address]   Phone: [phone]       Next Steps: Follow up    Kailee Ames MD   Specialty: Vascular Surgery    [address] [place] [address]  [address] [place] [address]   Phone: [phone]       Next Steps: Go on 6/7/2022    Instructions: appointment time is at 10:30.    Gabi Rodas MD   Specialty: Neurology    [address] [place] [address]   Phone: [phone]       Next Steps: Follow up on 6/7/2022    Instructions: appointment time is at 1:00 pm.  [address]            DME equipment needed includes shower chair, rolling walker and bedside commode. [employer] to deliver bedside commode and rolling walker to hospital. I explained that insurance typically does not cover the shower chair. Ms. Kaye requests prescription for shower chair be sent to [email or web address]. Order faxed and confirmed receipt.

## 2022-06-02 NOTE — PT/OT/SLP PROGRESS
Physical Therapy Treatment    Patient Name:  Michell Dhillon   MRN:  [id number]    Recommendations:     Discharge Recommendations:  home health PT, home health OT   Discharge Equipment Recommendations: walker, rolling   Barriers to discharge: Decreased caregiver support    Assessment:     Michell Dhillon is a 77 y.o. female admitted with a medical diagnosis of S/P carotid endarterectomy.  She presents with the following impairments/functional limitations:  weakness, impaired endurance, impaired self care skills, gait instability, impaired functional mobilty, impaired balance LPTA provided patient with visual and verbal cues for correct form of therapeutic activities and for upright posture. Patient reported that she had pain in her L toes when performing heel raises, but had no reports of pain after resting. Patient with increased gait distance with no adverse effects.   Rehab Prognosis: Good; patient would benefit from acute skilled PT services to address these deficits and reach maximum level of function.    Recent Surgery: * No surgery found *      Plan:     During this hospitalization, patient to be seen 5 x/week to address the identified rehab impairments via gait training, therapeutic activities and progress toward the following goals:    · Plan of Care Expires:  06/03/22    Subjective     Chief Complaint: Patient with no complaints prior to treatment session.   Patient/Family Comments/goals: return home with family  Pain/Comfort:  Pain Rating 1: 0/10      Objective:     Patient found up in chair, and was pleasant and agreeable to PT treatment session.     General Precautions: Standard, fall   Orthopedic Precautions:N/A   Braces: N/A  Respiratory Status: Room air     Functional Mobility:  · Transfers:     · Sit to Stand:  contact guard assistance and minimum assistance with rolling walker  · Gait: Patient ambulated approximately 100 feet x 2 trials with RW with CGA.      AM-PAC 6 CLICK MOBILITY  Turning over  in bed (including adjusting bedclothes, sheets and blankets)?: 4  Sitting down on and standing up from a chair with arms (e.g., wheelchair, bedside commode, etc.): 3  Moving from lying on back to sitting on the side of the bed?: 4  Moving to and from a bed to a chair (including a wheelchair)?: 3  Need to walk in hospital room?: 3  Climbing 3-5 steps with a railing?: 2  Basic Mobility Total Score: 19       Therapeutic Activities and Exercises:  Standing marching 10 x 2 each, Sit to stand 5 x 2, Standing heel raises 10x, mini squats 10x    Patient left on bedside commode with daughter and Shavonne, CNA  present     GOALS:   Multidisciplinary Problems     Physical Therapy Goals        Problem: Physical Therapy    Goal Priority Disciplines Outcome Goal Variances Interventions   Physical Therapy Goal     PT, PT/OT Ongoing, Progressing     Description: STG:  Patient will perform all bed mobility with CGA  Patient will perform sit to stand and SPT with CGA  Patient will ambulate 50 feet with RW assistive device with CGA assistance without LOB.     LTG  Patient will perform bed mobility with mod I.  Patient will perform sit to stand and SPT with SBA  Patient will ambuate 150 feet with RW assistive device with CGA assistance without LOB.   Patient will perform 10 min of LE exercise without rest break to increase LE strength to 4/5.                      Time Tracking:     PT Received On: 06/02/22  PT Start Time: 1025     PT Stop Time: 1048  PT Total Time (min): 23 min     Billable Minutes: Gait Training 10 and Therapeutic Activity 13    Treatment Type: Treatment  PT/PTA: PTA     PTA Visit Number: 2     IZABEL Balderas   06/02/2022

## 2022-06-02 NOTE — PT/OT/SLP DISCHARGE
"Occupational Therapy Discharge Summary    Michell Dhillon  MRN: [id number]   Principal Problem: S/P carotid endarterectomy      Patient Discharged from acute Occupational Therapy on today.  Please refer to prior OT note dated today for functional status.    Assessment:      Patient appropriate for care in another setting. pt ready to d/c to home with family svn and home health services    Objective:     GOALS:   Multidisciplinary Problems     Occupational Therapy Goals        Problem: Occupational Therapy    Goal Priority Disciplines Outcome Interventions   Occupational Therapy Goal     OT, PT/OT Adequate for Care Transition    Description: STG: within 2 weeks  Pt will perform grooming with setup  MET  Pt will bathe with setup  MET  Pt will perform UE dressing with setup MET  Pt will perform LE dressing with setup  MET overall  Pt will sit EOB x 30 min with no assistance  MET  Pt will transfer bed/chair/bsc with sba  MET  Pt will perform standing task x 3 min with svn assistance  MET  Pt will tolerate 60 minutes of tx without fatigue  MET      LTG: within 4 weeks  1.Restore to max I with self care and mobility.  Pt currently at svn level of assist at home.    Patient is stating she is very happy to get to go home and "not to nursing home" per her report.  Have discussed with pt and daughter regarding progression of home safety needs and AE for optimal efficiency and safety at home.  All relate good understanding including need for AE as follows: BSC, RW, shower chair.  Discussed fall precautions and also exercise equipment pt could possibly use at home.  Pt was instructed to have her daughter come by to discuss portable UBE procurement if she wishes.  D/c today to home with family and home health services to include OT.                      Reasons for Discontinuation of Therapy Services  Satisfactory goal achievement.      Plan:     Patient Discharged to: Home with Home Health Service and family " svn.        6/2/2022

## 2022-06-02 NOTE — PLAN OF CARE
"  Problem: Occupational Therapy  Goal: Occupational Therapy Goal  Description: STG: within 2 weeks  Pt will perform grooming with setup  MET  Pt will bathe with setup  MET  Pt will perform UE dressing with setup MET  Pt will perform LE dressing with setup  MET overall  Pt will sit EOB x 30 min with no assistance  MET  Pt will transfer bed/chair/bsc with sba  MET  Pt will perform standing task x 3 min with svn assistance  MET  Pt will tolerate 60 minutes of tx without fatigue  MET      LTG: within 4 weeks  1.Restore to max I with self care and mobility.  Pt currently at svn level of assist at home.    Patient is stating she is very happy to get to go home and "not to nursing home" per her report.  Have discussed with pt and daughter regarding progression of home safety needs and AE for optimal efficiency and safety at home.  All relate good understanding including need for AE as follows: BSC, RW, shower chair.  Discussed fall precautions and also exercise equipment pt could possibly use at home.  Pt was instructed to have her daughter come by to discuss portable UBE procurement if she wishes.  D/c today to home with family and home health services to include OT.     Outcome: Adequate for Care Transition     "

## 2022-06-03 NOTE — PHYSICIAN QUERY
PT Name: Michell Dhillon  MR #: [id number]     Documentation Clarification      CDS/: Kristine Munoz               Contact information:    This form is a permanent document in the medical record.     Query Date: Marily 3, 2022    By submitting this query, we are merely seeking further clarification of documentation. Please utilize your independent clinical judgment when addressing the question(s) below.    The Medical Record reflects the following:    Supporting Clinical Findings Location in Medical Record   H/O CVA H&P and DC                                                                                Provider, please provide clarification on the diagnosis of weakness if this is related to the CVA.     [   ] _____________________   [   ] Other (please specify): ____________   [  ] Clinically undetermined                                                                                                           Present on admission (POA) status:   [   ] Yes (Y)                          [  ] Clinically Undetermined (W)  [   ] No (N)                            [   ] Documentation insufficient to determine if condition is POA (U)

## 2022-07-11 ENCOUNTER — LAB REQUISITION (OUTPATIENT)
Dept: LAB | Facility: HOSPITAL | Age: 77
End: 2022-07-11
Attending: NURSE PRACTITIONER
Payer: MEDICARE

## 2022-07-11 DIAGNOSIS — R30.0 DYSURIA: ICD-10-CM

## 2022-07-11 LAB
BACTERIA #/AREA URNS HPF: ABNORMAL /HPF
BILIRUB UR QL STRIP: NEGATIVE
CLARITY UR: CLEAR
COLOR UR: YELLOW
GLUCOSE UR STRIP-MCNC: NEGATIVE MG/DL
KETONES UR STRIP-SCNC: NEGATIVE MG/DL
LEUKOCYTE ESTERASE UR QL STRIP: ABNORMAL
NITRITE UR QL STRIP: NEGATIVE
PH UR STRIP: 5.5 PH UNITS
PROT UR QL STRIP: NEGATIVE
RBC # UR STRIP: NEGATIVE /UL
SP GR UR STRIP: <=1.005
UROBILINOGEN UR STRIP-ACNC: 0.2 MG/DL
WBC #/AREA URNS HPF: ABNORMAL /HPF

## 2022-07-11 PROCEDURE — 81001 URINALYSIS AUTO W/SCOPE: CPT

## 2023-03-08 ENCOUNTER — LAB REQUISITION (OUTPATIENT)
Dept: LAB | Facility: HOSPITAL | Age: 78
End: 2023-03-08
Attending: NURSE PRACTITIONER
Payer: MEDICARE

## 2023-03-08 DIAGNOSIS — E11.9 TYPE 2 DIABETES MELLITUS WITHOUT COMPLICATIONS: ICD-10-CM

## 2023-03-08 DIAGNOSIS — E87.5 HYPERKALEMIA: ICD-10-CM

## 2023-03-08 DIAGNOSIS — I10 ESSENTIAL (PRIMARY) HYPERTENSION: ICD-10-CM

## 2023-03-08 DIAGNOSIS — E03.9 HYPOTHYROIDISM, UNSPECIFIED: ICD-10-CM

## 2023-03-08 LAB
ALBUMIN SERPL BCP-MCNC: 3.8 G/DL (ref 3.5–5)
ALBUMIN/GLOB SERPL: 1.1 {RATIO}
ALP SERPL-CCNC: 85 U/L (ref 55–142)
ALT SERPL W P-5'-P-CCNC: 18 U/L (ref 13–56)
ANION GAP SERPL CALCULATED.3IONS-SCNC: 13 MMOL/L (ref 7–16)
AST SERPL W P-5'-P-CCNC: 18 U/L (ref 15–37)
BASOPHILS # BLD AUTO: 0.02 K/UL (ref 0–0.2)
BASOPHILS NFR BLD AUTO: 0.3 % (ref 0–1)
BILIRUB SERPL-MCNC: 0.5 MG/DL (ref ?–1.2)
BUN SERPL-MCNC: 47 MG/DL (ref 7–18)
BUN/CREAT SERPL: 33 (ref 6–20)
CALCIUM SERPL-MCNC: 9.4 MG/DL (ref 8.5–10.1)
CHLORIDE SERPL-SCNC: 97 MMOL/L (ref 98–107)
CHOLEST SERPL-MCNC: 141 MG/DL (ref 0–200)
CHOLEST/HDLC SERPL: 3.4 {RATIO}
CO2 SERPL-SCNC: 28 MMOL/L (ref 21–32)
CREAT SERPL-MCNC: 1.43 MG/DL (ref 0.55–1.02)
DIFFERENTIAL METHOD BLD: ABNORMAL
EGFR (NO RACE VARIABLE) (RUSH/TITUS): 38 ML/MIN/1.73M²
EOSINOPHIL # BLD AUTO: 0.21 K/UL (ref 0–0.5)
EOSINOPHIL NFR BLD AUTO: 3 % (ref 1–4)
ERYTHROCYTE [DISTWIDTH] IN BLOOD BY AUTOMATED COUNT: 12.9 % (ref 11.5–14.5)
EST. AVERAGE GLUCOSE BLD GHB EST-MCNC: 134 MG/DL
GLOBULIN SER-MCNC: 3.6 G/DL (ref 2–4)
GLUCOSE SERPL-MCNC: 120 MG/DL (ref 74–106)
HBA1C MFR BLD HPLC: 6.6 % (ref 4.5–6.6)
HCT VFR BLD AUTO: 38.8 % (ref 38–47)
HDLC SERPL-MCNC: 41 MG/DL (ref 40–60)
HGB BLD-MCNC: 12.2 G/DL (ref 12–16)
LDLC SERPL CALC-MCNC: 85 MG/DL
LDLC/HDLC SERPL: 2.1 {RATIO}
LYMPHOCYTES # BLD AUTO: 1.85 K/UL (ref 1–4.8)
LYMPHOCYTES NFR BLD AUTO: 26.4 % (ref 27–41)
MCH RBC QN AUTO: 29.9 PG (ref 27–31)
MCHC RBC AUTO-ENTMCNC: 31.4 G/DL (ref 32–36)
MCV RBC AUTO: 95.1 FL (ref 80–96)
MONOCYTES # BLD AUTO: 0.51 K/UL (ref 0–0.8)
MONOCYTES NFR BLD AUTO: 7.3 % (ref 2–6)
MPC BLD CALC-MCNC: 10.9 FL (ref 9.4–12.4)
NEUTROPHILS # BLD AUTO: 4.41 K/UL (ref 1.8–7.7)
NEUTROPHILS NFR BLD AUTO: 63 % (ref 53–65)
NONHDLC SERPL-MCNC: 100 MG/DL
PLATELET # BLD AUTO: 97 K/UL (ref 150–400)
POTASSIUM SERPL-SCNC: 4.8 MMOL/L (ref 3.5–5.1)
PROT SERPL-MCNC: 7.4 G/DL (ref 6.4–8.2)
RBC # BLD AUTO: 4.08 M/UL (ref 4.2–5.4)
SODIUM SERPL-SCNC: 133 MMOL/L (ref 136–145)
T3FREE SERPL-MCNC: 2.15 PG/ML (ref 2.18–3.98)
T4 FREE SERPL-MCNC: 1 NG/DL (ref 0.76–1.46)
TRIGL SERPL-MCNC: 75 MG/DL (ref 35–150)
TSH SERPL DL<=0.005 MIU/L-ACNC: 4.78 UIU/ML (ref 0.36–3.74)
VLDLC SERPL-MCNC: 15 MG/DL
WBC # BLD AUTO: 7 K/UL (ref 4.5–11)

## 2023-03-08 PROCEDURE — 84481 FREE ASSAY (FT-3): CPT

## 2023-03-08 PROCEDURE — 80061 LIPID PANEL: CPT

## 2023-03-08 PROCEDURE — 83036 HEMOGLOBIN GLYCOSYLATED A1C: CPT

## 2023-03-08 PROCEDURE — 85025 COMPLETE CBC W/AUTO DIFF WBC: CPT

## 2023-03-08 PROCEDURE — 84439 ASSAY OF FREE THYROXINE: CPT

## 2023-03-08 PROCEDURE — 84443 ASSAY THYROID STIM HORMONE: CPT

## 2023-03-08 PROCEDURE — 80053 COMPREHEN METABOLIC PANEL: CPT

## 2023-08-08 ENCOUNTER — HOSPITAL ENCOUNTER (EMERGENCY)
Facility: HOSPITAL | Age: 78
Discharge: HOME | End: 2023-08-08
Payer: MEDICARE

## 2023-08-08 VITALS
BODY MASS INDEX: 42.52 KG/M2 | RESPIRATION RATE: 16 BRPM | WEIGHT: 240 LBS | SYSTOLIC BLOOD PRESSURE: 179 MMHG | OXYGEN SATURATION: 97 % | HEART RATE: 77 BPM | HEIGHT: 63 IN | TEMPERATURE: 98 F | DIASTOLIC BLOOD PRESSURE: 86 MMHG

## 2023-08-08 DIAGNOSIS — R22.0 FACIAL SWELLING: Primary | ICD-10-CM

## 2023-08-08 DIAGNOSIS — H10.9 CONJUNCTIVITIS OF BOTH EYES, UNSPECIFIED CONJUNCTIVITIS TYPE: ICD-10-CM

## 2023-08-08 LAB
ALBUMIN SERPL BCP-MCNC: 3.9 G/DL (ref 3.5–5)
ALBUMIN/GLOB SERPL: 1 {RATIO}
ALP SERPL-CCNC: 81 U/L (ref 55–142)
ALT SERPL W P-5'-P-CCNC: 21 U/L (ref 13–56)
ANION GAP SERPL CALCULATED.3IONS-SCNC: 16 MMOL/L (ref 7–16)
AST SERPL W P-5'-P-CCNC: 27 U/L (ref 15–37)
BASOPHILS # BLD AUTO: 0.03 K/UL (ref 0–0.2)
BASOPHILS NFR BLD AUTO: 0.4 % (ref 0–1)
BILIRUB SERPL-MCNC: 0.4 MG/DL (ref ?–1.2)
BUN SERPL-MCNC: 39 MG/DL (ref 7–18)
BUN/CREAT SERPL: 23 (ref 6–20)
CALCIUM SERPL-MCNC: 9.3 MG/DL (ref 8.5–10.1)
CHLORIDE SERPL-SCNC: 101 MMOL/L (ref 98–107)
CO2 SERPL-SCNC: 25 MMOL/L (ref 21–32)
CREAT SERPL-MCNC: 1.72 MG/DL (ref 0.55–1.02)
DIFFERENTIAL METHOD BLD: ABNORMAL
EGFR (NO RACE VARIABLE) (RUSH/TITUS): 30 ML/MIN/1.73M2
EOSINOPHIL # BLD AUTO: 0.28 K/UL (ref 0–0.5)
EOSINOPHIL NFR BLD AUTO: 3.3 % (ref 1–4)
ERYTHROCYTE [DISTWIDTH] IN BLOOD BY AUTOMATED COUNT: 13.8 % (ref 11.5–14.5)
GLOBULIN SER-MCNC: 3.9 G/DL (ref 2–4)
GLUCOSE SERPL-MCNC: 140 MG/DL (ref 70–105)
GLUCOSE SERPL-MCNC: 150 MG/DL (ref 74–106)
HCT VFR BLD AUTO: 41.3 % (ref 38–47)
HGB BLD-MCNC: 13 G/DL (ref 12–16)
LYMPHOCYTES # BLD AUTO: 2.36 K/UL (ref 1–4.8)
LYMPHOCYTES NFR BLD AUTO: 28.1 % (ref 27–41)
MCH RBC QN AUTO: 29.1 PG (ref 27–31)
MCHC RBC AUTO-ENTMCNC: 31.5 G/DL (ref 32–36)
MCV RBC AUTO: 92.6 FL (ref 80–96)
MONOCYTES # BLD AUTO: 0.72 K/UL (ref 0–0.8)
MONOCYTES NFR BLD AUTO: 8.6 % (ref 2–6)
MPC BLD CALC-MCNC: 9.8 FL (ref 9.4–12.4)
NEUTROPHILS # BLD AUTO: 5.02 K/UL (ref 1.8–7.7)
NEUTROPHILS NFR BLD AUTO: 59.6 % (ref 53–65)
NT-PROBNP SERPL-MCNC: 502 PG/ML (ref 1–450)
PLATELET # BLD AUTO: 242 K/UL (ref 150–400)
POTASSIUM SERPL-SCNC: 4.9 MMOL/L (ref 3.5–5.1)
PROT SERPL-MCNC: 7.8 G/DL (ref 6.4–8.2)
RBC # BLD AUTO: 4.46 M/UL (ref 4.2–5.4)
SODIUM SERPL-SCNC: 137 MMOL/L (ref 136–145)
WBC # BLD AUTO: 8.41 K/UL (ref 4.5–11)

## 2023-08-08 PROCEDURE — 96374 THER/PROPH/DIAG INJ IV PUSH: CPT

## 2023-08-08 PROCEDURE — 25000003 PHARM REV CODE 250: Performed by: NURSE PRACTITIONER

## 2023-08-08 PROCEDURE — 96375 TX/PRO/DX INJ NEW DRUG ADDON: CPT

## 2023-08-08 PROCEDURE — 99284 EMERGENCY DEPT VISIT MOD MDM: CPT | Mod: GF | Performed by: NURSE PRACTITIONER

## 2023-08-08 PROCEDURE — 63600175 PHARM REV CODE 636 W HCPCS: Performed by: NURSE PRACTITIONER

## 2023-08-08 PROCEDURE — 83880 ASSAY OF NATRIURETIC PEPTIDE: CPT | Performed by: NURSE PRACTITIONER

## 2023-08-08 PROCEDURE — 85025 COMPLETE CBC W/AUTO DIFF WBC: CPT | Performed by: NURSE PRACTITIONER

## 2023-08-08 PROCEDURE — 80053 COMPREHEN METABOLIC PANEL: CPT | Performed by: NURSE PRACTITIONER

## 2023-08-08 PROCEDURE — 99284 EMERGENCY DEPT VISIT MOD MDM: CPT | Mod: 25

## 2023-08-08 PROCEDURE — 82962 GLUCOSE BLOOD TEST: CPT

## 2023-08-08 RX ORDER — ERYTHROMYCIN 5 MG/G
OINTMENT OPHTHALMIC
Status: COMPLETED | OUTPATIENT
Start: 2023-08-08 | End: 2023-08-08

## 2023-08-08 RX ORDER — DIPHENHYDRAMINE HYDROCHLORIDE 50 MG/ML
25 INJECTION INTRAMUSCULAR; INTRAVENOUS
Status: COMPLETED | OUTPATIENT
Start: 2023-08-08 | End: 2023-08-08

## 2023-08-08 RX ORDER — FAMOTIDINE 10 MG/ML
20 INJECTION INTRAVENOUS
Status: COMPLETED | OUTPATIENT
Start: 2023-08-08 | End: 2023-08-08

## 2023-08-08 RX ORDER — DEXAMETHASONE SODIUM PHOSPHATE 4 MG/ML
8 INJECTION, SOLUTION INTRA-ARTICULAR; INTRALESIONAL; INTRAMUSCULAR; INTRAVENOUS; SOFT TISSUE
Status: COMPLETED | OUTPATIENT
Start: 2023-08-08 | End: 2023-08-08

## 2023-08-08 RX ORDER — HYDROCHLOROTHIAZIDE 25 MG/1
25 TABLET ORAL DAILY
COMMUNITY

## 2023-08-08 RX ORDER — FAMOTIDINE 20 MG/1
20 TABLET, FILM COATED ORAL DAILY
Qty: 30 TABLET | Refills: 0 | Status: SHIPPED | OUTPATIENT
Start: 2023-08-08 | End: 2024-08-07

## 2023-08-08 RX ADMIN — DEXAMETHASONE SODIUM PHOSPHATE 8 MG: 4 INJECTION, SOLUTION INTRA-ARTICULAR; INTRALESIONAL; INTRAMUSCULAR; INTRAVENOUS; SOFT TISSUE at 04:08

## 2023-08-08 RX ADMIN — ERYTHROMYCIN: 5 OINTMENT OPHTHALMIC at 06:08

## 2023-08-08 RX ADMIN — DIPHENHYDRAMINE HYDROCHLORIDE 25 MG: 50 INJECTION INTRAMUSCULAR; INTRAVENOUS at 04:08

## 2023-08-08 RX ADMIN — FAMOTIDINE 20 MG: 10 INJECTION, SOLUTION INTRAVENOUS at 06:08

## 2023-08-08 NOTE — ED TRIAGE NOTES
Pt presents to the ED via POV w/ c/o facial swelling that started on Saturday, pt medications recently changed by Daryl Moscoso NP, pt put on HCTZ 25mg and fluconazole.

## 2023-08-08 NOTE — DISCHARGE INSTRUCTIONS
Return to the ER for worsening symptoms. Use warm compresses to eyes, try not to rub them. Follow up with primary care provider as needed.     The examination and treatment you have received in the Emergency Department today have been rendered on an emergency basis only and are not intended to be a substitute for an effort to provide complete medical care. You should contact your follow-up physician as it is important that you let him or her check you and report any new or remaining problems since it is impossible to recognize and treat all elements of an injury or illness in a single emergency care center visit.

## 2023-08-08 NOTE — ED PROVIDER NOTES
Encounter Date: 8/8/2023       History     Chief Complaint   Patient presents with    Facial Swelling     79 yo WF presents for facial swelling. States she was started on a fluid pill last Tuesday by her PCP Daryl Moscoso and symptoms have progressed since then.     RN contacted clinic office and they state her Synthroid was increased, HCTZ was given but she was already on a HCTZ product, and Diflucan was given. Allergy assumed to be from Diflucan but patient states she has been on it multiple times with no reaction.     The history is provided by the patient and the spouse.     Review of patient's allergies indicates:   Allergen Reactions    Bactrim [sulfamethoxazole-trimethoprim]     Norvasc [amlodipine]     Omnicef [cefdinir]     Penicillin      Past Medical History:   Diagnosis Date    Arthritis     Diabetes mellitus, type 2     Hyperlipidemia     Hypertension     Hypothyroidism     Transient cerebral ischemic attack, unspecified 04/14/2021     History reviewed. No pertinent surgical history.  Family History   Problem Relation Age of Onset    Hypertension Mother     Hypertension Father     Hypertension Sister      Social History     Tobacco Use    Smoking status: Never    Smokeless tobacco: Never   Substance Use Topics    Alcohol use: Never    Drug use: Never     Review of Systems   Constitutional:  Negative for chills and fever.   HENT:  Positive for facial swelling. Negative for sore throat, trouble swallowing and voice change.    Eyes:  Positive for discharge. Negative for photophobia, pain, redness, itching and visual disturbance.   Respiratory:  Negative for cough, shortness of breath and wheezing.    Cardiovascular:  Negative for chest pain.   Gastrointestinal:  Negative for abdominal pain, nausea and vomiting.   Neurological:  Negative for dizziness and weakness.   Psychiatric/Behavioral:  Negative for confusion.    All other systems reviewed and are negative.      Physical Exam     Initial Vitals  [08/08/23 1640]   BP Pulse Resp Temp SpO2   (!) 186/86 77 16 97.8 °F (36.6 °C) 97 %      MAP       --         Physical Exam    Nursing note and vitals reviewed.  Constitutional: She appears well-developed and well-nourished. She is Obese . She is cooperative.   HENT:   Head: Head is with right periorbital erythema and with left periorbital erythema.   Right Ear: External ear normal.   Left Ear: External ear normal.   Nose: Nose normal.   Mouth/Throat: Oropharynx is clear and moist.   Eyes: EOM are normal. Pupils are equal, round, and reactive to light. Right eye exhibits discharge. Left eye exhibits discharge (watery).   Neck: Neck supple.   Normal range of motion.  Cardiovascular:  Normal rate, regular rhythm and normal heart sounds.           Pulmonary/Chest: Breath sounds normal.   Musculoskeletal:         General: Normal range of motion.      Cervical back: Normal range of motion and neck supple.     Neurological: She is alert and oriented to person, place, and time. She has normal strength. GCS score is 15. GCS eye subscore is 4. GCS verbal subscore is 5. GCS motor subscore is 6.   Skin: Skin is warm. Capillary refill takes 2 to 3 seconds.   Psychiatric: She has a normal mood and affect. Her behavior is normal. Judgment and thought content normal.         Medical Screening Exam   See Full Note    ED Course   Procedures  Labs Reviewed   COMPREHENSIVE METABOLIC PANEL - Abnormal; Notable for the following components:       Result Value    Glucose 150 (*)     BUN 39 (*)     Creatinine 1.72 (*)     BUN/Creatinine Ratio 23 (*)     eGFR 30 (*)     All other components within normal limits   CBC WITH DIFFERENTIAL - Abnormal; Notable for the following components:    MCHC 31.5 (*)     Monocytes % 8.6 (*)     All other components within normal limits   NT-PRO NATRIURETIC PEPTIDE - Abnormal; Notable for the following components:    ProBNP 502 (*)     All other components within normal limits   CBC W/ AUTO DIFFERENTIAL     Narrative:     The following orders were created for panel order CBC auto differential.  Procedure                               Abnormality         Status                     ---------                               -----------         ------                     CBC with Differential[[id number]]        Abnormal            Final result                 Please view results for these tests on the individual orders.   POCT GLUCOSE MONITORING CONTINUOUS          Imaging Results    None          Medications   diphenhydrAMINE injection 25 mg (25 mg Intravenous Given 8/8/23 1642)   dexAMETHasone injection 8 mg (8 mg Intravenous Given 8/8/23 1642)   erythromycin 5 mg/gram (0.5 %) ophthalmic ointment ( Both Eyes Given 8/8/23 1804)   famotidine (PF) injection 20 mg (20 mg Intravenous Given 8/8/23 1804)     Medical Decision Making:   Initial Assessment:   77 yo WF presents for facial swelling. States she was started on a fluid pill last Tuesday by her PCP Daryl Moscoso and symptoms have progressed since then.     RN contacted clinic office and they state her Synthroid was increased, HCTZ was given but she was already on a HCTZ product, and Diflucan was given. States she has been on Diflucan multiple times in the past with no reaction.         Differential Diagnosis:   Allergic reaction  Conjunctivitis  Periorbital edema  Clogged tear duct    Clinical Tests:   Lab Tests: Ordered and Reviewed  ED Management:  Labs reviewed  Symptoms improving after IV Benadryl, Pepcid and steroid  Patient instructed to monitor blood sugar at home d/t steroids given in ED  Discharged with Pepcid, continue erythromycin ointment, Benadryl as needed  Return to ED for worsening/return of symptoms  Strict return and follow-up precautions have been given by me personally to the patient/family/caregiver(s).    Data Reviewed/Counseling: I have reviewed the patient's vital signs, nursing notes, and other relevant tests/information. I had a detailed  discussion regarding the historical points, exam findings, and any diagnostic results supporting the discharge diagnosis. I also discussed the need for outpatient follow-up and the need to return to the ED if symptoms worsen or if there are any questions or concerns that arise at home.               ED Course as of 08/08/23 1819   Tue Aug 08, 2023   1713 Glucose(!): 150 [MJ]   1713 BUN(!): 39  Abnormal renal function noted from prior labs [MJ]   1713 Creatinine(!): 1.72 [MJ]   1713 BUN/CREAT RATIO(!): 23 [MJ]   1746 NT-proBNP(!): 502 [MJ]   1746 Some minimal improvement noted in facial swelling R>L.  [MJ]   1812 Facial swelling improving [MJ]      ED Course User Index  [] Bibiana Bland FNP                  Clinical Impression:   Final diagnoses:  [H10.9] Conjunctivitis of both eyes, unspecified conjunctivitis type  [R22.0] Facial swelling (Primary)        ED Disposition Condition    Discharge Stable          ED Prescriptions       Medication Sig Dispense Start Date End Date Auth. Provider    famotidine (PEPCID) 20 MG tablet Take 1 tablet (20 mg total) by mouth once daily. 30 tablet 8/8/2023 8/7/2024 Bibiana Bland FNP          Follow-up Information    None          Bibiana Bland FNP  08/08/23 1819

## 2023-08-12 ENCOUNTER — HOSPITAL ENCOUNTER (EMERGENCY)
Facility: HOSPITAL | Age: 78
Discharge: HOME | End: 2023-08-13
Payer: MEDICARE

## 2023-08-12 DIAGNOSIS — R41.82 ALTERED MENTAL STATUS, UNSPECIFIED ALTERED MENTAL STATUS TYPE: Primary | ICD-10-CM

## 2023-08-12 DIAGNOSIS — R03.0 SINGLE EPISODE OF ELEVATED BLOOD PRESSURE: ICD-10-CM

## 2023-08-12 DIAGNOSIS — R31.9 URINARY TRACT INFECTION WITH HEMATURIA, SITE UNSPECIFIED: ICD-10-CM

## 2023-08-12 DIAGNOSIS — N39.0 URINARY TRACT INFECTION WITH HEMATURIA, SITE UNSPECIFIED: ICD-10-CM

## 2023-08-12 LAB
ALBUMIN SERPL BCP-MCNC: 3.8 G/DL (ref 3.5–5)
ALBUMIN/GLOB SERPL: 1.1 {RATIO}
ALP SERPL-CCNC: 101 U/L (ref 55–142)
ALT SERPL W P-5'-P-CCNC: 21 U/L (ref 13–56)
ANION GAP SERPL CALCULATED.3IONS-SCNC: 14 MMOL/L (ref 7–16)
AST SERPL W P-5'-P-CCNC: 21 U/L (ref 15–37)
BACTERIA #/AREA URNS HPF: ABNORMAL /HPF
BASOPHILS # BLD AUTO: 0.03 K/UL (ref 0–0.2)
BASOPHILS NFR BLD AUTO: 0.2 % (ref 0–1)
BILIRUB SERPL-MCNC: 0.2 MG/DL (ref ?–1.2)
BILIRUB UR QL STRIP: NEGATIVE
BUN SERPL-MCNC: 60 MG/DL (ref 7–18)
BUN/CREAT SERPL: 36 (ref 6–20)
CALCIUM SERPL-MCNC: 9.1 MG/DL (ref 8.5–10.1)
CHLORIDE SERPL-SCNC: 101 MMOL/L (ref 98–107)
CLARITY UR: CLEAR
CO2 SERPL-SCNC: 24 MMOL/L (ref 21–32)
COLOR UR: YELLOW
CREAT SERPL-MCNC: 1.69 MG/DL (ref 0.55–1.02)
DIFFERENTIAL METHOD BLD: ABNORMAL
EGFR (NO RACE VARIABLE) (RUSH/TITUS): 31 ML/MIN/1.73M2
EOSINOPHIL # BLD AUTO: 0.2 K/UL (ref 0–0.5)
EOSINOPHIL NFR BLD AUTO: 1.5 % (ref 1–4)
ERYTHROCYTE [DISTWIDTH] IN BLOOD BY AUTOMATED COUNT: 13.7 % (ref 11.5–14.5)
GLOBULIN SER-MCNC: 3.6 G/DL (ref 2–4)
GLUCOSE SERPL-MCNC: 190 MG/DL (ref 74–106)
GLUCOSE UR STRIP-MCNC: NEGATIVE MG/DL
HCT VFR BLD AUTO: 41.2 % (ref 38–47)
HGB BLD-MCNC: 13.1 G/DL (ref 12–16)
KETONES UR STRIP-SCNC: NEGATIVE MG/DL
LEUKOCYTE ESTERASE UR QL STRIP: ABNORMAL
LYMPHOCYTES # BLD AUTO: 3.42 K/UL (ref 1–4.8)
LYMPHOCYTES NFR BLD AUTO: 25.9 % (ref 27–41)
MCH RBC QN AUTO: 29.2 PG (ref 27–31)
MCHC RBC AUTO-ENTMCNC: 31.8 G/DL (ref 32–36)
MCV RBC AUTO: 92 FL (ref 80–96)
MONOCYTES # BLD AUTO: 1.18 K/UL (ref 0–0.8)
MONOCYTES NFR BLD AUTO: 8.9 % (ref 2–6)
MPC BLD CALC-MCNC: 10.1 FL (ref 9.4–12.4)
NEUTROPHILS # BLD AUTO: 8.39 K/UL (ref 1.8–7.7)
NEUTROPHILS NFR BLD AUTO: 63.5 % (ref 53–65)
NITRITE UR QL STRIP: NEGATIVE
PH UR STRIP: 5.5 PH UNITS
PLATELET # BLD AUTO: 287 K/UL (ref 150–400)
POTASSIUM SERPL-SCNC: 4.3 MMOL/L (ref 3.5–5.1)
PROT SERPL-MCNC: 7.4 G/DL (ref 6.4–8.2)
PROT UR QL STRIP: NEGATIVE
RBC # BLD AUTO: 4.48 M/UL (ref 4.2–5.4)
RBC # UR STRIP: ABNORMAL /UL
RBC #/AREA URNS HPF: ABNORMAL /HPF
SODIUM SERPL-SCNC: 135 MMOL/L (ref 136–145)
SP GR UR STRIP: 1.01
SQUAMOUS #/AREA URNS LPF: ABNORMAL /LPF
TSH SERPL DL<=0.005 MIU/L-ACNC: 4.27 UIU/ML (ref 0.36–3.74)
UROBILINOGEN UR STRIP-ACNC: 0.2 MG/DL
WBC # BLD AUTO: 13.22 K/UL (ref 4.5–11)
WBC #/AREA URNS HPF: ABNORMAL /HPF

## 2023-08-12 PROCEDURE — 99284 EMERGENCY DEPT VISIT MOD MDM: CPT | Mod: GF

## 2023-08-12 PROCEDURE — 81001 URINALYSIS AUTO W/SCOPE: CPT | Performed by: NURSE PRACTITIONER

## 2023-08-12 PROCEDURE — 84443 ASSAY THYROID STIM HORMONE: CPT | Performed by: NURSE PRACTITIONER

## 2023-08-12 PROCEDURE — 85025 COMPLETE CBC W/AUTO DIFF WBC: CPT | Performed by: NURSE PRACTITIONER

## 2023-08-12 PROCEDURE — 80053 COMPREHEN METABOLIC PANEL: CPT | Performed by: NURSE PRACTITIONER

## 2023-08-13 VITALS
HEART RATE: 91 BPM | RESPIRATION RATE: 20 BRPM | OXYGEN SATURATION: 95 % | TEMPERATURE: 98 F | SYSTOLIC BLOOD PRESSURE: 182 MMHG | DIASTOLIC BLOOD PRESSURE: 71 MMHG

## 2023-08-13 PROCEDURE — 63600175 PHARM REV CODE 636 W HCPCS: Performed by: NURSE PRACTITIONER

## 2023-08-13 PROCEDURE — 99285 EMERGENCY DEPT VISIT HI MDM: CPT

## 2023-08-13 PROCEDURE — 96374 THER/PROPH/DIAG INJ IV PUSH: CPT

## 2023-08-13 RX ORDER — NITROFURANTOIN 25; 75 MG/1; MG/1
100 CAPSULE ORAL 2 TIMES DAILY
Qty: 10 CAPSULE | Refills: 0 | Status: SHIPPED | OUTPATIENT
Start: 2023-08-13 | End: 2023-08-18

## 2023-08-13 RX ORDER — HYDRALAZINE HYDROCHLORIDE 20 MG/ML
10 INJECTION INTRAMUSCULAR; INTRAVENOUS
Status: COMPLETED | OUTPATIENT
Start: 2023-08-13 | End: 2023-08-13

## 2023-08-13 RX ADMIN — HYDRALAZINE HYDROCHLORIDE 10 MG: 20 INJECTION, SOLUTION INTRAMUSCULAR; INTRAVENOUS at 12:08

## 2023-08-13 NOTE — ED NOTES
Pt discharged to home by wheelchair.  Pt son given discharge instructions with patient.  SARWAT on discharge.

## 2023-08-13 NOTE — ED PROVIDER NOTES
Encounter Date: 8/12/2023       History     Chief Complaint   Patient presents with    Altered Mental Status     77 y/o WF with PMH of HTN, DM, TIA,  and hypothyroidism presents pov per son with c/o confusion with onset this AM. Son states patient answers most questions appropriately but is confused at times.      Review of patient's allergies indicates:   Allergen Reactions    Bactrim [sulfamethoxazole-trimethoprim]     Norvasc [amlodipine]     Omnicef [cefdinir]     Penicillin      Past Medical History:   Diagnosis Date    Arthritis     Diabetes mellitus, type 2     Hyperlipidemia     Hypertension     Hypothyroidism     Transient cerebral ischemic attack, unspecified 04/14/2021     No past surgical history on file.  Family History   Problem Relation Age of Onset    Hypertension Mother     Hypertension Father     Hypertension Sister      Social History     Tobacco Use    Smoking status: Never    Smokeless tobacco: Never   Substance Use Topics    Alcohol use: Never    Drug use: Never     Review of Systems   Respiratory:  Negative for apnea, cough, choking, chest tightness, shortness of breath, wheezing and stridor.    Cardiovascular:  Negative for chest pain, palpitations and leg swelling.   Neurological:  Negative for dizziness, tremors, seizures, syncope, facial asymmetry, speech difficulty, weakness, light-headedness, numbness and headaches.   All other systems reviewed and are negative.      Physical Exam     Initial Vitals [08/12/23 2305]   BP Pulse Resp Temp SpO2   (!) 222/90 101 20 98 °F (36.7 °C) 95 %      MAP       --         Physical Exam    Nursing note and vitals reviewed.  Constitutional: She appears well-developed and well-nourished. No distress.   HENT:   Head: Normocephalic and atraumatic.   Eyes: Conjunctivae and EOM are normal. Pupils are equal, round, and reactive to light.   Neck:   Normal range of motion.  Cardiovascular:  Normal rate, regular rhythm, normal heart sounds and intact distal  pulses.     Exam reveals no gallop and no friction rub.       No murmur heard.  Pulmonary/Chest: Breath sounds normal. No respiratory distress. She has no wheezes. She has no rhonchi. She has no rales. She exhibits no tenderness.   Abdominal: Abdomen is soft. Bowel sounds are normal.   Musculoskeletal:         General: No tenderness. Normal range of motion.      Cervical back: Normal range of motion.     Neurological: She is alert and oriented to person, place, and time. She has normal strength. She displays normal reflexes. No cranial nerve deficit or sensory deficit. GCS score is 15. GCS eye subscore is 4. GCS verbal subscore is 5. GCS motor subscore is 6.   Skin: Skin is warm and dry. Capillary refill takes less than 2 seconds.   Psychiatric: She has a normal mood and affect. Her behavior is normal. Judgment and thought content normal.         Medical Screening Exam   See Full Note    ED Course   Procedures  Labs Reviewed   COMPREHENSIVE METABOLIC PANEL - Abnormal; Notable for the following components:       Result Value    Sodium 135 (*)     Glucose 190 (*)     BUN 60 (*)     Creatinine 1.69 (*)     BUN/Creatinine Ratio 36 (*)     eGFR 31 (*)     All other components within normal limits   URINALYSIS, REFLEX TO URINE CULTURE - Abnormal; Notable for the following components:    Leukocytes, UA Trace (*)     Blood, UA Moderate (*)     All other components within normal limits   CBC WITH DIFFERENTIAL - Abnormal; Notable for the following components:    WBC 13.22 (*)     MCHC 31.8 (*)     Lymphocytes % 25.9 (*)     Neutrophils, Abs 8.39 (*)     Monocytes % 8.9 (*)     Monocytes, Absolute 1.18 (*)     All other components within normal limits   URINALYSIS, MICROSCOPIC - Abnormal; Notable for the following components:    RBC, UA 25-50 (*)     Bacteria, UA Few (*)     Squamous Epithelial Cells, UA Few (*)     All other components within normal limits   TSH - Abnormal; Notable for the following components:    TSH 4.274  (*)     All other components within normal limits   CBC W/ AUTO DIFFERENTIAL    Narrative:     The following orders were created for panel order CBC auto differential.  Procedure                               Abnormality         Status                     ---------                               -----------         ------                     CBC with Differential[[id number]]        Abnormal            Final result                 Please view results for these tests on the individual orders.          Imaging Results              CT Head Without Contrast (In process)                      Medications   hydrALAZINE injection 10 mg (10 mg Intravenous Given 8/13/23 0018)     Medical Decision Making:   Initial Assessment:   79 y/o WF with PMH of HTN, DM, TIA,  and hypothyroidism presents pov per son with c/o confusion with onset this AM. Son states patient answers most questions appropriately but is confused at times.  Differential Diagnosis:   TIA  CVA  UTI  Clinical Tests:   Lab Tests: Ordered and Reviewed  The following lab test(s) were unremarkable: CBC, CMP and Urinalysis       <> Summary of Lab: TSH 4.274  UA Trace of leukocytes, few bacteria, moderate  WBC 13.22  Radiological Study: Ordered and Reviewed  ED Management:  Patient supine on stretcher in ER # 3a in [facility]. Son at bedside Peripheral IV access obtained and blood drawn for labs. Clean catch urine obtained for UA. Patient to radiology per stretcher for CT of Head.             ED Course as of 08/13/23 0045   Sun Aug 13, 2023   0005 CT of Head: (1) No acute large vessel distribution infarction, intracranial bleed. Or focal mass seen. ASPECTS score 10, follow-up brain MRI recommended. (2) Moderate chronic age related periventricular and subcortical hypodensities seen which are consistent with small vessel ischemic disease or the effects of vasculopathy. [NJ]      ED Course User Index  [[place]] Isaias Carrasco FNP                Clinical Impression:   Final  diagnoses:  [R41.82] Altered mental status, unspecified altered mental status type (Primary)  [N39.0, R31.9] Urinary tract infection with hematuria, site unspecified  [R03.0] Single episode of elevated blood pressure        ED Disposition Condition    Discharge Stable          ED Prescriptions       Medication Sig Dispense Start Date End Date Auth. Provider    nitrofurantoin, macrocrystal-monohydrate, (MACROBID) 100 MG capsule Take 1 capsule (100 mg total) by mouth 2 (two) times daily. for 5 days 10 capsule 8/13/2023 8/18/2023 Isaias Carrasco FNP          Follow-up Information       Follow up With Specialties Details Why Contact Info    Daryl Moscoso NP Family Medicine Go to  As needed, for follow up [address] [place] [address] [place] [id number] [phone]               Isaias Carrasco FNP  08/13/23 0045

## 2023-08-13 NOTE — ED TRIAGE NOTES
Pt to exam 3a by wheelchair for AMS since today.  Pt hypertensive upon arrival. Oriented to person and place.  Will continue to monitor.

## 2024-06-02 ENCOUNTER — HOSPITAL ENCOUNTER (INPATIENT)
Facility: HOSPITAL | Age: 79
LOS: 2 days | Discharge: ANOTHER HOSPITAL | DRG: 690 | End: 2024-06-05
Attending: HOSPITALIST | Admitting: HOSPITALIST
Payer: MEDICARE

## 2024-06-02 DIAGNOSIS — N39.0 URINARY TRACT INFECTION WITHOUT HEMATURIA, SITE UNSPECIFIED: Primary | ICD-10-CM

## 2024-06-02 LAB
ALBUMIN SERPL BCP-MCNC: 3.6 G/DL (ref 3.5–5)
ALBUMIN/GLOB SERPL: 0.8 {RATIO}
ALP SERPL-CCNC: 67 U/L (ref 55–142)
ALT SERPL W P-5'-P-CCNC: 21 U/L (ref 13–56)
ANION GAP SERPL CALCULATED.3IONS-SCNC: 15 MMOL/L (ref 7–16)
AST SERPL W P-5'-P-CCNC: 29 U/L (ref 15–37)
BACTERIA #/AREA URNS HPF: ABNORMAL /HPF
BASOPHILS # BLD AUTO: 0.06 K/UL (ref 0–0.2)
BASOPHILS NFR BLD AUTO: 0.5 % (ref 0–1)
BILIRUB SERPL-MCNC: 0.7 MG/DL (ref ?–1.2)
BILIRUB UR QL STRIP: NEGATIVE
BUN SERPL-MCNC: 37 MG/DL (ref 7–18)
BUN/CREAT SERPL: 19 (ref 6–20)
CALCIUM SERPL-MCNC: 9.3 MG/DL (ref 8.5–10.1)
CHLORIDE SERPL-SCNC: 97 MMOL/L (ref 98–107)
CLARITY UR: ABNORMAL
CO2 SERPL-SCNC: 25 MMOL/L (ref 21–32)
COLOR UR: YELLOW
CREAT SERPL-MCNC: 1.9 MG/DL (ref 0.55–1.02)
DIFFERENTIAL METHOD BLD: ABNORMAL
EGFR (NO RACE VARIABLE) (RUSH/TITUS): 26 ML/MIN/1.73M2
EOSINOPHIL # BLD AUTO: 0.1 K/UL (ref 0–0.5)
EOSINOPHIL NFR BLD AUTO: 0.9 % (ref 1–4)
ERYTHROCYTE [DISTWIDTH] IN BLOOD BY AUTOMATED COUNT: 14.3 % (ref 11.5–14.5)
GLOBULIN SER-MCNC: 4.4 G/DL (ref 2–4)
GLUCOSE SERPL-MCNC: 151 MG/DL (ref 74–106)
GLUCOSE UR STRIP-MCNC: NEGATIVE MG/DL
HCT VFR BLD AUTO: 42.8 % (ref 38–47)
HGB BLD-MCNC: 13.4 G/DL (ref 12–16)
KETONES UR STRIP-SCNC: NEGATIVE MG/DL
LACTATE SERPL-SCNC: 1.4 MMOL/L (ref 0.4–2)
LEUKOCYTE ESTERASE UR QL STRIP: ABNORMAL
LYMPHOCYTES # BLD AUTO: 2.3 K/UL (ref 1–4.8)
LYMPHOCYTES NFR BLD AUTO: 20.8 % (ref 27–41)
MCH RBC QN AUTO: 28.3 PG (ref 27–31)
MCHC RBC AUTO-ENTMCNC: 31.3 G/DL (ref 32–36)
MCV RBC AUTO: 90.3 FL (ref 80–96)
MONOCYTES # BLD AUTO: 1.02 K/UL (ref 0–0.8)
MONOCYTES NFR BLD AUTO: 9.2 % (ref 2–6)
MPC BLD CALC-MCNC: 9.2 FL (ref 9.4–12.4)
NEUTROPHILS # BLD AUTO: 7.6 K/UL (ref 1.8–7.7)
NEUTROPHILS NFR BLD AUTO: 68.6 % (ref 53–65)
NITRITE UR QL STRIP: POSITIVE
PH UR STRIP: 6 PH UNITS
PLATELET # BLD AUTO: 232 K/UL (ref 150–400)
POTASSIUM SERPL-SCNC: 4.9 MMOL/L (ref 3.5–5.1)
PROT SERPL-MCNC: 8 G/DL (ref 6.4–8.2)
PROT UR QL STRIP: 30
RBC # BLD AUTO: 4.74 M/UL (ref 4.2–5.4)
RBC # UR STRIP: ABNORMAL /UL
RBC #/AREA URNS HPF: ABNORMAL /HPF
SODIUM SERPL-SCNC: 132 MMOL/L (ref 136–145)
SP GR UR STRIP: 1.01
SQUAMOUS #/AREA URNS LPF: ABNORMAL /LPF
UROBILINOGEN UR STRIP-ACNC: 0.2 MG/DL
WBC # BLD AUTO: 11.08 K/UL (ref 4.5–11)
WBC #/AREA URNS HPF: ABNORMAL /HPF

## 2024-06-02 PROCEDURE — 87086 URINE CULTURE/COLONY COUNT: CPT | Performed by: NURSE PRACTITIONER

## 2024-06-02 PROCEDURE — 99285 EMERGENCY DEPT VISIT HI MDM: CPT | Mod: 25

## 2024-06-02 PROCEDURE — 83605 ASSAY OF LACTIC ACID: CPT | Performed by: NURSE PRACTITIONER

## 2024-06-02 PROCEDURE — 63600175 PHARM REV CODE 636 W HCPCS: Performed by: NURSE PRACTITIONER

## 2024-06-02 PROCEDURE — 81001 URINALYSIS AUTO W/SCOPE: CPT | Performed by: NURSE PRACTITIONER

## 2024-06-02 PROCEDURE — 85025 COMPLETE CBC W/AUTO DIFF WBC: CPT | Performed by: NURSE PRACTITIONER

## 2024-06-02 PROCEDURE — 51798 US URINE CAPACITY MEASURE: CPT

## 2024-06-02 PROCEDURE — 80053 COMPREHEN METABOLIC PANEL: CPT | Performed by: NURSE PRACTITIONER

## 2024-06-02 PROCEDURE — G0378 HOSPITAL OBSERVATION PER HR: HCPCS

## 2024-06-02 PROCEDURE — 96365 THER/PROPH/DIAG IV INF INIT: CPT

## 2024-06-02 PROCEDURE — 99285 EMERGENCY DEPT VISIT HI MDM: CPT | Mod: ,,, | Performed by: NURSE PRACTITIONER

## 2024-06-02 PROCEDURE — 25000003 PHARM REV CODE 250: Performed by: NURSE PRACTITIONER

## 2024-06-02 PROCEDURE — 51702 INSERT TEMP BLADDER CATH: CPT

## 2024-06-02 RX ORDER — ACETAMINOPHEN 325 MG/1
650 TABLET ORAL EVERY 4 HOURS PRN
Status: DISCONTINUED | OUTPATIENT
Start: 2024-06-02 | End: 2024-06-05 | Stop reason: HOSPADM

## 2024-06-02 RX ORDER — GLUCAGON 1 MG
1 KIT INJECTION
Status: DISCONTINUED | OUTPATIENT
Start: 2024-06-02 | End: 2024-06-05 | Stop reason: HOSPADM

## 2024-06-02 RX ORDER — INSULIN ASPART 100 [IU]/ML
0-5 INJECTION, SOLUTION INTRAVENOUS; SUBCUTANEOUS
Status: DISCONTINUED | OUTPATIENT
Start: 2024-06-02 | End: 2024-06-05 | Stop reason: HOSPADM

## 2024-06-02 RX ORDER — IBUPROFEN 200 MG
16 TABLET ORAL
Status: DISCONTINUED | OUTPATIENT
Start: 2024-06-02 | End: 2024-06-05 | Stop reason: HOSPADM

## 2024-06-02 RX ORDER — CETIRIZINE HYDROCHLORIDE 10 MG/1
10 TABLET ORAL DAILY PRN
COMMUNITY

## 2024-06-02 RX ORDER — IBUPROFEN 200 MG
24 TABLET ORAL
Status: DISCONTINUED | OUTPATIENT
Start: 2024-06-02 | End: 2024-06-05 | Stop reason: HOSPADM

## 2024-06-02 RX ORDER — FUROSEMIDE 40 MG/1
40 TABLET ORAL 2 TIMES DAILY PRN
COMMUNITY

## 2024-06-02 RX ORDER — CHLORPROMAZINE HYDROCHLORIDE 25 MG/1
25 TABLET, FILM COATED ORAL DAILY PRN
COMMUNITY

## 2024-06-02 RX ORDER — OLMESARTAN MEDOXOMIL AND HYDROCHLOROTHIAZIDE 40/25 40; 25 MG/1; MG/1
1 TABLET ORAL DAILY
Status: ON HOLD | COMMUNITY
Start: 2024-03-06 | End: 2024-06-06

## 2024-06-02 RX ORDER — NEBIVOLOL 10 MG/1
10 TABLET ORAL DAILY
COMMUNITY

## 2024-06-02 RX ORDER — TALC
6 POWDER (GRAM) TOPICAL NIGHTLY PRN
Status: DISCONTINUED | OUTPATIENT
Start: 2024-06-02 | End: 2024-06-05 | Stop reason: HOSPADM

## 2024-06-02 RX ORDER — CIPROFLOXACIN 500 MG/1
500 TABLET ORAL 2 TIMES DAILY
Status: ON HOLD | COMMUNITY
Start: 2024-05-30 | End: 2024-06-09 | Stop reason: HOSPADM

## 2024-06-02 RX ORDER — SODIUM CHLORIDE 0.9 % (FLUSH) 0.9 %
10 SYRINGE (ML) INJECTION
Status: DISCONTINUED | OUTPATIENT
Start: 2024-06-02 | End: 2024-06-05 | Stop reason: HOSPADM

## 2024-06-02 RX ORDER — LEVOTHYROXINE SODIUM 88 UG/1
88 TABLET ORAL
Status: ON HOLD | COMMUNITY
End: 2024-06-09 | Stop reason: HOSPADM

## 2024-06-02 RX ORDER — ATORVASTATIN CALCIUM 40 MG/1
40 TABLET, FILM COATED ORAL DAILY
COMMUNITY

## 2024-06-02 RX ORDER — METFORMIN HYDROCHLORIDE 500 MG/1
500 TABLET ORAL 2 TIMES DAILY WITH MEALS
COMMUNITY

## 2024-06-02 RX ORDER — CLOPIDOGREL BISULFATE 75 MG/1
75 TABLET ORAL DAILY
COMMUNITY

## 2024-06-02 RX ADMIN — CEFTRIAXONE SODIUM 1 G: 1 INJECTION, POWDER, FOR SOLUTION INTRAMUSCULAR; INTRAVENOUS at 07:06

## 2024-06-02 RX ADMIN — SODIUM CHLORIDE 500 ML: 9 INJECTION, SOLUTION INTRAVENOUS at 07:06

## 2024-06-02 NOTE — Clinical Note
Diagnosis: Urinary tract infection without hematuria, site unspecified [[id number]]   Future Attending Provider: NELSON ROMERO [[id number]]   Special Needs:: Fall Risk [15]

## 2024-06-03 PROBLEM — N30.00 ACUTE CYSTITIS WITHOUT HEMATURIA: Status: ACTIVE | Noted: 2024-06-03

## 2024-06-03 PROBLEM — N39.0 URINARY TRACT INFECTION WITHOUT HEMATURIA: Status: ACTIVE | Noted: 2024-06-03

## 2024-06-03 PROBLEM — Z16.12 ESBL (EXTENDED SPECTRUM BETA-LACTAMASE) PRODUCING BACTERIA INFECTION: Status: ACTIVE | Noted: 2024-06-03

## 2024-06-03 PROBLEM — A49.9 ESBL (EXTENDED SPECTRUM BETA-LACTAMASE) PRODUCING BACTERIA INFECTION: Status: ACTIVE | Noted: 2024-06-03

## 2024-06-03 LAB
ANION GAP SERPL CALCULATED.3IONS-SCNC: 14 MMOL/L (ref 7–16)
BASOPHILS # BLD AUTO: 0.03 K/UL (ref 0–0.2)
BASOPHILS NFR BLD AUTO: 0.3 % (ref 0–1)
BUN SERPL-MCNC: 28 MG/DL (ref 7–18)
BUN/CREAT SERPL: 17 (ref 6–20)
CALCIUM SERPL-MCNC: 9 MG/DL (ref 8.5–10.1)
CHLORIDE SERPL-SCNC: 101 MMOL/L (ref 98–107)
CO2 SERPL-SCNC: 25 MMOL/L (ref 21–32)
CREAT SERPL-MCNC: 1.65 MG/DL (ref 0.55–1.02)
DIFFERENTIAL METHOD BLD: ABNORMAL
EGFR (NO RACE VARIABLE) (RUSH/TITUS): 31 ML/MIN/1.73M2
EOSINOPHIL # BLD AUTO: 0.07 K/UL (ref 0–0.5)
EOSINOPHIL NFR BLD AUTO: 0.8 % (ref 1–4)
ERYTHROCYTE [DISTWIDTH] IN BLOOD BY AUTOMATED COUNT: 14.1 % (ref 11.5–14.5)
EST. AVERAGE GLUCOSE BLD GHB EST-MCNC: 166 MG/DL
GLUCOSE SERPL-MCNC: 126 MG/DL (ref 70–105)
GLUCOSE SERPL-MCNC: 131 MG/DL (ref 70–105)
GLUCOSE SERPL-MCNC: 164 MG/DL (ref 74–106)
GLUCOSE SERPL-MCNC: 173 MG/DL (ref 70–105)
HBA1C MFR BLD HPLC: 7.4 % (ref 4.5–6.6)
HCT VFR BLD AUTO: 41.8 % (ref 38–47)
HGB BLD-MCNC: 13.2 G/DL (ref 12–16)
LYMPHOCYTES # BLD AUTO: 1.72 K/UL (ref 1–4.8)
LYMPHOCYTES NFR BLD AUTO: 19.7 % (ref 27–41)
MCH RBC QN AUTO: 28.3 PG (ref 27–31)
MCHC RBC AUTO-ENTMCNC: 31.6 G/DL (ref 32–36)
MCV RBC AUTO: 89.5 FL (ref 80–96)
MONOCYTES # BLD AUTO: 0.81 K/UL (ref 0–0.8)
MONOCYTES NFR BLD AUTO: 9.3 % (ref 2–6)
MPC BLD CALC-MCNC: 9.7 FL (ref 9.4–12.4)
NEUTROPHILS # BLD AUTO: 6.1 K/UL (ref 1.8–7.7)
NEUTROPHILS NFR BLD AUTO: 69.9 % (ref 53–65)
PLATELET # BLD AUTO: 213 K/UL (ref 150–400)
POTASSIUM SERPL-SCNC: 4.2 MMOL/L (ref 3.5–5.1)
RBC # BLD AUTO: 4.67 M/UL (ref 4.2–5.4)
SODIUM SERPL-SCNC: 136 MMOL/L (ref 136–145)
WBC # BLD AUTO: 8.73 K/UL (ref 4.5–11)

## 2024-06-03 PROCEDURE — 85025 COMPLETE CBC W/AUTO DIFF WBC: CPT | Performed by: NURSE PRACTITIONER

## 2024-06-03 PROCEDURE — 36415 COLL VENOUS BLD VENIPUNCTURE: CPT | Performed by: NURSE PRACTITIONER

## 2024-06-03 PROCEDURE — 96366 THER/PROPH/DIAG IV INF ADDON: CPT

## 2024-06-03 PROCEDURE — 82962 GLUCOSE BLOOD TEST: CPT

## 2024-06-03 PROCEDURE — 80048 BASIC METABOLIC PNL TOTAL CA: CPT | Performed by: NURSE PRACTITIONER

## 2024-06-03 PROCEDURE — 83036 HEMOGLOBIN GLYCOSYLATED A1C: CPT | Performed by: NURSE PRACTITIONER

## 2024-06-03 PROCEDURE — 25000003 PHARM REV CODE 250: Performed by: HOSPITALIST

## 2024-06-03 PROCEDURE — 63600175 PHARM REV CODE 636 W HCPCS: Performed by: NURSE PRACTITIONER

## 2024-06-03 PROCEDURE — G0378 HOSPITAL OBSERVATION PER HR: HCPCS

## 2024-06-03 PROCEDURE — 11000001 HC ACUTE MED/SURG PRIVATE ROOM

## 2024-06-03 PROCEDURE — 25000003 PHARM REV CODE 250: Performed by: NURSE PRACTITIONER

## 2024-06-03 PROCEDURE — 99222 1ST HOSP IP/OBS MODERATE 55: CPT | Mod: AI,,, | Performed by: HOSPITALIST

## 2024-06-03 PROCEDURE — 63600175 PHARM REV CODE 636 W HCPCS: Performed by: HOSPITALIST

## 2024-06-03 RX ORDER — SODIUM CHLORIDE 9 MG/ML
INJECTION, SOLUTION INTRAVENOUS
Status: DISCONTINUED | OUTPATIENT
Start: 2024-06-03 | End: 2024-06-05 | Stop reason: HOSPADM

## 2024-06-03 RX ORDER — MUPIROCIN 20 MG/G
OINTMENT TOPICAL 2 TIMES DAILY
Status: DISCONTINUED | OUTPATIENT
Start: 2024-06-03 | End: 2024-06-05 | Stop reason: HOSPADM

## 2024-06-03 RX ORDER — LEVOTHYROXINE SODIUM 88 UG/1
88 TABLET ORAL
Status: DISCONTINUED | OUTPATIENT
Start: 2024-06-03 | End: 2024-06-05 | Stop reason: HOSPADM

## 2024-06-03 RX ORDER — ATORVASTATIN CALCIUM 40 MG/1
40 TABLET, FILM COATED ORAL DAILY
Status: DISCONTINUED | OUTPATIENT
Start: 2024-06-03 | End: 2024-06-03

## 2024-06-03 RX ORDER — METOPROLOL TARTRATE 50 MG/1
50 TABLET ORAL 2 TIMES DAILY
Status: DISCONTINUED | OUTPATIENT
Start: 2024-06-03 | End: 2024-06-05 | Stop reason: HOSPADM

## 2024-06-03 RX ORDER — CLOPIDOGREL BISULFATE 75 MG/1
75 TABLET ORAL DAILY
Status: DISCONTINUED | OUTPATIENT
Start: 2024-06-03 | End: 2024-06-05 | Stop reason: HOSPADM

## 2024-06-03 RX ADMIN — LEVOTHYROXINE SODIUM 88 MCG: 88 TABLET ORAL at 05:06

## 2024-06-03 RX ADMIN — MEROPENEM 1 G: 1 INJECTION, POWDER, FOR SOLUTION INTRAVENOUS at 12:06

## 2024-06-03 RX ADMIN — CLOPIDOGREL BISULFATE 75 MG: 75 TABLET, FILM COATED ORAL at 09:06

## 2024-06-03 RX ADMIN — SODIUM CHLORIDE: 900 INJECTION INTRAVENOUS at 12:06

## 2024-06-03 RX ADMIN — SODIUM CHLORIDE: 900 INJECTION INTRAVENOUS at 11:06

## 2024-06-03 RX ADMIN — METOPROLOL TARTRATE 50 MG: 50 TABLET, FILM COATED ORAL at 09:06

## 2024-06-03 RX ADMIN — METOPROLOL TARTRATE 50 MG: 50 TABLET, FILM COATED ORAL at 08:06

## 2024-06-03 RX ADMIN — ATORVASTATIN CALCIUM 40 MG: 40 TABLET, FILM COATED ORAL at 09:06

## 2024-06-03 RX ADMIN — CEFTRIAXONE SODIUM 1 G: 1 INJECTION, POWDER, FOR SOLUTION INTRAMUSCULAR; INTRAVENOUS at 09:06

## 2024-06-03 RX ADMIN — SODIUM CHLORIDE: 900 INJECTION INTRAVENOUS at 09:06

## 2024-06-03 RX ADMIN — MUPIROCIN: 20 OINTMENT TOPICAL at 08:06

## 2024-06-03 NOTE — PLAN OF CARE
[facility] Medical Surgical Unit  Initial Discharge Assessment       Primary Care Provider: Daryl Moscoso NP    Admission Diagnosis: Urinary tract infection without hematuria, site unspecified [N39.0]    Admission Date: 6/2/2024  Expected Discharge Date: 6/4/2024    Transition of Care Barriers: None    Payor: MEDICARE / Plan: MEDICARE PART A & B / Product Type: Government /     Extended Emergency Contact Information  Primary Emergency Contact: Mikki Gallardo  Mobile Phone: [phone]  Relation: Daughter  Preferred language: English   needed? No  Secondary Emergency Contact: sadie guevara  Mobile Phone: [phone]  Relation: Son  Preferred language: English   needed? No    Discharge Plan A: [facility] - [place], [place] - [address]  [address] [place] [address]  Phone: [phone] Fax: [phone]    [facility] [address] [place] [address] - [address]  [address]  [place] [address]  Phone: [phone] Fax: [phone]      Initial Assessment (most recent)       Adult Discharge Assessment - 06/03/24 1331          Discharge Assessment    Assessment Type Discharge Planning Assessment     Confirmed/corrected address, phone number and insurance Yes     Source of Information patient;family     When was your last doctors appointment? --   May 2024    Communicated NIGEL with patient/caregiver Date not available/Unable to determine     Reason For Admission IV abx     People in Home alone     Do you expect to return to your current living situation? Yes     Do you have help at home or someone to help you manage your care at home? Yes     Who are your caregiver(s) and their phone number(s)? Mikki     Prior to hospitilization cognitive status: Alert/Oriented     Current cognitive status: Alert/Oriented     Walking or Climbing Stairs Difficulty yes     Walking or Climbing Stairs ambulation difficulty, requires equipment      Dressing/Bathing Difficulty yes     Dressing/Bathing dressing difficulty, assistance 1 person;bathing difficulty, assistance 1 person     Home Accessibility wheelchair accessible     Equipment Currently Used at Home walker, standard;wheelchair     Readmission within 30 days? No     Patient currently being followed by outpatient case management? No     Do you currently have service(s) that help you manage your care at home? Yes     Is the pt/caregiver preference to resume services with current agency Yes     Do you take prescription medications? Yes     Do you have prescription coverage? Yes     Do you have any problems affording any of your prescribed medications? No     Is the patient taking medications as prescribed? yes     Who is going to help you get home at discharge? Mikki     How do you get to doctors appointments? family or friend will provide     Are you on dialysis? No     Do you take coumadin? No     Discharge Plan A Home Health     DME Needed Upon Discharge  none     Discharge Plan discussed with: Patient;Adult children     Transition of Care Barriers None        Physical Activity    On average, how many days per week do you engage in moderate to strenuous exercise (like a brisk walk)? 0 days     On average, how many minutes do you engage in exercise at this level? 0 min        Financial Resource Strain    How hard is it for you to pay for the very basics like food, housing, medical care, and heating? Not hard at all        Housing Stability    In the last 12 months, was there a time when you were not able to pay the mortgage or rent on time? No     At any time in the past 12 months, were you homeless or living in a shelter (including now)? No        Transportation Needs    Has the lack of transportation kept you from medical appointments, meetings, work or from getting things needed for daily living? No        Food Insecurity    Within the past 12 months, you worried that your food would run out before  you got the money to buy more. Never true     Within the past 12 months, the food you bought just didn't last and you didn't have money to get more. Never true        Stress    Do you feel stress - tense, restless, nervous, or anxious, or unable to sleep at night because your mind is troubled all the time - these days? Not at all        Social Isolation    How often do you feel lonely or isolated from those around you?  Never        Alcohol Use    Q1: How often do you have a drink containing alcohol? Never     Q2: How many drinks containing alcohol do you have on a typical day when you are drinking? Patient does not drink     Q3: How often do you have six or more drinks on one occasion? Never        Utilities    In the past 12 months has the electric, gas, oil, or water company threatened to shut off services in your home? No        Health Literacy    How often do you need to have someone help you when you read instructions, pamphlets, or other written material from your doctor or pharmacy? Often                      Patient admitted for medical treatment of UTI. She reports she lives with her daughter and she helps care for her. She is followed by kristian Estrella in [place]. Will continue to follow for dc needs.

## 2024-06-03 NOTE — H&P
[facility] Medical Surgical [facility] Medicine  History & Physical    Patient Name: Michell Dhillon  MRN: [id number]  Patient Class: OP- Observation  Admission Date: 6/2/2024  Attending Physician: Elmer Rainey DO   Primary Care Provider: Daryl Moscoso NP         Patient information was obtained from patient, past medical records, and ER records.     Subjective:     Principal Problem:Urinary tract infection without hematuria    Chief Complaint:   Chief Complaint   Patient presents with    Dysuria     Pt being treated for uti. C/o pressure lower abd.          HPI: Ms Dhillon is a 79 yo WF with PMH of DM, HTN prev CVA, anxiety/depression, and CKD Stage 3- she saw Dr. Grande her nephrologist 4/23. She reports having UTI and has taken a round of macrobid, levaquin and is currently taking cipro since 4/29 with no relief.  The C&S report from 5/30 shows sensitivity to nitrofurantion but resistance to levofloxacin. She presents today with continued burning with urination, suprapubic fullness, fatigue and feeling worse.  She is found to have nitrite positive UTI. After discussing the POC with Dr. Hedrick she is admitted to OBS and placed on rocephin    Past Medical History:   Diagnosis Date    Arthritis     Diabetes mellitus, type 2     Hyperlipidemia     Hypertension     Hypothyroidism     Transient cerebral ischemic attack, unspecified 04/14/2021       History reviewed. No pertinent surgical history.    Review of patient's allergies indicates:   Allergen Reactions    Bactrim [sulfamethoxazole-trimethoprim]     Norvasc [amlodipine]     Omnicef [cefdinir]     Penicillin        No current facility-administered medications on file prior to encounter.     Current Outpatient Medications on File Prior to Encounter   Medication Sig    atorvastatin (LIPITOR) 40 MG tablet Take 40 mg by mouth once daily.    cetirizine (ZYRTEC) 10 MG tablet Take 10 mg by mouth daily as needed.    ciprofloxacin HCl (CIPRO) 500  MG tablet Take 500 mg by mouth 2 (two) times daily.    clopidogreL (PLAVIX) 75 mg tablet Take 75 mg by mouth once daily.    fenofibrate (TRICOR) 145 MG tablet Take 1 tablet (145 mg total) by mouth once daily.    levothyroxine (SYNTHROID) 88 MCG tablet Take 88 mcg by mouth before breakfast.    metFORMIN (GLUCOPHAGE) 500 MG tablet Take 500 mg by mouth 2 (two) times daily with meals.    nebivoloL (BYSTOLIC) 10 MG Tab Take 10 mg by mouth once daily.    olmesartan-hydrochlorothiazide (BENICAR HCT) 40-25 mg per tablet Take 1 tablet by mouth once daily.    aspirin 81 MG Chew Take 81 mg by mouth once daily.    chlorproMAZINE (THORAZINE) 25 MG tablet Take 25 mg by mouth daily as needed.    furosemide (LASIX) 40 MG tablet Take 40 mg by mouth 2 (two) times daily as needed.     Family History       Problem Relation (Age of Onset)    Hypertension Mother, Father, Sister          Tobacco Use    Smoking status: Never    Smokeless tobacco: Never   Substance and Sexual Activity    Alcohol use: Never    Drug use: Never    Sexual activity: Not on file     Review of Systems   Constitutional:  Negative for appetite change, chills and fever.   Respiratory:  Negative for cough, shortness of breath and wheezing.    Cardiovascular:  Negative for chest pain, palpitations and leg swelling.   Gastrointestinal:  Negative for abdominal distention, diarrhea, nausea and vomiting.   Genitourinary:  Negative for dysuria.   Skin:  Negative for rash.   Neurological:  Positive for weakness. Negative for dizziness, seizures and syncope.   Psychiatric/Behavioral:  Negative for agitation, behavioral problems and confusion.    All other systems reviewed and are negative.    Objective:     Vital Signs (Most Recent):  Temp: 97.4 °F (36.3 °C) (06/03/24 0714)  Pulse: 78 (06/03/24 0714)  Resp: 18 (06/03/24 0714)  BP: 131/79 (06/03/24 0714)  SpO2: (!) 94 % (06/03/24 0714) Vital Signs (24h Range):  Temp:  [97.4 °F (36.3 °C)-98.7 °F (37.1 °C)] 97.4 °F (36.3  °C)  Pulse:  [] 78  Resp:  [18-20] 18  SpO2:  [92 %-96 %] 94 %  BP: (123-173)/(79-84) 131/79     Weight: 44.8 kg (98 lb 12.3 oz)  Body mass index is 15.47 kg/m².     Physical Exam  Vitals reviewed.   Constitutional:       General: She is not in acute distress.     Appearance: Normal appearance.   HENT:      Head: Normocephalic and atraumatic.   Eyes:      General: No scleral icterus.     Extraocular Movements: Extraocular movements intact.      Conjunctiva/sclera: Conjunctivae normal.      Pupils: Pupils are equal, round, and reactive to light.   Cardiovascular:      Rate and Rhythm: Normal rate and regular rhythm.      Heart sounds: No murmur heard.     No friction rub. No gallop.   Pulmonary:      Effort: Pulmonary effort is normal. No respiratory distress.      Breath sounds: Normal breath sounds. No wheezing or rales.   Abdominal:      General: Abdomen is flat. Bowel sounds are normal. There is no distension.      Palpations: Abdomen is soft.      Tenderness: There is no abdominal tenderness. There is no guarding.   Musculoskeletal:         General: No swelling.      Right lower leg: No edema.      Left lower leg: No edema.   Skin:     General: Skin is warm and dry.      Coloration: Skin is not jaundiced.      Findings: No rash.   Neurological:      General: No focal deficit present.      Mental Status: She is alert and oriented to person, place, and time.      Sensory: No sensory deficit.      Motor: No weakness.   Psychiatric:         Mood and Affect: Mood normal.         Thought Content: Thought content normal.         Judgment: Judgment normal.              CRANIAL NERVES     CN III, IV, VI   Pupils are equal, round, and reactive to light.       Significant Labs: All pertinent labs within the past 24 hours have been reviewed.  Recent Lab Results  (Last 5 results in the past 24 hours)        06/03/24  1115   06/03/24  1013   06/03/24  0521   06/02/24  1939   06/02/24  1915        Albumin/Globulin Ratio          0.8       Albumin         3.6       ALP         67       ALT         21       Anion Gap     14     15       Appearance, UA         Cloudy       AST         29       Bacteria, UA         Many       Baso #     0.03     0.06       Basophil %     0.3     0.5       Bilirubin (UA)         Negative       BILIRUBIN TOTAL         0.7       BUN     28     37       BUN/CREAT RATIO     17     19       Calcium     9.0     9.3       Chloride     101     97       CO2     25     25       Color, UA         Yellow       Creatinine     1.65     1.90       Differential Method     Auto     Auto       eGFR     31     26       Eos #     0.07     0.10       Eos %     0.8     0.9       Estimated Avg Glucose   166             Globulin, Total         4.4       Glucose     164     151       Glucose, UA         Negative       Hematocrit     41.8     42.8       Hemoglobin     13.2     13.4       Hemoglobin A1C External   7.4  Comment:   Normal:               <5.7%  Pre-Diabetic:       5.7% to 6.4%  Diabetic:             >6.4%  Diabetic Goal:     <7%             Ketones, UA         Negative       Lactic Acid Level       1.4         Leukocyte Esterase, UA         Moderate       Lymph #     1.72     2.30       Lymph %     19.7     20.8       MCH     28.3     28.3       MCHC     31.6     31.3       MCV     89.5     90.3       Mono #     0.81     1.02       Mono %     9.3     9.2       MPV     9.7     9.2       Neutrophils, Abs     6.10     7.60       Neutrophils Relative     69.9     68.6       NITRITE UA         Positive       Blood, UA         Small       pH, UA         6.0       Platelet Count     213     232       POC Glucose 126               Potassium     4.2     4.9       PROTEIN TOTAL         8.0       Protein, UA         30       RBC     4.67     4.74       RBC, UA         None Seen       RDW     14.1     14.3       Sodium     136     132       Spec Grav UA         1.015       Squam Epithel, UA         None Seen       UROBILINOGEN UA  "        0.2       WBC, UA         Too Numerous To Count       WBC     8.73     11.08                              Significant Imaging: I have reviewed all pertinent imaging results/findings within the past 24 hours.  Assessment/Plan:     * Urinary tract infection without hematuria  Rocephin IV       Acute cystitis without hematuria  Hx of ESBL in past.  Covering for that now.  Will follow cultures.       ESBL (extended spectrum beta-lactamase) producing bacteria infection  Per previous cultures.  Will cover with [facility] until C&S is resulted here.       Hypothyroidism  Resume meds        Type 2 diabetes mellitus without complication, without long-term current use of insulin  Patient's FSGs are uncontrolled due to hyperglycemia on current medication regimen.  Last A1c reviewed-   Lab Results   Component Value Date    HGBA1C 6.6 03/08/2023     Most recent fingerstick glucose reviewed- No results for input(s): "POCTGLUCOSE" in the last 24 hours.  Current correctional scale  Low  Increase anti-hyperglycemic dose as follows-   Antihyperglycemics (From admission, onward)      Start     Stop Route Frequency Ordered    06/02/24 2112  insulin aspart U-100 injection 0-5 Units         -- SubQ Before meals & nightly PRN 06/02/24 2035          Hold Oral hypoglycemics while patient is in the hospital.    Benign essential HTN  Chronic, controlled. Latest blood pressure and vitals reviewed-     Temp:  [98.2 °F (36.8 °C)-98.7 °F (37.1 °C)]   Pulse:  []   Resp:  [18-20]   BP: (123-173)/(81-84)   SpO2:  [92 %-96 %] .   Home meds for hypertension were reviewed and noted below.   Hypertension Medications               furosemide (LASIX) 40 MG tablet Take 40 mg by mouth 2 (two) times daily as needed.    nebivoloL (BYSTOLIC) 10 MG Tab Take 10 mg by mouth once daily.    olmesartan-hydrochlorothiazide (BENICAR HCT) 40-25 mg per tablet Take 1 tablet by mouth once daily.            While in the hospital, will manage blood pressure as " follows; Continue home antihypertensive regimen          VTE Risk Mitigation (From admission, onward)           Ordered     IP VTE HIGH RISK PATIENT  Once         06/02/24 2035     Place sequential compression device  Until discontinued         06/02/24 2035                       On 06/03/2024, patient should be placed in hospital observation services under my care.             Elmer Rainey DO  Department of Hospital Medicine  [facility] Medical [facility] [place]

## 2024-06-03 NOTE — ED NOTES
IN AND OUT CATH DONE USING STERILE TECHNIQUE PER PB STOKES RN.  SPECIMEN TO LAB. PT TOLERATED WELL.

## 2024-06-03 NOTE — ED NOTES
Report called to [facility] on Med/Surg Unit.  Pt going to room #15.  Med Req being done with RX Tech at [facility].

## 2024-06-03 NOTE — ED PROVIDER NOTES
Encounter Date: 6/2/2024       History     Chief Complaint   Patient presents with    Dysuria     Pt being treated for uti. C/o pressure lower abd.       Ms Dhillon is a 79 yo WF with PMH of DM, HTN prev CVA, anxiety/depression, and CKD Stage 3- she saw Dr. Grande her nephrologist 4/23.  She reports having UTI and taking abx since 4/29 with no relief. She is currently taking Cipro - however noted C&S report on hx shows a resistance to levofloxacin.  She presents today with continued burning with urination, suprapubic fullness, fatigue and feeling bad.  Her son reports she acts like she feels worse.    The history is provided by the patient.   Dysuria   This is a recurrent problem. The current episode started several weeks ago. The problem occurs every urination. The problem has been unchanged. Associated symptoms include frequency and urgency. Pertinent negatives include no chills. She has tried antibiotics for the symptoms.     Review of patient's allergies indicates:   Allergen Reactions    Bactrim [sulfamethoxazole-trimethoprim]     Norvasc [amlodipine]     Omnicef [cefdinir]     Penicillin      Past Medical History:   Diagnosis Date    Arthritis     Diabetes mellitus, type 2     Hyperlipidemia     Hypertension     Hypothyroidism     Transient cerebral ischemic attack, unspecified 04/14/2021     History reviewed. No pertinent surgical history.  Family History   Problem Relation Name Age of Onset    Hypertension Mother      Hypertension Father      Hypertension Sister       Social History     Tobacco Use    Smoking status: Never    Smokeless tobacco: Never   Substance Use Topics    Alcohol use: Never    Drug use: Never     Review of Systems   Constitutional:  Positive for fatigue. Negative for chills.   Respiratory: Negative.     Cardiovascular: Negative.    Genitourinary:  Positive for dysuria, frequency and urgency.   Skin: Negative.        Physical Exam     Initial Vitals [06/02/24 1852]   BP Pulse Resp Temp  SpO2   (!) 173/81 102 18 98.7 °F (37.1 °C) (!) 94 %      MAP       --         Physical Exam    Nursing note and vitals reviewed.  Constitutional: She appears well-developed and well-nourished.   Neck: Neck supple.   Cardiovascular:  Normal rate and regular rhythm.           Pulmonary/Chest: Breath sounds normal.   Abdominal: Abdomen is soft.   Musculoskeletal:      Cervical back: Neck supple.     Neurological: She is alert. GCS score is 15. GCS eye subscore is 4. GCS verbal subscore is 5. GCS motor subscore is 6.   Skin: Skin is warm and dry.         Medical Screening Exam   See Full Note    ED Course   Procedures  Labs Reviewed   URINALYSIS, REFLEX TO URINE CULTURE - Abnormal; Notable for the following components:       Result Value    Leukocytes, UA Moderate (*)     Nitrites, UA Positive (*)     Protein, UA 30 (*)     Blood, UA Small (*)     All other components within normal limits   COMPREHENSIVE METABOLIC PANEL - Abnormal; Notable for the following components:    Sodium 132 (*)     Chloride 97 (*)     Glucose 151 (*)     BUN 37 (*)     Creatinine 1.90 (*)     Globulin 4.4 (*)     eGFR 26 (*)     All other components within normal limits   CBC WITH DIFFERENTIAL - Abnormal; Notable for the following components:    WBC 11.08 (*)     MCHC 31.3 (*)     MPV 9.2 (*)     Neutrophils % 68.6 (*)     Lymphocytes % 20.8 (*)     Monocytes % 9.2 (*)     Eosinophils % 0.9 (*)     Monocytes, Absolute 1.02 (*)     All other components within normal limits   URINALYSIS, MICROSCOPIC - Abnormal; Notable for the following components:    WBC, UA Too Numerous To Count (*)     Bacteria, UA Many (*)     All other components within normal limits   LACTIC ACID, PLASMA - Normal   CULTURE, URINE   CBC W/ AUTO DIFFERENTIAL    Narrative:     The following orders were created for panel order CBC Auto Differential.  Procedure                               Abnormality         Status                     ---------                                -----------         ------                     CBC with Differential[[id number]]       Abnormal            Final result               Manual Differential[[id number]]                                                          Please view results for these tests on the individual orders.          Imaging Results    None          Medications   sodium chloride 0.9% flush 10 mL (has no administration in time range)   melatonin tablet 6 mg (has no administration in time range)   cefTRIAXone (Rocephin) 1 g in dextrose 5 % in water (D5W) 100 mL IVPB (MB+) (has no administration in time range)   acetaminophen tablet 650 mg (has no administration in time range)   glucose chewable tablet 16 g (has no administration in time range)   glucose chewable tablet 24 g (has no administration in time range)   dextrose 50% injection 12.5 g (has no administration in time range)   dextrose 50% injection 25 g (has no administration in time range)   glucagon (human recombinant) injection 1 mg (has no administration in time range)   insulin aspart U-100 injection 0-5 Units (has no administration in time range)   sodium chloride 0.9% bolus 500 mL 500 mL (0 mLs Intravenous Stopped 6/2/24 2017)   cefTRIAXone (Rocephin) 1 g in dextrose 5 % in water (D5W) 100 mL IVPB (MB+) (0 g Intravenous Stopped 6/2/24 2012)     Medical Decision Making  Ms Dhillon is a 79 yo WF with PMH of DM, HTN prev CVA, anxiety/depression, and CKD Stage 3- she saw Dr. Grande her nephrologist 4/23.  She reports having UTI and taking abx since 4/29 with no relief. She is currently taking Cipro - however noted C&S report on hx shows a resistance to levofloxacin.  She presents today with continued burning with urination, suprapubic fullness, fatigue and feeling bad.  Her son reports she acts like she feels worse.      Amount and/or Complexity of Data Reviewed  Labs: ordered.     Details: Nitrite pos UTI despite having had macrobid, levaquin now on cipro.  Culture from 5/29 shows  sensitive to nitrofurantoin but resistant to levafloxacin.  Mild elevation of WBC, BUN/ Creat similar to previous findings - pt with Stage 3 CKD - followed by [facility].    Discussion of management or test interpretation with external provider(s): Discussed pt status and POC with Dr. Hedrick who agrees with plan to admit.     Risk  OTC drugs.  Prescription drug management.               ED Course as of 06/02/24 2239   Sun Jun 02, 2024 1938 Discussed pt status and pOC with DR. Hedrick who agrees with plan to admit [CG]      ED Course User Index  [CG] Brandie Bajwa FNP                           Clinical Impression:   Final diagnoses:  [N39.0] Urinary tract infection without hematuria, site unspecified (Primary)        ED Disposition Condition    Observation Stable                Brandie Bajwa FNP  06/02/24 2239

## 2024-06-03 NOTE — ASSESSMENT & PLAN NOTE
Chronic, controlled. Latest blood pressure and vitals reviewed-     Temp:  [98.2 °F (36.8 °C)-98.7 °F (37.1 °C)]   Pulse:  []   Resp:  [18-20]   BP: (123-173)/(81-84)   SpO2:  [92 %-96 %] .   Home meds for hypertension were reviewed and noted below.   Hypertension Medications               furosemide (LASIX) 40 MG tablet Take 40 mg by mouth 2 (two) times daily as needed.    nebivoloL (BYSTOLIC) 10 MG Tab Take 10 mg by mouth once daily.    olmesartan-hydrochlorothiazide (BENICAR HCT) 40-25 mg per tablet Take 1 tablet by mouth once daily.            While in the hospital, will manage blood pressure as follows; Continue home antihypertensive regimen

## 2024-06-03 NOTE — HOSPITAL COURSE
Called by RN that pt having urinary retention.  Robb panel ordered.    6/4 Cultures still pending.  Hx of ESBL.  Failed outpatient treatment.     6/05 pt NA is fluctuating since admit.  This morning down to 115, discussed with Dr Bellamy, Intensivist at [facility], who accepts for transfer.     06/05 spoke with [facility] who reports pt is accepted to ICU 4 by Dr. Bellamy.  Pt and daughter agree with plan to transfer

## 2024-06-03 NOTE — ASSESSMENT & PLAN NOTE
"Patient's FSGs are uncontrolled due to hyperglycemia on current medication regimen.  Last A1c reviewed-   Lab Results   Component Value Date    HGBA1C 6.6 03/08/2023     Most recent fingerstick glucose reviewed- No results for input(s): "POCTGLUCOSE" in the last 24 hours.  Current correctional scale  Low  Increase anti-hyperglycemic dose as follows-   Antihyperglycemics (From admission, onward)      Start     Stop Route Frequency Ordered    06/02/24 2112  insulin aspart U-100 injection 0-5 Units         -- SubQ Before meals & nightly PRN 06/02/24 2035          Hold Oral hypoglycemics while patient is in the hospital.  "

## 2024-06-03 NOTE — PLAN OF CARE
Problem: Adult Inpatient Plan of Care  Goal: Plan of Care Review  Outcome: Progressing  Goal: Patient-Specific Goal (Individualized)  Outcome: Progressing  Goal: Absence of Hospital-Acquired Illness or Injury  Outcome: Progressing  Goal: Optimal Comfort and Wellbeing  Outcome: Progressing  Goal: Readiness for Transition of Care  Outcome: Progressing     Problem: UTI (Urinary Tract Infection)  Goal: Improved Infection Symptoms  Outcome: Progressing

## 2024-06-03 NOTE — PLAN OF CARE
Problem: Adult Inpatient Plan of Care  Goal: Plan of Care Review  Outcome: Progressing  Goal: Patient-Specific Goal (Individualized)  Outcome: Progressing  Goal: Absence of Hospital-Acquired Illness or Injury  Outcome: Progressing  Goal: Optimal Comfort and Wellbeing  Outcome: Progressing  Goal: Readiness for Transition of Care  Outcome: Progressing     Problem: Diabetes Comorbidity  Goal: Blood Glucose Level Within Targeted Range  Outcome: Progressing     Problem: Fall Injury Risk  Goal: Absence of Fall and Fall-Related Injury  Outcome: Progressing     Problem: UTI (Urinary Tract Infection)  Goal: Improved Infection Symptoms  Outcome: Progressing

## 2024-06-03 NOTE — PLAN OF CARE
Problem: Diabetes Comorbidity  Goal: Blood Glucose Level Within Targeted Range  Outcome: Progressing     Problem: UTI (Urinary Tract Infection)  Goal: Improved Infection Symptoms  Outcome: Progressing

## 2024-06-03 NOTE — HPI
Ms Dhillon is a 77 yo WF with PMH of DM, HTN prev CVA, anxiety/depression, and CKD Stage 3- she saw Dr. Grande her nephrologist 4/23. She reports having UTI and has taken a round of macrobid, levaquin and is currently taking cipro since 4/29 with no relief.  The C&S report from 5/30 shows sensitivity to nitrofurantion but resistance to levofloxacin. She presents today with continued burning with urination, suprapubic fullness, fatigue and feeling worse.  She is found to have nitrite positive UTI. After discussing the POC with Dr. Hedrick she is admitted to [facility] and placed on rocephin

## 2024-06-03 NOTE — PHARMACY MED REC
"Admission Medication History     The home medication history was taken by Lizeth Moreno.    You may go to "Admission" then "Reconcile Home Medications" tabs to review and/or act upon these items.     The home medication list has been updated by the Pharmacy department.   Please read ALL comments highlighted in yellow.   Please address this information as you see fit.    Feel free to contact us if you have any questions or require assistance.  Patient stated she takes her Furosemide 40 mg hardly ever, only when needed.  Patient's daughter mentioned that she was uncertain if the patient had taken her medications today, given that her mother was confused.      The medications listed below were removed from the home medication list. Please reorder if appropriate:  Patient reports no longer taking the following medication(s):  Aspirin 81 mg    Medications listed below were obtained from: Patient/family and Analytic software- [employer]  (Not in a hospital admission)        Current Outpatient Medications on File Prior to Encounter   Medication Sig Dispense Refill Last Dose    atorvastatin (LIPITOR) 40 MG tablet Take 40 mg by mouth once daily.   6/2/2024    cetirizine (ZYRTEC) 10 MG tablet Take 10 mg by mouth daily as needed.   Past Week    ciprofloxacin HCl (CIPRO) 500 MG tablet Take 500 mg by mouth 2 (two) times daily.   Past Week    clopidogreL (PLAVIX) 75 mg tablet Take 75 mg by mouth once daily.   Past Week    fenofibrate (TRICOR) 145 MG tablet Take 1 tablet (145 mg total) by mouth once daily. 90 tablet 0 Past Week    levothyroxine (SYNTHROID) 88 MCG tablet Take 88 mcg by mouth before breakfast.   Past Week    metFORMIN (GLUCOPHAGE) 500 MG tablet Take 500 mg by mouth 2 (two) times daily with meals.   Past Week    nebivoloL (BYSTOLIC) 10 MG Tab Take 10 mg by mouth once daily.   Past Week    olmesartan-hydrochlorothiazide (BENICAR HCT) 40-25 mg per tablet Take 1 tablet by mouth once daily.   Past Week    aspirin 81 MG Chew " Take 81 mg by mouth once daily.       chlorproMAZINE (THORAZINE) 25 MG tablet Take 25 mg by mouth daily as needed.   More than a month    furosemide (LASIX) 40 MG tablet Take 40 mg by mouth 2 (two) times daily as needed.   Unknown    [DISCONTINUED] atorvastatin (LIPITOR) 40 MG tablet TAKE 1 TABLET ONCE DAILY FOR CHOLESTEROL..... 90 tablet 0     [DISCONTINUED] chlorproMAZINE (THORAZINE) 25 MG tablet Take 1 tablet (25 mg total) by mouth daily as needed (anxiety). 30 tablet 1     [DISCONTINUED] clopidogreL (PLAVIX) 75 mg tablet Take 1 tablet (75 mg total) by mouth once daily. 90 tablet 0     [DISCONTINUED] famotidine (PEPCID) 20 MG tablet Take 1 tablet (20 mg total) by mouth once daily. 30 tablet 0     [DISCONTINUED] hydroCHLOROthiazide (HYDRODIURIL) 25 MG tablet Take 25 mg by mouth once daily.       [DISCONTINUED] levothyroxine (SYNTHROID) 75 MCG tablet Take 1 tablet (75 mcg total) by mouth before breakfast. (Patient taking differently: Take 88 mcg by mouth before breakfast.) 90 tablet 0     [DISCONTINUED] metFORMIN (GLUCOPHAGE) 500 MG tablet Take 1 tablet (500 mg total) by mouth 2 (two) times daily with meals. (Patient not taking: Reported on 8/8/2023) 360 tablet 0     [DISCONTINUED] nebivoloL (BYSTOLIC) 10 MG Tab Take 1 tablet (10 mg total) by mouth once daily. 90 tablet 0     [DISCONTINUED] olmesartan (BENICAR) 40 MG tablet Take 1 tablet (40 mg total) by mouth once daily. 90 tablet 3          VIRTUAL TELENOTE    Start time:8:05 PM  Chief complaint: N/A  The patient location is: [facility]  The patient arrived at: N/A  Present with the patient at the time of the telemed/virtual assessment:Patient's daughter             End time:  8:15 PM    Total time spent with patient: 10 minutes    The attending portion of this evaluation, treatment, and documentation was performed per Lizeth Moreno via Telemedicine AudioVisual using the secure [employer] software platform with 2 way audio/video. The provider was located off-site  and the patient is located in the hospital. The aforementioned video software was utilized to document the relevant history and physical exam.      Potential issues to be addressed PRIOR TO DISCHARGE  Patient reported not taking the following medications: (Aspirin 81 mg). These medications remain on the home medication list. Please address accordingly.     Lizeth Moreno  [facility] Specialist - Medication History  EXT. [id number]        .

## 2024-06-03 NOTE — SUBJECTIVE & OBJECTIVE
Past Medical History:   Diagnosis Date    Arthritis     Diabetes mellitus, type 2     Hyperlipidemia     Hypertension     Hypothyroidism     Transient cerebral ischemic attack, unspecified 04/14/2021       History reviewed. No pertinent surgical history.    Review of patient's allergies indicates:   Allergen Reactions    Bactrim [sulfamethoxazole-trimethoprim]     Norvasc [amlodipine]     Omnicef [cefdinir]     Penicillin        No current facility-administered medications on file prior to encounter.     Current Outpatient Medications on File Prior to Encounter   Medication Sig    atorvastatin (LIPITOR) 40 MG tablet Take 40 mg by mouth once daily.    cetirizine (ZYRTEC) 10 MG tablet Take 10 mg by mouth daily as needed.    ciprofloxacin HCl (CIPRO) 500 MG tablet Take 500 mg by mouth 2 (two) times daily.    clopidogreL (PLAVIX) 75 mg tablet Take 75 mg by mouth once daily.    fenofibrate (TRICOR) 145 MG tablet Take 1 tablet (145 mg total) by mouth once daily.    levothyroxine (SYNTHROID) 88 MCG tablet Take 88 mcg by mouth before breakfast.    metFORMIN (GLUCOPHAGE) 500 MG tablet Take 500 mg by mouth 2 (two) times daily with meals.    nebivoloL (BYSTOLIC) 10 MG Tab Take 10 mg by mouth once daily.    olmesartan-hydrochlorothiazide (BENICAR HCT) 40-25 mg per tablet Take 1 tablet by mouth once daily.    aspirin 81 MG Chew Take 81 mg by mouth once daily.    chlorproMAZINE (THORAZINE) 25 MG tablet Take 25 mg by mouth daily as needed.    furosemide (LASIX) 40 MG tablet Take 40 mg by mouth 2 (two) times daily as needed.     Family History       Problem Relation (Age of Onset)    Hypertension Mother, Father, Sister          Tobacco Use    Smoking status: Never    Smokeless tobacco: Never   Substance and Sexual Activity    Alcohol use: Never    Drug use: Never    Sexual activity: Not on file     Review of Systems   Constitutional:  Negative for appetite change, chills and fever.   Respiratory:  Negative for cough, shortness of  breath and wheezing.    Cardiovascular:  Negative for chest pain, palpitations and leg swelling.   Gastrointestinal:  Negative for abdominal distention, diarrhea, nausea and vomiting.   Genitourinary:  Negative for dysuria.   Skin:  Negative for rash.   Neurological:  Positive for weakness. Negative for dizziness, seizures and syncope.   Psychiatric/Behavioral:  Negative for agitation, behavioral problems and confusion.    All other systems reviewed and are negative.    Objective:     Vital Signs (Most Recent):  Temp: 97.4 °F (36.3 °C) (06/03/24 0714)  Pulse: 78 (06/03/24 0714)  Resp: 18 (06/03/24 0714)  BP: 131/79 (06/03/24 0714)  SpO2: (!) 94 % (06/03/24 0714) Vital Signs (24h Range):  Temp:  [97.4 °F (36.3 °C)-98.7 °F (37.1 °C)] 97.4 °F (36.3 °C)  Pulse:  [] 78  Resp:  [18-20] 18  SpO2:  [92 %-96 %] 94 %  BP: (123-173)/(79-84) 131/79     Weight: 44.8 kg (98 lb 12.3 oz)  Body mass index is 15.47 kg/m².     Physical Exam  Vitals reviewed.   Constitutional:       General: She is not in acute distress.     Appearance: Normal appearance.   HENT:      Head: Normocephalic and atraumatic.   Eyes:      General: No scleral icterus.     Extraocular Movements: Extraocular movements intact.      Conjunctiva/sclera: Conjunctivae normal.      Pupils: Pupils are equal, round, and reactive to light.   Cardiovascular:      Rate and Rhythm: Normal rate and regular rhythm.      Heart sounds: No murmur heard.     No friction rub. No gallop.   Pulmonary:      Effort: Pulmonary effort is normal. No respiratory distress.      Breath sounds: Normal breath sounds. No wheezing or rales.   Abdominal:      General: Abdomen is flat. Bowel sounds are normal. There is no distension.      Palpations: Abdomen is soft.      Tenderness: There is no abdominal tenderness. There is no guarding.   Musculoskeletal:         General: No swelling.      Right lower leg: No edema.      Left lower leg: No edema.   Skin:     General: Skin is warm and  dry.      Coloration: Skin is not jaundiced.      Findings: No rash.   Neurological:      General: No focal deficit present.      Mental Status: She is alert and oriented to person, place, and time.      Sensory: No sensory deficit.      Motor: No weakness.   Psychiatric:         Mood and Affect: Mood normal.         Thought Content: Thought content normal.         Judgment: Judgment normal.              CRANIAL NERVES     CN III, IV, VI   Pupils are equal, round, and reactive to light.       Significant Labs: All pertinent labs within the past 24 hours have been reviewed.  Recent Lab Results  (Last 5 results in the past 24 hours)        06/03/24  1115   06/03/24  1013   06/03/24  0521   06/02/24  1939   06/02/24  1915        Albumin/Globulin Ratio         0.8       Albumin         3.6       ALP         67       ALT         21       Anion Gap     14     15       Appearance, UA         Cloudy       AST         29       Bacteria, UA         Many       Baso #     0.03     0.06       Basophil %     0.3     0.5       Bilirubin (UA)         Negative       BILIRUBIN TOTAL         0.7       BUN     28     37       BUN/CREAT RATIO     17     19       Calcium     9.0     9.3       Chloride     101     97       CO2     25     25       Color, UA         Yellow       Creatinine     1.65     1.90       Differential Method     Auto     Auto       eGFR     31     26       Eos #     0.07     0.10       Eos %     0.8     0.9       Estimated Avg Glucose   166             Globulin, Total         4.4       Glucose     164     151       Glucose, UA         Negative       Hematocrit     41.8     42.8       Hemoglobin     13.2     13.4       Hemoglobin A1C External   7.4  Comment:   Normal:               <5.7%  Pre-Diabetic:       5.7% to 6.4%  Diabetic:             >6.4%  Diabetic Goal:     <7%             Ketones, UA         Negative       Lactic Acid Level       1.4         Leukocyte Esterase, UA         Moderate       Lymph #      1.72     2.30       Lymph %     19.7     20.8       MCH     28.3     28.3       MCHC     31.6     31.3       MCV     89.5     90.3       Mono #     0.81     1.02       Mono %     9.3     9.2       MPV     9.7     9.2       Neutrophils, Abs     6.10     7.60       Neutrophils Relative     69.9     68.6       NITRITE UA         Positive       Blood, UA         Small       pH, UA         6.0       Platelet Count     213     232       POC Glucose 126               Potassium     4.2     4.9       PROTEIN TOTAL         8.0       Protein, UA         30       RBC     4.67     4.74       RBC, UA         None Seen       RDW     14.1     14.3       Sodium     136     132       Spec Grav UA         1.015       Squam Epithel, UA         None Seen       UROBILINOGEN UA         0.2       WBC, UA         Too Numerous To Count       WBC     8.73     11.08                              Significant Imaging: I have reviewed all pertinent imaging results/findings within the past 24 hours.

## 2024-06-04 LAB
ANION GAP SERPL CALCULATED.3IONS-SCNC: 11 MMOL/L (ref 7–16)
ANION GAP SERPL CALCULATED.3IONS-SCNC: 13 MMOL/L (ref 7–16)
ANION GAP SERPL CALCULATED.3IONS-SCNC: 15 MMOL/L (ref 7–16)
BASOPHILS # BLD AUTO: 0.04 K/UL (ref 0–0.2)
BASOPHILS NFR BLD AUTO: 0.4 % (ref 0–1)
BUN SERPL-MCNC: 22 MG/DL (ref 7–18)
BUN SERPL-MCNC: 23 MG/DL (ref 7–18)
BUN SERPL-MCNC: 24 MG/DL (ref 7–18)
BUN/CREAT SERPL: 17 (ref 6–20)
BUN/CREAT SERPL: 18 (ref 6–20)
BUN/CREAT SERPL: 18 (ref 6–20)
CALCIUM SERPL-MCNC: 8.5 MG/DL (ref 8.5–10.1)
CALCIUM SERPL-MCNC: 8.9 MG/DL (ref 8.5–10.1)
CALCIUM SERPL-MCNC: 9 MG/DL (ref 8.5–10.1)
CHLORIDE SERPL-SCNC: 87 MMOL/L (ref 98–107)
CHLORIDE SERPL-SCNC: 87 MMOL/L (ref 98–107)
CHLORIDE SERPL-SCNC: 91 MMOL/L (ref 98–107)
CO2 SERPL-SCNC: 21 MMOL/L (ref 21–32)
CO2 SERPL-SCNC: 24 MMOL/L (ref 21–32)
CO2 SERPL-SCNC: 25 MMOL/L (ref 21–32)
CREAT SERPL-MCNC: 1.21 MG/DL (ref 0.55–1.02)
CREAT SERPL-MCNC: 1.29 MG/DL (ref 0.55–1.02)
CREAT SERPL-MCNC: 1.38 MG/DL (ref 0.55–1.02)
DIFFERENTIAL METHOD BLD: ABNORMAL
EGFR (NO RACE VARIABLE) (RUSH/TITUS): 39 ML/MIN/1.73M2
EGFR (NO RACE VARIABLE) (RUSH/TITUS): 42 ML/MIN/1.73M2
EGFR (NO RACE VARIABLE) (RUSH/TITUS): 45 ML/MIN/1.73M2
EOSINOPHIL # BLD AUTO: 0.15 K/UL (ref 0–0.5)
EOSINOPHIL NFR BLD AUTO: 1.4 % (ref 1–4)
ERYTHROCYTE [DISTWIDTH] IN BLOOD BY AUTOMATED COUNT: 13.9 % (ref 11.5–14.5)
GLUCOSE SERPL-MCNC: 147 MG/DL (ref 74–106)
GLUCOSE SERPL-MCNC: 149 MG/DL (ref 70–105)
GLUCOSE SERPL-MCNC: 155 MG/DL (ref 70–105)
GLUCOSE SERPL-MCNC: 156 MG/DL (ref 74–106)
GLUCOSE SERPL-MCNC: 164 MG/DL (ref 70–105)
GLUCOSE SERPL-MCNC: 174 MG/DL (ref 70–105)
GLUCOSE SERPL-MCNC: 217 MG/DL (ref 74–106)
HCT VFR BLD AUTO: 42 % (ref 38–47)
HGB BLD-MCNC: 13.3 G/DL (ref 12–16)
LYMPHOCYTES # BLD AUTO: 1.69 K/UL (ref 1–4.8)
LYMPHOCYTES NFR BLD AUTO: 16 % (ref 27–41)
MCH RBC QN AUTO: 28 PG (ref 27–31)
MCHC RBC AUTO-ENTMCNC: 31.7 G/DL (ref 32–36)
MCV RBC AUTO: 88.4 FL (ref 80–96)
MONOCYTES # BLD AUTO: 0.8 K/UL (ref 0–0.8)
MONOCYTES NFR BLD AUTO: 7.6 % (ref 2–6)
MPC BLD CALC-MCNC: 9.5 FL (ref 9.4–12.4)
NEUTROPHILS # BLD AUTO: 7.88 K/UL (ref 1.8–7.7)
NEUTROPHILS NFR BLD AUTO: 74.6 % (ref 53–65)
PLATELET # BLD AUTO: 201 K/UL (ref 150–400)
POTASSIUM SERPL-SCNC: 4.1 MMOL/L (ref 3.5–5.1)
POTASSIUM SERPL-SCNC: 4.4 MMOL/L (ref 3.5–5.1)
POTASSIUM SERPL-SCNC: 4.6 MMOL/L (ref 3.5–5.1)
RBC # BLD AUTO: 4.75 M/UL (ref 4.2–5.4)
SODIUM SERPL-SCNC: 119 MMOL/L (ref 136–145)
SODIUM SERPL-SCNC: 119 MMOL/L (ref 136–145)
SODIUM SERPL-SCNC: 123 MMOL/L (ref 136–145)
WBC # BLD AUTO: 10.56 K/UL (ref 4.5–11)

## 2024-06-04 PROCEDURE — 85025 COMPLETE CBC W/AUTO DIFF WBC: CPT | Performed by: NURSE PRACTITIONER

## 2024-06-04 PROCEDURE — 99232 SBSQ HOSP IP/OBS MODERATE 35: CPT | Mod: GC,,, | Performed by: HOSPITALIST

## 2024-06-04 PROCEDURE — 36600 WITHDRAWAL OF ARTERIAL BLOOD: CPT | Performed by: HOSPITALIST

## 2024-06-04 PROCEDURE — 80048 BASIC METABOLIC PNL TOTAL CA: CPT | Performed by: HOSPITALIST

## 2024-06-04 PROCEDURE — 84300 ASSAY OF URINE SODIUM: CPT | Performed by: HOSPITALIST

## 2024-06-04 PROCEDURE — 36415 COLL VENOUS BLD VENIPUNCTURE: CPT | Performed by: NURSE PRACTITIONER

## 2024-06-04 PROCEDURE — 82962 GLUCOSE BLOOD TEST: CPT

## 2024-06-04 PROCEDURE — 51702 INSERT TEMP BLADDER CATH: CPT

## 2024-06-04 PROCEDURE — 25000003 PHARM REV CODE 250: Performed by: HOSPITALIST

## 2024-06-04 PROCEDURE — 63600175 PHARM REV CODE 636 W HCPCS: Performed by: HOSPITALIST

## 2024-06-04 PROCEDURE — 80048 BASIC METABOLIC PNL TOTAL CA: CPT | Performed by: NURSE PRACTITIONER

## 2024-06-04 PROCEDURE — 36415 COLL VENOUS BLD VENIPUNCTURE: CPT | Performed by: HOSPITALIST

## 2024-06-04 PROCEDURE — 11000001 HC ACUTE MED/SURG PRIVATE ROOM

## 2024-06-04 PROCEDURE — 83935 ASSAY OF URINE OSMOLALITY: CPT | Mod: 90 | Performed by: HOSPITALIST

## 2024-06-04 PROCEDURE — 25000003 PHARM REV CODE 250: Performed by: NURSE PRACTITIONER

## 2024-06-04 PROCEDURE — 36600 WITHDRAWAL OF ARTERIAL BLOOD: CPT

## 2024-06-04 RX ORDER — SODIUM CHLORIDE 9 MG/ML
INJECTION, SOLUTION INTRAVENOUS CONTINUOUS
Status: DISCONTINUED | OUTPATIENT
Start: 2024-06-04 | End: 2024-06-04

## 2024-06-04 RX ORDER — SODIUM CHLORIDE 9 MG/ML
INJECTION, SOLUTION INTRAVENOUS CONTINUOUS
Status: DISCONTINUED | OUTPATIENT
Start: 2024-06-04 | End: 2024-06-05

## 2024-06-04 RX ADMIN — SODIUM CHLORIDE: 9 INJECTION, SOLUTION INTRAVENOUS at 09:06

## 2024-06-04 RX ADMIN — METOPROLOL TARTRATE 50 MG: 50 TABLET, FILM COATED ORAL at 08:06

## 2024-06-04 RX ADMIN — CLOPIDOGREL BISULFATE 75 MG: 75 TABLET, FILM COATED ORAL at 08:06

## 2024-06-04 RX ADMIN — MUPIROCIN: 20 OINTMENT TOPICAL at 08:06

## 2024-06-04 RX ADMIN — SODIUM CHLORIDE: 900 INJECTION INTRAVENOUS at 01:06

## 2024-06-04 RX ADMIN — MEROPENEM 1 G: 1 INJECTION, POWDER, FOR SOLUTION INTRAVENOUS at 12:06

## 2024-06-04 RX ADMIN — LEVOTHYROXINE SODIUM 88 MCG: 88 TABLET ORAL at 05:06

## 2024-06-04 RX ADMIN — MEROPENEM 1 G: 1 INJECTION, POWDER, FOR SOLUTION INTRAVENOUS at 01:06

## 2024-06-04 RX ADMIN — SODIUM CHLORIDE: 9 INJECTION, SOLUTION INTRAVENOUS at 01:06
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
## 2024-06-04 NOTE — PROGRESS NOTES
[facility] Medicine  Progress Note    Patient Name: Michell Dhillon  MRN: [id number]  Patient Class: IP- Inpatient   Admission Date: 6/2/2024  Length of Stay: 1 days  Attending Physician: Elmer Rainey DO  Primary Care Provider: Daryl Moscoso NP        Subjective:     Principal Problem:Urinary tract infection without hematuria        HPI:  Ms Dhillon is a 77 yo WF with PMH of DM, HTN prev CVA, anxiety/depression, and CKD Stage 3- she saw Dr. Grande her nephrologist 4/23. She reports having UTI and has taken a round of macrobid, levaquin and is currently taking cipro since 4/29 with no relief.  The C&S report from 5/30 shows sensitivity to nitrofurantion but resistance to levofloxacin. She presents today with continued burning with urination, suprapubic fullness, fatigue and feeling worse.  She is found to have nitrite positive UTI. After discussing the POC with Dr. Hedrick she is admitted to OBS and placed on rocephin    Overview/Hospital Course:  Called by RN that pt having urinary retention.  Robb panel ordered.    6/4 Cultures still pending.  Hx of ESBL.  Failed outpatient treatment.     Interval History: still waiting on urine culture.     Review of Systems   Respiratory:  Negative for shortness of breath.    Cardiovascular:  Negative for chest pain.   Gastrointestinal:  Negative for abdominal pain, nausea and vomiting.   Musculoskeletal:  Positive for arthralgias and back pain.   Neurological:  Positive for weakness.   Psychiatric/Behavioral:  Negative for agitation and confusion.    All other systems reviewed and are negative.    Objective:     Vital Signs (Most Recent):  Temp: 97.8 °F (36.6 °C) (06/04/24 1117)  Pulse: 78 (06/04/24 1117)  Resp: 20 (06/04/24 1117)  BP: (!) 169/98 (06/04/24 0718)  SpO2: 96 % (06/04/24 1117) Vital Signs (24h Range):  Temp:  [97.4 °F (36.3 °C)-97.9 °F (36.6 °C)] 97.8 °F (36.6 °C)  Pulse:  [72-85] 78  Resp:  [18-20] 20  SpO2:  [93  %-96 %] 96 %  BP: (143-170)/() 169/98     Weight: 90.2 kg (198 lb 14.4 oz)  Body mass index is 31.14 kg/m².    Intake/Output Summary (Last 24 hours) at 6/4/2024 1206  Last data filed at 6/4/2024 1010  Gross per 24 hour   Intake 3004.15 ml   Output 3450 ml   Net -445.85 ml         Physical Exam  Vitals reviewed.   Constitutional:       General: She is not in acute distress.     Appearance: Normal appearance.   HENT:      Head: Normocephalic and atraumatic.   Eyes:      General: No scleral icterus.     Extraocular Movements: Extraocular movements intact.      Conjunctiva/sclera: Conjunctivae normal.      Pupils: Pupils are equal, round, and reactive to light.   Cardiovascular:      Rate and Rhythm: Normal rate and regular rhythm.      Heart sounds: No murmur heard.     No friction rub. No gallop.   Pulmonary:      Effort: Pulmonary effort is normal. No respiratory distress.      Breath sounds: Normal breath sounds. No wheezing or rales.   Abdominal:      General: Abdomen is flat. Bowel sounds are normal. There is no distension.      Palpations: Abdomen is soft.      Tenderness: There is no abdominal tenderness. There is no guarding.   Musculoskeletal:         General: No swelling.      Right lower leg: No edema.      Left lower leg: No edema.   Skin:     General: Skin is warm and dry.      Coloration: Skin is not jaundiced.      Findings: No rash.   Neurological:      General: No focal deficit present.      Mental Status: She is alert and oriented to person, place, and time.      Sensory: No sensory deficit.      Motor: No weakness.   Psychiatric:         Mood and Affect: Mood normal.         Thought Content: Thought content normal.         Judgment: Judgment normal.             Significant Labs: All pertinent labs within the past 24 hours have been reviewed.  Recent Lab Results  (Last 5 results in the past 24 hours)        06/04/24  1107   06/04/24  0532   06/04/24  0508   06/03/24 2017 06/03/24  1641         "Anion Gap   11             Baso #   0.04             Basophil %   0.4             BUN   23             BUN/CREAT RATIO   18             Calcium   9.0             Chloride   91             CO2   25             Creatinine   1.29             Differential Method   Auto             eGFR   42             Eos #   0.15             Eos %   1.4             Glucose   156             Hematocrit   42.0             Hemoglobin   13.3             Lymph #   1.69             Lymph %   16.0             MCH   28.0             MCHC   31.7             MCV   88.4             Mono #   0.80             Mono %   7.6             MPV   9.5             Neutrophils, Abs   7.88             Neutrophils Relative   74.6             Platelet Count   201             POC Glucose 164     149   173   131       Potassium   4.1             RBC   4.75             RDW   13.9             Sodium   123             WBC   10.56                                    Significant Imaging: I have reviewed all pertinent imaging results/findings within the past 24 hours.    Assessment/Plan:      * Urinary tract infection without hematuria  Rocephin IV       Acute cystitis without hematuria  Hx of ESBL in past.  Covering for that now.  Will follow cultures.       ESBL (extended spectrum beta-lactamase) producing bacteria infection  Per previous cultures.  Will cover with Merrem until C&S is resulted here.       Hypothyroidism  Resume meds        Type 2 diabetes mellitus without complication, without long-term current use of insulin  Patient's FSGs are uncontrolled due to hyperglycemia on current medication regimen.  Last A1c reviewed-   Lab Results   Component Value Date    HGBA1C 6.6 03/08/2023     Most recent fingerstick glucose reviewed- No results for input(s): "POCTGLUCOSE" in the last 24 hours.  Current correctional scale  Low  Increase anti-hyperglycemic dose as follows-   Antihyperglycemics (From admission, onward)      Start     Stop Route Frequency Ordered    " 06/02/24 2112  insulin aspart U-100 injection 0-5 Units         -- SubQ Before meals & nightly PRN 06/02/24 2035          Hold Oral hypoglycemics while patient is in the hospital.    Benign essential HTN  Chronic, controlled. Latest blood pressure and vitals reviewed-     Temp:  [98.2 °F (36.8 °C)-98.7 °F (37.1 °C)]   Pulse:  []   Resp:  [18-20]   BP: (123-173)/(81-84)   SpO2:  [92 %-96 %] .   Home meds for hypertension were reviewed and noted below.   Hypertension Medications               furosemide (LASIX) 40 MG tablet Take 40 mg by mouth 2 (two) times daily as needed.    nebivoloL (BYSTOLIC) 10 MG Tab Take 10 mg by mouth once daily.    olmesartan-hydrochlorothiazide (BENICAR HCT) 40-25 mg per tablet Take 1 tablet by mouth once daily.            While in the hospital, will manage blood pressure as follows; Continue home antihypertensive regimen          VTE Risk Mitigation (From admission, onward)           Ordered     IP VTE HIGH RISK PATIENT  Once         06/02/24 2035     Place sequential compression device  Until discontinued         06/02/24 2035                    Discharge Planning   NIGEL: 6/4/2024     Code Status: Full Code   Is the patient medically ready for discharge?:     Reason for patient still in hospital (select all that apply): Patient trending condition, Laboratory test, and Treatment  Discharge Plan A: Home Health                  Elmer Rainey DO  Department of Hospital Medicine   [facility]

## 2024-06-04 NOTE — SUBJECTIVE & OBJECTIVE
Interval History: still waiting on urine culture.     Review of Systems   Respiratory:  Negative for shortness of breath.    Cardiovascular:  Negative for chest pain.   Gastrointestinal:  Negative for abdominal pain, nausea and vomiting.   Musculoskeletal:  Positive for arthralgias and back pain.   Neurological:  Positive for weakness.   Psychiatric/Behavioral:  Negative for agitation and confusion.    All other systems reviewed and are negative.    Objective:     Vital Signs (Most Recent):  Temp: 97.8 °F (36.6 °C) (06/04/24 1117)  Pulse: 78 (06/04/24 1117)  Resp: 20 (06/04/24 1117)  BP: (!) 169/98 (06/04/24 0718)  SpO2: 96 % (06/04/24 1117) Vital Signs (24h Range):  Temp:  [97.4 °F (36.3 °C)-97.9 °F (36.6 °C)] 97.8 °F (36.6 °C)  Pulse:  [72-85] 78  Resp:  [18-20] 20  SpO2:  [93 %-96 %] 96 %  BP: (143-170)/() 169/98     Weight: 90.2 kg (198 lb 14.4 oz)  Body mass index is 31.14 kg/m².    Intake/Output Summary (Last 24 hours) at 6/4/2024 1206  Last data filed at 6/4/2024 1010  Gross per 24 hour   Intake 3004.15 ml   Output 3450 ml   Net -445.85 ml         Physical Exam  Vitals reviewed.   Constitutional:       General: She is not in acute distress.     Appearance: Normal appearance.   HENT:      Head: Normocephalic and atraumatic.   Eyes:      General: No scleral icterus.     Extraocular Movements: Extraocular movements intact.      Conjunctiva/sclera: Conjunctivae normal.      Pupils: Pupils are equal, round, and reactive to light.   Cardiovascular:      Rate and Rhythm: Normal rate and regular rhythm.      Heart sounds: No murmur heard.     No friction rub. No gallop.   Pulmonary:      Effort: Pulmonary effort is normal. No respiratory distress.      Breath sounds: Normal breath sounds. No wheezing or rales.   Abdominal:      General: Abdomen is flat. Bowel sounds are normal. There is no distension.      Palpations: Abdomen is soft.      Tenderness: There is no abdominal tenderness. There is no guarding.    Musculoskeletal:         General: No swelling.      Right lower leg: No edema.      Left lower leg: No edema.   Skin:     General: Skin is warm and dry.      Coloration: Skin is not jaundiced.      Findings: No rash.   Neurological:      General: No focal deficit present.      Mental Status: She is alert and oriented to person, place, and time.      Sensory: No sensory deficit.      Motor: No weakness.   Psychiatric:         Mood and Affect: Mood normal.         Thought Content: Thought content normal.         Judgment: Judgment normal.             Significant Labs: All pertinent labs within the past 24 hours have been reviewed.  Recent Lab Results  (Last 5 results in the past 24 hours)        06/04/24  1107   06/04/24  0532   06/04/24  0508   06/03/24  2017   06/03/24  1641        Anion Gap   11             Baso #   0.04             Basophil %   0.4             BUN   23             BUN/CREAT RATIO   18             Calcium   9.0             Chloride   91             CO2   25             Creatinine   1.29             Differential Method   Auto             eGFR   42             Eos #   0.15             Eos %   1.4             Glucose   156             Hematocrit   42.0             Hemoglobin   13.3             Lymph #   1.69             Lymph %   16.0             MCH   28.0             MCHC   31.7             MCV   88.4             Mono #   0.80             Mono %   7.6             MPV   9.5             Neutrophils, Abs   7.88             Neutrophils Relative   74.6             Platelet Count   201             POC Glucose 164     149   173   131       Potassium   4.1             RBC   4.75             RDW   13.9             Sodium   123             WBC   10.56                                    Significant Imaging: I have reviewed all pertinent imaging results/findings within the past 24 hours.

## 2024-06-04 NOTE — PLAN OF CARE
Problem: Adult Inpatient Plan of Care  Goal: Optimal Comfort and Wellbeing  Outcome: Progressing     Problem: UTI (Urinary Tract Infection)  Goal: Improved Infection Symptoms  Outcome: Progressing

## 2024-06-04 NOTE — NURSING
1200: Patient called out to desk for help transitioning into bed. Nurse jasmine and valencia grant arrived at bed side with remy lift to assist patient back to bed. After patient was in the air, lifted by the remy; patients daughter insisted that she stand to transfer. After explaining that the transfer was half complete employees proceeded to complete transfer and place patient in bed. Plan of care ongoing.

## 2024-06-05 ENCOUNTER — HOSPITAL ENCOUNTER (INPATIENT)
Facility: HOSPITAL | Age: 79
LOS: 4 days | Discharge: HOME HEALTH | DRG: 641 | End: 2024-06-09
Attending: STUDENT IN AN ORGANIZED HEALTH CARE EDUCATION/TRAINING PROGRAM | Admitting: STUDENT IN AN ORGANIZED HEALTH CARE EDUCATION/TRAINING PROGRAM
Payer: MEDICARE

## 2024-06-05 VITALS
WEIGHT: 210 LBS | DIASTOLIC BLOOD PRESSURE: 87 MMHG | BODY MASS INDEX: 32.96 KG/M2 | RESPIRATION RATE: 18 BRPM | TEMPERATURE: 98 F | SYSTOLIC BLOOD PRESSURE: 143 MMHG | HEART RATE: 74 BPM | HEIGHT: 67 IN | OXYGEN SATURATION: 93 %

## 2024-06-05 DIAGNOSIS — E87.1 HYPONATREMIA: Primary | ICD-10-CM

## 2024-06-05 DIAGNOSIS — N39.0 URINARY TRACT INFECTION WITHOUT HEMATURIA, SITE UNSPECIFIED: ICD-10-CM

## 2024-06-05 DIAGNOSIS — N30.00 ACUTE CYSTITIS WITHOUT HEMATURIA: ICD-10-CM

## 2024-06-05 DIAGNOSIS — N18.30 CKD (CHRONIC KIDNEY DISEASE) STAGE 3, GFR 30-59 ML/MIN: ICD-10-CM

## 2024-06-05 DIAGNOSIS — E03.9 HYPOTHYROIDISM, UNSPECIFIED TYPE: Chronic | ICD-10-CM

## 2024-06-05 LAB
ALBUMIN SERPL BCP-MCNC: 2.8 G/DL (ref 3.5–5)
ALP SERPL-CCNC: 55 U/L (ref 55–142)
ALT SERPL W P-5'-P-CCNC: 17 U/L (ref 13–56)
ANION GAP SERPL CALCULATED.3IONS-SCNC: 10 MMOL/L (ref 7–16)
ANION GAP SERPL CALCULATED.3IONS-SCNC: 13 MMOL/L (ref 7–16)
ANION GAP SERPL CALCULATED.3IONS-SCNC: 13 MMOL/L (ref 7–16)
ANION GAP SERPL CALCULATED.3IONS-SCNC: 7 MMOL/L (ref 7–16)
AST SERPL W P-5'-P-CCNC: 28 U/L (ref 15–37)
BACTERIA #/AREA URNS HPF: ABNORMAL /HPF
BILIRUB DIRECT SERPL-MCNC: 0.2 MG/DL (ref 0–0.2)
BILIRUB SERPL-MCNC: 0.7 MG/DL (ref ?–1.2)
BILIRUB UR QL STRIP: NEGATIVE
BUN SERPL-MCNC: 24 MG/DL (ref 7–18)
BUN SERPL-MCNC: 26 MG/DL (ref 7–18)
BUN/CREAT SERPL: 20 (ref 6–20)
BUN/CREAT SERPL: 20 (ref 6–20)
BUN/CREAT SERPL: 22 (ref 6–20)
BUN/CREAT SERPL: 23 (ref 6–20)
BUN/CREAT SERPL: 23 (ref 6–20)
BUN/CREAT SERPL: 24 (ref 6–20)
CALCIUM SERPL-MCNC: 8.5 MG/DL (ref 8.5–10.1)
CALCIUM SERPL-MCNC: 8.6 MG/DL (ref 8.5–10.1)
CALCIUM SERPL-MCNC: 8.8 MG/DL (ref 8.5–10.1)
CHLORIDE SERPL-SCNC: 88 MMOL/L (ref 98–107)
CHLORIDE SERPL-SCNC: 88 MMOL/L (ref 98–107)
CHLORIDE SERPL-SCNC: 91 MMOL/L (ref 98–107)
CHLORIDE SERPL-SCNC: 92 MMOL/L (ref 98–107)
CHOLEST SERPL-MCNC: 126 MG/DL (ref 0–200)
CHOLEST/HDLC SERPL: 3.9 {RATIO}
CLARITY UR: ABNORMAL
CO2 SERPL-SCNC: 24 MMOL/L (ref 21–32)
CO2 SERPL-SCNC: 24 MMOL/L (ref 21–32)
CO2 SERPL-SCNC: 25 MMOL/L (ref 21–32)
CO2 SERPL-SCNC: 26 MMOL/L (ref 21–32)
COLOR UR: YELLOW
CORTIS SERPL-MCNC: 38.2 ΜG/DL
CREAT SERPL-MCNC: 1.01 MG/DL (ref 0.55–1.02)
CREAT SERPL-MCNC: 1.04 MG/DL (ref 0.55–1.02)
CREAT SERPL-MCNC: 1.05 MG/DL (ref 0.55–1.02)
CREAT SERPL-MCNC: 1.1 MG/DL (ref 0.55–1.02)
CREAT SERPL-MCNC: 1.19 MG/DL (ref 0.55–1.02)
CREAT SERPL-MCNC: 1.28 MG/DL (ref 0.55–1.02)
EGFR (NO RACE VARIABLE) (RUSH/TITUS): 42 ML/MIN/1.73M2
EGFR (NO RACE VARIABLE) (RUSH/TITUS): 46 ML/MIN/1.73M2
EGFR (NO RACE VARIABLE) (RUSH/TITUS): 51 ML/MIN/1.73M2
EGFR (NO RACE VARIABLE) (RUSH/TITUS): 54 ML/MIN/1.73M2
EGFR (NO RACE VARIABLE) (RUSH/TITUS): 54 ML/MIN/1.73M2
EGFR (NO RACE VARIABLE) (RUSH/TITUS): 56 ML/MIN/1.73M2
GLUCOSE SERPL-MCNC: 126 MG/DL (ref 70–105)
GLUCOSE SERPL-MCNC: 131 MG/DL (ref 70–105)
GLUCOSE SERPL-MCNC: 139 MG/DL (ref 74–106)
GLUCOSE SERPL-MCNC: 144 MG/DL (ref 74–106)
GLUCOSE SERPL-MCNC: 146 MG/DL (ref 74–106)
GLUCOSE SERPL-MCNC: 147 MG/DL (ref 70–105)
GLUCOSE SERPL-MCNC: 150 MG/DL (ref 74–106)
GLUCOSE SERPL-MCNC: 152 MG/DL (ref 74–106)
GLUCOSE SERPL-MCNC: 154 MG/DL (ref 74–106)
GLUCOSE UR STRIP-MCNC: NORMAL MG/DL
HDLC SERPL-MCNC: 32 MG/DL (ref 40–60)
KETONES UR STRIP-SCNC: ABNORMAL MG/DL
LDLC SERPL CALC-MCNC: 70 MG/DL
LDLC/HDLC SERPL: 2.2 {RATIO}
LEUKOCYTE ESTERASE UR QL STRIP: ABNORMAL
MAGNESIUM SERPL-MCNC: 1.6 MG/DL (ref 1.7–2.3)
MUCOUS, UA: ABNORMAL /LPF
NITRITE UR QL STRIP: NEGATIVE
NONHDLC SERPL-MCNC: 94 MG/DL
PH UR STRIP: 5.5 PH UNITS
POTASSIUM SERPL-SCNC: 3.9 MMOL/L (ref 3.5–5.1)
POTASSIUM SERPL-SCNC: 4.1 MMOL/L (ref 3.5–5.1)
POTASSIUM SERPL-SCNC: 4.2 MMOL/L (ref 3.5–5.1)
POTASSIUM SERPL-SCNC: 4.3 MMOL/L (ref 3.5–5.1)
POTASSIUM SERPL-SCNC: 4.4 MMOL/L (ref 3.5–5.1)
POTASSIUM SERPL-SCNC: 4.5 MMOL/L (ref 3.5–5.1)
PREALB SERPL NEPH-MCNC: 12 MG/DL (ref 20–40)
PROT SERPL-MCNC: 6.6 G/DL (ref 6.4–8.2)
PROT UR QL STRIP: 30
RBC # UR STRIP: ABNORMAL /UL
RBC #/AREA URNS HPF: >182 /HPF
SODIUM SERPL-SCNC: 115 MMOL/L (ref 136–145)
SODIUM SERPL-SCNC: 121 MMOL/L (ref 136–145)
SODIUM SERPL-SCNC: 122 MMOL/L (ref 136–145)
SODIUM SERPL-SCNC: 123 MMOL/L (ref 136–145)
SODIUM SERPL-SCNC: 123 MMOL/L (ref 136–145)
SODIUM SERPL-SCNC: 124 MMOL/L (ref 136–145)
SODIUM UR-SCNC: 45 MMOL/L (ref 40–220)
SODIUM UR-SCNC: 66 MMOL/L (ref 40–220)
SP GR UR STRIP: 1.02
SQUAMOUS #/AREA URNS LPF: ABNORMAL /HPF
T4 FREE SERPL-MCNC: 1.47 NG/DL (ref 0.76–1.46)
TRIGL SERPL-MCNC: 121 MG/DL (ref 35–150)
TSH SERPL DL<=0.005 MIU/L-ACNC: 4.54 UIU/ML (ref 0.36–3.74)
UA COMPLETE W REFLEX CULTURE PNL UR: ABNORMAL
UROBILINOGEN UR STRIP-ACNC: 8 MG/DL
VLDLC SERPL-MCNC: 24 MG/DL
WBC #/AREA URNS HPF: 165 /HPF

## 2024-06-05 PROCEDURE — 87086 URINE CULTURE/COLONY COUNT: CPT | Performed by: NURSE PRACTITIONER

## 2024-06-05 PROCEDURE — 80048 BASIC METABOLIC PNL TOTAL CA: CPT | Performed by: HOSPITALIST

## 2024-06-05 PROCEDURE — 84443 ASSAY THYROID STIM HORMONE: CPT | Performed by: NURSE PRACTITIONER

## 2024-06-05 PROCEDURE — 94761 N-INVAS EAR/PLS OXIMETRY MLT: CPT

## 2024-06-05 PROCEDURE — 99232 SBSQ HOSP IP/OBS MODERATE 35: CPT | Mod: ,,, | Performed by: INTERNAL MEDICINE

## 2024-06-05 PROCEDURE — 63600175 PHARM REV CODE 636 W HCPCS: Performed by: NURSE PRACTITIONER

## 2024-06-05 PROCEDURE — 25000003 PHARM REV CODE 250: Performed by: NURSE PRACTITIONER

## 2024-06-05 PROCEDURE — 36415 COLL VENOUS BLD VENIPUNCTURE: CPT | Performed by: NURSE PRACTITIONER

## 2024-06-05 PROCEDURE — 63600175 PHARM REV CODE 636 W HCPCS: Performed by: HOSPITALIST

## 2024-06-05 PROCEDURE — 25000003 PHARM REV CODE 250: Performed by: HOSPITALIST

## 2024-06-05 PROCEDURE — 84300 ASSAY OF URINE SODIUM: CPT | Performed by: NURSE PRACTITIONER

## 2024-06-05 PROCEDURE — 36415 COLL VENOUS BLD VENIPUNCTURE: CPT | Performed by: HOSPITALIST

## 2024-06-05 PROCEDURE — 84134 ASSAY OF PREALBUMIN: CPT | Performed by: INTERNAL MEDICINE

## 2024-06-05 PROCEDURE — 80048 BASIC METABOLIC PNL TOTAL CA: CPT | Performed by: NURSE PRACTITIONER

## 2024-06-05 PROCEDURE — 83735 ASSAY OF MAGNESIUM: CPT | Performed by: INTERNAL MEDICINE

## 2024-06-05 PROCEDURE — 84439 ASSAY OF FREE THYROXINE: CPT | Performed by: NURSE PRACTITIONER

## 2024-06-05 PROCEDURE — 20000000 HC ICU ROOM

## 2024-06-05 PROCEDURE — 80076 HEPATIC FUNCTION PANEL: CPT | Performed by: INTERNAL MEDICINE

## 2024-06-05 PROCEDURE — 81003 URINALYSIS AUTO W/O SCOPE: CPT | Performed by: NURSE PRACTITIONER

## 2024-06-05 PROCEDURE — 99291 CRITICAL CARE FIRST HOUR: CPT | Mod: ,,, | Performed by: STUDENT IN AN ORGANIZED HEALTH CARE EDUCATION/TRAINING PROGRAM

## 2024-06-05 PROCEDURE — 82962 GLUCOSE BLOOD TEST: CPT

## 2024-06-05 PROCEDURE — 80061 LIPID PANEL: CPT | Performed by: INTERNAL MEDICINE

## 2024-06-05 PROCEDURE — 80048 BASIC METABOLIC PNL TOTAL CA: CPT | Performed by: INTERNAL MEDICINE

## 2024-06-05 PROCEDURE — 82533 TOTAL CORTISOL: CPT | Performed by: NURSE PRACTITIONER

## 2024-06-05 PROCEDURE — 83935 ASSAY OF URINE OSMOLALITY: CPT | Mod: 90 | Performed by: NURSE PRACTITIONER

## 2024-06-05 PROCEDURE — 99900035 HC TECH TIME PER 15 MIN (STAT)

## 2024-06-05 PROCEDURE — 25000003 PHARM REV CODE 250: Performed by: STUDENT IN AN ORGANIZED HEALTH CARE EDUCATION/TRAINING PROGRAM

## 2024-06-05 RX ORDER — ATORVASTATIN CALCIUM 40 MG/1
40 TABLET, FILM COATED ORAL DAILY
Status: DISCONTINUED | OUTPATIENT
Start: 2024-06-06 | End: 2024-06-09 | Stop reason: HOSPADM

## 2024-06-05 RX ORDER — NAPROXEN SODIUM 220 MG/1
81 TABLET, FILM COATED ORAL DAILY
Status: DISCONTINUED | OUTPATIENT
Start: 2024-06-06 | End: 2024-06-09 | Stop reason: HOSPADM

## 2024-06-05 RX ORDER — CLOPIDOGREL BISULFATE 75 MG/1
75 TABLET ORAL DAILY
Status: DISCONTINUED | OUTPATIENT
Start: 2024-06-06 | End: 2024-06-09 | Stop reason: HOSPADM

## 2024-06-05 RX ORDER — LEVOTHYROXINE SODIUM 88 UG/1
88 TABLET ORAL
Status: DISCONTINUED | OUTPATIENT
Start: 2024-06-06 | End: 2024-06-06

## 2024-06-05 RX ORDER — INSULIN ASPART 100 [IU]/ML
0-10 INJECTION, SOLUTION INTRAVENOUS; SUBCUTANEOUS
Status: DISCONTINUED | OUTPATIENT
Start: 2024-06-05 | End: 2024-06-09 | Stop reason: HOSPADM

## 2024-06-05 RX ORDER — LABETALOL HYDROCHLORIDE 5 MG/ML
20 INJECTION, SOLUTION INTRAVENOUS
Status: DISCONTINUED | OUTPATIENT
Start: 2024-06-05 | End: 2024-06-05

## 2024-06-05 RX ORDER — MUPIROCIN 20 MG/G
OINTMENT TOPICAL 2 TIMES DAILY
Status: DISCONTINUED | OUTPATIENT
Start: 2024-06-05 | End: 2024-06-09 | Stop reason: HOSPADM

## 2024-06-05 RX ORDER — LABETALOL HYDROCHLORIDE 5 MG/ML
10 INJECTION, SOLUTION INTRAVENOUS
Status: DISCONTINUED | OUTPATIENT
Start: 2024-06-05 | End: 2024-06-09 | Stop reason: HOSPADM

## 2024-06-05 RX ORDER — SODIUM CHLORIDE 9 MG/ML
INJECTION, SOLUTION INTRAVENOUS
Status: DISCONTINUED | OUTPATIENT
Start: 2024-06-05 | End: 2024-06-09 | Stop reason: HOSPADM

## 2024-06-05 RX ORDER — GLUCAGON 1 MG
1 KIT INJECTION
Status: DISCONTINUED | OUTPATIENT
Start: 2024-06-05 | End: 2024-06-09 | Stop reason: HOSPADM

## 2024-06-05 RX ORDER — IBUPROFEN 200 MG
16 TABLET ORAL
Status: DISCONTINUED | OUTPATIENT
Start: 2024-06-05 | End: 2024-06-09 | Stop reason: HOSPADM

## 2024-06-05 RX ORDER — IBUPROFEN 200 MG
24 TABLET ORAL
Status: DISCONTINUED | OUTPATIENT
Start: 2024-06-05 | End: 2024-06-09 | Stop reason: HOSPADM

## 2024-06-05 RX ORDER — METOPROLOL TARTRATE 50 MG/1
50 TABLET ORAL 2 TIMES DAILY
Status: DISCONTINUED | OUTPATIENT
Start: 2024-06-05 | End: 2024-06-09 | Stop reason: HOSPADM

## 2024-06-05 RX ORDER — HEPARIN SODIUM 5000 [USP'U]/ML
5000 INJECTION, SOLUTION INTRAVENOUS; SUBCUTANEOUS EVERY 8 HOURS
Status: DISCONTINUED | OUTPATIENT
Start: 2024-06-05 | End: 2024-06-06

## 2024-06-05 RX ORDER — ONDANSETRON HYDROCHLORIDE 2 MG/ML
4 INJECTION, SOLUTION INTRAVENOUS EVERY 8 HOURS PRN
Status: DISCONTINUED | OUTPATIENT
Start: 2024-06-05 | End: 2024-06-09 | Stop reason: HOSPADM

## 2024-06-05 RX ORDER — SODIUM CHLORIDE 0.9 % (FLUSH) 0.9 %
10 SYRINGE (ML) INJECTION
Status: DISCONTINUED | OUTPATIENT
Start: 2024-06-05 | End: 2024-06-09 | Stop reason: HOSPADM

## 2024-06-05 RX ORDER — ACETAMINOPHEN 325 MG/1
650 TABLET ORAL EVERY 4 HOURS PRN
Status: DISCONTINUED | OUTPATIENT
Start: 2024-06-05 | End: 2024-06-09 | Stop reason: HOSPADM

## 2024-06-05 RX ADMIN — LEVOTHYROXINE SODIUM 88 MCG: 88 TABLET ORAL at 05:06

## 2024-06-05 RX ADMIN — HEPARIN SODIUM 5000 UNITS: 5000 INJECTION, SOLUTION INTRAVENOUS; SUBCUTANEOUS at 09:06

## 2024-06-05 RX ADMIN — MUPIROCIN: 20 OINTMENT TOPICAL at 08:06

## 2024-06-05 RX ADMIN — ACETAMINOPHEN 650 MG: 325 TABLET ORAL at 01:06

## 2024-06-05 RX ADMIN — SODIUM CHLORIDE: 9 INJECTION, SOLUTION INTRAVENOUS at 12:06

## 2024-06-05 RX ADMIN — METOPROLOL TARTRATE 50 MG: 50 TABLET, FILM COATED ORAL at 08:06

## 2024-06-05 RX ADMIN — MUPIROCIN: 20 OINTMENT TOPICAL at 09:06

## 2024-06-05 RX ADMIN — HEPARIN SODIUM 5000 UNITS: 5000 INJECTION, SOLUTION INTRAVENOUS; SUBCUTANEOUS at 01:06

## 2024-06-05 RX ADMIN — METOPROLOL TARTRATE 50 MG: 50 TABLET, FILM COATED ORAL at 09:06

## 2024-06-05 RX ADMIN — MEROPENEM 1 G: 1 INJECTION, POWDER, FOR SOLUTION INTRAVENOUS at 12:06

## 2024-06-05 RX ADMIN — SODIUM CHLORIDE: 900 INJECTION INTRAVENOUS at 12:06

## 2024-06-05 RX ADMIN — ACETAMINOPHEN 650 MG: 325 TABLET ORAL at 06:06

## 2024-06-05 RX ADMIN — CLOPIDOGREL BISULFATE 75 MG: 75 TABLET, FILM COATED ORAL at 08:06

## 2024-06-05 RX ADMIN — MUPIROCIN: 20 OINTMENT TOPICAL at 12:06

## 2024-06-05 NOTE — PLAN OF CARE
Problem: Adult Inpatient Plan of Care  Goal: Plan of Care Review  Outcome: Progressing  Goal: Patient-Specific Goal (Individualized)  Outcome: Progressing  Goal: Absence of Hospital-Acquired Illness or Injury  Outcome: Progressing  Goal: Optimal Comfort and Wellbeing  Outcome: Progressing  Goal: Readiness for Transition of Care  Outcome: Progressing     Problem: Diabetes Comorbidity  Goal: Blood Glucose Level Within Targeted Range  Outcome: Progressing     Problem: Infection  Goal: Absence of Infection Signs and Symptoms  Outcome: Progressing     Problem: Electrolyte Imbalance  Goal: Electrolyte Balance  Outcome: Progressing

## 2024-06-05 NOTE — PLAN OF CARE
Patient being transferred for higher level of care. No dc needs at this time. Family made aware of swingbed program if therapy is needed for strength after stay at [facility].

## 2024-06-05 NOTE — ASSESSMENT & PLAN NOTE
Patient has hyponatremia which is uncontrolled,We will aim to correct the sodium by 4-6mEq in 24 hours. We will monitor sodium  as instructed by intensivist . The hyponatremia is due to unsure at this time. We will obtain the following studies  Transfer to [facility] to ICU  Recent Labs   Lab 06/05/24  0528   *

## 2024-06-05 NOTE — NURSING
0815 Report called to Reina ESCALANTE at [facility] ICU.   0910 [facility] arrived to transport patient. Robb intact, 2 20gauge IVs intact. Patient appears to be in no acute distress.

## 2024-06-05 NOTE — H&P
[facility]  Critical Care Medicine  History & Physical    Patient Name: Michell Dhillon  MRN: [id number]  Admission Date: 6/5/2024  Hospital Length of Stay: 0 days  Code Status: Full Code  Attending Physician: Shaheen Bellamy MD   Primary Care Provider: Daryl Moscoso NP   Principal Problem: Hyponatremia    Subjective:     HPI:  Ms Dhillon is a 77 yo female who initially presented to [facility] with burning with urination, suprapubic fullness, fatigue, and generalized malaise. She was admitted to the floor at [facility] for treatment of her UTI. During her hospitalization, it was noted that she had an unusual decrease in her sodium despite multiple labs. It was decided to transfer her to [facility] critical care for continued treatment and workup. Upon arrival, she is alert and oriented x3. She is at her baseline sodium level (124) presently.   She has a PMH of DM, HTN, anxiety/depression, previous CVA, and CKD stage 3. She follows with Dr Grande (nephrologist). She has had recurrent UTI symptoms and has been on cipro since 04/29.       Hospital/ICU Course:  No notes on file     Past Medical History:   Diagnosis Date    Arthritis     Diabetes mellitus, type 2     Hyperlipidemia     Hypertension     Hypothyroidism     Transient cerebral ischemic attack, unspecified 04/14/2021       History reviewed. No pertinent surgical history.    Review of patient's allergies indicates:   Allergen Reactions    Bactrim [sulfamethoxazole-trimethoprim]     Norvasc [amlodipine]     Omnicef [cefdinir]     Penicillin        Family History       Problem Relation (Age of Onset)    Hypertension Mother, Father, Sister          Tobacco Use    Smoking status: Never    Smokeless tobacco: Never   Substance and Sexual Activity    Alcohol use: Never    Drug use: Never    Sexual activity: Not on file      Review of Systems   Constitutional:  Positive for appetite change.   All other systems reviewed and are  negative.    Objective:     Vital Signs (Most Recent):  Temp: 98.6 °F (37 °C) (06/05/24 1415)  Pulse: 78 (06/05/24 1415)  Resp: 19 (06/05/24 1415)  BP: (!) 163/90 (06/05/24 1415)  SpO2: (!) 94 % (06/05/24 1415) Vital Signs (24h Range):  Temp:  [97.5 °F (36.4 °C)-99.1 °F (37.3 °C)] 98.6 °F (37 °C)  Pulse:  [65-89] 78  Resp:  [12-21] 19  SpO2:  [91 %-98 %] 94 %  BP: (132-189)/(77-99) 163/90   Weight: 100.4 kg (221 lb 5.5 oz)  Body mass index is 34.67 kg/m².      Intake/Output Summary (Last 24 hours) at 6/5/2024 1432  Last data filed at 6/5/2024 1423  Gross per 24 hour   Intake 60.95 ml   Output 300 ml   Net -239.05 ml          Physical Exam  Vitals and nursing note reviewed.   Constitutional:       General: She is not in acute distress.     Appearance: Normal appearance.   HENT:      Head: Normocephalic and atraumatic.      Mouth/Throat:      Mouth: Mucous membranes are moist.   Eyes:      General: No scleral icterus.     Extraocular Movements: Extraocular movements intact.      Conjunctiva/sclera: Conjunctivae normal.      Pupils: Pupils are equal, round, and reactive to light.   Cardiovascular:      Rate and Rhythm: Normal rate and regular rhythm.      Heart sounds: No murmur heard.     No friction rub. No gallop.   Pulmonary:      Effort: Pulmonary effort is normal. No respiratory distress.      Breath sounds: Normal breath sounds. No wheezing or rales.   Abdominal:      General: Abdomen is flat. Bowel sounds are normal. There is no distension.      Palpations: Abdomen is soft.      Tenderness: There is no abdominal tenderness.   Musculoskeletal:         General: Tenderness (BLE) present. No swelling.      Right lower leg: No edema.      Left lower leg: No edema.   Skin:     General: Skin is warm and dry.      Capillary Refill: Capillary refill takes 2 to 3 seconds.      Coloration: Skin is not jaundiced.      Findings: No rash.   Neurological:      General: No focal deficit present.      Mental Status: She is  alert and oriented to person, place, and time.   Psychiatric:         Mood and Affect: Mood normal.         Behavior: Behavior normal.            Vents:     Lines/Drains/Airways       Drain  Duration                  Urethral Catheter 06/02/24 2306 Temperature probe 16 Fr. 2 days              Peripheral Intravenous Line  Duration                  Peripheral IV - Single Lumen 06/04/24 1330 20 G Anterior;Proximal;Right Forearm 1 day         Peripheral IV - Single Lumen 06/05/24 1315 20 G Anterior;Left;Proximal Forearm <1 day                  Significant Labs:    CBC/Anemia Profile:  Recent Labs   Lab 06/04/24  0532   WBC 10.56   HGB 13.3   HCT 42.0      MCV 88.4   RDW 13.9        Chemistries:  Recent Labs   Lab 06/05/24  0528 06/05/24  1109 06/05/24  1324   * 124* 123*   K 3.9 4.5 4.4   CL 88* 91* 91*   CO2 24 25 26   BUN 24* 24* 24*   CREATININE 1.19* 1.10* 1.04*   CALCIUM 8.6 8.6 8.6       All pertinent labs within the past 24 hours have been reviewed.    Significant Imaging: I have reviewed all pertinent imaging results/findings within the past 24 hours.  Assessment/Plan:     Renal/  CKD (chronic kidney disease) stage 3, GFR 30-59 ml/min  - Cr stable at present  - follows with nephrologist   - continue to trend labs     Urinary tract infection without hematuria  Urine culture with E. Coli  Previous ESBL-- will continue merrem     Endocrine  * Hyponatremia  -- Na 124 on admit which is back to baseline for patient  - appears to be a possible lab error; especially given pt's mentation  - serum and urine osmolality studies pending   - will continue to hold causative agents     Hypothyroidism  - TSH 4.540; free T4 1.47   - will increase levothyroxine     Type 2 diabetes mellitus without complication, without long-term current use of insulin  Will hold oral antidiabetic medications  Will continue SSI at present  Accuchecks JASSI Gutiérrez-[facility]  Critical Care Medicine  [facility]  [facility]

## 2024-06-05 NOTE — NURSING
Na 121 now when collected @0100. Will wait to see what morning results are and notify provider if lower. Pt NAD, asleep, NS @50mL/hr through R forearm IV.

## 2024-06-05 NOTE — NURSING
Communicated via secure chat w Dr. Rainey regarding seeing possibility for transferring pt to [place]. Per Dr. Rainey repeat BMP @0100 and start NS @50mL/hr. If Na still dropping, notify ER NP to continue transfer, Dr. Gutiérrez aware of pt already.

## 2024-06-05 NOTE — SUBJECTIVE & OBJECTIVE
Past Medical History:   Diagnosis Date    Arthritis     Diabetes mellitus, type 2     Hyperlipidemia     Hypertension     Hypothyroidism     Transient cerebral ischemic attack, unspecified 04/14/2021       History reviewed. No pertinent surgical history.    Review of patient's allergies indicates:   Allergen Reactions    Bactrim [sulfamethoxazole-trimethoprim]     Norvasc [amlodipine]     Omnicef [cefdinir]     Penicillin        Family History       Problem Relation (Age of Onset)    Hypertension Mother, Father, Sister          Tobacco Use    Smoking status: Never    Smokeless tobacco: Never   Substance and Sexual Activity    Alcohol use: Never    Drug use: Never    Sexual activity: Not on file      Review of Systems   Constitutional:  Positive for appetite change.   All other systems reviewed and are negative.    Objective:     Vital Signs (Most Recent):  Temp: 98.6 °F (37 °C) (06/05/24 1415)  Pulse: 78 (06/05/24 1415)  Resp: 19 (06/05/24 1415)  BP: (!) 163/90 (06/05/24 1415)  SpO2: (!) 94 % (06/05/24 1415) Vital Signs (24h Range):  Temp:  [97.5 °F (36.4 °C)-99.1 °F (37.3 °C)] 98.6 °F (37 °C)  Pulse:  [65-89] 78  Resp:  [12-21] 19  SpO2:  [91 %-98 %] 94 %  BP: (132-189)/(77-99) 163/90   Weight: 100.4 kg (221 lb 5.5 oz)  Body mass index is 34.67 kg/m².      Intake/Output Summary (Last 24 hours) at 6/5/2024 1432  Last data filed at 6/5/2024 1423  Gross per 24 hour   Intake 60.95 ml   Output 300 ml   Net -239.05 ml          Physical Exam  Vitals and nursing note reviewed.   Constitutional:       General: She is not in acute distress.     Appearance: Normal appearance.   HENT:      Head: Normocephalic and atraumatic.      Mouth/Throat:      Mouth: Mucous membranes are moist.   Eyes:      General: No scleral icterus.     Extraocular Movements: Extraocular movements intact.      Conjunctiva/sclera: Conjunctivae normal.      Pupils: Pupils are equal, round, and reactive to light.   Cardiovascular:      Rate and Rhythm:  Normal rate and regular rhythm.      Heart sounds: No murmur heard.     No friction rub. No gallop.   Pulmonary:      Effort: Pulmonary effort is normal. No respiratory distress.      Breath sounds: Normal breath sounds. No wheezing or rales.   Abdominal:      General: Abdomen is flat. Bowel sounds are normal. There is no distension.      Palpations: Abdomen is soft.      Tenderness: There is no abdominal tenderness.   Musculoskeletal:         General: Tenderness (BLE) present. No swelling.      Right lower leg: No edema.      Left lower leg: No edema.   Skin:     General: Skin is warm and dry.      Capillary Refill: Capillary refill takes 2 to 3 seconds.      Coloration: Skin is not jaundiced.      Findings: No rash.   Neurological:      General: No focal deficit present.      Mental Status: She is alert and oriented to person, place, and time.   Psychiatric:         Mood and Affect: Mood normal.         Behavior: Behavior normal.            Vents:     Lines/Drains/Airways       Drain  Duration                  Urethral Catheter 06/02/24 2306 Temperature probe 16 Fr. 2 days              Peripheral Intravenous Line  Duration                  Peripheral IV - Single Lumen 06/04/24 1330 20 G Anterior;Proximal;Right Forearm 1 day         Peripheral IV - Single Lumen 06/05/24 1315 20 G Anterior;Left;Proximal Forearm <1 day                  Significant Labs:    CBC/Anemia Profile:  Recent Labs   Lab 06/04/24  0532   WBC 10.56   HGB 13.3   HCT 42.0      MCV 88.4   RDW 13.9        Chemistries:  Recent Labs   Lab 06/05/24  0528 06/05/24  1109 06/05/24  1324   * 124* 123*   K 3.9 4.5 4.4   CL 88* 91* 91*   CO2 24 25 26   BUN 24* 24* 24*   CREATININE 1.19* 1.10* 1.04*   CALCIUM 8.6 8.6 8.6       All pertinent labs within the past 24 hours have been reviewed.    Significant Imaging: I have reviewed all pertinent imaging results/findings within the past 24 hours.

## 2024-06-05 NOTE — PLAN OF CARE
Problem: Adult Inpatient Plan of Care  Goal: Plan of Care Review  Outcome: Progressing  Goal: Patient-Specific Goal (Individualized)  Outcome: Progressing  Goal: Absence of Hospital-Acquired Illness or Injury  Outcome: Progressing  Goal: Optimal Comfort and Wellbeing  Outcome: Progressing  Goal: Readiness for Transition of Care  Outcome: Progressing     Problem: Diabetes Comorbidity  Goal: Blood Glucose Level Within Targeted Range  Outcome: Progressing     Problem: Fall Injury Risk  Goal: Absence of Fall and Fall-Related Injury  Outcome: Progressing     Problem: UTI (Urinary Tract Infection)  Goal: Improved Infection Symptoms  Outcome: Progressing     Problem: Skin Injury Risk Increased  Goal: Skin Health and Integrity  Outcome: Progressing     Problem: Infection  Goal: Absence of Infection Signs and Symptoms  Outcome: Progressing

## 2024-06-05 NOTE — NURSING
Na 115, called from lab. AVA Mojica otp with provider in  [facility]. Communicated via secure chat w Dr. Rainey to make aware of lab results.

## 2024-06-05 NOTE — CONSULTS
[facility] Consult History and Physical   Patient Name: Michell Dhillon  MRN: [id number]  Age: 79 y.o.  : 1945  Time:  2:15 PM  Admission Date: 2024    Consulted for:  ILIANA  Consulted by: Dr. Bellamy    HPI:   Michell Dhillon is a pleasant 80 yo female with medical history significant DM2, HTN, history of CVA, Anxiety/Depression, CKD III (followed by Dr. Serge Phillips at [facility]) who presents as a transfer from [facility] for hyponatremia. She was admitted on  for UTI.  She had previously been taking ciprofloxacin however she had no relief of her symptoms.  She would admitted to the outside hospital and started on Rocephin and Merrem.  Throughout her hospitalization she had an acute worsening of her sodium to 115.  She was transferred from an outside hospital to our ICU for further management.  Of note her home medications include hydrochlorothiazide 25 mg daily.  She was also having urinary retention and required Robb placement.      Past Medical History:  has a past medical history of Arthritis, Diabetes mellitus, type 2, Hyperlipidemia, Hypertension, Hypothyroidism, and Transient cerebral ischemic attack, unspecified (2021).     Past Surgical History:   has no past surgical history on file.     Family History:  family history includes Hypertension in her father, mother, and sister.     Social History:   reports that she has never smoked. She has never used smokeless tobacco. She reports that she does not drink alcohol and does not use drugs.     Allergies: is allergic to bactrim [sulfamethoxazole-trimethoprim], norvasc [amlodipine], omnicef [cefdinir], and penicillin.     Medications prior to admission: Reviewed including OTC medications, herbal supplements, and NSAIDS.     Old records have been reviewed.       Review of Systems  ROS: A 10 point ROS was completed and found to be negative except for that mentioned above in the HPI.       Physical Exam:   BP  "(!) 175/79   Pulse 65   Temp 98.6 °F (37 °C)   Resp 17   Ht 5' 7" (1.702 m)   Wt 100.4 kg (221 lb 5.5 oz)   SpO2 (!) 92%   Breastfeeding No   BMI 34.67 kg/m²     Constitutional: lying in bed, in NAD  Eyes: EOMI, white sclera  ENMT: moist mucus membranes, nares patent  Cardiovascular: normal rate, S1/S2 noted, no edema  Respiratory: symmetrical chest expansion, CTA-B  Gastrointestinal: +BS, soft, NT/ND  Musculoskeletal: normal, no joint erythema/effusions  Skin: no rash, no purpura, warm extremities  Neurological: Alert and Oriented x 4, afocal    Data Review:  Lab:   Labs reviewed and significant values discussed below.    Recent Labs     06/05/24  0050 06/05/24  0528 06/05/24  1109   CALCIUM 8.5 8.6 8.6   * 115* 124*   K 4.2 3.9 4.5   CL 88* 88* 91*   CO2 24 24 25   BUN 26* 24* 24*   CREATININE 1.28* 1.19* 1.10*   * 146* 150*       Imaging:  No imaging     Assessment/Plan:     Patient Active Problem List   Diagnosis    Cellulitis of right lower leg    COVID-19 virus infection    Benign essential HTN    Type 2 diabetes mellitus without complication, without long-term current use of insulin    Hypothyroidism    Urinary tract infection without hematuria    ESBL (extended spectrum beta-lactamase) producing bacteria infection    Acute cystitis without hematuria    Hyponatremia    CKD (chronic kidney disease) stage 3, GFR 30-59 ml/min     Hyponatremia   Hypochloremia   - Acute complicated illness that poses a threat to life or bodily function without treatment  - Discussed with consulting service/ICU team   - Records reviewed prior to admission, Baseline na tends to run lower 130-135  - Will add on hepatic function panel, lipid panel. Agree with obtaining urine osm.   - Without urine osm, I am speculating etiology. I suspect she may have a degree of solute deficiency, poor nutrition in setting of advanced age dementia (also on thiazide diuretics at home, unsure if she was getting this from OSH). Will " add on albumin, prealbumin. I would consider giving her 500 cc NS slowly @ 50 cc/hr and trending her sodium q 4 to monitor response.   - Labs: Will order renal function for tomorrow   - Please avoid nephrotoxic agents/NSAIDs  - Renally dose all medications   - Please monitor strict UOP  - Daily weights    Thank you for the consult. Will follow along. Please call with questions.    Alisha S. Parker, DO [facility]   06/05/2024

## 2024-06-05 NOTE — HPI
Ms Dhillon is a 79 yo female who initially presented to [facility] with burning with urination, suprapubic fullness, fatigue, and generalized malaise. She was admitted to the floor at [facility] for treatment of her UTI. During her hospitalization, it was noted that she had an unusual decrease in her sodium despite multiple labs. It was decided to transfer her to [facility] critical care for continued treatment and workup. Upon arrival, she is alert and oriented x3. She is at her baseline sodium level (124) presently.   She has a PMH of DM, HTN, anxiety/depression, previous CVA, and CKD stage 3. She follows with Dr Grande (nephrologist). She has had recurrent UTI symptoms and has been on cipro since 04/29.

## 2024-06-05 NOTE — DISCHARGE SUMMARY
[facility]  Hospital Medicine  Discharge Summary      Patient Name: Michell Dhillon  MRN: [id number]  SHILOH: [id number]  Patient Class: IP- Inpatient  Admission Date: 6/2/2024  Hospital Length of Stay: 2 days  Discharge Date and Time:  06/05/2024 9:09 AM  Attending Physician: Elmer Rainey DO   Discharging Provider: JASSI Oh  Primary Care Provider: Daryl Moscoso NP    Primary Care Team: Networked reference to record PCT     HPI:   Ms Dhillon is a 79 yo WF with PMH of DM, HTN prev CVA, anxiety/depression, and CKD Stage 3- she saw Dr. Grande her nephrologist 4/23. She reports having UTI and has taken a round of macrobid, levaquin and is currently taking cipro since 4/29 with no relief.  The C&S report from 5/30 shows sensitivity to nitrofurantion but resistance to levofloxacin. She presents today with continued burning with urination, suprapubic fullness, fatigue and feeling worse.  She is found to have nitrite positive UTI. After discussing the POC with Dr. Hedrick she is admitted to OBS and placed on rocephin    * No surgery found *      Hospital Course:   Called by RN that pt having urinary retention.  Robb panel ordered.    6/4 Cultures still pending.  Hx of ESBL.  Failed outpatient treatment.     6/05 pt NA is fluctuating since admit.  This morning down to 115, discussed with Dr Bellamy, Intensivist at [facility], who accepts for transfer.     06/05 spoke with [facility] who reports pt is accepted to ICU 4 by Dr. Bellamy.  Pt and daughter agree with plan to transfer     Goals of Care Treatment Preferences:  Code Status: Full Code      Consults:     No new Assessment & Plan notes have been filed under this hospital service since the last note was generated.  Service: Hospital Medicine    Final Active Diagnoses:    Diagnosis Date Noted POA    PRINCIPAL PROBLEM:  Urinary tract infection without hematuria [N39.0] 06/03/2024 Yes    Hyponatremia [E87.1] 06/05/2024 No     ESBL (extended spectrum beta-lactamase) producing bacteria infection [A49.9, Z16.12] 06/03/2024 Yes    Acute cystitis without hematuria [N30.00] 06/03/2024 Yes    Hypothyroidism [E03.9] 05/13/2022 Yes     Chronic    Benign essential HTN [I10] 01/10/2022 Yes    Type 2 diabetes mellitus without complication, without long-term current use of insulin [E11.9] 01/10/2022 Yes      Problems Resolved During this Admission:       Discharged Condition: fair    Disposition: to [facility] ICU 4, accepted by Dr. Bellamy    Follow Up:    Patient Instructions:   No discharge procedures on file.    Significant Diagnostic Studies: Labs: All labs within the past 24 hours have been reviewed    Pending Diagnostic Studies:       Procedure Component Value Units Date/Time    Calcium, Ionized [[id number]] Collected: 06/04/24 1828    Order Status: Sent Lab Status: In process Updated: 06/04/24 1828    Specimen: Blood, Arterial     Osmolality, Serum [[id number]] Collected: 06/04/24 1653    Order Status: Sent Lab Status: In process Updated: 06/04/24 1653    Specimen: Blood     Osmolality, Urine [[id number]] Collected: 06/04/24 1702    Order Status: Sent Lab Status: In process Updated: 06/04/24 1715    Specimen: Urine, Catheterized     Sodium, Random Urine [[id number]] Collected: 06/04/24 1702    Order Status: Sent Lab Status: No result     Specimen: Urine, Catheterized            Medications:  Transfer Medications (for Discharge Readmit only):   Current Facility-Administered Medications   Medication Dose Route Frequency Provider Last Rate Last Admin    0.9%  NaCl infusion   Intravenous PRN Elmer Rainey DO   Stopped at 06/05/24 0310    acetaminophen tablet 650 mg  650 mg Oral Q4H PRN Brandie Bajwa FNP        clopidogreL tablet 75 mg  75 mg Oral Daily Brandie Bajwa FNP   75 mg at 06/05/24 0818    dextrose 50% injection 12.5 g  12.5 g Intravenous PRN Brandie Bajwa, FNP        dextrose 50% injection 25 g  25 g Intravenous PRN Garett  JASSI Cohen        glucagon (human recombinant) injection 1 mg  1 mg Intramuscular PRN Brandie Bajwa FNP        glucose chewable tablet 16 g  16 g Oral PRN Brandie Bajwa FNP        glucose chewable tablet 24 g  24 g Oral PRN Brandie Bajwa FNP        insulin aspart U-100 injection 0-5 Units  0-5 Units Subcutaneous QID (AC + HS) PRN Brandie Bajwa FNP        levothyroxine tablet 88 mcg  88 mcg Oral Before breakfast Brandie Bajwa FNP   88 mcg at 06/05/24 0501    melatonin tablet 6 mg  6 mg Oral Nightly PRN Brandie Bajwa FNP        meropenem (MERREM) 1 g in sodium chloride 0.9 % 100 mL IVPB (MB+)  1 g Intravenous Q12H Elmer Rainey DO   Stopped at 06/05/24 0304    metoprolol tartrate (LOPRESSOR) tablet 50 mg  50 mg Oral BID Brandie Bajwa FNP   50 mg at 06/05/24 0818    mupirocin 2 % ointment   Nasal BID Elmer Rainey DO   Given at 06/05/24 0818    sodium chloride 0.9% flush 10 mL  10 mL Intravenous PRN Brandie Bajwa FNP           Indwelling Lines/Drains at time of discharge:   Lines/Drains/Airways       Drain  Duration                  Urethral Catheter 06/02/24 2306 Temperature probe 16 Fr. 2 days                    Time spent on the discharge of patient: 35 minutes         JASSI Oh  Department of Hospital Medicine  [facility]

## 2024-06-06 LAB
ANION GAP SERPL CALCULATED.3IONS-SCNC: 9 MMOL/L (ref 7–16)
BASOPHILS # BLD AUTO: 0.02 K/UL (ref 0–0.2)
BASOPHILS NFR BLD AUTO: 0.2 % (ref 0–1)
BUN SERPL-MCNC: 22 MG/DL (ref 7–18)
BUN/CREAT SERPL: 24 (ref 6–20)
CALCIUM SERPL-MCNC: 8.4 MG/DL (ref 8.5–10.1)
CHLORIDE SERPL-SCNC: 94 MMOL/L (ref 98–107)
CO2 SERPL-SCNC: 25 MMOL/L (ref 21–32)
CREAT SERPL-MCNC: 0.93 MG/DL (ref 0.55–1.02)
CRENATED CELLS: ABNORMAL
DIFFERENTIAL METHOD BLD: ABNORMAL
EGFR (NO RACE VARIABLE) (RUSH/TITUS): 62 ML/MIN/1.73M2
EOSINOPHIL # BLD AUTO: 0.2 K/UL (ref 0–0.5)
EOSINOPHIL NFR BLD AUTO: 2.3 % (ref 1–4)
ERYTHROCYTE [DISTWIDTH] IN BLOOD BY AUTOMATED COUNT: 13.7 % (ref 11.5–14.5)
GLUCOSE SERPL-MCNC: 122 MG/DL (ref 70–105)
GLUCOSE SERPL-MCNC: 127 MG/DL (ref 70–105)
GLUCOSE SERPL-MCNC: 135 MG/DL (ref 74–106)
GLUCOSE SERPL-MCNC: 137 MG/DL (ref 70–105)
GLUCOSE SERPL-MCNC: 157 MG/DL (ref 70–105)
HCT VFR BLD AUTO: 38.3 % (ref 38–47)
HGB BLD-MCNC: 12.4 G/DL (ref 12–16)
IMM GRANULOCYTES # BLD AUTO: 0.07 K/UL (ref 0–0.04)
IMM GRANULOCYTES NFR BLD: 0.8 % (ref 0–0.4)
LYMPHOCYTES # BLD AUTO: 1.46 K/UL (ref 1–4.8)
LYMPHOCYTES NFR BLD AUTO: 16.7 % (ref 27–41)
MAGNESIUM SERPL-MCNC: 1.6 MG/DL (ref 1.7–2.3)
MCH RBC QN AUTO: 27.6 PG (ref 27–31)
MCHC RBC AUTO-ENTMCNC: 32.4 G/DL (ref 32–36)
MCV RBC AUTO: 85.1 FL (ref 80–96)
MONOCYTES # BLD AUTO: 0.81 K/UL (ref 0–0.8)
MONOCYTES NFR BLD AUTO: 9.3 % (ref 2–6)
MPC BLD CALC-MCNC: 10.1 FL (ref 9.4–12.4)
NEUTROPHILS # BLD AUTO: 6.16 K/UL (ref 1.8–7.7)
NEUTROPHILS NFR BLD AUTO: 70.7 % (ref 53–65)
NRBC # BLD AUTO: 0 X10E3/UL
NRBC, AUTO (.00): 0 %
PLATELET # BLD AUTO: 181 K/UL (ref 150–400)
PLATELET MORPHOLOGY: ABNORMAL
POLYCHROMASIA BLD QL SMEAR: ABNORMAL
POTASSIUM SERPL-SCNC: 4 MMOL/L (ref 3.5–5.1)
RBC # BLD AUTO: 4.5 M/UL (ref 4.2–5.4)
SODIUM SERPL-SCNC: 124 MMOL/L (ref 136–145)
WBC # BLD AUTO: 8.72 K/UL (ref 4.5–11)

## 2024-06-06 PROCEDURE — 25000003 PHARM REV CODE 250: Performed by: STUDENT IN AN ORGANIZED HEALTH CARE EDUCATION/TRAINING PROGRAM

## 2024-06-06 PROCEDURE — 11000001 HC ACUTE MED/SURG PRIVATE ROOM

## 2024-06-06 PROCEDURE — 36415 COLL VENOUS BLD VENIPUNCTURE: CPT | Performed by: NURSE PRACTITIONER

## 2024-06-06 PROCEDURE — 82962 GLUCOSE BLOOD TEST: CPT

## 2024-06-06 PROCEDURE — 25000003 PHARM REV CODE 250: Performed by: NURSE PRACTITIONER

## 2024-06-06 PROCEDURE — 63600175 PHARM REV CODE 636 W HCPCS: Performed by: STUDENT IN AN ORGANIZED HEALTH CARE EDUCATION/TRAINING PROGRAM

## 2024-06-06 PROCEDURE — 80048 BASIC METABOLIC PNL TOTAL CA: CPT | Performed by: NURSE PRACTITIONER

## 2024-06-06 PROCEDURE — 83735 ASSAY OF MAGNESIUM: CPT | Performed by: NURSE PRACTITIONER

## 2024-06-06 PROCEDURE — 99232 SBSQ HOSP IP/OBS MODERATE 35: CPT | Mod: ,,, | Performed by: INTERNAL MEDICINE

## 2024-06-06 PROCEDURE — 99233 SBSQ HOSP IP/OBS HIGH 50: CPT | Mod: ,,, | Performed by: NURSE PRACTITIONER

## 2024-06-06 PROCEDURE — 63600175 PHARM REV CODE 636 W HCPCS: Performed by: NURSE PRACTITIONER

## 2024-06-06 PROCEDURE — 85025 COMPLETE CBC W/AUTO DIFF WBC: CPT | Performed by: NURSE PRACTITIONER

## 2024-06-06 RX ORDER — LEVOTHYROXINE SODIUM 100 UG/1
100 TABLET ORAL
Status: DISCONTINUED | OUTPATIENT
Start: 2024-06-07 | End: 2024-06-09 | Stop reason: HOSPADM

## 2024-06-06 RX ORDER — ENOXAPARIN SODIUM 100 MG/ML
40 INJECTION SUBCUTANEOUS EVERY 24 HOURS
Status: DISCONTINUED | OUTPATIENT
Start: 2024-06-06 | End: 2024-06-09 | Stop reason: HOSPADM

## 2024-06-06 RX ORDER — SODIUM CHLORIDE 9 MG/ML
INJECTION, SOLUTION INTRAVENOUS
Status: DISCONTINUED | OUTPATIENT
Start: 2024-06-06 | End: 2024-06-09 | Stop reason: HOSPADM

## 2024-06-06 RX ORDER — MAGNESIUM SULFATE HEPTAHYDRATE 40 MG/ML
2 INJECTION, SOLUTION INTRAVENOUS ONCE
Status: COMPLETED | OUTPATIENT
Start: 2024-06-06 | End: 2024-06-06

## 2024-06-06 RX ADMIN — INSULIN ASPART 2 UNITS: 100 INJECTION, SOLUTION INTRAVENOUS; SUBCUTANEOUS at 10:06

## 2024-06-06 RX ADMIN — CLOPIDOGREL BISULFATE 75 MG: 75 TABLET ORAL at 09:06

## 2024-06-06 RX ADMIN — ASPIRIN 81 MG CHEWABLE TABLET 81 MG: 81 TABLET CHEWABLE at 09:06

## 2024-06-06 RX ADMIN — METOPROLOL TARTRATE 50 MG: 50 TABLET, FILM COATED ORAL at 09:06

## 2024-06-06 RX ADMIN — SODIUM CHLORIDE: 9 INJECTION, SOLUTION INTRAVENOUS at 09:06

## 2024-06-06 RX ADMIN — MAGNESIUM SULFATE HEPTAHYDRATE 2 G: 40 INJECTION, SOLUTION INTRAVENOUS at 09:06

## 2024-06-06 RX ADMIN — HEPARIN SODIUM 5000 UNITS: 5000 INJECTION, SOLUTION INTRAVENOUS; SUBCUTANEOUS at 06:06

## 2024-06-06 RX ADMIN — MEROPENEM 1 G: 1 INJECTION, POWDER, FOR SOLUTION INTRAVENOUS at 10:06

## 2024-06-06 RX ADMIN — MEROPENEM 1 G: 1 INJECTION, POWDER, FOR SOLUTION INTRAVENOUS at 01:06

## 2024-06-06 RX ADMIN — ACETAMINOPHEN 650 MG: 325 TABLET ORAL at 02:06

## 2024-06-06 RX ADMIN — SODIUM CHLORIDE: 9 INJECTION, SOLUTION INTRAVENOUS at 05:06

## 2024-06-06 RX ADMIN — MEROPENEM 1 G: 1 INJECTION, POWDER, FOR SOLUTION INTRAVENOUS at 05:06

## 2024-06-06 RX ADMIN — ENOXAPARIN SODIUM 40 MG: 40 INJECTION SUBCUTANEOUS at 05:06

## 2024-06-06 RX ADMIN — ACETAMINOPHEN 650 MG: 325 TABLET ORAL at 10:06

## 2024-06-06 RX ADMIN — MUPIROCIN: 20 OINTMENT TOPICAL at 08:06

## 2024-06-06 RX ADMIN — METOPROLOL TARTRATE 50 MG: 50 TABLET, FILM COATED ORAL at 08:06

## 2024-06-06 RX ADMIN — ATORVASTATIN CALCIUM 40 MG: 40 TABLET, FILM COATED ORAL at 09:06

## 2024-06-06 RX ADMIN — SODIUM CHLORIDE: 9 INJECTION, SOLUTION INTRAVENOUS at 10:06

## 2024-06-06 RX ADMIN — LEVOTHYROXINE SODIUM 88 MCG: 0.09 TABLET ORAL at 06:06

## 2024-06-06 RX ADMIN — MUPIROCIN: 20 OINTMENT TOPICAL at 09:06

## 2024-06-06 NOTE — PLAN OF CARE
Problem: Adult Inpatient Plan of Care  Goal: Plan of Care Review  Outcome: Progressing  Goal: Patient-Specific Goal (Individualized)  Outcome: Progressing  Goal: Absence of Hospital-Acquired Illness or Injury  Outcome: Progressing  Goal: Optimal Comfort and Wellbeing  Outcome: Progressing  Goal: Readiness for Transition of Care  Outcome: Progressing     Problem: Diabetes Comorbidity  Goal: Blood Glucose Level Within Targeted Range  Outcome: Progressing     Problem: Infection  Goal: Absence of Infection Signs and Symptoms  Outcome: Progressing     Problem: Electrolyte Imbalance  Goal: Electrolyte Balance  Outcome: Progressing     Problem: Skin Injury Risk Increased  Goal: Skin Health and Integrity  Outcome: Progressing

## 2024-06-06 NOTE — PLAN OF CARE
Problem: Diabetes Comorbidity  Goal: Blood Glucose Level Within Targeted Range  6/6/2024 0430 by Snow Thompson RN  Outcome: Progressing  6/6/2024 0429 by Snow Thompson RN  Outcome: Progressing  Intervention: Monitor and Manage Glycemia  Flowsheets (Taken 6/6/2024 0430)  Glycemic Management: blood glucose monitored     Problem: Infection  Goal: Absence of Infection Signs and Symptoms  Outcome: Progressing  Intervention: Prevent or Manage Infection  Flowsheets (Taken 6/6/2024 0430)  Fever Reduction/Comfort Measures:   lightweight bedding   lightweight clothing  Infection Management: aseptic technique maintained  Isolation Precautions: precautions maintained     Problem: Skin Injury Risk Increased  Goal: Skin Health and Integrity  Outcome: Progressing  Intervention: Optimize Skin Protection  Flowsheets (Taken 6/6/2024 0430)  Pressure Reduction Techniques:   frequent weight shift encouraged   weight shift assistance provided  Pressure Reduction Devices:   positioning supports utilized   specialty bed utilized  Activity Management: Rolling - L1  Head of Bed (HOB) Positioning: HOB at 30-45 degrees  Intervention: Promote and Optimize Oral Intake  Flowsheets (Taken 6/6/2024 0430)  Oral Nutrition Promotion: adaptive equipment use encouraged

## 2024-06-06 NOTE — SUBJECTIVE & OBJECTIVE
Interval History/Significant Events: Pt resting in bed. Denies any needs or complaints. Remains oriented. Sodium remains stable at present. Will continue abx and transfer to floor.     Review of Systems   All other systems reviewed and are negative.    Objective:     Vital Signs (Most Recent):  Temp: 99 °F (37.2 °C) (06/06/24 1045)  Pulse: 73 (06/06/24 1045)  Resp: 19 (06/06/24 1045)  BP: (!) 102/53 (06/06/24 1045)  SpO2: (!) 91 % (06/06/24 1045) Vital Signs (24h Range):  Temp:  [97.9 °F (36.6 °C)-99.1 °F (37.3 °C)] 99 °F (37.2 °C)  Pulse:  [59-88] 73  Resp:  [11-22] 19  SpO2:  [89 %-98 %] 91 %  BP: ()/() 102/53   Weight: 106.1 kg (233 lb 14.5 oz)  Body mass index is 36.64 kg/m².      Intake/Output Summary (Last 24 hours) at 6/6/2024 1058  Last data filed at 6/6/2024 1047  Gross per 24 hour   Intake 324.58 ml   Output 2400 ml   Net -2075.42 ml          Physical Exam  Vitals and nursing note reviewed.   Constitutional:       General: She is not in acute distress.     Appearance: Normal appearance.   HENT:      Head: Normocephalic and atraumatic.      Mouth/Throat:      Mouth: Mucous membranes are moist.   Eyes:      General: No scleral icterus.     Extraocular Movements: Extraocular movements intact.      Conjunctiva/sclera: Conjunctivae normal.      Pupils: Pupils are equal, round, and reactive to light.   Cardiovascular:      Rate and Rhythm: Normal rate and regular rhythm.      Heart sounds: No murmur heard.     No friction rub. No gallop.   Pulmonary:      Effort: Pulmonary effort is normal. No respiratory distress.      Breath sounds: Normal breath sounds. No wheezing or rales.   Abdominal:      General: Abdomen is flat. Bowel sounds are normal. There is no distension.      Palpations: Abdomen is soft.      Tenderness: There is no abdominal tenderness.   Musculoskeletal:         General: Tenderness (BLE) present. No swelling.      Right lower leg: No edema.      Left lower leg: No edema.   Skin:      General: Skin is warm and dry.      Capillary Refill: Capillary refill takes 2 to 3 seconds.      Coloration: Skin is not jaundiced.      Findings: No rash.   Neurological:      General: No focal deficit present.      Mental Status: She is alert and oriented to person, place, and time.   Psychiatric:         Mood and Affect: Mood normal.         Behavior: Behavior normal.            Vents:  Oxygen Concentration (%): 21 (06/05/24 1800)  Lines/Drains/Airways       Drain  Duration                  Urethral Catheter 06/02/24 2306 Temperature probe 16 Fr. 3 days              Peripheral Intravenous Line  Duration                  Peripheral IV - Single Lumen 06/04/24 1330 20 G Anterior;Proximal;Right Forearm 1 day         Peripheral IV - Single Lumen 06/05/24 1315 20 G Anterior;Left;Proximal Forearm <1 day                  Significant Labs:    CBC/Anemia Profile:  Recent Labs   Lab 06/06/24  0330   WBC 8.72   HGB 12.4   HCT 38.3      MCV 85.1   RDW 13.7        Chemistries:  Recent Labs   Lab 06/05/24  1539 06/05/24 2003 06/06/24  0330   * 122* 124*   K 4.1 4.3 4.0   CL 92* 91* 94*   CO2 25 25 25   BUN 24* 24* 22*   CREATININE 1.05* 1.01 0.93   CALCIUM 8.6 8.8 8.4*   ALBUMIN 2.8*  --   --    PROT 6.6  --   --    BILITOT 0.7  --   --    ALKPHOS 55  --   --    ALT 17  --   --    AST 28  --   --    MG 1.6*  --  1.6*       All pertinent labs within the past 24 hours have been reviewed.    Significant Imaging:  I have reviewed all pertinent imaging results/findings within the past 24 hours.

## 2024-06-06 NOTE — PLAN OF CARE
[facility] ICU  Initial Discharge Assessment       Primary Care Provider: Daryl Moscoso NP    Admission Diagnosis: Hyponatremia [E87.1]    Admission Date: 6/5/2024  Expected Discharge Date:     Transition of Care Barriers: None    Payor: MEDICARE / Plan: MEDICARE PART A & B / Product Type: Government /     Extended Emergency Contact Information  Primary Emergency Contact: Mikki Gallardo   [place]  Mobile Phone: [phone]  Relation: Daughter  Preferred language: English   needed? No    Discharge Plan A: Home  Discharge Plan B: [facility]      [facility] - [place], [place] - [address]  [address]  [place] [address]  Phone: [phone] Fax: [phone]    [facility] - [place], [place] - [address]  [address]  [place] [address]  Phone: [phone] Fax: [phone]      Initial Assessment (most recent)       Adult Discharge Assessment - 06/06/24 1020          Discharge Assessment    Assessment Type Discharge Planning Assessment     Confirmed/corrected address, phone number and insurance Yes     Confirmed Demographics Correct on Facesheet     Source of Information family     If unable to respond/provide information was family/caregiver contacted? Yes     Contact Name/Number Mikki [phone]     Communicated NIGEL with patient/caregiver Date not available/Unable to determine     People in Home child(janell), adult     Do you expect to return to your current living situation? Yes     Do you have help at home or someone to help you manage your care at home? Yes     Who are your caregiver(s) and their phone number(s)? daughter Mikki and sons     Prior to hospitilization cognitive status: Unable to Assess     Current cognitive status: Unable to Assess     Walking or Climbing Stairs ambulation difficulty, requires equipment     Mobility Management wc and rw     Dressing/Bathing bathing difficulty, assistance 1 person     Home  Accessibility wheelchair accessible     Home Layout Able to live on 1st floor     Equipment Currently Used at Home walker, rolling;wheelchair     Readmission within 30 days? Yes     Patient currently being followed by outpatient case management? No     Do you currently have service(s) that help you manage your care at home? No     Do you take prescription medications? Yes     Do you have prescription coverage? Yes     Coverage mcare a b     Do you have any problems affording any of your prescribed medications? No     Is the patient taking medications as prescribed? yes     Who is going to help you get home at discharge? daughetr and sons     How do you get to doctors appointments? family or friend will provide     Are you on dialysis? No     Do you take coumadin? No     Discharge Plan A Home     Discharge Plan B Home Health     DME Needed Upon Discharge  none     Discharge Plan discussed with: Adult children     Transition of Care Barriers None                      Spoke with pt in room. Pt lives home with children, plans to return at DC. Reviewed chart, 0 dc needs noted. Will follow consults.

## 2024-06-06 NOTE — PROGRESS NOTES
Pharmacist Renal Dose Adjustment Note    Michell Dhillon is a 79 y.o. female being treated with the medication Merrem.    Patient Data:    Vital Signs (Most Recent):  Temp: 98.4 °F (36.9 °C) (06/06/24 0845)  Pulse: 83 (06/06/24 0845)  Resp: 15 (06/06/24 0845)  BP: (!) 140/83 (06/06/24 0931)  SpO2: (!) 94 % (06/06/24 0845) Vital Signs (72h Range):  Temp:  [97.4 °F (36.3 °C)-99.1 °F (37.3 °C)]   Pulse:  [59-89]   Resp:  [11-21]   BP: ()/()   SpO2:  [89 %-98 %]      Recent Labs   Lab 06/05/24  1539 06/05/24 2003 06/06/24  0330   CREATININE 1.05* 1.01 0.93     Serum creatinine: 0.93 mg/dL 06/06/24 0330  Estimated creatinine clearance: 60.5 mL/min    Medication:Merrem dose: 1 g frequency every 12 hours will be changed to medication:Merrem dose:1 g frequency:every 8 hours.    Pharmacist's Name: Bonnie Grissom  Pharmacist's Extension: [phone]

## 2024-06-06 NOTE — PROGRESS NOTES
"[facility] Consult Follow-Up Note     HPI:  80 yo female with medical history significant DM2, HTN, history of CVA, Anxiety/Depression, CKD III (followed by Dr. Serge Phillips at [facility]) who presents as a transfer from [facility] for hyponatremia. She was admitted on 6/2 for UTI.  She had previously been taking ciprofloxacin however she had no relief of her symptoms.  She would admitted to the outside hospital and started on Rocephin and Merrem.  Throughout her hospitalization she had an acute worsening of her sodium to 115.  She was transferred from an outside hospital to our ICU for further management.  Of note her home medications include hydrochlorothiazide 25 mg daily.  She was also having urinary retention and required Robb placement.     Subjective/Interval History:  No acute events overnight  Sodium and chloride improving     Objective     Medications:   aspirin  81 mg Oral Daily    atorvastatin  40 mg Oral Daily    clopidogreL  75 mg Oral Daily    enoxparin  40 mg Subcutaneous Q24H (prophylaxis, 1700)    [START ON 6/7/2024] levothyroxine  100 mcg Oral Before breakfast    meropenem (MERREM) extended infusion  1 g Intravenous Q8H    metoprolol tartrate  50 mg Oral BID    mupirocin   Nasal BID       Physical Exam:   BP (!) 114/53   Pulse 67   Temp 99 °F (37.2 °C)   Resp 17   Ht 5' 7" (1.702 m)   Wt 106.1 kg (233 lb 14.5 oz)   SpO2 (!) 94%   Breastfeeding No   BMI 36.64 kg/m²     Constitutional: lying in bed, in NAD  Eyes: EOMI, white sclera  ENMT: moist mucus membranes, nares patent  Cardiovascular: normal rate, S1/S2 noted, no edema  Respiratory: symmetrical chest expansion, CTA-B  Gastrointestinal: +BS, soft, NT/ND  Musculoskeletal: normal, no joint erythema/effusions  Skin: no rash, no purpura, warm extremities  Neurological: Alert and Oriented x 4, afocal    I/Os:   I/O last 3 completed shifts:  In: 279.8 [I.V.:80.6; IV Piggyback:199.2]  Out: 2025 [Urine:2025]    Labs, micro, imaging reviewed. "   Recent Labs     06/05/24  1539 06/05/24 2003 06/06/24  0330   CALCIUM 8.6 8.8 8.4*   * 122* 124*   K 4.1 4.3 4.0   CL 92* 91* 94*   CO2 25 25 25   BUN 24* 24* 22*   CREATININE 1.05* 1.01 0.93   * 144* 135*         Pertinent for:   22/0.93 BUN/sCr   Na 124    Assessment and Plan:     Patient Active Problem List   Diagnosis    Cellulitis of right lower leg    COVID-19 virus infection    Benign essential HTN    Type 2 diabetes mellitus without complication, without long-term current use of insulin    Hypothyroidism    Urinary tract infection without hematuria    ESBL (extended spectrum beta-lactamase) producing bacteria infection    Acute cystitis without hematuria    Hyponatremia    CKD (chronic kidney disease) stage 3, GFR 30-59 ml/min       Hyponatremia   Hypochloremia   - Acute complicated illness that poses a threat to life or bodily function without treatment  - Discussed with consulting service/ICU team   - Records reviewed prior to admission, Baseline na tends to run lower 130-135  - U osm pending  - Without urine osm, I am speculating etiology. I suspect she may have a degree of solute deficiency, poor nutrition in setting of advanced age dementia (also on thiazide diuretics at home, unsure if she was getting this from OSH).  - I recommend to continue high protein, high solute diet. Don't resume HCTZ on DC.   - Labs: Will order renal function for tomorrow   - Please avoid nephrotoxic agents/NSAIDs  - Renally dose all medications   - Please monitor strict UOP  - Daily weights      Thank you for this consult. [facility] paolo sign off. Please call with any questions.     Jamila Connell, DO [facility]   06/06/2024

## 2024-06-06 NOTE — PROGRESS NOTES
[facility]  Critical Care Medicine  Progress Note    Patient Name: Michell Dhillon  MRN: [id number]  Admission Date: 6/5/2024  Hospital Length of Stay: 1 days  Code Status: Full Code  Attending Provider: Shaheen Bellamy MD  Primary Care Provider: Daryl Moscoso NP   Principal Problem: Hyponatremia    Subjective:     HPI:  Ms Dhillon is a 79 yo female who initially presented to [facility] with burning with urination, suprapubic fullness, fatigue, and generalized malaise. She was admitted to the floor at [facility] for treatment of her UTI. During her hospitalization, it was noted that she had an unusual decrease in her sodium despite multiple labs. It was decided to transfer her to [facility] critical care for continued treatment and workup. Upon arrival, she is alert and oriented x3. She is at her baseline sodium level (124) presently.   She has a PMH of DM, HTN, anxiety/depression, previous CVA, and CKD stage 3. She follows with Dr Grande (nephrologist). She has had recurrent UTI symptoms and has been on cipro since 04/29.       Hospital/ICU Course:  6/6- Na stable; mentation at baseline; continue abx; ok to transfer to floor     Interval History/Significant Events: Pt resting in bed. Denies any needs or complaints. Remains oriented. Sodium remains stable at present. Will continue abx and transfer to floor.     Review of Systems   All other systems reviewed and are negative.    Objective:     Vital Signs (Most Recent):  Temp: 99 °F (37.2 °C) (06/06/24 1045)  Pulse: 73 (06/06/24 1045)  Resp: 19 (06/06/24 1045)  BP: (!) 102/53 (06/06/24 1045)  SpO2: (!) 91 % (06/06/24 1045) Vital Signs (24h Range):  Temp:  [97.9 °F (36.6 °C)-99.1 °F (37.3 °C)] 99 °F (37.2 °C)  Pulse:  [59-88] 73  Resp:  [11-22] 19  SpO2:  [89 %-98 %] 91 %  BP: ()/() 102/53   Weight: 106.1 kg (233 lb 14.5 oz)  Body mass index is 36.64 kg/m².      Intake/Output Summary (Last 24 hours) at 6/6/2024 4221  Last data filed  at 6/6/2024 1047  Gross per 24 hour   Intake 324.58 ml   Output 2400 ml   Net -2075.42 ml          Physical Exam  Vitals and nursing note reviewed.   Constitutional:       General: She is not in acute distress.     Appearance: Normal appearance.   HENT:      Head: Normocephalic and atraumatic.      Mouth/Throat:      Mouth: Mucous membranes are moist.   Eyes:      General: No scleral icterus.     Extraocular Movements: Extraocular movements intact.      Conjunctiva/sclera: Conjunctivae normal.      Pupils: Pupils are equal, round, and reactive to light.   Cardiovascular:      Rate and Rhythm: Normal rate and regular rhythm.      Heart sounds: No murmur heard.     No friction rub. No gallop.   Pulmonary:      Effort: Pulmonary effort is normal. No respiratory distress.      Breath sounds: Normal breath sounds. No wheezing or rales.   Abdominal:      General: Abdomen is flat. Bowel sounds are normal. There is no distension.      Palpations: Abdomen is soft.      Tenderness: There is no abdominal tenderness.   Musculoskeletal:         General: Tenderness (BLE) present. No swelling.      Right lower leg: No edema.      Left lower leg: No edema.   Skin:     General: Skin is warm and dry.      Capillary Refill: Capillary refill takes 2 to 3 seconds.      Coloration: Skin is not jaundiced.      Findings: No rash.   Neurological:      General: No focal deficit present.      Mental Status: She is alert and oriented to person, place, and time.   Psychiatric:         Mood and Affect: Mood normal.         Behavior: Behavior normal.            Vents:  Oxygen Concentration (%): 21 (06/05/24 1800)  Lines/Drains/Airways       Drain  Duration                  Urethral Catheter 06/02/24 2306 Temperature probe 16 Fr. 3 days              Peripheral Intravenous Line  Duration                  Peripheral IV - Single Lumen 06/04/24 1330 20 G Anterior;Proximal;Right Forearm 1 day         Peripheral IV - Single Lumen 06/05/24 1315 20 G  "Anterior;Left;Proximal Forearm <1 day                  Significant Labs:    CBC/Anemia Profile:  Recent Labs   Lab 06/06/24  0330   WBC 8.72   HGB 12.4   HCT 38.3      MCV 85.1   RDW 13.7        Chemistries:  Recent Labs   Lab 06/05/24  1539 06/05/24 2003 06/06/24  0330   * 122* 124*   K 4.1 4.3 4.0   CL 92* 91* 94*   CO2 25 25 25   BUN 24* 24* 22*   CREATININE 1.05* 1.01 0.93   CALCIUM 8.6 8.8 8.4*   ALBUMIN 2.8*  --   --    PROT 6.6  --   --    BILITOT 0.7  --   --    ALKPHOS 55  --   --    ALT 17  --   --    AST 28  --   --    MG 1.6*  --  1.6*       All pertinent labs within the past 24 hours have been reviewed.    Significant Imaging:  I have reviewed all pertinent imaging results/findings within the past 24 hours.    ABG  No results for input(s): "PH", "PO2", "PCO2", "HCO3", "BE" in the last 168 hours.  Assessment/Plan:     Renal/  CKD (chronic kidney disease) stage 3, GFR 30-59 ml/min  - Cr stable at present  - follows with nephrologist   - continue to trend labs     Urinary tract infection without hematuria  Urine culture with E. Coli  Previous ESBL-- will continue merrem   Merrem for a total of 7 days-- stop date added to abx course     Endocrine  * Hyponatremia  -- Na 124 on admit which is back to baseline for patient  - appears to be a possible lab error; especially given pt's mentation  - serum and urine osmolality studies pending   - will continue to hold causative agents   -- Na lvl remains stable; will continue daily labs     Hypothyroidism  - TSH 4.540; free T4 1.47   - will increase levothyroxine     Type 2 diabetes mellitus without complication, without long-term current use of insulin  Will hold oral antidiabetic medications  Will continue SSI at present  Accuchecks ACHS   BG trend stable <180          JASSI Alvarado-[facility]  Critical Care Medicine  [facility]  "

## 2024-06-07 PROBLEM — M17.9 DJD (DEGENERATIVE JOINT DISEASE) OF KNEE: Status: ACTIVE | Noted: 2024-06-07

## 2024-06-07 LAB
ANION GAP SERPL CALCULATED.3IONS-SCNC: 8 MMOL/L (ref 7–16)
BASOPHILS # BLD AUTO: 0.04 K/UL (ref 0–0.2)
BASOPHILS NFR BLD AUTO: 0.4 % (ref 0–1)
BUN SERPL-MCNC: 24 MG/DL (ref 7–18)
BUN/CREAT SERPL: 25 (ref 6–20)
CALCIUM SERPL-MCNC: 9 MG/DL (ref 8.5–10.1)
CHLORIDE SERPL-SCNC: 96 MMOL/L (ref 98–107)
CO2 SERPL-SCNC: 27 MMOL/L (ref 21–32)
CREAT SERPL-MCNC: 0.96 MG/DL (ref 0.55–1.02)
DIFFERENTIAL METHOD BLD: ABNORMAL
EGFR (NO RACE VARIABLE) (RUSH/TITUS): 60 ML/MIN/1.73M2
EOSINOPHIL # BLD AUTO: 0.26 K/UL (ref 0–0.5)
EOSINOPHIL NFR BLD AUTO: 2.8 % (ref 1–4)
ERYTHROCYTE [DISTWIDTH] IN BLOOD BY AUTOMATED COUNT: 14.1 % (ref 11.5–14.5)
GLUCOSE SERPL-MCNC: 123 MG/DL (ref 74–106)
GLUCOSE SERPL-MCNC: 135 MG/DL (ref 70–105)
GLUCOSE SERPL-MCNC: 150 MG/DL (ref 70–105)
GLUCOSE SERPL-MCNC: 168 MG/DL (ref 70–105)
GLUCOSE SERPL-MCNC: 176 MG/DL (ref 70–105)
HCT VFR BLD AUTO: 42.3 % (ref 38–47)
HGB BLD-MCNC: 13.5 G/DL (ref 12–16)
IMM GRANULOCYTES # BLD AUTO: 0.08 K/UL (ref 0–0.04)
IMM GRANULOCYTES NFR BLD: 0.9 % (ref 0–0.4)
LYMPHOCYTES # BLD AUTO: 1.95 K/UL (ref 1–4.8)
LYMPHOCYTES NFR BLD AUTO: 20.8 % (ref 27–41)
MAGNESIUM SERPL-MCNC: 2.3 MG/DL (ref 1.7–2.3)
MCH RBC QN AUTO: 27.9 PG (ref 27–31)
MCHC RBC AUTO-ENTMCNC: 31.9 G/DL (ref 32–36)
MCV RBC AUTO: 87.4 FL (ref 80–96)
MONOCYTES # BLD AUTO: 0.9 K/UL (ref 0–0.8)
MONOCYTES NFR BLD AUTO: 9.6 % (ref 2–6)
MPC BLD CALC-MCNC: 10.8 FL (ref 9.4–12.4)
NEUTROPHILS # BLD AUTO: 6.16 K/UL (ref 1.8–7.7)
NEUTROPHILS NFR BLD AUTO: 65.5 % (ref 53–65)
NRBC # BLD AUTO: 0 X10E3/UL
NRBC, AUTO (.00): 0 %
PLATELET # BLD AUTO: 175 K/UL (ref 150–400)
POTASSIUM SERPL-SCNC: 3.9 MMOL/L (ref 3.5–5.1)
RBC # BLD AUTO: 4.84 M/UL (ref 4.2–5.4)
SODIUM SERPL-SCNC: 127 MMOL/L (ref 136–145)
UA COMPLETE W REFLEX CULTURE PNL UR: NO GROWTH
URATE SERPL-MCNC: 5.3 MG/DL (ref 2.6–6)
WBC # BLD AUTO: 9.39 K/UL (ref 4.5–11)

## 2024-06-07 PROCEDURE — 63600175 PHARM REV CODE 636 W HCPCS: Performed by: STUDENT IN AN ORGANIZED HEALTH CARE EDUCATION/TRAINING PROGRAM

## 2024-06-07 PROCEDURE — 82962 GLUCOSE BLOOD TEST: CPT

## 2024-06-07 PROCEDURE — 25000003 PHARM REV CODE 250: Performed by: NURSE PRACTITIONER

## 2024-06-07 PROCEDURE — 83735 ASSAY OF MAGNESIUM: CPT | Performed by: NURSE PRACTITIONER

## 2024-06-07 PROCEDURE — 11000001 HC ACUTE MED/SURG PRIVATE ROOM

## 2024-06-07 PROCEDURE — 99233 SBSQ HOSP IP/OBS HIGH 50: CPT | Mod: ,,, | Performed by: HOSPITALIST

## 2024-06-07 PROCEDURE — 25000003 PHARM REV CODE 250: Performed by: STUDENT IN AN ORGANIZED HEALTH CARE EDUCATION/TRAINING PROGRAM

## 2024-06-07 PROCEDURE — 63600175 PHARM REV CODE 636 W HCPCS: Performed by: NURSE PRACTITIONER

## 2024-06-07 PROCEDURE — 97162 PT EVAL MOD COMPLEX 30 MIN: CPT

## 2024-06-07 PROCEDURE — 84550 ASSAY OF BLOOD/URIC ACID: CPT | Performed by: HOSPITALIST

## 2024-06-07 PROCEDURE — 25000003 PHARM REV CODE 250: Performed by: HOSPITALIST

## 2024-06-07 PROCEDURE — 36415 COLL VENOUS BLD VENIPUNCTURE: CPT | Performed by: NURSE PRACTITIONER

## 2024-06-07 PROCEDURE — 97165 OT EVAL LOW COMPLEX 30 MIN: CPT

## 2024-06-07 PROCEDURE — 85025 COMPLETE CBC W/AUTO DIFF WBC: CPT | Performed by: NURSE PRACTITIONER

## 2024-06-07 PROCEDURE — 80048 BASIC METABOLIC PNL TOTAL CA: CPT | Performed by: NURSE PRACTITIONER

## 2024-06-07 PROCEDURE — 63600175 PHARM REV CODE 636 W HCPCS: Performed by: HOSPITALIST

## 2024-06-07 RX ORDER — HYDROCODONE BITARTRATE AND ACETAMINOPHEN 5; 325 MG/1; MG/1
1 TABLET ORAL EVERY 6 HOURS PRN
Status: DISCONTINUED | OUTPATIENT
Start: 2024-06-07 | End: 2024-06-09 | Stop reason: HOSPADM

## 2024-06-07 RX ORDER — SODIUM CHLORIDE 9 MG/ML
INJECTION, SOLUTION INTRAVENOUS CONTINUOUS
Status: DISCONTINUED | OUTPATIENT
Start: 2024-06-07 | End: 2024-06-09 | Stop reason: HOSPADM

## 2024-06-07 RX ORDER — KETOROLAC TROMETHAMINE 30 MG/ML
15 INJECTION, SOLUTION INTRAMUSCULAR; INTRAVENOUS EVERY 6 HOURS
Status: DISCONTINUED | OUTPATIENT
Start: 2024-06-07 | End: 2024-06-09 | Stop reason: HOSPADM

## 2024-06-07 RX ADMIN — SODIUM CHLORIDE: 9 INJECTION, SOLUTION INTRAVENOUS at 09:06

## 2024-06-07 RX ADMIN — ATORVASTATIN CALCIUM 40 MG: 40 TABLET, FILM COATED ORAL at 09:06

## 2024-06-07 RX ADMIN — HYDROCODONE BITARTRATE AND ACETAMINOPHEN 1 TABLET: 5; 325 TABLET ORAL at 07:06

## 2024-06-07 RX ADMIN — SODIUM CHLORIDE: 9 INJECTION, SOLUTION INTRAVENOUS at 06:06

## 2024-06-07 RX ADMIN — KETOROLAC TROMETHAMINE 15 MG: 30 INJECTION, SOLUTION INTRAMUSCULAR at 04:06

## 2024-06-07 RX ADMIN — MUPIROCIN: 20 OINTMENT TOPICAL at 08:06

## 2024-06-07 RX ADMIN — METOPROLOL TARTRATE 50 MG: 50 TABLET, FILM COATED ORAL at 08:06

## 2024-06-07 RX ADMIN — KETOROLAC TROMETHAMINE 15 MG: 30 INJECTION, SOLUTION INTRAMUSCULAR at 11:06

## 2024-06-07 RX ADMIN — MUPIROCIN: 20 OINTMENT TOPICAL at 09:06

## 2024-06-07 RX ADMIN — METOPROLOL TARTRATE 50 MG: 50 TABLET, FILM COATED ORAL at 09:06

## 2024-06-07 RX ADMIN — MEROPENEM 1 G: 1 INJECTION, POWDER, FOR SOLUTION INTRAVENOUS at 06:06

## 2024-06-07 RX ADMIN — MEROPENEM 1 G: 1 INJECTION, POWDER, FOR SOLUTION INTRAVENOUS at 01:06

## 2024-06-07 RX ADMIN — INSULIN ASPART 2 UNITS: 100 INJECTION, SOLUTION INTRAVENOUS; SUBCUTANEOUS at 01:06

## 2024-06-07 RX ADMIN — LEVOTHYROXINE SODIUM 100 MCG: 100 TABLET ORAL at 05:06

## 2024-06-07 RX ADMIN — MEROPENEM 1 G: 1 INJECTION, POWDER, FOR SOLUTION INTRAVENOUS at 10:06

## 2024-06-07 RX ADMIN — SODIUM CHLORIDE: 9 INJECTION, SOLUTION INTRAVENOUS at 10:06

## 2024-06-07 RX ADMIN — ACETAMINOPHEN 650 MG: 325 TABLET ORAL at 05:06

## 2024-06-07 RX ADMIN — ENOXAPARIN SODIUM 40 MG: 40 INJECTION SUBCUTANEOUS at 04:06

## 2024-06-07 RX ADMIN — CLOPIDOGREL BISULFATE 75 MG: 75 TABLET ORAL at 09:06

## 2024-06-07 RX ADMIN — INSULIN ASPART 2 UNITS: 100 INJECTION, SOLUTION INTRAVENOUS; SUBCUTANEOUS at 04:06

## 2024-06-07 RX ADMIN — ASPIRIN 81 MG CHEWABLE TABLET 81 MG: 81 TABLET CHEWABLE at 09:06

## 2024-06-07 NOTE — PLAN OF CARE
Problem: Adult Inpatient Plan of Care  Goal: Patient-Specific Goal (Individualized)  Outcome: Progressing     Problem: Adult Inpatient Plan of Care  Goal: Absence of Hospital-Acquired Illness or Injury  Outcome: Progressing     Problem: Adult Inpatient Plan of Care  Goal: Optimal Comfort and Wellbeing  Outcome: Progressing     Problem: Adult Inpatient Plan of Care  Goal: Readiness for Transition of Care  Outcome: Progressing

## 2024-06-07 NOTE — PROGRESS NOTES
[facility] Hospital Medicine  Progress Note    Patient Name: Michell Dhillon  MRN: [id number]  Patient Class: IP- Inpatient   Admission Date: 6/5/2024  Length of Stay: 2 days  Attending Physician: Suzanna Copeland MD  Primary Care Provider: Daryl Moscoso NP        Subjective:     Principal Problem:Hyponatremia    HPI:  No notes on file    Overview/Hospital Course:  No notes on file    Interval History:     Review of Systems   All other systems reviewed and are negative.    Objective:     Vital Signs (Most Recent):  Temp: 97.3 °F (36.3 °C) (06/07/24 1158)  Pulse: 84 (06/07/24 1158)  Resp: 16 (06/07/24 1158)  BP: 121/82 (06/07/24 1158)  SpO2: (!) 93 % (06/07/24 1158) Vital Signs (24h Range):  Temp:  [97.3 °F (36.3 °C)-97.6 °F (36.4 °C)] 97.3 °F (36.3 °C)  Pulse:  [70-89] 84  Resp:  [16-20] 16  SpO2:  [92 %-96 %] 93 %  BP: (103-152)/(57-84) 121/82     Weight: 106.1 kg (233 lb 14.5 oz)  Body mass index is 36.64 kg/m².    Intake/Output Summary (Last 24 hours) at 6/7/2024 1441  Last data filed at 6/7/2024 0459  Gross per 24 hour   Intake 144.11 ml   Output 1050 ml   Net -905.89 ml         Physical Exam  Vitals and nursing note reviewed.   Constitutional:       General: She is not in acute distress.     Appearance: Normal appearance.   HENT:      Head: Normocephalic and atraumatic.      Mouth/Throat:      Mouth: Mucous membranes are moist.   Eyes:      General: No scleral icterus.     Extraocular Movements: Extraocular movements intact.      Conjunctiva/sclera: Conjunctivae normal.      Pupils: Pupils are equal, round, and reactive to light.   Cardiovascular:      Rate and Rhythm: Normal rate and regular rhythm.      Heart sounds: No murmur heard.     No friction rub. No gallop.   Pulmonary:      Effort: Pulmonary effort is normal. No respiratory distress.      Breath sounds: Normal breath sounds. No wheezing or rales.   Abdominal:      General: Abdomen is flat. Bowel sounds are normal. There  is no distension.      Palpations: Abdomen is soft.      Tenderness: There is no abdominal tenderness.   Musculoskeletal:         General: Tenderness (BLE) present. No swelling.      Right lower leg: No edema.      Left lower leg: No edema.   Skin:     General: Skin is warm and dry.      Capillary Refill: Capillary refill takes 2 to 3 seconds.      Coloration: Skin is not jaundiced.      Findings: No rash.   Neurological:      General: No focal deficit present.      Mental Status: She is alert and oriented to person, place, and time.   Psychiatric:         Mood and Affect: Mood normal.         Behavior: Behavior normal.             Significant Labs: All pertinent labs within the past 24 hours have been reviewed.    Significant Imaging: I have reviewed all pertinent imaging results/findings within the past 24 hours.    Assessment/Plan:      * Hyponatremia  Patient has hyponatremia which is uncontrolled,We will aim to correct the sodium by 4-6mEq in 24 hours. We will monitor sodium Every 12 hours. The hyponatremia is due to meds/dehydration or other chronic issue. We will obtain the following studies: Urine sodium, urine osmolality, serum osmolality or TSH, T4. We will treat the hyponatremia with IV fluids as follows: 100 cc/hr NS. The patient's sodium results have been reviewed and are listed below.  Recent Labs   Lab 06/07/24  0352   *       DJD (degenerative joint disease) of knee    Degenerative joint disease of bilateral knees.  She will be referred to Orthopedics as outpatient.    NSAIDs for now per recommendation of Orthopedics.      Urinary tract infection without hematuria    Continue current treatment with Merrem.    Antibiotics (From admission, onward)      Start     Stop Route Frequency Ordered    06/06/24 1015  meropenem (MERREM) 1 g in sodium chloride 0.9 % 100 mL IVPB (MB+)         06/09/24 9939 IV Every 8 hours (non-standard times) 06/06/24 1015    06/05/24 1215  mupirocin 2 % ointment          "06/10/24 0859 Nasl 2 times daily 06/05/24 1113                CKD (chronic kidney disease) stage 3, GFR 30-59 ml/min  Creatine stable for now. BMP reviewed- noted Estimated Creatinine Clearance: 58.6 mL/min (based on SCr of 0.96 mg/dL). according to latest data. Based on current GFR, CKD stage is stage 2 - GFR 60-89.  Monitor UOP and serial BMP and adjust therapy as needed. Renally dose meds. Avoid nephrotoxic medications and procedures.    Hypothyroidism    Relatively normal.  Continue thyroid replacement therapy.      Type 2 diabetes mellitus without complication, without long-term current use of insulin  Patient's FSGs are controlled on current medication regimen.  Last A1c reviewed-   Lab Results   Component Value Date    HGBA1C 7.4 (H) 06/03/2024     Most recent fingerstick glucose reviewed- No results for input(s): "POCTGLUCOSE" in the last 24 hours.  Current correctional scale  Low  Maintain anti-hyperglycemic dose as follows-   Antihyperglycemics (From admission, onward)      Start     Stop Route Frequency Ordered    06/05/24 1211  insulin aspart U-100 injection 0-10 Units         -- SubQ Before meals & nightly PRN 06/05/24 1111          Hold Oral hypoglycemics while patient is in the hospital.      VTE Risk Mitigation (From admission, onward)           Ordered     enoxaparin injection 40 mg  Every 24 hours         06/06/24 0952     IP VTE HIGH RISK PATIENT  Once         06/05/24 1110     Place sequential compression device  Until discontinued         06/05/24 1110                    Discharge Planning   NIGEL:      Code Status: Full Code   Is the patient medically ready for discharge?:     Reason for patient still in hospital (select all that apply): Treatment  Discharge Plan A: Home                  Suzanna Copeland MD  Department of Hospital Medicine   [facility]    "

## 2024-06-07 NOTE — SUBJECTIVE & OBJECTIVE
Interval History:     Review of Systems   All other systems reviewed and are negative.    Objective:     Vital Signs (Most Recent):  Temp: 97.3 °F (36.3 °C) (06/07/24 1158)  Pulse: 84 (06/07/24 1158)  Resp: 16 (06/07/24 1158)  BP: 121/82 (06/07/24 1158)  SpO2: (!) 93 % (06/07/24 1158) Vital Signs (24h Range):  Temp:  [97.3 °F (36.3 °C)-97.6 °F (36.4 °C)] 97.3 °F (36.3 °C)  Pulse:  [70-89] 84  Resp:  [16-20] 16  SpO2:  [92 %-96 %] 93 %  BP: (103-152)/(57-84) 121/82     Weight: 106.1 kg (233 lb 14.5 oz)  Body mass index is 36.64 kg/m².    Intake/Output Summary (Last 24 hours) at 6/7/2024 1441  Last data filed at 6/7/2024 0459  Gross per 24 hour   Intake 144.11 ml   Output 1050 ml   Net -905.89 ml         Physical Exam  Vitals and nursing note reviewed.   Constitutional:       General: She is not in acute distress.     Appearance: Normal appearance.   HENT:      Head: Normocephalic and atraumatic.      Mouth/Throat:      Mouth: Mucous membranes are moist.   Eyes:      General: No scleral icterus.     Extraocular Movements: Extraocular movements intact.      Conjunctiva/sclera: Conjunctivae normal.      Pupils: Pupils are equal, round, and reactive to light.   Cardiovascular:      Rate and Rhythm: Normal rate and regular rhythm.      Heart sounds: No murmur heard.     No friction rub. No gallop.   Pulmonary:      Effort: Pulmonary effort is normal. No respiratory distress.      Breath sounds: Normal breath sounds. No wheezing or rales.   Abdominal:      General: Abdomen is flat. Bowel sounds are normal. There is no distension.      Palpations: Abdomen is soft.      Tenderness: There is no abdominal tenderness.   Musculoskeletal:         General: Tenderness (BLE) present. No swelling.      Right lower leg: No edema.      Left lower leg: No edema.   Skin:     General: Skin is warm and dry.      Capillary Refill: Capillary refill takes 2 to 3 seconds.      Coloration: Skin is not jaundiced.      Findings: No rash.    Neurological:      General: No focal deficit present.      Mental Status: She is alert and oriented to person, place, and time.   Psychiatric:         Mood and Affect: Mood normal.         Behavior: Behavior normal.             Significant Labs: All pertinent labs within the past 24 hours have been reviewed.    Significant Imaging: I have reviewed all pertinent imaging results/findings within the past 24 hours.

## 2024-06-07 NOTE — PT/OT/SLP EVAL
Occupational Therapy   Evaluation    Name: Michell Dhillon  MRN: [id number]  Admitting Diagnosis: Hyponatremia  Recent Surgery: * No surgery found *      Recommendations:     Discharge Recommendations: Moderate Intensity Therapy (Pt wants to return home)  Discharge Equipment Recommendations:  hospital bed, lift device  Barriers to discharge:  None    Assessment:     Michell Dhillon is a 79 y.o. female with a medical diagnosis of Hyponatremia.  She presents with alert, and concerned about participating with therapy at this time with her leg so painful. Performance deficits affecting function: weakness, impaired endurance, impaired functional mobility, decreased lower extremity function, pain, impaired skin, impaired self care skills.      Rehab Prognosis: Good; patient would benefit from acute skilled OT services to address these deficits and reach maximum level of function.       Plan:     Patient to be seen 5 x/week to address the above listed problems via self-care/home management, therapeutic activities, therapeutic exercises  Plan of Care Expires: 06/28/24  Plan of Care Reviewed with: patient    Subjective     Chief Complaint: Hyponatremia    Patient/Family Comments/goals: Pt wants to return home withher family and home health care    Occupational Profile:  Living Environment: Pt lives in a single level home with 1 step to enter with her son and grandson  Previous level of function: Pt was able to perform her own transfers, bed/w/c/ BSC. Pt used her w/c as her primary mobility. Pt has not walked much since her stroke due to her knee giving away. Pt performed her sponge bath herself and was able to dress herself, but wore slippers and no socks.  Roles and Routines: Pt performed her own self-care, would perform light homemaking also.  Equipment Used at Home: walker, rolling, wheelchair, bedside commode  Assistance upon Discharge: Pt will have facility staff verses family and  home health  care    Pain/Comfort:  Pain Rating 1: 8/10  Location - Side 1: Left  Location 1: knee  Pain Addressed 1: Cessation of Activity  Pain Rating Post-Intervention 1: 8/10    Patients cultural, spiritual, [employer]al conflicts given the current situation: no    Objective:     Communicated with: AVA Barnes prior to session.  Patient found HOB elevated with peripheral IV, PureWick, telemetry upon OT entry to room.    General Precautions: Standard, fall  Orthopedic Precautions: N/A  Braces: N/A  Respiratory Status: Room air    Occupational Performance:    Bed Mobility:    Patient completed Rolling/Turning to Left with  minimum assistance  Patient completed Rolling/Turning to Right with minimum assistance  Patient completed Supine to Sit with maximal assistance and 2 persons  Patient completed Sit to Supine with maximal assistance and 2 persons    Functional Mobility/Transfers:  NT- pt with much LE pain and fear of attempting standing    Activities of Daily Living:  Feeding:  independence    Lower Body Dressing: dependence for socks    Cognitive/Visual Perceptual:  Cognitive/Psychosocial Skills:     -       Oriented to: Person and Situation   -       Follows Commands/attention:Follows one-step commands  -       Communication: clear/fluent  -       Safety awareness/insight to disability: impaired   -       Mood/Affect/Coping skills/emotional control: Cooperative and Anxious    Physical Exam:  Balance:    -       sitting, mod(A) initially, but improved to supervision once established  Skin integrity: Thin, Dry, and raw areas on her back where it appears pt has been scratching  Upper Extremity Range of Motion:     -       Right Upper Extremity: WFL  -       Left Upper Extremity: WFL  Upper Extremity Strength:    -       Right Upper Extremity: WFL  -       Left Upper Extremity: WFL   Strength:    -       Right Upper Extremity: WFL  -       Left Upper Extremity: WFL    Gross motor coordination:   WFL    [facility] 6 Click  ADL:  AMPAC Total Score: 14    Treatment & Education:  Pt educated on OT role/POC.   Importance of OOB activity with staff assistance.  Importance of sitting up in the chair throughout the day as tolerated, especially for meals   Importance of assisting with self-care activities   All questions/concerns answered within OT scope of practice      Patient left HOB elevated with all lines intact, call button in reach, and RN notified    GOALS:   Multidisciplinary Problems       Occupational Therapy Goals          Problem: Occupational Therapy    Goal Priority Disciplines Outcome Interventions   Occupational Therapy Goal     OT, PT/OT Progressing    Description: STG: (in 1 week)  Pt will perform grooming with setup  Pt will bathe with moderate assistance  Pt will perform UE dressing with setup  Pt will sit EOB x 10 min with SBA during activity   Pt will transfer bed to chair with moderate assistance with AD  Pt will tolerate 15 minutes of tx without fatigue      LTG: (in 5 weeks)  1.Restore to max I with self care and mobility.                        History:     Past Medical History:   Diagnosis Date    Arthritis     Diabetes mellitus, type 2     Hyperlipidemia     Hypertension     Hypothyroidism     Transient cerebral ischemic attack, unspecified 04/14/2021       History reviewed. No pertinent surgical history.    Time Tracking:     OT Date of Treatment: 06/07/24  OT Start Time: 1328  OT Stop Time: 1406  OT Total Time (min): 38 min    Billable Minutes:Evaluation low complexity    6/7/2024

## 2024-06-07 NOTE — PLAN OF CARE
Consult for home health. Spoke with dr cronin about plan of care. Spoke with patient and daughter rohith in her room. Verbal choice from patients daughter rohith for [facility]. Will fax and notify livia.

## 2024-06-07 NOTE — PLAN OF CARE
Problem: Physical Therapy  Goal: Physical Therapy Goal  Description: Short term goals:  1. Supine to sit with MInimal Assistance  2. Rolling to Left and Right with Minimal Assistance.  3. Sit to stand transfer with Minimal Assistance  4. Bed to chair transfer with Minimal Assistance using No Assistive Device    Long term goals:  1. Supine to sit with Contact Guard Assistance  2. Rolling to Left and Right with Contact Guard Assistance.  3. Sit to stand transfer with Contact Guard Assistance  4. Bed to chair transfer with Contact Guard Assistance using No Assistive Device   Outcome: Progressing

## 2024-06-07 NOTE — PT/OT/SLP EVAL
Physical Therapy Evaluation     Patient Name: Michell Dhillon   MRN: [id number]  Recent Surgery: * No surgery found *      Recommendations:     Discharge Recommendations: Moderate Intensity Therapy (Pt states she would rather go home)   Discharge Equipment Recommendations: lift device, hospital bed   Barriers to discharge: Increased level of assist and Ongoing medical treatment    Assessment:     Michell Dhillon is a 79 y.o. female admitted with a medical diagnosis of Hyponatremia. She presents with the following impairments/functional limitations: weakness, impaired functional mobility, impaired self care skills, decreased lower extremity function, pain. Pt complains of significant pain to BLE but worse in LLE. Pt is nonambulatory at baseline but reports ability to transfer to her wheelchair independently. Pt required maximum assistance to reach edge of bed sitting today, mostly limited due to pain in LE. Pt was unable/unwilling to attempt transfers due to increased pain in LE.  PT to follow to address mobility as able    Rehab Prognosis: Poor; patient would benefit from acute PT services to address these deficits and reach maximum level of function.    Plan:     During this hospitalization, patient to be seen 5 x/week to address the above listed problems via therapeutic activities, therapeutic exercises, wheelchair management/training, neuromuscular re-education    Plan of Care Expires: 07/07/24    Subjective     Chief Complaint: BLE pain  Patient Comments/Goals: Pt states she wants to go home  Pain/Comfort:  Pain Rating 1: 8/10  Location - Side 1: Left  Location 1: knee  Pain Addressed 1: Cessation of Activity  Pain Rating Post-Intervention 1: 8/10    Social History:  Living Environment: Patient lives with their spouse and son in a single story home   Prior Level of Function: Prior to admission, patient was modified independent with ADLs using wheelchair for mobility, was non-ambulatory, and completed transfers  via stand pivot with supervision using rail  Equipment Used at Home: walker, rolling, wheelchair, bedside commode  DME owned (not currently used): none  Assistance Upon Discharge: family    Objective:     Communicated with RITA Forbes RN prior to session. Patient found HOB elevated with peripheral IV, PureWick, telemetry upon PT entry to room.    General Precautions: Standard, fall   Orthopedic Precautions: N/A   Braces: N/A    Respiratory Status: Room air    Exams:  Cognition: Patient is oriented to Person, Place, Time, Situation  RLE ROM:  limited due to pain  RLE Strength:  3/5  LLE ROM:  limited due to pain  LLE Strength:  3-/5  Gross Motor Coordination: WFL    Functional Mobility:  Gait belt applied - N/A  Bed Mobility  Rolling Left: moderate assistance  Rolling Right: moderate assistance  Scooting: maximal assistance  Supine to Sit: maximal assistance for LE management and trunk management  Sit to Supine: maximal assistance and of 2 persons for LE management and trunk management  Transfers  Unable  Gait  Pt unable  Balance  Sitting: contact guard assistance  Standing:  n/a      Therapeutic Activities and Exercises:   Patient educated on role of acute care PT and PT POC, safety while in hospital including calling nurse for mobility, and call light usage      AM-PAC 6 CLICK MOBILITY  Total Score:8    Patient left HOB elevated with all lines intact and call button in reach.    GOALS:   Multidisciplinary Problems       Physical Therapy Goals          Problem: Physical Therapy    Goal Priority Disciplines Outcome Goal Variances Interventions   Physical Therapy Goal     PT, PT/OT Progressing     Description: Short term goals:  1. Supine to sit with MInimal Assistance  2. Rolling to Left and Right with Minimal Assistance.  3. Sit to stand transfer with Minimal Assistance  4. Bed to chair transfer with Minimal Assistance using No Assistive Device    Long term goals:  1. Supine to sit with Contact Guard Assistance  2.  Rolling to Left and Right with Contact Guard Assistance.  3. Sit to stand transfer with Contact Guard Assistance  4. Bed to chair transfer with Contact Guard Assistance using No Assistive Device                        History:     Past Medical History:   Diagnosis Date    Arthritis     Diabetes mellitus, type 2     Hyperlipidemia     Hypertension     Hypothyroidism     Transient cerebral ischemic attack, unspecified 04/14/2021       History reviewed. No pertinent surgical history.    Time Tracking:     PT Received On: 06/07/24  PT Start Time: 1326  PT Stop Time: 1408  PT Total Time (min): 42 min     Billable Minutes: Evaluation moderate complexity    6/7/2024

## 2024-06-07 NOTE — PLAN OF CARE
Problem: Occupational Therapy  Goal: Occupational Therapy Goal  Description: STG: (in 1 week)  Pt will perform grooming with setup  Pt will bathe with moderate assistance  Pt will perform UE dressing with setup  Pt will sit EOB x 10 min with SBA during activity   Pt will transfer bed to chair with moderate assistance with AD  Pt will tolerate 15 minutes of tx without fatigue      LTG: (in 5 weeks)  1.Restore to max I with self care and mobility.   Outcome: Progressing       Pt admitted with hyponatremia and UTI. Dr. Copeland is concerned pt may have Gout due to severe pain in (B) knees. Pt lives with her family and does not ambulate at baseline. Pt states she was able to transfer herself bed/chair/BSC in a (modified?) stand pivot transfer.  Pt was able to perform her own sponge bath and dressed herself, wearing house shoes kathy no socks. Pt c/o severe LE pain and did not attempt standing. Pt required max(A) of 2 persons supine<-->sit. Pt refuses to consider going to a swingbed. Pt requires much more assistance at this time than she does at baseline.

## 2024-06-08 LAB
ANION GAP SERPL CALCULATED.3IONS-SCNC: 10 MMOL/L (ref 7–16)
BASOPHILS # BLD AUTO: 0.04 K/UL (ref 0–0.2)
BASOPHILS NFR BLD AUTO: 0.5 % (ref 0–1)
BUN SERPL-MCNC: 36 MG/DL (ref 7–18)
BUN/CREAT SERPL: 33 (ref 6–20)
CALCIUM SERPL-MCNC: 8.3 MG/DL (ref 8.5–10.1)
CHLORIDE SERPL-SCNC: 97 MMOL/L (ref 98–107)
CO2 SERPL-SCNC: 26 MMOL/L (ref 21–32)
CREAT SERPL-MCNC: 1.1 MG/DL (ref 0.55–1.02)
DIFFERENTIAL METHOD BLD: ABNORMAL
EGFR (NO RACE VARIABLE) (RUSH/TITUS): 51 ML/MIN/1.73M2
EOSINOPHIL # BLD AUTO: 0.23 K/UL (ref 0–0.5)
EOSINOPHIL NFR BLD AUTO: 2.8 % (ref 1–4)
ERYTHROCYTE [DISTWIDTH] IN BLOOD BY AUTOMATED COUNT: 14.3 % (ref 11.5–14.5)
GLUCOSE SERPL-MCNC: 126 MG/DL (ref 74–106)
GLUCOSE SERPL-MCNC: 134 MG/DL (ref 70–105)
GLUCOSE SERPL-MCNC: 168 MG/DL (ref 70–105)
GLUCOSE SERPL-MCNC: 184 MG/DL (ref 70–105)
HCT VFR BLD AUTO: 37.9 % (ref 38–47)
HGB BLD-MCNC: 11.9 G/DL (ref 12–16)
IMM GRANULOCYTES # BLD AUTO: 0.08 K/UL (ref 0–0.04)
IMM GRANULOCYTES NFR BLD: 1 % (ref 0–0.4)
LYMPHOCYTES # BLD AUTO: 2.13 K/UL (ref 1–4.8)
LYMPHOCYTES NFR BLD AUTO: 25.7 % (ref 27–41)
MAGNESIUM SERPL-MCNC: 2.1 MG/DL (ref 1.7–2.3)
MCH RBC QN AUTO: 27.7 PG (ref 27–31)
MCHC RBC AUTO-ENTMCNC: 31.4 G/DL (ref 32–36)
MCV RBC AUTO: 88.3 FL (ref 80–96)
MONOCYTES # BLD AUTO: 0.72 K/UL (ref 0–0.8)
MONOCYTES NFR BLD AUTO: 8.7 % (ref 2–6)
MPC BLD CALC-MCNC: 9.8 FL (ref 9.4–12.4)
NEUTROPHILS # BLD AUTO: 5.09 K/UL (ref 1.8–7.7)
NEUTROPHILS NFR BLD AUTO: 61.3 % (ref 53–65)
NRBC # BLD AUTO: 0 X10E3/UL
NRBC, AUTO (.00): 0 %
OSMOLALITY UR: 330 MOSM/KG (ref 150–1150)
OSMOLALITY UR: 531 MOSM/KG (ref 150–1150)
PLATELET # BLD AUTO: 220 K/UL (ref 150–400)
POTASSIUM SERPL-SCNC: 4.7 MMOL/L (ref 3.5–5.1)
RBC # BLD AUTO: 4.29 M/UL (ref 4.2–5.4)
SODIUM SERPL-SCNC: 128 MMOL/L (ref 136–145)
WBC # BLD AUTO: 8.29 K/UL (ref 4.5–11)

## 2024-06-08 PROCEDURE — 85025 COMPLETE CBC W/AUTO DIFF WBC: CPT | Performed by: NURSE PRACTITIONER

## 2024-06-08 PROCEDURE — 11000001 HC ACUTE MED/SURG PRIVATE ROOM

## 2024-06-08 PROCEDURE — 82962 GLUCOSE BLOOD TEST: CPT

## 2024-06-08 PROCEDURE — 97530 THERAPEUTIC ACTIVITIES: CPT

## 2024-06-08 PROCEDURE — 25000003 PHARM REV CODE 250: Performed by: NURSE PRACTITIONER

## 2024-06-08 PROCEDURE — 25000003 PHARM REV CODE 250: Performed by: HOSPITALIST

## 2024-06-08 PROCEDURE — 25000003 PHARM REV CODE 250: Performed by: STUDENT IN AN ORGANIZED HEALTH CARE EDUCATION/TRAINING PROGRAM

## 2024-06-08 PROCEDURE — 36415 COLL VENOUS BLD VENIPUNCTURE: CPT | Performed by: NURSE PRACTITIONER

## 2024-06-08 PROCEDURE — 63600175 PHARM REV CODE 636 W HCPCS: Performed by: STUDENT IN AN ORGANIZED HEALTH CARE EDUCATION/TRAINING PROGRAM

## 2024-06-08 PROCEDURE — 25000003 PHARM REV CODE 250: Performed by: INTERNAL MEDICINE

## 2024-06-08 PROCEDURE — 63600175 PHARM REV CODE 636 W HCPCS: Performed by: NURSE PRACTITIONER

## 2024-06-08 PROCEDURE — 97110 THERAPEUTIC EXERCISES: CPT

## 2024-06-08 PROCEDURE — 99233 SBSQ HOSP IP/OBS HIGH 50: CPT | Mod: GC,,, | Performed by: HOSPITALIST

## 2024-06-08 PROCEDURE — 80048 BASIC METABOLIC PNL TOTAL CA: CPT | Performed by: NURSE PRACTITIONER

## 2024-06-08 PROCEDURE — 63600175 PHARM REV CODE 636 W HCPCS: Performed by: HOSPITALIST

## 2024-06-08 PROCEDURE — 83735 ASSAY OF MAGNESIUM: CPT | Performed by: NURSE PRACTITIONER

## 2024-06-08 RX ORDER — BISACODYL 10 MG/1
10 SUPPOSITORY RECTAL DAILY PRN
Status: DISCONTINUED | OUTPATIENT
Start: 2024-06-08 | End: 2024-06-09 | Stop reason: HOSPADM

## 2024-06-08 RX ADMIN — ASPIRIN 81 MG CHEWABLE TABLET 81 MG: 81 TABLET CHEWABLE at 09:06

## 2024-06-08 RX ADMIN — KETOROLAC TROMETHAMINE 15 MG: 30 INJECTION, SOLUTION INTRAMUSCULAR at 05:06

## 2024-06-08 RX ADMIN — KETOROLAC TROMETHAMINE 15 MG: 30 INJECTION, SOLUTION INTRAMUSCULAR at 12:06

## 2024-06-08 RX ADMIN — ATORVASTATIN CALCIUM 40 MG: 40 TABLET, FILM COATED ORAL at 09:06

## 2024-06-08 RX ADMIN — MEROPENEM 1 G: 1 INJECTION, POWDER, FOR SOLUTION INTRAVENOUS at 09:06

## 2024-06-08 RX ADMIN — MEROPENEM 1 G: 1 INJECTION, POWDER, FOR SOLUTION INTRAVENOUS at 05:06

## 2024-06-08 RX ADMIN — HYDROCODONE BITARTRATE AND ACETAMINOPHEN 1 TABLET: 5; 325 TABLET ORAL at 09:06

## 2024-06-08 RX ADMIN — MEROPENEM 1 G: 1 INJECTION, POWDER, FOR SOLUTION INTRAVENOUS at 01:06

## 2024-06-08 RX ADMIN — MUPIROCIN: 20 OINTMENT TOPICAL at 09:06

## 2024-06-08 RX ADMIN — HYDROCODONE BITARTRATE AND ACETAMINOPHEN 1 TABLET: 5; 325 TABLET ORAL at 03:06

## 2024-06-08 RX ADMIN — ACETAMINOPHEN 650 MG: 325 TABLET ORAL at 09:06

## 2024-06-08 RX ADMIN — ENOXAPARIN SODIUM 40 MG: 40 INJECTION SUBCUTANEOUS at 05:06

## 2024-06-08 RX ADMIN — METOPROLOL TARTRATE 50 MG: 50 TABLET, FILM COATED ORAL at 09:06

## 2024-06-08 RX ADMIN — BISACODYL 10 MG: 10 SUPPOSITORY RECTAL at 10:06

## 2024-06-08 RX ADMIN — INSULIN ASPART 2 UNITS: 100 INJECTION, SOLUTION INTRAVENOUS; SUBCUTANEOUS at 05:06

## 2024-06-08 RX ADMIN — INSULIN ASPART 2 UNITS: 100 INJECTION, SOLUTION INTRAVENOUS; SUBCUTANEOUS at 12:06

## 2024-06-08 RX ADMIN — INSULIN ASPART 4 UNITS: 100 INJECTION, SOLUTION INTRAVENOUS; SUBCUTANEOUS at 09:06

## 2024-06-08 RX ADMIN — CLOPIDOGREL BISULFATE 75 MG: 75 TABLET ORAL at 09:06

## 2024-06-08 RX ADMIN — LEVOTHYROXINE SODIUM 100 MCG: 100 TABLET ORAL at 05:06

## 2024-06-08 NOTE — PLAN OF CARE
Problem: Infection  Goal: Absence of Infection Signs and Symptoms  Outcome: Progressing  Intervention: Prevent or Manage Infection  Flowsheets (Taken 6/8/2024 0024)  Fever Reduction/Comfort Measures:   lightweight bedding   lightweight clothing  Infection Management: aseptic technique maintained  Isolation Precautions: precautions maintained     Problem: Electrolyte Imbalance  Goal: Electrolyte Balance  Outcome: Progressing  Intervention: Monitor and Manage Electrolyte Imbalance  Flowsheets (Taken 6/8/2024 0024)  Fluid/Electrolyte Management: intravenous fluids adjusted

## 2024-06-08 NOTE — PLAN OF CARE
SW received consult for SWB (preference in [place], [place]).  Spoke with pt in room with daughter at bedside regarding consult as well as followed up with pt's daughter following pt's therapy. Per daughter, they would like therapy with home health instead of SWB.  Pt has been referred to [facility] and information faxed per  notes dated 06/07/24. SS will continue to follow for discharge needs.

## 2024-06-08 NOTE — SUBJECTIVE & OBJECTIVE
Interval History: Patient feeling better today. PT recommending SWB. SW consulted for SWB in [place], [place] per family request.    Review of Systems   All other systems reviewed and are negative.    Objective:     Vital Signs (Most Recent):  Temp: 97.3 °F (36.3 °C) (06/08/24 1148)  Pulse: (!) 59 (06/08/24 1148)  Resp: 16 (06/08/24 1148)  BP: 118/68 (06/08/24 1148)  SpO2: 96 % (06/08/24 1148) Vital Signs (24h Range):  Temp:  [97.3 °F (36.3 °C)-98 °F (36.7 °C)] 97.3 °F (36.3 °C)  Pulse:  [59-85] 59  Resp:  [16-20] 16  SpO2:  [94 %-97 %] 96 %  BP: (104-151)/(55-76) 118/68     Weight: 106.1 kg (233 lb 14.5 oz)  Body mass index is 36.64 kg/m².    Intake/Output Summary (Last 24 hours) at 6/8/2024 1516  Last data filed at 6/8/2024 0537  Gross per 24 hour   Intake 96.3 ml   Output 700 ml   Net -603.7 ml         Physical Exam  Vitals and nursing note reviewed.   Constitutional:       General: She is not in acute distress.     Appearance: Normal appearance.   HENT:      Head: Normocephalic and atraumatic.      Mouth/Throat:      Mouth: Mucous membranes are moist.   Eyes:      General: No scleral icterus.     Extraocular Movements: Extraocular movements intact.      Conjunctiva/sclera: Conjunctivae normal.      Pupils: Pupils are equal, round, and reactive to light.   Cardiovascular:      Rate and Rhythm: Normal rate and regular rhythm.      Heart sounds: No murmur heard.     No friction rub. No gallop.   Pulmonary:      Effort: Pulmonary effort is normal. No respiratory distress.      Breath sounds: Normal breath sounds. No wheezing or rales.   Abdominal:      General: Abdomen is flat. Bowel sounds are normal. There is no distension.      Palpations: Abdomen is soft.      Tenderness: There is no abdominal tenderness.   Musculoskeletal:         General: Tenderness (BLE) present. No swelling.      Right lower leg: No edema.      Left lower leg: No edema.   Skin:     General: Skin is warm and dry.      Capillary Refill: Capillary  refill takes 2 to 3 seconds.      Coloration: Skin is not jaundiced.      Findings: No rash.   Neurological:      General: No focal deficit present.      Mental Status: She is alert and oriented to person, place, and time.   Psychiatric:         Mood and Affect: Mood normal.         Behavior: Behavior normal.             Significant Labs: All pertinent labs within the past 24 hours have been reviewed.  Recent Lab Results  (Last 5 results in the past 24 hours)        06/08/24  1152   06/08/24  0818   06/08/24  0510   06/07/24  2002   06/07/24  1559        Anion Gap     10           Baso #     0.04           Basophil %     0.5           BUN     36           BUN/CREAT RATIO     33           Calcium     8.3           Chloride     97           CO2     26           Creatinine     1.10           Differential Method     Auto           eGFR     51           Eos #     0.23           Eos %     2.8           Glucose     126           Hematocrit     37.9           Hemoglobin     11.9           Immature Grans (Abs)     0.08           Immature Granulocytes     1.0           Lymph #     2.13           Lymph %     25.7           Magnesium      2.1           MCH     27.7           MCHC     31.4           MCV     88.3           Mono #     0.72           Mono %     8.7           MPV     9.8           Neutrophils, Abs     5.09           Neutrophils Relative     61.3           nRBC     0.0           NUCLEATED RBC ABSOLUTE     0.00           Platelet Count     220           POC Glucose 184   134     135   176       Potassium     4.7           RBC     4.29           RDW     14.3           Sodium     128           WBC     8.29                                  Significant Imaging: I have reviewed all pertinent imaging results/findings within the past 24 hours.  I have reviewed and interpreted all pertinent imaging results/findings within the past 24 hours.

## 2024-06-08 NOTE — PROGRESS NOTES
[facility]  [facility] Medicine  Progress Note    Patient Name: Michell Dhillon  MRN: [id number]  Patient Class: IP- Inpatient   Admission Date: 6/5/2024  Length of Stay: 3 days  Attending Physician: Suzanna Copeland MD  Primary Care Provider: Daryl Moscoso NP        Subjective:     Principal Problem:Hyponatremia        HPI:  No notes on file    Interval History: Patient feeling better today. Sodium levels improving. PT recommending SWB. SW consulted for SWB in [place], [place] per family request.    Review of Systems   All other systems reviewed and are negative.    Objective:     Vital Signs (Most Recent):  Temp: 97.3 °F (36.3 °C) (06/08/24 1148)  Pulse: (!) 59 (06/08/24 1148)  Resp: 16 (06/08/24 1148)  BP: 118/68 (06/08/24 1148)  SpO2: 96 % (06/08/24 1148) Vital Signs (24h Range):  Temp:  [97.3 °F (36.3 °C)-98 °F (36.7 °C)] 97.3 °F (36.3 °C)  Pulse:  [59-85] 59  Resp:  [16-20] 16  SpO2:  [94 %-97 %] 96 %  BP: (104-151)/(55-76) 118/68     Weight: 106.1 kg (233 lb 14.5 oz)  Body mass index is 36.64 kg/m².    Intake/Output Summary (Last 24 hours) at 6/8/2024 1516  Last data filed at 6/8/2024 0537  Gross per 24 hour   Intake 96.3 ml   Output 700 ml   Net -603.7 ml         Physical Exam  Vitals and nursing note reviewed.   Constitutional:       General: She is not in acute distress.     Appearance: Normal appearance.   HENT:      Head: Normocephalic and atraumatic.      Mouth/Throat:      Mouth: Mucous membranes are moist.   Eyes:      General: No scleral icterus.     Extraocular Movements: Extraocular movements intact.      Conjunctiva/sclera: Conjunctivae normal.      Pupils: Pupils are equal, round, and reactive to light.   Cardiovascular:      Rate and Rhythm: Normal rate and regular rhythm.      Heart sounds: No murmur heard.     No friction rub. No gallop.   Pulmonary:      Effort: Pulmonary effort is normal. No respiratory distress.      Breath sounds: Normal breath sounds. No wheezing or  rales.   Abdominal:      General: Abdomen is flat. Bowel sounds are normal. There is no distension.      Palpations: Abdomen is soft.      Tenderness: There is no abdominal tenderness.   Musculoskeletal:         General: Tenderness (BLE) present. No swelling.      Right lower leg: No edema.      Left lower leg: No edema.   Skin:     General: Skin is warm and dry.      Capillary Refill: Capillary refill takes 2 to 3 seconds.      Coloration: Skin is not jaundiced.      Findings: No rash.   Neurological:      General: No focal deficit present.      Mental Status: She is alert and oriented to person, place, and time.   Psychiatric:         Mood and Affect: Mood normal.         Behavior: Behavior normal.             Significant Labs: All pertinent labs within the past 24 hours have been reviewed.  Recent Lab Results  (Last 5 results in the past 24 hours)        06/08/24  1152   06/08/24  0818   06/08/24  0510   06/07/24  2002   06/07/24  1559        Anion Gap     10           Baso #     0.04           Basophil %     0.5           BUN     36           BUN/CREAT RATIO     33           Calcium     8.3           Chloride     97           CO2     26           Creatinine     1.10           Differential Method     Auto           eGFR     51           Eos #     0.23           Eos %     2.8           Glucose     126           Hematocrit     37.9           Hemoglobin     11.9           Immature Grans (Abs)     0.08           Immature Granulocytes     1.0           Lymph #     2.13           Lymph %     25.7           Magnesium      2.1           MCH     27.7           MCHC     31.4           MCV     88.3           Mono #     0.72           Mono %     8.7           MPV     9.8           Neutrophils, Abs     5.09           Neutrophils Relative     61.3           nRBC     0.0           NUCLEATED RBC ABSOLUTE     0.00           Platelet Count     220           POC Glucose 184   134     135   176       Potassium     4.7           RBC      4.29           RDW     14.3           Sodium     128           WBC     8.29                                  Significant Imaging: I have reviewed all pertinent imaging results/findings within the past 24 hours.  I have reviewed and interpreted all pertinent imaging results/findings within the past 24 hours.    Assessment/Plan:      * Hyponatremia  Patient has hyponatremia which is uncontrolled,We will aim to correct the sodium by 4-6mEq in 24 hours. We will monitor sodium Every 12 hours. The hyponatremia is due to meds/dehydration or other chronic issue. We will obtain the following studies: Urine sodium, urine osmolality, serum osmolality or TSH, T4. We will treat the hyponatremia with IV fluids as follows: 100 cc/hr NS. The patient's sodium results have been reviewed and are listed below.  Recent Labs   Lab 06/08/24  0510   *     6/8  Improving. Sodium 128 today.  PT recommending SWB. SW consulted for SWB in [place], [place] per family request.    Urinary tract infection without hematuria    Continue current treatment with Merrem.    Antibiotics (From admission, onward)      Start     Stop Route Frequency Ordered    06/06/24 1015  meropenem (MERREM) 1 g in sodium chloride 0.9 % 100 mL IVPB (MB+)         06/09/24 2359 IV Every 8 hours (non-standard times) 06/06/24 1015    06/05/24 1215  mupirocin 2 % ointment         06/10/24 0859 Nasl 2 times daily 06/05/24 1113                DJD (degenerative joint disease) of knee    Degenerative joint disease of bilateral knees.  She will be referred to Orthopedics as outpatient.    NSAIDs for now per recommendation of Orthopedics.      CKD (chronic kidney disease) stage 3, GFR 30-59 ml/min  Creatine stable for now. BMP reviewed- noted Estimated Creatinine Clearance: 51.1 mL/min (A) (based on SCr of 1.1 mg/dL (H)). according to latest data. Based on current GFR, CKD stage is stage 2 - GFR 60-89.  Monitor UOP and serial BMP and adjust therapy as needed. Renally dose meds.  "Avoid nephrotoxic medications and procedures.    Hypothyroidism    Relatively normal.  Continue thyroid replacement therapy.      Type 2 diabetes mellitus without complication, without long-term current use of insulin  Patient's FSGs are controlled on current medication regimen.  Last A1c reviewed-   Lab Results   Component Value Date    HGBA1C 7.4 (H) 06/03/2024     Most recent fingerstick glucose reviewed- No results for input(s): "POCTGLUCOSE" in the last 24 hours.  Current correctional scale  Low  Maintain anti-hyperglycemic dose as follows-   Antihyperglycemics (From admission, onward)      Start     Stop Route Frequency Ordered    06/05/24 1211  insulin aspart U-100 injection 0-10 Units         -- SubQ Before meals & nightly PRN 06/05/24 1111          Hold Oral hypoglycemics while patient is in the hospital.      VTE Risk Mitigation (From admission, onward)           Ordered     enoxaparin injection 40 mg  Every 24 hours         06/06/24 0952     IP VTE HIGH RISK PATIENT  Once         06/05/24 1110     Place sequential compression device  Until discontinued         06/05/24 1110                    Discharge Planning   NIGEL:      Code Status: Full Code   Is the patient medically ready for discharge?:     Reason for patient still in hospital (select all that apply): Treatment  Discharge Plan A: Home                  Carlitos Morrison MD  Department of Hospital Medicine   [facility]    "

## 2024-06-08 NOTE — PT/OT/SLP PROGRESS
Physical Therapy Treatment    Patient Name:  Michell Dhillon   MRN:  [id number]    Recommendations:     Discharge Recommendations: Moderate Intensity Therapy (Pt states she would rather go home)  Discharge Equipment Recommendations: lift device, hospital bed  Barriers to discharge:  ongoing medical care    Assessment:     Michell Dhillon is a 79 y.o. female admitted with a medical diagnosis of Hyponatremia.  She presents with the following impairments/functional limitations: weakness, impaired endurance, impaired functional mobility, gait instability, impaired balance, decreased lower extremity function, pain. Pt and family concerned about pt's ability to complete transfers indep at home. Pt reports she is not as sore and painful today as yesterday. Pt able to tolerate transfers with assistance. Pt would benefit from swingbed if she will not have assistance at home with transfers.     Rehab Prognosis: Good; patient would benefit from acute skilled PT services to address these deficits and reach maximum level of function.    Recent Surgery: * No surgery found *      Plan:     During this hospitalization, patient to be seen 5 x/week to address the identified rehab impairments via gait training, therapeutic activities, therapeutic exercises, neuromuscular re-education and progress toward the following goals:    Plan of Care Expires:  07/07/24    Subjective     Chief Complaint: hyponatremia  Patient/Family Comments/goals: agreeable  Pain/Comfort:  Pain Rating 1: 0/10      Objective:     Communicated with BENITO Quintana RN prior to session.  Patient found HOB elevated with peripheral IV, telemetry, PureWick upon PT entry to room.     General Precautions: Standard, fall  Orthopedic Precautions: N/A  Braces: N/A  Respiratory Status: Room air     Functional Mobility:  Bed Mobility:     Scooting: minimum assistance  Supine to Sit: minimum assistance  Transfers:     Sit to Stand:  minimum assistance and of 2 persons with  hand-held assist and manual assist using gait belt, increased time and effort required  Bed to Chair: minimum assistance and of 2 persons with  hand-held assist and manual assist using gait belt with increased time and effort  using  Stand Pivot      AM-PAC 6 CLICK MOBILITY  Turning over in bed (including adjusting bedclothes, sheets and blankets)?: 3  Sitting down on and standing up from a chair with arms (e.g., wheelchair, bedside commode, etc.): 2  Moving from lying on back to sitting on the side of the bed?: 3  Moving to and from a bed to a chair (including a wheelchair)?: 3  Need to walk in hospital room?: 1  Climbing 3-5 steps with a railing?: 1  Basic Mobility Total Score: 13       Treatment & Education:  Bilateral lower extremity exercise x 10-15 reps: ankle pumps, short arc quads, heel slides, hip abduction/adduction, and straight leg raises with active assist ROM, verbal cues for sequencing and safety, and tactile cues     Patient left up in chair with all lines intact, call button in reach, RN notified, and family present..    GOALS:   Multidisciplinary Problems       Physical Therapy Goals          Problem: Physical Therapy    Goal Priority Disciplines Outcome Goal Variances Interventions   Physical Therapy Goal     PT, PT/OT Progressing     Description: Short term goals:  1. Supine to sit with MInimal Assistance  2. Rolling to Left and Right with Minimal Assistance.  3. Sit to stand transfer with Minimal Assistance  4. Bed to chair transfer with Minimal Assistance using No Assistive Device    Long term goals:  1. Supine to sit with Contact Guard Assistance  2. Rolling to Left and Right with Contact Guard Assistance.  3. Sit to stand transfer with Contact Guard Assistance  4. Bed to chair transfer with Contact Guard Assistance using No Assistive Device                        Time Tracking:     PT Received On: 06/08/24  PT Start Time: 1032     PT Stop Time: 1057  PT Total Time (min): 25 min     Billable  Minutes: Therapeutic Activity 15 and Therapeutic Exercise 10    Treatment Type: Treatment  PT/PTA: PT     Number of PTA visits since last PT visit: 0     06/08/2024

## 2024-06-08 NOTE — PT/OT/SLP PROGRESS
Occupational Therapy   Treatment    Name: Michell Dhillon  MRN: [id number]  Admitting Diagnosis:  Hyponatremia       Recommendations:     Discharge Recommendations: Moderate Intensity Therapy (Pt wants to return home) Pt would benefit from SWB placement  Discharge Equipment Recommendations:  hospital bed, lift device  Barriers to discharge:       Assessment:     Michell Dhillon is a 79 y.o. female with a medical diagnosis of Hyponatremia.  She presents with weakness. Performance deficits affecting function are weakness, impaired endurance, impaired functional mobility, decreased lower extremity function, pain, impaired skin, impaired self care skills.     Rehab Prognosis:  Good; patient would benefit from acute skilled OT services to address these deficits and reach maximum level of function.       Plan:     Patient to be seen 5 x/week to address the above listed problems via self-care/home management, therapeutic activities, therapeutic exercises  Plan of Care Expires: 06/28/24  Plan of Care Reviewed with: patient    Subjective     Chief Complaint: Pt had no complaints  Patient/Family Comments/goals: return home  Pain/Comfort:       Objective:     Communicated with: Nursing prior to session.  Patient found HOB elevated with  peripheral IV and Purewick in place upon OT entry to room.    General Precautions: Standard, fall    Orthopedic Precautions:N/A  Braces: N/A  Respiratory Status: Room air     Occupational Performance:     Bed Mobility:    Patient completed Rolling/Turning to Right with minimum assistance     Functional Mobility/Transfers:  Patient completed Sit <> Stand Transfer with minimum assistance and of 2 persons  with  hand-held assist   Patient completed Bed <> Chair Transfer using Stand Pivot technique with minimum assistance and of 2 persons with Pt held on to bed and chair  Functional Mobility: Pt completed EOB sit>stand with min x2, sit>stand from chair Mod x    Activities of Daily Living:  Upper  Body Dressing: modified independence Pt donned gown like coat sitting unsupported EOB      AMPAC 6 Click ADL:      Treatment & Education:  Pt completed BUE elbow flex, sh flex, and ADD/ABD, bed mobs and transfer    Patient left up in chair with all lines intact, call button in reach, and family present    GOALS:   Multidisciplinary Problems       Occupational Therapy Goals          Problem: Occupational Therapy    Goal Priority Disciplines Outcome Interventions   Occupational Therapy Goal     OT, PT/OT Progressing    Description: STG: (in 1 week)  Pt will perform grooming with setup  Pt will bathe with moderate assistance  Pt will perform UE dressing with setup  Pt will sit EOB x 10 min with SBA during activity   Pt will transfer bed to chair with moderate assistance with AD  Pt will tolerate 15 minutes of tx without fatigue      LTG: (in 5 weeks)  1.Restore to max I with self care and mobility.                        Time Tracking:     OT Date of Treatment: 06/08/24  OT Start Time: 1035  OT Stop Time: 1056  OT Total Time (min): 21 min    Billable Minutes:Therapeutic Activity 21 6/8/2024

## 2024-06-09 VITALS
SYSTOLIC BLOOD PRESSURE: 146 MMHG | RESPIRATION RATE: 16 BRPM | TEMPERATURE: 99 F | DIASTOLIC BLOOD PRESSURE: 87 MMHG | WEIGHT: 233.94 LBS | BODY MASS INDEX: 36.72 KG/M2 | HEIGHT: 67 IN | HEART RATE: 79 BPM | OXYGEN SATURATION: 96 %

## 2024-06-09 LAB
ANION GAP SERPL CALCULATED.3IONS-SCNC: 8 MMOL/L (ref 7–16)
BUN SERPL-MCNC: 41 MG/DL (ref 7–18)
BUN/CREAT SERPL: 37 (ref 6–20)
CALCIUM SERPL-MCNC: 8.3 MG/DL (ref 8.5–10.1)
CHLORIDE SERPL-SCNC: 97 MMOL/L (ref 98–107)
CO2 SERPL-SCNC: 27 MMOL/L (ref 21–32)
CREAT SERPL-MCNC: 1.11 MG/DL (ref 0.55–1.02)
EGFR (NO RACE VARIABLE) (RUSH/TITUS): 50 ML/MIN/1.73M2
GLUCOSE SERPL-MCNC: 130 MG/DL (ref 70–105)
GLUCOSE SERPL-MCNC: 142 MG/DL (ref 74–106)
GLUCOSE SERPL-MCNC: 176 MG/DL (ref 70–105)
POTASSIUM SERPL-SCNC: 5.1 MMOL/L (ref 3.5–5.1)
SODIUM SERPL-SCNC: 127 MMOL/L (ref 136–145)

## 2024-06-09 PROCEDURE — 25000003 PHARM REV CODE 250: Performed by: HOSPITALIST

## 2024-06-09 PROCEDURE — 63600175 PHARM REV CODE 636 W HCPCS: Performed by: NURSE PRACTITIONER

## 2024-06-09 PROCEDURE — 82962 GLUCOSE BLOOD TEST: CPT

## 2024-06-09 PROCEDURE — 36415 COLL VENOUS BLD VENIPUNCTURE: CPT | Performed by: GENERAL PRACTICE

## 2024-06-09 PROCEDURE — 80048 BASIC METABOLIC PNL TOTAL CA: CPT | Performed by: GENERAL PRACTICE

## 2024-06-09 PROCEDURE — 63600175 PHARM REV CODE 636 W HCPCS: Performed by: STUDENT IN AN ORGANIZED HEALTH CARE EDUCATION/TRAINING PROGRAM

## 2024-06-09 PROCEDURE — 25000003 PHARM REV CODE 250: Performed by: STUDENT IN AN ORGANIZED HEALTH CARE EDUCATION/TRAINING PROGRAM

## 2024-06-09 PROCEDURE — 63600175 PHARM REV CODE 636 W HCPCS: Performed by: HOSPITALIST

## 2024-06-09 PROCEDURE — 25000003 PHARM REV CODE 250: Performed by: NURSE PRACTITIONER

## 2024-06-09 RX ORDER — NITROFURANTOIN 25; 75 MG/1; MG/1
100 CAPSULE ORAL EVERY OTHER DAY
Qty: 10 CAPSULE | Refills: 0 | Status: SHIPPED | OUTPATIENT
Start: 2024-06-16

## 2024-06-09 RX ORDER — SODIUM BICARBONATE 650 MG/1
650 TABLET ORAL 2 TIMES DAILY
Qty: 60 TABLET | Refills: 0 | Status: SHIPPED | OUTPATIENT
Start: 2024-06-09

## 2024-06-09 RX ORDER — NITROFURANTOIN 25; 75 MG/1; MG/1
100 CAPSULE ORAL 2 TIMES DAILY
Qty: 10 CAPSULE | Refills: 0 | Status: SHIPPED | OUTPATIENT
Start: 2024-06-09 | End: 2024-06-14

## 2024-06-09 RX ORDER — LEVOTHYROXINE SODIUM 100 UG/1
100 TABLET ORAL
Qty: 30 TABLET | Refills: 0 | Status: SHIPPED | OUTPATIENT
Start: 2024-06-10

## 2024-06-09 RX ADMIN — MEROPENEM 1 G: 1 INJECTION, POWDER, FOR SOLUTION INTRAVENOUS at 11:06

## 2024-06-09 RX ADMIN — CLOPIDOGREL BISULFATE 75 MG: 75 TABLET ORAL at 08:06

## 2024-06-09 RX ADMIN — ATORVASTATIN CALCIUM 40 MG: 40 TABLET, FILM COATED ORAL at 08:06

## 2024-06-09 RX ADMIN — LEVOTHYROXINE SODIUM 100 MCG: 100 TABLET ORAL at 05:06

## 2024-06-09 RX ADMIN — METOPROLOL TARTRATE 50 MG: 50 TABLET, FILM COATED ORAL at 08:06

## 2024-06-09 RX ADMIN — SODIUM CHLORIDE: 9 INJECTION, SOLUTION INTRAVENOUS at 01:06

## 2024-06-09 RX ADMIN — INSULIN ASPART 2 UNITS: 100 INJECTION, SOLUTION INTRAVENOUS; SUBCUTANEOUS at 12:06

## 2024-06-09 RX ADMIN — KETOROLAC TROMETHAMINE 15 MG: 30 INJECTION, SOLUTION INTRAMUSCULAR at 11:06

## 2024-06-09 RX ADMIN — MEROPENEM 1 G: 1 INJECTION, POWDER, FOR SOLUTION INTRAVENOUS at 03:06

## 2024-06-09 RX ADMIN — MUPIROCIN: 20 OINTMENT TOPICAL at 08:06

## 2024-06-09 RX ADMIN — ASPIRIN 81 MG CHEWABLE TABLET 81 MG: 81 TABLET CHEWABLE at 08:06

## 2024-06-09 NOTE — DISCHARGE SUMMARY
[facility] Hospital Medicine  Discharge Summary      Patient Name: Michell Dhillon  MRN: [id number]  SHILOH: [id number]  Patient Class: IP- Inpatient  Admission Date: 6/5/2024  Hospital Length of Stay: 4 days  Discharge Date and Time:  06/09/2024 4:38 PM  Attending Physician: No att. providers found   Discharging Provider: Juanito Turner MD  Primary Care Provider: Daryl Moscoso NP    Primary Care Team: Networked reference to record PCT     HPI:   Patient was     * No procedures listed *      Hospital Course:   Patient was admitted to ICU and was treated with isotonic fluids. She started improving clinically. During her stay nephrology was consulted and monitored the patient. U osmo is pending and patient should follow up with PCP for further evaluation and possible referral to nephrology if needed. Nephrology speculating that she might have some degree of solute deficiency, poor nutrition in setting of advanced age dementia. Patient is on HCTZ diuretics at home. Will stop this medication upon discharge per nephrology recommendation. Continue high protein, high solute diet at home. She will take sodium bicarbonate 650 mg BID for a month outpatient. PCP will evaluate patient and adjust the medication, or stop it.   Patient also had a UTI during admission that was treated with IV merrem. Cultures grew pan sensitive E coli. Will d/c patient with 5 days of macobid. PCP please check urine to make sure UTI is gone.   Patient is stable and has reached maximum benefit from hospitalization. She will follow up with her PCP in a week. She does have a nephrologist that she should follow up with in the next couple of weeks.   Patient was offered swing bed as PT recommended but patient declined and decided to go home.       Goals of Care Treatment Preferences:  Code Status: Full Code      Consults:   Consults (From admission, onward)          Status Ordering Provider     Inpatient consult to Social Work   Once        Provider:  (Not yet assigned)    Completed JOCELIN PEREZ     Inpatient consult to Social Work  Once        Provider:  (Not yet assigned)    Completed KAYLAN LEGER            Renal/  CKD (chronic kidney disease) stage 3, GFR 30-59 ml/min  Creatine stable for now. BMP reviewed- noted Estimated Creatinine Clearance: 50.7 mL/min (A) (based on SCr of 1.11 mg/dL (H)). according to latest data. Based on current GFR, CKD stage is stage 2 - GFR 60-89.  Monitor UOP and serial BMP and adjust therapy as needed. Renally dose meds. Avoid nephrotoxic medications and procedures.    Urinary tract infection without hematuria  Received merrem while at the hospital  Cultures grew E coli pan sensitive.   Will continue Macrobid for 5 days and patient will follow up with PCP for evaluation and making sure UTI is gone      Endocrine  * Hyponatremia    Improving. Sodium 128 today.  PT recommending SWB. SW consulted for SWB in [place], [place] per family request.  Patient is clinically stable  She will be discahrged with sodium bicarb 650 mg pills bid for a month. Will follow up with PCP to check sodium and adjust med as needed  Patient would need to follow up with her nephrologist outpatient .  Urine Osmo is pending     Hypothyroidism  Start synthroid 100 mcg daily      Type 2 diabetes mellitus without complication, without long-term current use of insulin  Continue home metformin. Follow up with PCP for monitoring diabetese.     Lab Results   Component Value Date    HGBA1C 7.4 (H) 06/03/2024         Orthopedic  DJD (degenerative joint disease) of knee    Degenerative joint disease of bilateral knees.  She will be referred to Orthopedics as outpatient.    NSAIDs for now per recommendation of Orthopedics.        Final Active Diagnoses:    Diagnosis Date Noted POA    PRINCIPAL PROBLEM:  Hyponatremia [E87.1] 06/05/2024 Yes    DJD (degenerative joint disease) of knee [M17.9] 06/07/2024 Yes    CKD (chronic kidney disease) stage 3,  GFR 30-59 ml/min [N18.30] 06/05/2024 Yes    Urinary tract infection without hematuria [N39.0] 06/03/2024 Yes    Hypothyroidism [E03.9] 05/13/2022 Yes     Chronic    Type 2 diabetes mellitus without complication, without long-term current use of insulin [E11.9] 01/10/2022 Yes      Problems Resolved During this Admission:       Discharged Condition: stable    Disposition: Home or Self Care    Follow Up:   Follow-up Information       Sarah Dhillon FNP Follow up in 2 week(s).    Specialty: Family Medicine  Why: Post hospital discharge follow up.  Contact information:  [address] [place] [address]  [place] [address]  [phone]                           Patient Instructions:   No discharge procedures on file.    Significant Diagnostic Studies: Labs: BMP:   Recent Labs   Lab 06/08/24  0510 06/09/24  0700   * 142*   * 127*   K 4.7 5.1   CL 97* 97*   CO2 26 27   BUN 36* 41*   CREATININE 1.10* 1.11*   CALCIUM 8.3* 8.3*   MG 2.1  --     and CBC   Recent Labs   Lab 06/08/24  0510   WBC 8.29   HGB 11.9*   HCT 37.9*        Microbiology: Urine Culture    Lab Results   Component Value Date    LABURIN No Growth 06/05/2024       Pending Diagnostic Studies:       Procedure Component Value Units Date/Time    EXTRA TUBES [[id number]] Collected: 06/05/24 1336    Order Status: Sent Lab Status: In process Updated: 06/07/24 0929    Specimen: Blood, Venous     Narrative:      The following orders were created for panel order EXTRA TUBES.  Procedure                               Abnormality         Status                     ---------                               -----------         ------                     Light Blue Top Hold[[id number]]                             In process                 Red Top Hold[[id number]]                                    In process                 Red Top Hold[[id number]]                                                               Light Green Top Hold[[id number]]                             In process                 Lavender Top Hold[[id number]]                               In process                 Pink Top Hold[[id number]]                                   In process                 Pedroza Top Hold[[id number]]                                                                Please view results for these tests on the individual orders.    Osmolality, Serum [[id number]] Collected: 06/05/24 1116    Order Status: Sent Lab Status: In process Updated: 06/05/24 1116    Specimen: Blood            Medications:  Reconciled Home Medications:      Medication List        START taking these medications      * nitrofurantoin (macrocrystal-monohydrate) 100 MG capsule  Commonly known as: MACROBID  Take 1 capsule (100 mg total) by mouth 2 (two) times daily. for 5 days     * nitrofurantoin (macrocrystal-monohydrate) 100 MG capsule  Commonly known as: MACROBID  Take 1 capsule (100 mg total) by mouth every other day.  Start taking on: June 16, 2024     sodium bicarbonate 650 MG tablet  Take 1 tablet (650 mg total) by mouth 2 (two) times daily.           * This list has 2 medication(s) that are the same as other medications prescribed for you. Read the directions carefully, and ask your doctor or other care provider to review them with you.                CHANGE how you take these medications      levothyroxine 100 MCG tablet  Commonly known as: SYNTHROID  Take 1 tablet (100 mcg total) by mouth before breakfast.  Start taking on: Marily 10, 2024  What changed:   medication strength  how much to take            CONTINUE taking these medications      aspirin 81 MG Chew  Take 81 mg by mouth once daily.     atorvastatin 40 MG tablet  Commonly known as: LIPITOR  Take 40 mg by mouth once daily.     cetirizine 10 MG tablet  Commonly known as: ZYRTEC  Take 10 mg by mouth daily as needed.     chlorproMAZINE 25 MG tablet  Commonly known as: THORAZINE  Take 25 mg by mouth daily as needed.     clopidogreL 75  mg tablet  Commonly known as: PLAVIX  Take 75 mg by mouth once daily.     fenofibrate 145 MG tablet  Commonly known as: TRICOR  Take 1 tablet (145 mg total) by mouth once daily.     furosemide 40 MG tablet  Commonly known as: LASIX  Take 40 mg by mouth 2 (two) times daily as needed.     metFORMIN 500 MG tablet  Commonly known as: GLUCOPHAGE  Take 500 mg by mouth 2 (two) times daily with meals.     nebivoloL 10 MG Tab  Commonly known as: BYSTOLIC  Take 10 mg by mouth once daily.            STOP taking these medications      ciprofloxacin HCl 500 MG tablet  Commonly known as: CIPRO              Indwelling Lines/Drains at time of discharge:   Lines/Drains/Airways       None                   Time spent on the discharge of patient: 45 minutes         Juanito Turner MD  Department of Hospital Medicine  [facility]

## 2024-06-09 NOTE — HOSPITAL COURSE
Patient was admitted to ICU and was treated with isotonic fluids. She started improving clinically. During her stay nephrology was consulted and monitored the patient. U osmo is pending and patient should follow up with PCP for further evaluation and possible referral to nephrology if needed. Nephrology speculating that she might have some degree of solute deficiency, poor nutrition in setting of advanced age dementia. Patient is on HCTZ diuretics at home. Will stop this medication upon discharge per nephrology recommendation. Continue high protein, high solute diet at home. She will take sodium bicarbonate 650 mg BID for a month outpatient. PCP will evaluate patient and adjust the medication, or stop it.   Patient also had a UTI during admission that was treated with IV merrem. Cultures grew pan sensitive E coli. Will d/c patient with 5 days of macobid. PCP please check urine to make sure UTI is gone.   Patient is stable and has reached maximum benefit from hospitalization. She will follow up with her PCP in a week. She does have a nephrologist that she should follow up with in the next couple of weeks.   Patient was offered swing bed as PT recommended but patient declined and decided to go home.

## 2024-06-09 NOTE — NURSING
Patient discharged by wheelchair with daughter to vehicle at this time. Patient left with patient belongings and discharge instructions. Went over discharge teaching with daughter. No acute signs or symptoms of distress noted.

## 2024-06-09 NOTE — PLAN OF CARE
Problem: Adult Inpatient Plan of Care  Goal: Plan of Care Review  Outcome: Met  Goal: Patient-Specific Goal (Individualized)  Outcome: Met  Goal: Absence of Hospital-Acquired Illness or Injury  Outcome: Met  Goal: Optimal Comfort and Wellbeing  Outcome: Met  Goal: Readiness for Transition of Care  Outcome: Met     Problem: Diabetes Comorbidity  Goal: Blood Glucose Level Within Targeted Range  Outcome: Met     Problem: Infection  Goal: Absence of Infection Signs and Symptoms  Outcome: Met     Problem: Electrolyte Imbalance  Goal: Electrolyte Balance  Outcome: Met     Problem: Skin Injury Risk Increased  Goal: Skin Health and Integrity  Outcome: Met

## 2024-06-11 LAB — GLUCOSE SERPL-MCNC: 168 MG/DL (ref 70–105)

## 2024-06-15 NOTE — PROGRESS NOTES
Estimated Creatinine Clearance: 40 mL/min (A) (based on SCr of 1.38 mg/dL (H)).  Medications reviewed, no dose adjustments needed. Will continue to monitor weekly.    [facility] EMERGENCY DEPT  EMERGENCY DEPARTMENT HISTORY AND PHYSICAL EXAM      Date: 6/15/2024  Patient Name: Talib Miller  MRN: [id number]  YOB: 1990  Date of evaluation: 6/15/2024  Provider: Velma Espinoza PA-C   Note Started: 3:54 PM EDT 6/15/24    HISTORY OF PRESENT ILLNESS     Chief Complaint   Patient presents with    Dental Pain       History Provided By: Patient    HPI: Talib Miller is a 33 y.o. male with past medical history listed below, presents for right-sided dental pain x 2 days.  Patient has taken over-the-counter Advil with minimal relief of his symptoms.  He denies any fevers, chills, facial swelling, inability to swallow, inability to manage secretions, trismus, muffled voice.    PAST MEDICAL HISTORY   Past Medical History:  Past Medical History:   Diagnosis Date    Asthma     DVT (deep venous thrombosis) (HCC)     Hypertension     Lymphoma (HCC)     pt states dx feb 2022    PE (pulmonary thromboembolism) (HCC)     Thromboembolus (HCC)     pt states of right leg feb 2022       Past Surgical History:  Past Surgical History:   Procedure Laterality Date    CT BONE MARROW BIOPSY  2/28/2022    CT BONE MARROW BIOPSY 2/28/2022 [facility] RAD CT    IR FLUOROSCOPY GUIDED CENTRAL VENOUS ACCESS DEVICE PLACEMENT  3/30/2022    IR PORT PLACEMENT EQUAL OR GREATER THAN 5 YEARS  3/30/2022    IR PORT PLACEMENT EQUAL OR GREATER THAN 5 YEARS 3/30/2022 [facility] RAD ANGIO IR    IR PORT PLACEMENT EQUAL OR GREATER THAN 5 YEARS  3/30/2022    IR REMOVE TUNNELED VAD W PORT  12/6/2022    OTHER SURGICAL HISTORY Right 03/02/2022    PICC line placed 3/2 and removed a week later.     US ABDOMINAL MASS BIOPSY PERCUTANEOUS  2/24/2022    US ABDOMINAL MASS BIOPSY PERCUTANEOUS 2/24/2022 [facility] RAD US       Family History:  Family History   Problem Relation Age of Onset    Cancer Maternal Grandfather     Diabetes Mother     No Known Problems Father     Hypertension Mother        Social History:  Social History     Tobacco Use     Phone: [phone]   ketorolac 10 MG tablet  penicillin v potassium 500 MG tablet           DISCONTINUED MEDICATIONS:  Discharge Medication List as of 6/15/2024  4:18 PM          I am the Primary Clinician of Record: Velma Espinoza PA-C (electronically signed)    (Please note that parts of this dictation were completed with voice recognition software. Quite often unanticipated grammatical, syntax, homophones, and other interpretive errors are inadvertently transcribed by the computer software. Please disregards these errors. Please excuse any errors that have escaped final proofreading.)     Velma Espinoza PA-C  06/15/24 9945

## 2024-06-17 NOTE — PLAN OF CARE
[facility]  Discharge Final Note    Primary Care Provider: Daryl Moscoso NP    Expected Discharge Date: 6/9/2024    Final Discharge Note (most recent)       Final Note - 06/17/24 0822          Final Note    Assessment Type Final Discharge Note     Anticipated Discharge Disposition Home-Health Care Svc        Post-Acute Status    Post-Acute Authorization Home Health     Home Health Status Set-up Complete/Auth obtained     Patient choice form signed by patient/caregiver List with quality metrics by geographic area provided;List from CMS Compare;List from System Post-Acute Care     Discharge Delays None known at this time                     Important Message from Medicare  Important Message from Medicare regarding Discharge Appeal Rights: Explained to patient/caregiver, Signed/date by patient/caregiver     Date IMM was signed: 06/07/24  Time IMM was signed: 1010     Follow-up providers       Sarah Dhillon FNP   Specialty: Family Medicine    [address] [place] [address]  [place] [address]   Phone: [phone]       Next Steps: Follow up in 2 week(s)    Instructions: Post hospital discharge follow up.              After-discharge care                [facility]       [facility]   Service: Home Nursing    [address] [facility] [place] [address]   Phone: [phone]                             [facility] with family and home health.

## 2024-06-20 NOTE — ASSESSMENT & PLAN NOTE
Continue current treatment with Merrem.    Antibiotics (From admission, onward)      Start     Stop Route Frequency Ordered    06/06/24 1015  meropenem (MERREM) 1 g in sodium chloride 0.9 % 100 mL IVPB (MB+)         06/09/24 8863 IV Every 8 hours (non-standard times) 06/06/24 1015    06/05/24 1215  mupirocin 2 % ointment         06/10/24 0859 Nasl 2 times daily 06/05/24 1113              
  Continue current treatment with Merrem.    Antibiotics (From admission, onward)    Start     Stop Route Frequency Ordered    06/06/24 1015  meropenem (MERREM) 1 g in sodium chloride 0.9 % 100 mL IVPB (MB+)         06/09/24 2187 IV Every 8 hours (non-standard times) 06/06/24 1015    06/05/24 1215  mupirocin 2 % ointment         06/10/24 0859 Nasl 2 times daily 06/05/24 1113            
  Degenerative joint disease of bilateral knees.  She will be referred to Orthopedics as outpatient.    NSAIDs for now per recommendation of Orthopedics.    
  Improving. Sodium 128 today.  PT recommending SWB. SW consulted for SWB in [place], [place] per family request.  Patient is clinically stable  She will be discahrged with sodium bicarb 650 mg pills bid for a month. Will follow up with PCP to check sodium and adjust med as needed  Patient would need to follow up with her nephrologist outpatient .  Urine Osmo is pending   
  Relatively normal.  Continue thyroid replacement therapy.    
  Relatively normal.  Continue thyroid replacement therapy.    
"Patient's FSGs are controlled on current medication regimen.  Last A1c reviewed-   Lab Results   Component Value Date    HGBA1C 7.4 (H) 06/03/2024     Most recent fingerstick glucose reviewed- No results for input(s): "POCTGLUCOSE" in the last 24 hours.  Current correctional scale  Low  Maintain anti-hyperglycemic dose as follows-   Antihyperglycemics (From admission, onward)      Start     Stop Route Frequency Ordered    06/05/24 1211  insulin aspart U-100 injection 0-10 Units         -- SubQ Before meals & nightly PRN 06/05/24 1111          Hold Oral hypoglycemics while patient is in the hospital.  "
"Patient's FSGs are controlled on current medication regimen.  Last A1c reviewed-   Lab Results   Component Value Date    HGBA1C 7.4 (H) 06/03/2024     Most recent fingerstick glucose reviewed- No results for input(s): "POCTGLUCOSE" in the last 24 hours.  Current correctional scale  Low  Maintain anti-hyperglycemic dose as follows-   Antihyperglycemics (From admission, onward)    Start     Stop Route Frequency Ordered    06/05/24 1211  insulin aspart U-100 injection 0-10 Units         -- SubQ Before meals & nightly PRN 06/05/24 1111        Hold Oral hypoglycemics while patient is in the hospital.  "
- Cr stable at present  - follows with nephrologist   - continue to trend labs   
- Cr stable at present  - follows with nephrologist   - continue to trend labs   
- TSH 4.540; free T4 1.47   - will increase levothyroxine   
- TSH 4.540; free T4 1.47   - will increase levothyroxine   
-- Na 124 on admit which is back to baseline for patient  - appears to be a possible lab error; especially given pt's mentation  - serum and urine osmolality studies pending   - will continue to hold causative agents   
-- Na 124 on admit which is back to baseline for patient  - appears to be a possible lab error; especially given pt's mentation  - serum and urine osmolality studies pending   - will continue to hold causative agents   -- Na lvl remains stable; will continue daily labs   
Continue home metformin. Follow up with PCP for monitoring diabetese.     Lab Results   Component Value Date    HGBA1C 7.4 (H) 06/03/2024       
Creatine stable for now. BMP reviewed- noted Estimated Creatinine Clearance: 50.7 mL/min (A) (based on SCr of 1.11 mg/dL (H)). according to latest data. Based on current GFR, CKD stage is stage 2 - GFR 60-89.  Monitor UOP and serial BMP and adjust therapy as needed. Renally dose meds. Avoid nephrotoxic medications and procedures.  
Creatine stable for now. BMP reviewed- noted Estimated Creatinine Clearance: 51.1 mL/min (A) (based on SCr of 1.1 mg/dL (H)). according to latest data. Based on current GFR, CKD stage is stage 2 - GFR 60-89.  Monitor UOP and serial BMP and adjust therapy as needed. Renally dose meds. Avoid nephrotoxic medications and procedures.  
Creatine stable for now. BMP reviewed- noted Estimated Creatinine Clearance: 58.6 mL/min (based on SCr of 0.96 mg/dL). according to latest data. Based on current GFR, CKD stage is stage 2 - GFR 60-89.  Monitor UOP and serial BMP and adjust therapy as needed. Renally dose meds. Avoid nephrotoxic medications and procedures.  
Patient has hyponatremia which is uncontrolled,We will aim to correct the sodium by 4-6mEq in 24 hours. We will monitor sodium Every 12 hours. The hyponatremia is due to meds/dehydration or other chronic issue. We will obtain the following studies: Urine sodium, urine osmolality, serum osmolality or TSH, T4. We will treat the hyponatremia with IV fluids as follows: 100 cc/hr NS. The patient's sodium results have been reviewed and are listed below.  Recent Labs   Lab 06/07/24  0352   *     
Patient has hyponatremia which is uncontrolled,We will aim to correct the sodium by 4-6mEq in 24 hours. We will monitor sodium Every 12 hours. The hyponatremia is due to meds/dehydration or other chronic issue. We will obtain the following studies: Urine sodium, urine osmolality, serum osmolality or TSH, T4. We will treat the hyponatremia with IV fluids as follows: 100 cc/hr NS. The patient's sodium results have been reviewed and are listed below.  Recent Labs   Lab 06/08/24  0510   *     6/8  Improving. Sodium 128 today.  PT recommending SWB. LINETTE consulted for SWB in [place], [place] per family request.  
Received merrem while at the hospital  Cultures grew E coli pan sensitive.   Will continue Macrobid for 5 days and patient will follow up with PCP for evaluation and making sure UTI is gone    
Start synthroid 100 mcg daily    
Urine culture with E. Coli  Previous ESBL-- will continue merrem   
Urine culture with E. Coli  Previous ESBL-- will continue merrem   Merrem for a total of 7 days-- stop date added to abx course   
Will hold oral antidiabetic medications  Will continue SSI at present  Accuchecks ACHS   BG trend stable <180   
Will hold oral antidiabetic medications  Will continue SSI at present  Jimmyucheckbryn BURGOS   
[FreeTextEntry1] : FU for hypertension and morbid obesity -  Just increased Wegovy to .5 mg per week Not sure if he lost any weight Not eating as much Tolerating Wegovy Not really exercising

## 2024-07-16 ENCOUNTER — LAB REQUISITION (OUTPATIENT)
Dept: LAB | Facility: HOSPITAL | Age: 79
End: 2024-07-16
Attending: NURSE PRACTITIONER
Payer: MEDICARE

## 2024-07-16 DIAGNOSIS — E11.9 TYPE 2 DIABETES MELLITUS WITHOUT COMPLICATIONS: ICD-10-CM

## 2024-07-16 DIAGNOSIS — D47.1 CHRONIC MYELOPROLIFERATIVE DISEASE: ICD-10-CM

## 2024-07-16 DIAGNOSIS — R94.6 ABNORMAL RESULTS OF THYROID FUNCTION STUDIES: ICD-10-CM

## 2024-07-16 DIAGNOSIS — D64.9 ANEMIA, UNSPECIFIED: ICD-10-CM

## 2024-07-16 LAB
ALBUMIN SERPL BCP-MCNC: 3.5 G/DL (ref 3.5–5)
ALBUMIN/GLOB SERPL: 1.1 {RATIO}
ALP SERPL-CCNC: 93 U/L (ref 55–142)
ALT SERPL W P-5'-P-CCNC: 20 U/L (ref 13–56)
ANION GAP SERPL CALCULATED.3IONS-SCNC: 16 MMOL/L (ref 7–16)
AST SERPL W P-5'-P-CCNC: 19 U/L (ref 15–37)
BASOPHILS # BLD AUTO: 0.02 K/UL (ref 0–0.2)
BASOPHILS NFR BLD AUTO: 0.3 % (ref 0–1)
BILIRUB SERPL-MCNC: 0.5 MG/DL (ref ?–1.2)
BUN SERPL-MCNC: 29 MG/DL (ref 7–18)
BUN/CREAT SERPL: 20 (ref 6–20)
CALCIUM SERPL-MCNC: 9.2 MG/DL (ref 8.5–10.1)
CHLORIDE SERPL-SCNC: 99 MMOL/L (ref 98–107)
CO2 SERPL-SCNC: 25 MMOL/L (ref 21–32)
CREAT SERPL-MCNC: 1.42 MG/DL (ref 0.55–1.02)
DIFFERENTIAL METHOD BLD: ABNORMAL
EGFR (NO RACE VARIABLE) (RUSH/TITUS): 37 ML/MIN/1.73M2
EOSINOPHIL # BLD AUTO: 0.11 K/UL (ref 0–0.5)
EOSINOPHIL NFR BLD AUTO: 1.6 % (ref 1–4)
ERYTHROCYTE [DISTWIDTH] IN BLOOD BY AUTOMATED COUNT: 14.6 % (ref 11.5–14.5)
EST. AVERAGE GLUCOSE BLD GHB EST-MCNC: 146 MG/DL
GLOBULIN SER-MCNC: 3.1 G/DL (ref 2–4)
GLUCOSE SERPL-MCNC: 143 MG/DL (ref 74–106)
HBA1C MFR BLD HPLC: 6.7 % (ref 4.5–6.6)
HCT VFR BLD AUTO: 38.1 % (ref 38–47)
HGB BLD-MCNC: 12.3 G/DL (ref 12–16)
LYMPHOCYTES # BLD AUTO: 1.75 K/UL (ref 1–4.8)
LYMPHOCYTES NFR BLD AUTO: 25.3 % (ref 27–41)
MCH RBC QN AUTO: 29 PG (ref 27–31)
MCHC RBC AUTO-ENTMCNC: 32.3 G/DL (ref 32–36)
MCV RBC AUTO: 89.9 FL (ref 80–96)
MONOCYTES # BLD AUTO: 0.69 K/UL (ref 0–0.8)
MONOCYTES NFR BLD AUTO: 10 % (ref 2–6)
MPC BLD CALC-MCNC: 10.4 FL (ref 9.4–12.4)
NEUTROPHILS # BLD AUTO: 4.35 K/UL (ref 1.8–7.7)
NEUTROPHILS NFR BLD AUTO: 62.8 % (ref 53–65)
PLATELET # BLD AUTO: 154 K/UL (ref 150–400)
POTASSIUM SERPL-SCNC: 4.3 MMOL/L (ref 3.5–5.1)
PROT SERPL-MCNC: 6.6 G/DL (ref 6.4–8.2)
RBC # BLD AUTO: 4.24 M/UL (ref 4.2–5.4)
SODIUM SERPL-SCNC: 136 MMOL/L (ref 136–145)
TSH SERPL DL<=0.005 MIU/L-ACNC: 2.04 UIU/ML (ref 0.36–3.74)
WBC # BLD AUTO: 6.92 K/UL (ref 4.5–11)

## 2024-07-16 PROCEDURE — 85025 COMPLETE CBC W/AUTO DIFF WBC: CPT | Performed by: NURSE PRACTITIONER

## 2024-07-16 PROCEDURE — 80053 COMPREHEN METABOLIC PANEL: CPT | Performed by: NURSE PRACTITIONER

## 2024-07-16 PROCEDURE — 83036 HEMOGLOBIN GLYCOSYLATED A1C: CPT | Performed by: NURSE PRACTITIONER

## 2024-07-16 PROCEDURE — 84443 ASSAY THYROID STIM HORMONE: CPT | Performed by: NURSE PRACTITIONER

## 2024-09-09 PROBLEM — N39.0 URINARY TRACT INFECTION WITHOUT HEMATURIA: Status: RESOLVED | Noted: 2024-06-03 | Resolved: 2024-09-09

## 2024-09-25 ENCOUNTER — LAB REQUISITION (OUTPATIENT)
Dept: LAB | Facility: HOSPITAL | Age: 79
End: 2024-09-25
Attending: NURSE PRACTITIONER
Payer: MEDICARE

## 2024-09-25 DIAGNOSIS — R82.79 OTHER ABNORMAL FINDINGS ON MICROBIOLOGICAL EXAMINATION OF URINE: ICD-10-CM

## 2024-09-25 LAB
BACTERIA #/AREA URNS HPF: ABNORMAL /HPF
BILIRUB UR QL STRIP: NEGATIVE
CLARITY UR: ABNORMAL
COLOR UR: ABNORMAL
GLUCOSE UR STRIP-MCNC: NEGATIVE MG/DL
KETONES UR STRIP-SCNC: NEGATIVE MG/DL
LEUKOCYTE ESTERASE UR QL STRIP: ABNORMAL
NITRITE UR QL STRIP: POSITIVE
PH UR STRIP: 6.5 PH UNITS
PROT UR QL STRIP: 100
RBC # UR STRIP: ABNORMAL /UL
SP GR UR STRIP: 1.02
UROBILINOGEN UR STRIP-ACNC: 1 MG/DL
WBC #/AREA URNS HPF: ABNORMAL /HPF

## 2024-09-25 PROCEDURE — 81003 URINALYSIS AUTO W/O SCOPE: CPT

## 2024-09-25 PROCEDURE — 87086 URINE CULTURE/COLONY COUNT: CPT

## 2024-09-25 PROCEDURE — 87077 CULTURE AEROBIC IDENTIFY: CPT

## 2024-09-25 PROCEDURE — 87186 SC STD MICRODIL/AGAR DIL: CPT

## 2024-09-27 LAB — UA COMPLETE W REFLEX CULTURE PNL UR: ABNORMAL

## 2024-11-23 ENCOUNTER — LAB REQUISITION (OUTPATIENT)
Dept: LAB | Facility: HOSPITAL | Age: 79
End: 2024-11-23
Attending: NURSE PRACTITIONER
Payer: MEDICARE

## 2024-11-23 DIAGNOSIS — R82.79 OTHER ABNORMAL FINDINGS ON MICROBIOLOGICAL EXAMINATION OF URINE: ICD-10-CM

## 2024-11-23 LAB
BACTERIA #/AREA URNS HPF: ABNORMAL /HPF
BILIRUB UR QL STRIP: ABNORMAL
CLARITY UR: ABNORMAL
COLOR UR: ABNORMAL
GLUCOSE UR STRIP-MCNC: 100 MG/DL
KETONES UR STRIP-SCNC: NEGATIVE MG/DL
LEUKOCYTE ESTERASE UR QL STRIP: ABNORMAL
NITRITE UR QL STRIP: POSITIVE
PH UR STRIP: 6 PH UNITS
PROT UR QL STRIP: 30
RBC # UR STRIP: ABNORMAL /UL
RBC #/AREA URNS HPF: ABNORMAL /HPF
SP GR UR STRIP: 1.01
SQUAMOUS #/AREA URNS LPF: ABNORMAL /LPF
UROBILINOGEN UR STRIP-ACNC: 1 MG/DL
WBC #/AREA URNS HPF: ABNORMAL /HPF

## 2024-11-23 PROCEDURE — 87186 SC STD MICRODIL/AGAR DIL: CPT | Performed by: NURSE PRACTITIONER

## 2024-11-23 PROCEDURE — 81001 URINALYSIS AUTO W/SCOPE: CPT | Performed by: NURSE PRACTITIONER

## 2024-11-25 LAB — UA COMPLETE W REFLEX CULTURE PNL UR: ABNORMAL

## 2025-01-06 ENCOUNTER — LAB REQUISITION (OUTPATIENT)
Dept: LAB | Facility: HOSPITAL | Age: 80
End: 2025-01-06
Attending: NURSE PRACTITIONER
Payer: MEDICARE

## 2025-01-06 DIAGNOSIS — R82.79 OTHER ABNORMAL FINDINGS ON MICROBIOLOGICAL EXAMINATION OF URINE: ICD-10-CM

## 2025-01-06 LAB
BACTERIA #/AREA URNS HPF: ABNORMAL /HPF
BILIRUB UR QL STRIP: NEGATIVE
CLARITY UR: ABNORMAL
COLOR UR: YELLOW
GLUCOSE UR STRIP-MCNC: NEGATIVE MG/DL
KETONES UR STRIP-SCNC: NEGATIVE MG/DL
LEUKOCYTE ESTERASE UR QL STRIP: ABNORMAL
NITRITE UR QL STRIP: POSITIVE
PH UR STRIP: 6 PH UNITS
PROT UR QL STRIP: 100
RBC # UR STRIP: ABNORMAL /UL
RBC #/AREA URNS HPF: ABNORMAL /HPF
SP GR UR STRIP: 1.02
SQUAMOUS #/AREA URNS LPF: ABNORMAL /LPF
UROBILINOGEN UR STRIP-ACNC: 1 MG/DL
WBC #/AREA URNS HPF: ABNORMAL /HPF

## 2025-01-06 PROCEDURE — 81003 URINALYSIS AUTO W/O SCOPE: CPT | Performed by: NURSE PRACTITIONER

## 2025-01-06 PROCEDURE — 87086 URINE CULTURE/COLONY COUNT: CPT | Performed by: NURSE PRACTITIONER

## 2025-01-08 LAB — UA COMPLETE W REFLEX CULTURE PNL UR: ABNORMAL

## 2025-01-20 ENCOUNTER — LAB REQUISITION (OUTPATIENT)
Dept: LAB | Facility: HOSPITAL | Age: 80
End: 2025-01-20
Attending: NURSE PRACTITIONER
Payer: MEDICARE

## 2025-01-20 DIAGNOSIS — R82.79 OTHER ABNORMAL FINDINGS ON MICROBIOLOGICAL EXAMINATION OF URINE: ICD-10-CM

## 2025-01-20 LAB
BACTERIA #/AREA URNS HPF: ABNORMAL /HPF
BILIRUB UR QL STRIP: NEGATIVE
CLARITY UR: ABNORMAL
COLOR UR: YELLOW
GLUCOSE UR STRIP-MCNC: NEGATIVE MG/DL
KETONES UR STRIP-SCNC: NEGATIVE MG/DL
LEUKOCYTE ESTERASE UR QL STRIP: ABNORMAL
NITRITE UR QL STRIP: NEGATIVE
PH UR STRIP: 6 PH UNITS
PROT UR QL STRIP: 30
RBC # UR STRIP: ABNORMAL /UL
RBC #/AREA URNS HPF: ABNORMAL /HPF
SP GR UR STRIP: 1.01
SQUAMOUS #/AREA URNS LPF: ABNORMAL /LPF
UROBILINOGEN UR STRIP-ACNC: 0.2 MG/DL
WBC #/AREA URNS HPF: ABNORMAL /HPF

## 2025-01-20 PROCEDURE — 87186 SC STD MICRODIL/AGAR DIL: CPT

## 2025-01-20 PROCEDURE — 81003 URINALYSIS AUTO W/O SCOPE: CPT

## 2025-01-22 LAB — UA COMPLETE W REFLEX CULTURE PNL UR: ABNORMAL

## 2025-02-17 ENCOUNTER — LAB REQUISITION (OUTPATIENT)
Dept: LAB | Facility: HOSPITAL | Age: 80
End: 2025-02-17
Attending: NURSE PRACTITIONER
Payer: MEDICARE

## 2025-02-17 ENCOUNTER — HOSPITAL ENCOUNTER (INPATIENT)
Facility: HOSPITAL | Age: 80
LOS: 3 days | Discharge: NURSING, REHAB OR LONG-TERM CARE | DRG: 690 | End: 2025-02-21
Attending: HOSPITALIST | Admitting: HOSPITALIST
Payer: MEDICARE

## 2025-02-17 DIAGNOSIS — R82.991 HYPOCITRATURIA: ICD-10-CM

## 2025-02-17 DIAGNOSIS — R05.9 COUGH: ICD-10-CM

## 2025-02-17 DIAGNOSIS — F05 ACUTE CONFUSIONAL STATE: ICD-10-CM

## 2025-02-17 DIAGNOSIS — R41.82 ALTERED MENTAL STATUS, UNSPECIFIED ALTERED MENTAL STATUS TYPE: ICD-10-CM

## 2025-02-17 DIAGNOSIS — N30.00 ACUTE CYSTITIS WITHOUT HEMATURIA: Primary | ICD-10-CM

## 2025-02-17 LAB
ALBUMIN SERPL BCP-MCNC: 3.6 G/DL (ref 3.4–4.8)
ALBUMIN/GLOB SERPL: 0.9 {RATIO}
ALP SERPL-CCNC: 79 U/L (ref 40–150)
ALT SERPL W P-5'-P-CCNC: 16 U/L
ANION GAP SERPL CALCULATED.3IONS-SCNC: 18 MMOL/L (ref 7–16)
AST SERPL W P-5'-P-CCNC: 34 U/L (ref 5–34)
BACTERIA #/AREA URNS HPF: ABNORMAL /HPF
BASOPHILS # BLD AUTO: 0.02 K/UL (ref 0–0.2)
BASOPHILS NFR BLD AUTO: 0.2 % (ref 0–1)
BILIRUB SERPL-MCNC: 0.4 MG/DL
BILIRUB UR QL STRIP: NEGATIVE
BUN SERPL-MCNC: 16 MG/DL (ref 10–20)
BUN/CREAT SERPL: 15 (ref 6–20)
CALCIUM SERPL-MCNC: 9.9 MG/DL (ref 8.4–10.2)
CHLORIDE SERPL-SCNC: 98 MMOL/L (ref 98–107)
CLARITY UR: CLEAR
CO2 SERPL-SCNC: 24 MMOL/L (ref 23–31)
COLOR UR: YELLOW
CREAT SERPL-MCNC: 1.08 MG/DL (ref 0.55–1.02)
DIFFERENTIAL METHOD BLD: ABNORMAL
EGFR (NO RACE VARIABLE) (RUSH/TITUS): 52 ML/MIN/1.73M2
EOSINOPHIL # BLD AUTO: 0.12 K/UL (ref 0–0.5)
EOSINOPHIL NFR BLD AUTO: 1.3 % (ref 1–4)
ERYTHROCYTE [DISTWIDTH] IN BLOOD BY AUTOMATED COUNT: 14.6 % (ref 11.5–14.5)
EST. AVERAGE GLUCOSE BLD GHB EST-MCNC: 134 MG/DL
GLOBULIN SER-MCNC: 3.9 G/DL (ref 2–4)
GLUCOSE SERPL-MCNC: 144 MG/DL (ref 82–115)
GLUCOSE UR STRIP-MCNC: NEGATIVE MG/DL
HBA1C MFR BLD HPLC: 6.3 %
HCT VFR BLD AUTO: 44.3 % (ref 38–47)
HGB BLD-MCNC: 13.9 G/DL (ref 12–16)
KETONES UR STRIP-SCNC: NEGATIVE MG/DL
LACTATE SERPL-SCNC: 1.4 MMOL/L (ref 0.5–2.2)
LEUKOCYTE ESTERASE UR QL STRIP: ABNORMAL
LYMPHOCYTES # BLD AUTO: 2.7 K/UL (ref 1–4.8)
LYMPHOCYTES NFR BLD AUTO: 28.7 % (ref 27–41)
MCH RBC QN AUTO: 28.8 PG (ref 27–31)
MCHC RBC AUTO-ENTMCNC: 31.4 G/DL (ref 32–36)
MCV RBC AUTO: 91.7 FL (ref 80–96)
MONOCYTES # BLD AUTO: 0.76 K/UL (ref 0–0.8)
MONOCYTES NFR BLD AUTO: 8.1 % (ref 2–6)
MPC BLD CALC-MCNC: 10.6 FL (ref 9.4–12.4)
NEUTROPHILS # BLD AUTO: 5.82 K/UL (ref 1.8–7.7)
NEUTROPHILS NFR BLD AUTO: 61.7 % (ref 53–65)
NITRITE UR QL STRIP: NEGATIVE
PH UR STRIP: 6 PH UNITS
PLATELET # BLD AUTO: 227 K/UL (ref 150–400)
POTASSIUM SERPL-SCNC: 4.3 MMOL/L (ref 3.5–5.1)
PROT SERPL-MCNC: 7.5 G/DL (ref 5.8–7.6)
PROT UR QL STRIP: NEGATIVE
RBC # BLD AUTO: 4.83 M/UL (ref 4.2–5.4)
RBC # UR STRIP: ABNORMAL /UL
SODIUM SERPL-SCNC: 136 MMOL/L (ref 136–145)
SP GR UR STRIP: 1.01
UROBILINOGEN UR STRIP-ACNC: 0.2 MG/DL
WBC # BLD AUTO: 9.42 K/UL (ref 4.5–11)
WBC #/AREA URNS HPF: ABNORMAL /HPF

## 2025-02-17 PROCEDURE — 63600175 PHARM REV CODE 636 W HCPCS: Performed by: NURSE PRACTITIONER

## 2025-02-17 PROCEDURE — 96372 THER/PROPH/DIAG INJ SC/IM: CPT | Performed by: NURSE PRACTITIONER

## 2025-02-17 PROCEDURE — 96375 TX/PRO/DX INJ NEW DRUG ADDON: CPT

## 2025-02-17 PROCEDURE — 87186 SC STD MICRODIL/AGAR DIL: CPT

## 2025-02-17 PROCEDURE — 99285 EMERGENCY DEPT VISIT HI MDM: CPT | Mod: 25

## 2025-02-17 PROCEDURE — 83036 HEMOGLOBIN GLYCOSYLATED A1C: CPT | Performed by: NURSE PRACTITIONER

## 2025-02-17 PROCEDURE — 99285 EMERGENCY DEPT VISIT HI MDM: CPT | Mod: GF,EDII,, | Performed by: NURSE PRACTITIONER

## 2025-02-17 PROCEDURE — G0378 HOSPITAL OBSERVATION PER HR: HCPCS

## 2025-02-17 PROCEDURE — 36415 COLL VENOUS BLD VENIPUNCTURE: CPT | Performed by: NURSE PRACTITIONER

## 2025-02-17 PROCEDURE — 81003 URINALYSIS AUTO W/O SCOPE: CPT

## 2025-02-17 PROCEDURE — 25000003 PHARM REV CODE 250: Performed by: NURSE PRACTITIONER

## 2025-02-17 PROCEDURE — 96374 THER/PROPH/DIAG INJ IV PUSH: CPT

## 2025-02-17 PROCEDURE — 85025 COMPLETE CBC W/AUTO DIFF WBC: CPT | Performed by: NURSE PRACTITIONER

## 2025-02-17 PROCEDURE — 80053 COMPREHEN METABOLIC PANEL: CPT | Performed by: NURSE PRACTITIONER

## 2025-02-17 PROCEDURE — 83605 ASSAY OF LACTIC ACID: CPT | Performed by: NURSE PRACTITIONER

## 2025-02-17 RX ORDER — SODIUM CHLORIDE 0.9 % (FLUSH) 0.9 %
10 SYRINGE (ML) INJECTION
Status: DISCONTINUED | OUTPATIENT
Start: 2025-02-17 | End: 2025-02-21 | Stop reason: HOSPADM

## 2025-02-17 RX ORDER — GLUCAGON 1 MG
1 KIT INJECTION
Status: DISCONTINUED | OUTPATIENT
Start: 2025-02-17 | End: 2025-02-21 | Stop reason: HOSPADM

## 2025-02-17 RX ORDER — LEVOTHYROXINE SODIUM 88 UG/1
88 TABLET ORAL EVERY MORNING
Status: ON HOLD | COMMUNITY
Start: 2025-02-06

## 2025-02-17 RX ORDER — ATORVASTATIN CALCIUM 40 MG/1
40 TABLET, FILM COATED ORAL DAILY
Status: DISCONTINUED | OUTPATIENT
Start: 2025-02-18 | End: 2025-02-18

## 2025-02-17 RX ORDER — CLOPIDOGREL BISULFATE 75 MG/1
75 TABLET ORAL DAILY
Status: DISCONTINUED | OUTPATIENT
Start: 2025-02-18 | End: 2025-02-21 | Stop reason: HOSPADM

## 2025-02-17 RX ORDER — INSULIN ASPART 100 [IU]/ML
0-5 INJECTION, SOLUTION INTRAVENOUS; SUBCUTANEOUS
Status: DISCONTINUED | OUTPATIENT
Start: 2025-02-17 | End: 2025-02-21 | Stop reason: HOSPADM

## 2025-02-17 RX ORDER — LEVOTHYROXINE SODIUM 88 UG/1
88 TABLET ORAL EVERY MORNING
Status: DISCONTINUED | OUTPATIENT
Start: 2025-02-18 | End: 2025-02-19

## 2025-02-17 RX ORDER — METOPROLOL TARTRATE 50 MG/1
50 TABLET ORAL 2 TIMES DAILY
Status: DISCONTINUED | OUTPATIENT
Start: 2025-02-17 | End: 2025-02-21 | Stop reason: HOSPADM

## 2025-02-17 RX ORDER — ACETAMINOPHEN 325 MG/1
650 TABLET ORAL EVERY 8 HOURS PRN
Status: DISCONTINUED | OUTPATIENT
Start: 2025-02-17 | End: 2025-02-21 | Stop reason: HOSPADM

## 2025-02-17 RX ORDER — CEFTRIAXONE 1 G/1
1 INJECTION, POWDER, FOR SOLUTION INTRAMUSCULAR; INTRAVENOUS
Status: COMPLETED | OUTPATIENT
Start: 2025-02-17 | End: 2025-02-17

## 2025-02-17 RX ORDER — ENOXAPARIN SODIUM 100 MG/ML
40 INJECTION SUBCUTANEOUS EVERY 24 HOURS
Status: DISCONTINUED | OUTPATIENT
Start: 2025-02-17 | End: 2025-02-21 | Stop reason: HOSPADM

## 2025-02-17 RX ORDER — OLMESARTAN MEDOXOMIL 40 MG/1
40 TABLET ORAL EVERY MORNING
Status: ON HOLD | COMMUNITY
Start: 2025-02-11

## 2025-02-17 RX ORDER — CEFTRIAXONE 1 G/1
1 INJECTION, POWDER, FOR SOLUTION INTRAMUSCULAR; INTRAVENOUS
Status: DISCONTINUED | OUTPATIENT
Start: 2025-02-18 | End: 2025-02-19

## 2025-02-17 RX ORDER — HYDRALAZINE HYDROCHLORIDE 20 MG/ML
10 INJECTION INTRAMUSCULAR; INTRAVENOUS
Status: COMPLETED | OUTPATIENT
Start: 2025-02-17 | End: 2025-02-17

## 2025-02-17 RX ORDER — ONDANSETRON HYDROCHLORIDE 2 MG/ML
4 INJECTION, SOLUTION INTRAVENOUS EVERY 8 HOURS PRN
Status: DISCONTINUED | OUTPATIENT
Start: 2025-02-17 | End: 2025-02-21 | Stop reason: HOSPADM

## 2025-02-17 RX ORDER — CIPROFLOXACIN 500 MG/1
500 TABLET ORAL 2 TIMES DAILY
Status: ON HOLD | COMMUNITY
Start: 2025-02-17

## 2025-02-17 RX ORDER — IBUPROFEN 200 MG
16 TABLET ORAL
Status: DISCONTINUED | OUTPATIENT
Start: 2025-02-17 | End: 2025-02-21 | Stop reason: HOSPADM

## 2025-02-17 RX ORDER — IBUPROFEN 200 MG
24 TABLET ORAL
Status: DISCONTINUED | OUTPATIENT
Start: 2025-02-17 | End: 2025-02-21 | Stop reason: HOSPADM

## 2025-02-17 RX ORDER — TALC
6 POWDER (GRAM) TOPICAL NIGHTLY PRN
Status: DISCONTINUED | OUTPATIENT
Start: 2025-02-17 | End: 2025-02-21 | Stop reason: HOSPADM

## 2025-02-17 RX ORDER — LOSARTAN POTASSIUM 50 MG/1
100 TABLET ORAL DAILY
Status: DISCONTINUED | OUTPATIENT
Start: 2025-02-18 | End: 2025-02-21 | Stop reason: HOSPADM

## 2025-02-17 RX ORDER — ERGOCALCIFEROL 1.25 MG/1
CAPSULE ORAL
Status: ON HOLD | COMMUNITY
Start: 2025-02-07

## 2025-02-17 RX ADMIN — CEFTRIAXONE SODIUM 1 G: 1 INJECTION, POWDER, FOR SOLUTION INTRAMUSCULAR; INTRAVENOUS at 08:02

## 2025-02-17 RX ADMIN — HYDRALAZINE HYDROCHLORIDE 10 MG: 20 INJECTION INTRAMUSCULAR; INTRAVENOUS at 08:02

## 2025-02-17 RX ADMIN — ENOXAPARIN SODIUM 40 MG: 40 INJECTION SUBCUTANEOUS at 10:02

## 2025-02-17 RX ADMIN — METOPROLOL TARTRATE 50 MG: 50 TABLET, FILM COATED ORAL at 10:02

## 2025-02-18 PROBLEM — F05 ACUTE CONFUSIONAL STATE: Status: ACTIVE | Noted: 2025-02-18

## 2025-02-18 LAB
GLUCOSE SERPL-MCNC: 111 MG/DL (ref 70–105)
GLUCOSE SERPL-MCNC: 123 MG/DL (ref 70–105)
GLUCOSE SERPL-MCNC: 133 MG/DL (ref 70–105)
GLUCOSE SERPL-MCNC: 223 MG/DL (ref 70–105)

## 2025-02-18 PROCEDURE — 27000958

## 2025-02-18 PROCEDURE — 11000001 HC ACUTE MED/SURG PRIVATE ROOM

## 2025-02-18 PROCEDURE — 25000003 PHARM REV CODE 250: Performed by: NURSE PRACTITIONER

## 2025-02-18 PROCEDURE — 27000987 HC MATTRESS, MATRIX LOW PROFILE

## 2025-02-18 PROCEDURE — G0378 HOSPITAL OBSERVATION PER HR: HCPCS

## 2025-02-18 PROCEDURE — 97162 PT EVAL MOD COMPLEX 30 MIN: CPT

## 2025-02-18 PROCEDURE — 99222 1ST HOSP IP/OBS MODERATE 55: CPT | Mod: AI,,, | Performed by: HOSPITALIST

## 2025-02-18 PROCEDURE — 63600175 PHARM REV CODE 636 W HCPCS: Performed by: NURSE PRACTITIONER

## 2025-02-18 PROCEDURE — 82962 GLUCOSE BLOOD TEST: CPT

## 2025-02-18 PROCEDURE — A4216 STERILE WATER/SALINE, 10 ML: HCPCS | Performed by: NURSE PRACTITIONER

## 2025-02-18 RX ADMIN — CLOPIDOGREL BISULFATE 75 MG: 75 TABLET, FILM COATED ORAL at 08:02

## 2025-02-18 RX ADMIN — LEVOTHYROXINE SODIUM 88 MCG: 0.09 TABLET ORAL at 06:02

## 2025-02-18 RX ADMIN — LOSARTAN POTASSIUM 100 MG: 50 TABLET, FILM COATED ORAL at 08:02

## 2025-02-18 RX ADMIN — METOPROLOL TARTRATE 50 MG: 50 TABLET, FILM COATED ORAL at 08:02

## 2025-02-18 RX ADMIN — CEFTRIAXONE 1 G: 1 INJECTION, POWDER, FOR SOLUTION INTRAMUSCULAR; INTRAVENOUS at 08:02

## 2025-02-18 RX ADMIN — SODIUM CHLORIDE, PRESERVATIVE FREE 10 ML: 5 INJECTION INTRAVENOUS at 08:02

## 2025-02-18 RX ADMIN — INSULIN ASPART 2 UNITS: 100 INJECTION, SOLUTION INTRAVENOUS; SUBCUTANEOUS at 05:02

## 2025-02-18 RX ADMIN — ENOXAPARIN SODIUM 40 MG: 40 INJECTION SUBCUTANEOUS at 03:02

## 2025-02-18 NOTE — ASSESSMENT & PLAN NOTE
Seems like a chronic issue with her.  She has multiple hospital admissions to [facility] and here.  All seem to be the same issue.

## 2025-02-18 NOTE — PT/OT/SLP EVAL
Physical Therapy Evaluation    Patient Name:  Michell Dhillon   MRN:  [id number]    Recommendations:     Discharge Recommendations: Moderate Intensity Therapy   Discharge Equipment Recommendations: bedside commode, wheelchair   Barriers to discharge: None    Assessment:     Michell Dhillon is a 79 y.o. female admitted with a medical diagnosis of Acute confusional state.  She presents with the following impairments/functional limitations: weakness, impaired endurance, gait instability, impaired balance, decreased coordination, decreased safety awareness, impaired coordination .    Rehab Prognosis: Good; patient would benefit from acute skilled PT services to address these deficits and reach maximum level of function.    Recent Surgery: * No surgery found *      Plan:     During this hospitalization, patient to be seen 5 x/week to address the identified rehab impairments via therapeutic activities, therapeutic exercises, wheelchair management/training, neuromuscular re-education and progress toward the following goals:    Plan of Care Expires:   03/2025    Subjective     Chief Complaint: fatigue  Patient/Family Comments/goals: patient transfers from bed to chair independently at baseline , but does not walk.  Pain/Comfort:  none     Patients cultural, spiritual, [employer]aritan conflicts given the current situation:      Living Environment:  Patient live with son in home with 2 steps to enter.   Prior to admission, patients level of function was Independent with  mobility and transfers to and from bed to  and toilet to .  Equipment used at home: bedside commode, wheelchair.  DME owned (not currently used): bedside commode and wheelchair.  Upon discharge, patient will have assistance from family.    Objective:     Communicated with RN prior to session.  Patient found supine with    upon PT entry to room.    General Precautions: Standard, fall  Orthopedic Precautions:N/A   Braces: N/A  Respiratory Status: Room  air    Exams:  Cognitive Exam:  Patient is oriented to Person and Place    Functional Mobility:  Bed Mobility:     Supine to Sit: moderate assistance  Sit to Supine: moderate assistance  Transfers:   Unable to perform transfers today due to c/o dizziness.       AM-PAC 6 CLICK MOBILITY  Total Score:9       Treatment & Education:  Unable to perform transfers today due to c/o dizziness.   Initial evaluation perfromed.     Patient left supine with call button in reach.    GOALS:   Multidisciplinary Problems       Physical Therapy Goals          Problem: Physical Therapy    Goal Priority Disciplines Outcome Interventions   Physical Therapy Goal     PT, PT/OT     Description: STG 1 week.   Patient ill perform supine to sit with modified independence  Patient will be able to transfer from bed to chair with modified independence.  Patient will perform WC mobility independently on level surfaces x 200 feet.                       DME Justifications:  No DME recommended requiring DME justifications    History:     Past Medical History:   Diagnosis Date    Arthritis     Diabetes mellitus, type 2     Hyperlipidemia     Hypertension     Hypothyroidism     Transient cerebral ischemic attack, unspecified 04/14/2021       Past Surgical History:   Procedure Laterality Date    BLADDER SUSPENSION      unsure of the dates - patient has had this done 2 times    CATARACT EXTRACTION Right     patient unsure    HYSTERECTOMY         Time Tracking:     PT Received On: 02/18/25  PT Start Time: 1005     PT Stop Time: 1039  PT Total Time (min): 34 min     Billable Minutes: Evaluation 34      02/18/2025

## 2025-02-18 NOTE — PLAN OF CARE
[facility] Medical Surgical Unit  Initial Discharge Assessment       Primary Care Provider: Daryl Moscoso NP    Admission Diagnosis: Cough [R05.9]  Acute cystitis without hematuria [N30.00]  Altered mental status, unspecified altered mental status type [R41.82]  Acute confusional state [F05]    Admission Date: 2/17/2025  Expected Discharge Date:     Transition of Care Barriers: None    Payor: MEDICARE / Plan: MEDICARE PART A & B / Product Type: Government /     Extended Emergency Contact Information  Primary Emergency Contact: Mikki Gallardo   [place]  Mobile Phone: [phone]  Relation: Daughter  Preferred language: English   needed? No    Discharge Plan A: [facility] - [place], [place] - [address]  [address] [place] [address]  Phone: [phone] Fax: [phone]    [facility] [address] [place] [address] - [address]  [address]  [place] [address]  Phone: [phone] Fax: [phone]      Initial Assessment (most recent)       Adult Discharge Assessment - 02/18/25 1417          Discharge Assessment    Assessment Type Discharge Planning Assessment     Confirmed/corrected address, phone number and insurance Yes     Confirmed Demographics Correct on Facesheet     Source of Information patient     Communicated NIGEL with patient/caregiver Date not available/Unable to determine     Reason For Admission inpatient treatment     People in Home child(janell), adult     Facility Arrived From: n/a     Do you expect to return to your current living situation? Yes     Do you have help at home or someone to help you manage your care at home? Yes     Who are your caregiver(s) and their phone number(s)? son and granddaughter     Prior to hospitilization cognitive status: Unable to Assess     Current cognitive status: Alert/Oriented     Walking or Climbing Stairs Difficulty yes     Walking or Climbing Stairs ambulation  difficulty, requires equipment;transferring difficulty, assistance 1 person     Dressing/Bathing Difficulty yes     Dressing/Bathing bathing difficulty, requires equipment     Home Accessibility wheelchair accessible     Home Layout Able to live on 1st floor     Equipment Currently Used at Home walker, rolling;wheelchair     Readmission within 30 days? No     Patient currently being followed by outpatient case management? No     Do you currently have service(s) that help you manage your care at home? Yes     Name and Contact number of agency [facility]     Is the pt/caregiver preference to resume services with current agency Yes     Do you take prescription medications? Yes     Do you have prescription coverage? Yes     Do you have any problems affording any of your prescribed medications? No     Is the patient taking medications as prescribed? yes     Who is going to help you get home at discharge? son and granddaughter     How do you get to doctors appointments? family or friend will provide     Are you on dialysis? No     Do you take coumadin? No     Discharge Plan A Home Health     DME Needed Upon Discharge  none     Discharge Plan discussed with: Patient     Transition of Care Barriers None        Physical Activity    On average, how many days per week do you engage in moderate to strenuous exercise (like a brisk walk)? 0 days     On average, how many minutes do you engage in exercise at this level? 0 min        Financial Resource Strain    How hard is it for you to pay for the very basics like food, housing, medical care, and heating? Not hard at all        Housing Stability    In the last 12 months, was there a time when you were not able to pay the mortgage or rent on time? No     At any time in the past 12 months, were you homeless or living in a shelter (including now)? No        Transportation Needs    Has the lack of transportation kept you from medical appointments, meetings, work or from getting things  needed for daily living? No        Food Insecurity    Within the past 12 months, you worried that your food would run out before you got the money to buy more. Never true     Within the past 12 months, the food you bought just didn't last and you didn't have money to get more. Never true        Stress    Do you feel stress - tense, restless, nervous, or anxious, or unable to sleep at night because your mind is troubled all the time - these days? Not at all        Social Isolation    How often do you feel lonely or isolated from those around you?  Never        Alcohol Use    Q1: How often do you have a drink containing alcohol? Never     Q2: How many drinks containing alcohol do you have on a typical day when you are drinking? Patient does not drink     Q3: How often do you have six or more drinks on one occasion? Never        Utilities    In the past 12 months has the electric, gas, oil, or water company threatened to shut off services in your home? No        Health Literacy    How often do you need to have someone help you when you read instructions, pamphlets, or other written material from your doctor or pharmacy? Often

## 2025-02-18 NOTE — ASSESSMENT & PLAN NOTE
Creatine stable for now. BMP reviewed- noted Estimated Creatinine Clearance: 47.1 mL/min (A) (based on SCr of 1.08 mg/dL (H)). according to latest data. Based on current GFR, CKD stage is stage 3 - GFR 30-59.  Monitor UOP and serial BMP and adjust therapy as needed. Renally dose meds. Avoid nephrotoxic medications and procedures.

## 2025-02-18 NOTE — SUBJECTIVE & OBJECTIVE
"Past Medical History:   Diagnosis Date    Arthritis     Diabetes mellitus, type 2     Hyperlipidemia     Hypertension     Hypothyroidism     Transient cerebral ischemic attack, unspecified 04/14/2021       Past Surgical History:   Procedure Laterality Date    BLADDER SUSPENSION      unsure of the dates - patient has had this done 2 times    CATARACT EXTRACTION Right     patient unsure    HYSTERECTOMY         Review of patient's allergies indicates:   Allergen Reactions    Bactrim [sulfamethoxazole-trimethoprim]     Norvasc [amlodipine]     Omnicef [cefdinir]     Penicillin        No current facility-administered medications on file prior to encounter.     Current Outpatient Medications on File Prior to Encounter   Medication Sig    atorvastatin (LIPITOR) 40 MG tablet Take 40 mg by mouth once daily.    chlorproMAZINE (THORAZINE) 25 MG tablet Take 25 mg by mouth daily as needed.    ciprofloxacin HCl (CIPRO) 500 MG tablet Take 500 mg by mouth 2 (two) times daily.    clopidogreL (PLAVIX) 75 mg tablet Take 75 mg by mouth once daily.    levothyroxine (SYNTHROID) 88 MCG tablet Take 88 mcg by mouth every morning.    metFORMIN (GLUCOPHAGE) 500 MG tablet Take 500 mg by mouth 2 (two) times daily with meals.    nebivoloL (BYSTOLIC) 10 MG Tab Take 10 mg by mouth once daily.    olmesartan (BENICAR) 40 MG tablet Take 40 mg by mouth every morning.    VITAMIN D2 1,250 mcg (50,000 unit) capsule Take by mouth. take 1 capsule once a "week" for 10 weeks, then once a month as directed     Family History       Problem Relation (Age of Onset)    Hypertension Mother, Father, Sister          Tobacco Use    Smoking status: Never    Smokeless tobacco: Never   Substance and Sexual Activity    Alcohol use: Never    Drug use: Never    Sexual activity: Not on file     Review of Systems   Constitutional:  Negative for appetite change, chills and fever.   Respiratory:  Negative for cough, shortness of breath and wheezing.    Cardiovascular:  " Negative for chest pain, palpitations and leg swelling.   Gastrointestinal:  Negative for abdominal distention, diarrhea, nausea and vomiting.   Genitourinary:  Negative for dysuria.   Skin:  Negative for rash.   Neurological:  Positive for weakness. Negative for dizziness, seizures and syncope.   Psychiatric/Behavioral:  Positive for confusion. Negative for agitation and behavioral problems.    All other systems reviewed and are negative.    Objective:     Vital Signs (Most Recent):  Temp: 97.9 °F (36.6 °C) (02/18/25 0747)  Pulse: 62 (02/18/25 0747)  Resp: 20 (02/18/25 0747)  BP: (!) 107/59 (02/18/25 0747)  SpO2: (!) 93 % (02/18/25 0747) Vital Signs (24h Range):  Temp:  [97.8 °F (36.6 °C)-98.2 °F (36.8 °C)] 97.9 °F (36.6 °C)  Pulse:  [62-86] 62  Resp:  [18-20] 20  SpO2:  [92 %-96 %] 93 %  BP: (105-195)/(52-94) 107/59     Weight: 87.1 kg (192 lb)  Body mass index is 30.07 kg/m².     Physical Exam  Vitals reviewed.   Constitutional:       General: She is not in acute distress.     Appearance: Normal appearance.   HENT:      Head: Normocephalic and atraumatic.   Eyes:      General: No scleral icterus.     Extraocular Movements: Extraocular movements intact.      Conjunctiva/sclera: Conjunctivae normal.      Pupils: Pupils are equal, round, and reactive to light.   Cardiovascular:      Rate and Rhythm: Normal rate and regular rhythm.      Heart sounds: No murmur heard.     No friction rub. No gallop.   Pulmonary:      Effort: Pulmonary effort is normal. No respiratory distress.      Breath sounds: Normal breath sounds. No wheezing or rales.   Abdominal:      General: Abdomen is flat. Bowel sounds are normal. There is no distension.      Palpations: Abdomen is soft.      Tenderness: There is no abdominal tenderness. There is no guarding.   Musculoskeletal:         General: No swelling.      Right lower leg: No edema.      Left lower leg: No edema.   Skin:     General: Skin is warm and dry.      Coloration: Skin is not  jaundiced.      Findings: No rash.   Neurological:      General: No focal deficit present.      Mental Status: She is alert. She is disoriented.      Sensory: No sensory deficit.      Motor: Weakness present.   Psychiatric:         Behavior: Behavior normal.      Comments: Mild confusion, likely baseline               CRANIAL NERVES     CN III, IV, VI   Pupils are equal, round, and reactive to light.       Significant Labs: All pertinent labs within the past 24 hours have been reviewed.  Recent Lab Results         02/18/25  0541   02/17/25  2323   02/17/25  2048   02/17/25  1326        Albumin/Globulin Ratio     0.9         Albumin     3.6         ALP     79         ALT     16         Anion Gap     18         Appearance, UA       Clear       AST     34         Bacteria, UA       Few       Baso #     0.02         Basophil %     0.2         Bilirubin (UA)       Negative       BILIRUBIN TOTAL     0.4         BUN     16         BUN/CREAT RATIO     15         Calcium     9.9         Chloride     98         CO2     24         Color, UA       Yellow       Creatinine     1.08         Differential Method     Auto         eGFR     52  Comment: Estimated GFR calculated using the CKD-EPI creatinine (2021) equation.         Eos #     0.12         Eos %     1.3         Estimated Avg Glucose   134           Globulin, Total     3.9         Glucose     144         Glucose, UA       Negative       Hematocrit     44.3         Hemoglobin     13.9         Hemoglobin A1C External   6.3  Comment:   Normal:               <5.7%  Pre-Diabetic:       5.7% to 6.4%  Diabetic:             >6.4%  Diabetic Goal:     <7%           Ketones, UA       Negative       Lactic Acid Level     1.4         Leukocyte Esterase, UA       Large       Lymph #     2.70         Lymph %     28.7         MCH     28.8         MCHC     31.4         MCV     91.7         Mono #     0.76         Mono %     8.1         MPV     10.6         Neutrophils, Abs     5.82          Neutrophils Relative     61.7         NITRITE UA       Negative       Blood, UA       Small       pH, UA       6.0       Platelet Count     227         POC Glucose 123             Potassium     4.3         PROTEIN TOTAL     7.5         Protein, UA       Negative       RBC     4.83         RDW     14.6         Sodium     136         Spec Grav UA       1.015       Urine Culture       >100,000 Gram-negative Bacilli  Comment: Identification and Susceptibility to Follow  [P]       UROBILINOGEN UA       0.2       WBC, UA       Too Numerous To Count       WBC     9.42                  [P] - Preliminary Result               Significant Imaging: I have reviewed all pertinent imaging results/findings within the past 24 hours.

## 2025-02-18 NOTE — ED PROVIDER NOTES
Encounter Date: 2/17/2025       History     Chief Complaint   Patient presents with    Altered Mental Status     78 yo female to ED via WC with family for increased confusion. Had urinalysis with home health earlier today with large leukocytes, WBC TNTC. Awaiting culture.     The history is provided by a relative and the patient.     Review of patient's allergies indicates:   Allergen Reactions    Bactrim [sulfamethoxazole-trimethoprim]     Norvasc [amlodipine]     Omnicef [cefdinir]     Penicillin      Past Medical History:   Diagnosis Date    Arthritis     Diabetes mellitus, type 2     Hyperlipidemia     Hypertension     Hypothyroidism     Transient cerebral ischemic attack, unspecified 04/14/2021     History reviewed. No pertinent surgical history.  Family History   Problem Relation Name Age of Onset    Hypertension Mother      Hypertension Father      Hypertension Sister       Social History[1]  Review of Systems   Unable to perform ROS: Mental status change   All other systems reviewed and are negative.      Physical Exam     Initial Vitals [02/17/25 2033]   BP Pulse Resp Temp SpO2   (!) 195/94 80 18 98.2 °F (36.8 °C) 96 %      MAP       --         Physical Exam    Nursing note and vitals reviewed.  Constitutional: She appears well-developed and well-nourished.   HENT:   Head: Normocephalic and atraumatic.   Right Ear: External ear normal.   Left Ear: External ear normal.   Nose: Nose normal. Mouth/Throat: Oropharynx is clear and moist.   Eyes: EOM are normal. Pupils are equal, round, and reactive to light.   Neck: Neck supple.   Normal range of motion.  Cardiovascular:  Normal rate, regular rhythm and normal heart sounds.           Abdominal: Abdomen is soft.   Musculoskeletal:         General: Normal range of motion.      Cervical back: Normal range of motion and neck supple.     Neurological: GCS eye subscore is 4. GCS verbal subscore is 4. GCS motor subscore is 6.   Skin: Skin is warm. Capillary refill  takes 2 to 3 seconds.         Medical Screening Exam   See Full Note    ED Course   Procedures  Labs Reviewed   COMPREHENSIVE METABOLIC PANEL - Abnormal       Result Value    Sodium 136      Potassium 4.3      Chloride 98      CO2 24      Anion Gap 18 (*)     Glucose 144 (*)     BUN 16      Creatinine 1.08 (*)     BUN/Creatinine Ratio 15      Calcium 9.9      Total Protein 7.5      Albumin 3.6      Globulin 3.9      A/G Ratio 0.9      Bilirubin, Total 0.4      Alk Phos 79      ALT 16      AST 34      eGFR 52 (*)    CBC WITH DIFFERENTIAL - Abnormal    WBC 9.42      RBC 4.83      Hemoglobin 13.9      Hematocrit 44.3      MCV 91.7      MCH 28.8      MCHC 31.4 (*)     RDW 14.6 (*)     Platelet Count 227      MPV 10.6      Neutrophils % 61.7      Lymphocytes % 28.7      Neutrophils, Abs 5.82      Lymphocytes, Absolute 2.70      Diff Type Auto      Monocytes % 8.1 (*)     Eosinophils % 1.3      Basophils % 0.2      Monocytes, Absolute 0.76      Eosinophils, Absolute 0.12      Basophils, Absolute 0.02     LACTIC ACID, PLASMA - Normal    Lactic Acid 1.4     CBC W/ AUTO DIFFERENTIAL    Narrative:     The following orders were created for panel order CBC auto differential.  Procedure                               Abnormality         Status                     ---------                               -----------         ------                     CBC with Differential[[id number]]       Abnormal            Final result                 Please view results for these tests on the individual orders.          Imaging Results              CT Head Without Contrast (In process)                      X-Ray Chest AP Portable (In process)                      Medications   hydrALAZINE injection 10 mg (10 mg Intravenous Given 2/17/25 2048)   cefTRIAXone injection 1 g (1 g Intravenous Given 2/17/25 2048)     Medical Decision Making  78 yo female to ED via  with family for increased confusion. Had urinalysis with home health earlier today  with large leukocytes, WBC TNTC. Awaiting culture.     The history is provided by a relative and the patient.     Hypertensive on arrival 194/94  Labs reviewed    CT with no acute findings  CXR with no infectious process  Rocephin given in ED  Patient admitted to [facility] for IV abx and neuro monitoring      Amount and/or Complexity of Data Reviewed  Labs: ordered. Decision-making details documented in ED Course.  Radiology: ordered. Decision-making details documented in ED Course.  Discussion of management or test interpretation with external provider(s): Discussed patient with Dr. Hedrick, agrees with admission.     Risk  Prescription drug management.                                      Clinical Impression:   Final diagnoses:  [R05.9] Cough  [N30.00] Acute cystitis without hematuria (Primary)  [R41.82] Altered mental status, unspecified altered mental status type        ED Disposition Condition    Observation Stable                  [1]   Social History  Tobacco Use    Smoking status: Never    Smokeless tobacco: Never   Substance Use Topics    Alcohol use: Never    Drug use: Never        Bibiana Bland, FNP  02/17/25 5294

## 2025-02-18 NOTE — PLAN OF CARE
Problem: Skin Injury Risk Increased  Goal: Skin Health and Integrity  Outcome: Progressing     Problem: Adult Inpatient Plan of Care  Goal: Plan of Care Review  Outcome: Progressing  Goal: Patient-Specific Goal (Individualized)  Outcome: Progressing  Goal: Absence of Hospital-Acquired Illness or Injury  Outcome: Progressing  Goal: Optimal Comfort and Wellbeing  Outcome: Progressing  Goal: Readiness for Transition of Care  Outcome: Progressing     Problem: Diabetes Comorbidity  Goal: Blood Glucose Level Within Targeted Range  Outcome: Progressing     Problem: Fall Injury Risk  Goal: Absence of Fall and Fall-Related Injury  2/18/2025 0305 by Kera Claros, RN  Outcome: Progressing  2/17/2025 2239 by Kera Claros, RN  Outcome: Progressing     Problem: UTI (Urinary Tract Infection)  Goal: Improved Infection Symptoms  Outcome: Progressing

## 2025-02-18 NOTE — NURSING
Admitted from ER with c/o being more confused (per family). Daughter reports that patient was in [facility] recently and was discharged on 2/11 - with the flu. Patient has an old healing bruise to L posterior side - daughter reports that patient received the bruise while she was in [facility]. Patient oriented to person and place. On admission to the floor, patient was incontinent of bowel & bladder. Cleaned up per staff. Oriented to room, bed, NCL. Daughter at the bedside. Patient took po med without difficulty.

## 2025-02-18 NOTE — NURSING
Bruising to L arm & L side that were noted on admission - daughter reports that she got these while at [facility]

## 2025-02-18 NOTE — PLAN OF CARE
STG 1 week.   Patient ill perform supine to sit with modified independence  Patient will be able to transfer from bed to chair with modified independence.  Patient will perform WC mobility independently on level surfaces x 200 feet.

## 2025-02-18 NOTE — HPI
79-year-old  female history of CVA, type 2 diabetes, hypertension, hyperlipidemia, recurrent UTI sent from home for confusion. Patient has been admitted multiple times to many facilities with exact same presentation.  Reviewing a multitude of urine cultures in Epic and various facilities she has had numerous urine cultures with various organisms.  She is awake and talking this am but unaware of previous events and why she is admitted. Eating breakfast with no problem.

## 2025-02-18 NOTE — PLAN OF CARE
Problem: Skin Injury Risk Increased  Goal: Skin Health and Integrity  Outcome: Progressing     Problem: Adult Inpatient Plan of Care  Goal: Plan of Care Review  Outcome: Progressing  Goal: Patient-Specific Goal (Individualized)  Outcome: Progressing  Goal: Absence of Hospital-Acquired Illness or Injury  Outcome: Progressing  Goal: Optimal Comfort and Wellbeing  Outcome: Progressing  Goal: Readiness for Transition of Care  Outcome: Progressing     Problem: Diabetes Comorbidity  Goal: Blood Glucose Level Within Targeted Range  Outcome: Progressing     Problem: Fall Injury Risk  Goal: Absence of Fall and Fall-Related Injury  Outcome: Progressing     Problem: UTI (Urinary Tract Infection)  Goal: Improved Infection Symptoms  Outcome: Progressing

## 2025-02-18 NOTE — H&P
[facility] Medicine  History & Physical    Patient Name: Michell Dhillon  MRN: [id number]  Patient Class: OP- Observation  Admission Date: 2/17/2025  Attending Physician: Elmer Rainey DO   Primary Care Provider: Daryl Moscoso NP         Patient information was obtained from patient, past medical records, and ER records.     Subjective:     Principal Problem:Acute confusional state    Chief Complaint:   Chief Complaint   Patient presents with    Altered Mental Status        HPI: 79-year-old  female history of CVA, type 2 diabetes, hypertension, hyperlipidemia, recurrent UTI sent from home for confusion. Patient has been admitted multiple times to many facilities with exact same presentation.  Reviewing a multitude of urine cultures in Epic and various facilities she has had numerous urine cultures with various organisms.  She is awake and talking this am but unaware of previous events and why she is admitted. Eating breakfast with no problem.     Past Medical History:   Diagnosis Date    Arthritis     Diabetes mellitus, type 2     Hyperlipidemia     Hypertension     Hypothyroidism     Transient cerebral ischemic attack, unspecified 04/14/2021       Past Surgical History:   Procedure Laterality Date    BLADDER SUSPENSION      unsure of the dates - patient has had this done 2 times    CATARACT EXTRACTION Right     patient unsure    HYSTERECTOMY         Review of patient's allergies indicates:   Allergen Reactions    Bactrim [sulfamethoxazole-trimethoprim]     Norvasc [amlodipine]     Omnicef [cefdinir]     Penicillin        No current facility-administered medications on file prior to encounter.     Current Outpatient Medications on File Prior to Encounter   Medication Sig    atorvastatin (LIPITOR) 40 MG tablet Take 40 mg by mouth once daily.    chlorproMAZINE (THORAZINE) 25 MG tablet Take 25 mg by mouth daily as needed.    ciprofloxacin HCl (CIPRO) 500 MG  "tablet Take 500 mg by mouth 2 (two) times daily.    clopidogreL (PLAVIX) 75 mg tablet Take 75 mg by mouth once daily.    levothyroxine (SYNTHROID) 88 MCG tablet Take 88 mcg by mouth every morning.    metFORMIN (GLUCOPHAGE) 500 MG tablet Take 500 mg by mouth 2 (two) times daily with meals.    nebivoloL (BYSTOLIC) 10 MG Tab Take 10 mg by mouth once daily.    olmesartan (BENICAR) 40 MG tablet Take 40 mg by mouth every morning.    VITAMIN D2 1,250 mcg (50,000 unit) capsule Take by mouth. take 1 capsule once a "week" for 10 weeks, then once a month as directed     Family History       Problem Relation (Age of Onset)    Hypertension Mother, Father, Sister          Tobacco Use    Smoking status: Never    Smokeless tobacco: Never   Substance and Sexual Activity    Alcohol use: Never    Drug use: Never    Sexual activity: Not on file     Review of Systems   Constitutional:  Negative for appetite change, chills and fever.   Respiratory:  Negative for cough, shortness of breath and wheezing.    Cardiovascular:  Negative for chest pain, palpitations and leg swelling.   Gastrointestinal:  Negative for abdominal distention, diarrhea, nausea and vomiting.   Genitourinary:  Negative for dysuria.   Skin:  Negative for rash.   Neurological:  Positive for weakness. Negative for dizziness, seizures and syncope.   Psychiatric/Behavioral:  Positive for confusion. Negative for agitation and behavioral problems.    All other systems reviewed and are negative.    Objective:     Vital Signs (Most Recent):  Temp: 97.9 °F (36.6 °C) (02/18/25 0747)  Pulse: 62 (02/18/25 0747)  Resp: 20 (02/18/25 0747)  BP: (!) 107/59 (02/18/25 0747)  SpO2: (!) 93 % (02/18/25 0747) Vital Signs (24h Range):  Temp:  [97.8 °F (36.6 °C)-98.2 °F (36.8 °C)] 97.9 °F (36.6 °C)  Pulse:  [62-86] 62  Resp:  [18-20] 20  SpO2:  [92 %-96 %] 93 %  BP: (105-195)/(52-94) 107/59     Weight: 87.1 kg (192 lb)  Body mass index is 30.07 kg/m².     Physical Exam  Vitals reviewed. "   Constitutional:       General: She is not in acute distress.     Appearance: Normal appearance.   HENT:      Head: Normocephalic and atraumatic.   Eyes:      General: No scleral icterus.     Extraocular Movements: Extraocular movements intact.      Conjunctiva/sclera: Conjunctivae normal.      Pupils: Pupils are equal, round, and reactive to light.   Cardiovascular:      Rate and Rhythm: Normal rate and regular rhythm.      Heart sounds: No murmur heard.     No friction rub. No gallop.   Pulmonary:      Effort: Pulmonary effort is normal. No respiratory distress.      Breath sounds: Normal breath sounds. No wheezing or rales.   Abdominal:      General: Abdomen is flat. Bowel sounds are normal. There is no distension.      Palpations: Abdomen is soft.      Tenderness: There is no abdominal tenderness. There is no guarding.   Musculoskeletal:         General: No swelling.      Right lower leg: No edema.      Left lower leg: No edema.   Skin:     General: Skin is warm and dry.      Coloration: Skin is not jaundiced.      Findings: No rash.   Neurological:      General: No focal deficit present.      Mental Status: She is alert. She is disoriented.      Sensory: No sensory deficit.      Motor: Weakness present.   Psychiatric:         Behavior: Behavior normal.      Comments: Mild confusion, likely baseline               CRANIAL NERVES     CN III, IV, VI   Pupils are equal, round, and reactive to light.       Significant Labs: All pertinent labs within the past 24 hours have been reviewed.  Recent Lab Results         02/18/25  0541   02/17/25  2323   02/17/25  2048   02/17/25  1326        Albumin/Globulin Ratio     0.9         Albumin     3.6         ALP     79         ALT     16         Anion Gap     18         Appearance, UA       Clear       AST     34         Bacteria, UA       Few       Baso #     0.02         Basophil %     0.2         Bilirubin (UA)       Negative       BILIRUBIN TOTAL     0.4         BUN      16         BUN/CREAT RATIO     15         Calcium     9.9         Chloride     98         CO2     24         Color, UA       Yellow       Creatinine     1.08         Differential Method     Auto         eGFR     52  Comment: Estimated GFR calculated using the CKD-EPI creatinine (2021) equation.         Eos #     0.12         Eos %     1.3         Estimated Avg Glucose   134           Globulin, Total     3.9         Glucose     144         Glucose, UA       Negative       Hematocrit     44.3         Hemoglobin     13.9         Hemoglobin A1C External   6.3  Comment:   Normal:               <5.7%  Pre-Diabetic:       5.7% to 6.4%  Diabetic:             >6.4%  Diabetic Goal:     <7%           Ketones, UA       Negative       Lactic Acid Level     1.4         Leukocyte Esterase, UA       Large       Lymph #     2.70         Lymph %     28.7         MCH     28.8         MCHC     31.4         MCV     91.7         Mono #     0.76         Mono %     8.1         MPV     10.6         Neutrophils, Abs     5.82         Neutrophils Relative     61.7         NITRITE UA       Negative       Blood, UA       Small       pH, UA       6.0       Platelet Count     227         POC Glucose 123             Potassium     4.3         PROTEIN TOTAL     7.5         Protein, UA       Negative       RBC     4.83         RDW     14.6         Sodium     136         Spec Grav UA       1.015       Urine Culture       >100,000 Gram-negative Bacilli  Comment: Identification and Susceptibility to Follow  [P]       UROBILINOGEN UA       0.2       WBC, UA       Too Numerous To Count       WBC     9.42                  [P] - Preliminary Result               Significant Imaging: I have reviewed all pertinent imaging results/findings within the past 24 hours.  Assessment/Plan:     * Acute confusional state  Seems like a chronic issue with her.  She has multiple hospital admissions to [facility] and here.  All seem to be the same issue.        CKD  (chronic kidney disease) stage 3, GFR 30-59 ml/min  Creatine stable for now. BMP reviewed- noted Estimated Creatinine Clearance: 47.1 mL/min (A) (based on SCr of 1.08 mg/dL (H)). according to latest data. Based on current GFR, CKD stage is stage 3 - GFR 30-59.  Monitor UOP and serial BMP and adjust therapy as needed. Renally dose meds. Avoid nephrotoxic medications and procedures.    Acute cystitis without hematuria  ON ABX.  Review of multiple cultures will try to look for PO options.       Hypothyroidism  Review home meds and adjust if needed.       Type 2 diabetes mellitus without complication, without long-term current use of insulin  SSI and monitor         VTE Risk Mitigation (From admission, onward)           Ordered     enoxaparin injection 40 mg  Daily         02/17/25 2238     IP VTE HIGH RISK PATIENT  Once         02/17/25 2238     Place sequential compression device  Until discontinued         02/17/25 2238                       On 02/18/2025, patient should be placed in hospital observation services under my care.             Elmer Rainey DO  Department of Hospital Medicine  [facility]

## 2025-02-19 LAB
ANION GAP SERPL CALCULATED.3IONS-SCNC: 16 MMOL/L (ref 7–16)
BASOPHILS # BLD AUTO: 0.03 K/UL (ref 0–0.2)
BASOPHILS NFR BLD AUTO: 0.4 % (ref 0–1)
BUN SERPL-MCNC: 16 MG/DL (ref 10–20)
BUN/CREAT SERPL: 15 (ref 6–20)
CALCIUM SERPL-MCNC: 9.5 MG/DL (ref 8.4–10.2)
CHLORIDE SERPL-SCNC: 101 MMOL/L (ref 98–107)
CO2 SERPL-SCNC: 24 MMOL/L (ref 23–31)
CREAT SERPL-MCNC: 1.09 MG/DL (ref 0.55–1.02)
DIFFERENTIAL METHOD BLD: ABNORMAL
EGFR (NO RACE VARIABLE) (RUSH/TITUS): 51 ML/MIN/1.73M2
EOSINOPHIL # BLD AUTO: 0.08 K/UL (ref 0–0.5)
EOSINOPHIL NFR BLD AUTO: 1.2 % (ref 1–4)
ERYTHROCYTE [DISTWIDTH] IN BLOOD BY AUTOMATED COUNT: 14.5 % (ref 11.5–14.5)
GLUCOSE SERPL-MCNC: 127 MG/DL (ref 70–105)
GLUCOSE SERPL-MCNC: 144 MG/DL (ref 82–115)
GLUCOSE SERPL-MCNC: 181 MG/DL (ref 70–105)
GLUCOSE SERPL-MCNC: 181 MG/DL (ref 70–105)
HCT VFR BLD AUTO: 43.3 % (ref 38–47)
HGB BLD-MCNC: 13.6 G/DL (ref 12–16)
LYMPHOCYTES # BLD AUTO: 2.05 K/UL (ref 1–4.8)
LYMPHOCYTES NFR BLD AUTO: 30.1 % (ref 27–41)
MCH RBC QN AUTO: 28.8 PG (ref 27–31)
MCHC RBC AUTO-ENTMCNC: 31.4 G/DL (ref 32–36)
MCV RBC AUTO: 91.5 FL (ref 80–96)
MONOCYTES # BLD AUTO: 0.65 K/UL (ref 0–0.8)
MONOCYTES NFR BLD AUTO: 9.6 % (ref 2–6)
MPC BLD CALC-MCNC: 9.7 FL (ref 9.4–12.4)
NEUTROPHILS # BLD AUTO: 3.99 K/UL (ref 1.8–7.7)
NEUTROPHILS NFR BLD AUTO: 58.7 % (ref 53–65)
PLATELET # BLD AUTO: 219 K/UL (ref 150–400)
POTASSIUM SERPL-SCNC: 4.2 MMOL/L (ref 3.5–5.1)
RBC # BLD AUTO: 4.73 M/UL (ref 4.2–5.4)
SODIUM SERPL-SCNC: 137 MMOL/L (ref 136–145)
UA COMPLETE W REFLEX CULTURE PNL UR: ABNORMAL
WBC # BLD AUTO: 6.8 K/UL (ref 4.5–11)

## 2025-02-19 PROCEDURE — 36415 COLL VENOUS BLD VENIPUNCTURE: CPT | Performed by: HOSPITALIST

## 2025-02-19 PROCEDURE — 80048 BASIC METABOLIC PNL TOTAL CA: CPT | Performed by: HOSPITALIST

## 2025-02-19 PROCEDURE — 99232 SBSQ HOSP IP/OBS MODERATE 35: CPT | Mod: ,,, | Performed by: HOSPITALIST

## 2025-02-19 PROCEDURE — 25000003 PHARM REV CODE 250: Performed by: NURSE PRACTITIONER

## 2025-02-19 PROCEDURE — 27000958

## 2025-02-19 PROCEDURE — 11000001 HC ACUTE MED/SURG PRIVATE ROOM

## 2025-02-19 PROCEDURE — 25000003 PHARM REV CODE 250: Performed by: HOSPITALIST

## 2025-02-19 PROCEDURE — 27000987 HC MATTRESS, MATRIX LOW PROFILE

## 2025-02-19 PROCEDURE — 85025 COMPLETE CBC W/AUTO DIFF WBC: CPT | Performed by: HOSPITALIST

## 2025-02-19 PROCEDURE — 97530 THERAPEUTIC ACTIVITIES: CPT

## 2025-02-19 PROCEDURE — 82962 GLUCOSE BLOOD TEST: CPT

## 2025-02-19 PROCEDURE — 63600175 PHARM REV CODE 636 W HCPCS: Performed by: NURSE PRACTITIONER

## 2025-02-19 RX ORDER — CEFAZOLIN SODIUM 1 G/3ML
1 INJECTION, POWDER, FOR SOLUTION INTRAMUSCULAR; INTRAVENOUS
Status: DISCONTINUED | OUTPATIENT
Start: 2025-02-20 | End: 2025-02-21

## 2025-02-19 RX ORDER — LEVOTHYROXINE SODIUM 88 UG/1
88 TABLET ORAL
Status: DISCONTINUED | OUTPATIENT
Start: 2025-02-19 | End: 2025-02-21 | Stop reason: HOSPADM

## 2025-02-19 RX ADMIN — LEVOTHYROXINE SODIUM 88 MCG: 0.09 TABLET ORAL at 05:02

## 2025-02-19 RX ADMIN — LOSARTAN POTASSIUM 100 MG: 50 TABLET, FILM COATED ORAL at 08:02

## 2025-02-19 RX ADMIN — METOPROLOL TARTRATE 50 MG: 50 TABLET, FILM COATED ORAL at 08:02

## 2025-02-19 RX ADMIN — CEFTRIAXONE 1 G: 1 INJECTION, POWDER, FOR SOLUTION INTRAMUSCULAR; INTRAVENOUS at 08:02

## 2025-02-19 RX ADMIN — CLOPIDOGREL BISULFATE 75 MG: 75 TABLET, FILM COATED ORAL at 08:02

## 2025-02-19 RX ADMIN — ENOXAPARIN SODIUM 40 MG: 40 INJECTION SUBCUTANEOUS at 04:02

## 2025-02-19 NOTE — PT/OT/SLP PROGRESS
Physical Therapy Treatment    Patient Name:  Michell Dhillon   MRN:  [id number]    Recommendations:     Discharge Recommendations: Moderate Intensity Therapy  Discharge Equipment Recommendations: bedside commode, wheelchair, shower chair  Barriers to discharge: None    Assessment:     Michell Dhillon is a 79 y.o. female admitted with a medical diagnosis of Acute confusional state.  She presents with the following impairments/functional limitations: weakness, impaired endurance, impaired balance, decreased coordination, decreased safety awareness, pain .    Rehab Prognosis: Good; patient would benefit from acute skilled PT services to address these deficits and reach maximum level of function.    Recent Surgery: * No surgery found *      Plan:     During this hospitalization, patient to be seen 5 x/week to address the identified rehab impairments via gait training, therapeutic activities, therapeutic exercises, wheelchair management/training, neuromuscular re-education and progress toward the following goals:    Plan of Care Expires:  03/02/25    Subjective     Chief Complaint: leg pain and general weakness  Patient/Family Comments/goals: Patient wants to go home soon.   Pain/Comfort:  Pain Rating 1: 4/10  Location - Side 1: Bilateral  Location - Orientation 1: lower  Location 1: leg  Pain Rating Post-Intervention 1: 3/10      Objective:     Communicated with RN prior to session.  Patient found supine with daughter-in law in room   upon PT entry to room.     General Precautions: Standard, fall  Orthopedic Precautions: N/A  Braces: N/A  Respiratory Status: Room air     Functional Mobility:  Bed Mobility:     Supine to Sit: moderate assistance  Sit to Supine: moderate assistance  Transfers:     Bed to Chair: moderate assistance with  no AD  using  Squat Pivot      AM-PAC 6 CLICK MOBILITY  Turning over in bed (including adjusting bedclothes, sheets and blankets)?: 3  Sitting down on and standing up from a chair with  arms (e.g., wheelchair, bedside commode, etc.): 2  Moving from lying on back to sitting on the side of the bed?: 2  Moving to and from a bed to a chair (including a wheelchair)?: 2  Need to walk in hospital room?: 1  Climbing 3-5 steps with a railing?: 1  Basic Mobility Total Score: 11       Treatment & Education:  Bed Mobility:     Supine to Sit: moderate assistance  Sit to Supine: moderate assistance  Transfers:     Bed to Chair: moderate assistance with  no AD  using  Squat Pivot        Patient left up in chair with call button in reach and Daughter- in law present..    GOALS:   Multidisciplinary Problems       Physical Therapy Goals          Problem: Physical Therapy    Goal Priority Disciplines Outcome Interventions   Physical Therapy Goal     PT, PT/OT     Description: STG 1 week.   Patient ill perform supine to sit with modified independence  Patient will be able to transfer from bed to chair with modified independence.  Patient will perform WC mobility independently on level surfaces x 200 feet.                       DME Justifications:  No DME recommended requiring DME justifications    Time Tracking:     PT Received On: 02/19/25  PT Start Time: 1405     PT Stop Time: 1430  PT Total Time (min): 25 min     Billable Minutes: Therapeutic Activity 25    Treatment Type: Treatment  PT/PTA: PT     Number of PTA visits since last PT visit: 0     02/19/2025

## 2025-02-19 NOTE — PLAN OF CARE
Problem: Skin Injury Risk Increased  Goal: Skin Health and Integrity  2/19/2025 1413 by Charlotte Suazo, RN  Outcome: Progressing  2/19/2025 1413 by Charlotte Suazo, RN  Outcome: Progressing

## 2025-02-20 LAB
GLUCOSE SERPL-MCNC: 114 MG/DL (ref 70–105)
GLUCOSE SERPL-MCNC: 121 MG/DL (ref 70–105)
GLUCOSE SERPL-MCNC: 124 MG/DL (ref 70–105)

## 2025-02-20 PROCEDURE — 27000987 HC MATTRESS, MATRIX LOW PROFILE

## 2025-02-20 PROCEDURE — 11000001 HC ACUTE MED/SURG PRIVATE ROOM

## 2025-02-20 PROCEDURE — 99232 SBSQ HOSP IP/OBS MODERATE 35: CPT | Mod: ,,, | Performed by: HOSPITALIST

## 2025-02-20 PROCEDURE — 25000003 PHARM REV CODE 250: Performed by: HOSPITALIST

## 2025-02-20 PROCEDURE — 25000003 PHARM REV CODE 250: Performed by: NURSE PRACTITIONER

## 2025-02-20 PROCEDURE — 97110 THERAPEUTIC EXERCISES: CPT

## 2025-02-20 PROCEDURE — 63600175 PHARM REV CODE 636 W HCPCS: Performed by: NURSE PRACTITIONER

## 2025-02-20 PROCEDURE — 63600175 PHARM REV CODE 636 W HCPCS: Performed by: HOSPITALIST

## 2025-02-20 PROCEDURE — 96372 THER/PROPH/DIAG INJ SC/IM: CPT

## 2025-02-20 PROCEDURE — 27000958

## 2025-02-20 PROCEDURE — 97530 THERAPEUTIC ACTIVITIES: CPT

## 2025-02-20 PROCEDURE — 82962 GLUCOSE BLOOD TEST: CPT

## 2025-02-20 RX ADMIN — CEFAZOLIN 1 G: 330 INJECTION, POWDER, FOR SOLUTION INTRAMUSCULAR; INTRAVENOUS at 02:02

## 2025-02-20 RX ADMIN — LOSARTAN POTASSIUM 100 MG: 50 TABLET, FILM COATED ORAL at 08:02

## 2025-02-20 RX ADMIN — METOPROLOL TARTRATE 50 MG: 50 TABLET, FILM COATED ORAL at 09:02

## 2025-02-20 RX ADMIN — CEFAZOLIN 1 G: 330 INJECTION, POWDER, FOR SOLUTION INTRAMUSCULAR; INTRAVENOUS at 09:02

## 2025-02-20 RX ADMIN — CEFAZOLIN 1 G: 330 INJECTION, POWDER, FOR SOLUTION INTRAMUSCULAR; INTRAVENOUS at 05:02

## 2025-02-20 RX ADMIN — ENOXAPARIN SODIUM 40 MG: 40 INJECTION SUBCUTANEOUS at 05:02

## 2025-02-20 RX ADMIN — LEVOTHYROXINE SODIUM 88 MCG: 0.09 TABLET ORAL at 05:02

## 2025-02-20 RX ADMIN — CLOPIDOGREL BISULFATE 75 MG: 75 TABLET, FILM COATED ORAL at 08:02

## 2025-02-20 RX ADMIN — METOPROLOL TARTRATE 50 MG: 50 TABLET, FILM COATED ORAL at 08:02

## 2025-02-20 NOTE — PT/OT/SLP PROGRESS
Physical Therapy Treatment    Patient Name:  Michell Dhillon   MRN:  [id number]    Recommendations:     Discharge Recommendations: Moderate Intensity Therapy  Discharge Equipment Recommendations: bedside commode, walker, rolling, wheelchair  Barriers to discharge:  poor functional mobility    Assessment:     Michell Dhillon is a 79 y.o. female admitted with a medical diagnosis of Acute confusional state.  She presents with the following impairments/functional limitations: weakness, impaired endurance, impaired sensation, impaired balance, decreased coordination, decreased lower extremity function, pain, impaired joint extensibility .    Rehab Prognosis: Good; patient would benefit from acute skilled PT services to address these deficits and reach maximum level of function.    Recent Surgery: * No surgery found *      Plan:     During this hospitalization, patient to be seen 5 x/week to address the identified rehab impairments via gait training, therapeutic activities, therapeutic exercises, neuromuscular re-education, wheelchair management/training and progress toward the following goals:    Plan of Care Expires:  03/02/25    Subjective     Chief Complaint: Poor mobility  Patient/Family Comments/goals: Patient states she is weak  Pain/Comfort:  Pain Rating 1: 4/10  Location - Side 1: Bilateral  Location - Orientation 1: lower  Location 1: leg      Objective:     Communicated with RN prior to session.  Patient found supine with   upon PT entry to room.     General Precautions: Standard, fall  Orthopedic Precautions: N/A  Braces: N/A  Respiratory Status: Room air     Functional Mobility:  Bed Mobility:     Supine to Sit: minimum assistance  Sit to Supine: minimum assistance  Transfers:     Bed to Chair: moderate assistance with  no AD  using  Stand Pivot  Wheelchair Propulsion:  Pt propelled Standard wheelchair x 100 feet on Level tile with  Bilateral upper extremity with Minimal Assistance.       AM-PAC 6 CLICK  MOBILITY  Turning over in bed (including adjusting bedclothes, sheets and blankets)?: 3  Sitting down on and standing up from a chair with arms (e.g., wheelchair, bedside commode, etc.): 3  Moving from lying on back to sitting on the side of the bed?: 3  Moving to and from a bed to a chair (including a wheelchair)?: 2  Need to walk in hospital room?: 2  Climbing 3-5 steps with a railing?: 2  Basic Mobility Total Score: 15       Treatment & Education:  Bed Mobility:     Supine to Sit: minimum assistance  Sit to Supine: minimum assistance  Transfers:     Bed to Chair: moderate assistance with  no AD  using  Stand Pivot  Wheelchair Propulsion:  Pt propelled Standard wheelchair x 100 feet on Level tile with  Bilateral upper extremity with Minimal Assistance.   There ex for BLE ankle pumps , heel slides , SLR, LAQ, hip ABD 2 x10    Patient left supine with all lines intact and Family present..    GOALS:   Multidisciplinary Problems       Physical Therapy Goals          Problem: Physical Therapy    Goal Priority Disciplines Outcome Interventions   Physical Therapy Goal     PT, PT/OT     Description: STG 1 week.   Patient ill perform supine to sit with modified independence  Patient will be able to transfer from bed to chair with modified independence.  Patient will perform WC mobility independently on level surfaces x 200 feet.                       DME Justifications:  No DME recommended requiring DME justifications    Time Tracking:     PT Received On: 02/20/25  PT Start Time: 1230     PT Stop Time: 1255  PT Total Time (min): 25 min     Billable Minutes: Therapeutic Activity 15 and Therapeutic Exercise 10    Treatment Type: Treatment  PT/PTA: PT     Number of PTA visits since last PT visit: 0     02/20/2025

## 2025-02-20 NOTE — HOSPITAL COURSE
"2/19 Doing well, changed ABX according to C&S.  Discussed SWB and she is not interested.  Will get some PT today and plan on DC tomorrow with .     2/20 Much improved, now willing to do SWB.  Would be better given her multiple hospital admissions for the same issue.     2/21: Patient improving, will swing today to start PT/OT. She is complaining of right foot being "sore" States "I think it is gout". She has no swelling, very mild tenderness to palpation. Denies injury.   "

## 2025-02-20 NOTE — PROGRESS NOTES
[facility] Medicine  Progress Note    Patient Name: Michell Dhillon  MRN: [id number]  Patient Class: IP- Inpatient   Admission Date: 2/17/2025  Length of Stay: 1 days  Attending Physician: Elmer Rainey DO  Primary Care Provider: Daryl Moscoso NP        Subjective     Principal Problem:Acute confusional state        HPI:  79-year-old  female history of CVA, type 2 diabetes, hypertension, hyperlipidemia, recurrent UTI sent from home for confusion. Patient has been admitted multiple times to many facilities with exact same presentation.  Reviewing a multitude of urine cultures in Epic and various facilities she has had numerous urine cultures with various organisms.  She is awake and talking this am but unaware of previous events and why she is admitted. Eating breakfast with no problem.     Overview/Hospital Course:  2/19 Doing well, changed ABX according to C&S.  Discussed SWB and she is not interested.  Will get some PT today and plan on DC tomorrow with HH.     Interval History: doing well, DC plan for tomorrow. Getting PT today    Review of Systems   Respiratory:  Negative for shortness of breath.    Cardiovascular:  Negative for chest pain.   Gastrointestinal:  Negative for abdominal pain, nausea and vomiting.   Neurological:  Positive for weakness.   Psychiatric/Behavioral:  Negative for agitation and confusion.    All other systems reviewed and are negative.    Objective:     Vital Signs (Most Recent):  Temp: 98 °F (36.7 °C) (02/19/25 2015)  Pulse: 87 (02/19/25 2015)  Resp: 20 (02/19/25 2015)  BP: (!) 148/68 (02/19/25 2015)  SpO2: (!) 93 % (02/19/25 2015) Vital Signs (24h Range):  Temp:  [98 °F (36.7 °C)-98.4 °F (36.9 °C)] 98 °F (36.7 °C)  Pulse:  [72-87] 87  Resp:  [20] 20  SpO2:  [93 %] 93 %  BP: (148-197)/() 148/68     Weight: 86.1 kg (189 lb 12.8 oz)  Body mass index is 29.73 kg/m².    Intake/Output Summary (Last 24 hours) at 2/19/2025  2314  Last data filed at 2/19/2025 1842  Gross per 24 hour   Intake 577 ml   Output 200 ml   Net 377 ml         Physical Exam  Vitals reviewed.   Constitutional:       General: She is not in acute distress.     Appearance: Normal appearance.   HENT:      Head: Normocephalic and atraumatic.   Eyes:      General: No scleral icterus.     Extraocular Movements: Extraocular movements intact.      Conjunctiva/sclera: Conjunctivae normal.      Pupils: Pupils are equal, round, and reactive to light.   Cardiovascular:      Rate and Rhythm: Normal rate and regular rhythm.      Heart sounds: No murmur heard.     No friction rub. No gallop.   Pulmonary:      Effort: Pulmonary effort is normal. No respiratory distress.      Breath sounds: Normal breath sounds. No wheezing or rales.   Abdominal:      General: Abdomen is flat. Bowel sounds are normal. There is no distension.      Palpations: Abdomen is soft.      Tenderness: There is no abdominal tenderness. There is no guarding.   Musculoskeletal:         General: No swelling.      Right lower leg: No edema.      Left lower leg: No edema.   Skin:     General: Skin is warm and dry.      Coloration: Skin is not jaundiced.      Findings: No rash.   Neurological:      General: No focal deficit present.      Mental Status: She is alert. She is disoriented.      Sensory: No sensory deficit.      Motor: Weakness present.   Psychiatric:         Behavior: Behavior normal.      Comments: Mild confusion, likely baseline              Significant Labs: All pertinent labs within the past 24 hours have been reviewed.  Recent Lab Results         02/19/25  1945   02/19/25  1632   02/19/25  1038   02/19/25  0512        Anion Gap       16       Baso #       0.03       Basophil %       0.4       BUN       16       BUN/CREAT RATIO       15       Calcium       9.5       Chloride       101       CO2       24       Creatinine       1.09       Differential Method       Auto       eGFR       51  Comment:  Estimated GFR calculated using the CKD-EPI creatinine (2021) equation.       Eos #       0.08       Eos %       1.2       Glucose       144       Hematocrit       43.3       Hemoglobin       13.6       Lymph #       2.05       Lymph %       30.1       MCH       28.8       MCHC       31.4       MCV       91.5       Mono #       0.65       Mono %       9.6       MPV       9.7       Neutrophils, Abs       3.99       Neutrophils Relative       58.7       Platelet Count       219       POC Glucose 181   181   127         Potassium       4.2       RBC       4.73       RDW       14.5       Sodium       137       WBC       6.80               Significant Imaging: I have reviewed all pertinent imaging results/findings within the past 24 hours.    Assessment and Plan     * Acute confusional state  Seems like a chronic issue with her.  She has multiple hospital admissions to [facility] and here.  All seem to be the same issue.        CKD (chronic kidney disease) stage 3, GFR 30-59 ml/min  Creatine stable for now. BMP reviewed- noted Estimated Creatinine Clearance: 47.1 mL/min (A) (based on SCr of 1.08 mg/dL (H)). according to latest data. Based on current GFR, CKD stage is stage 3 - GFR 30-59.  Monitor UOP and serial BMP and adjust therapy as needed. Renally dose meds. Avoid nephrotoxic medications and procedures.    Acute cystitis without hematuria  ON ABX.  Review of multiple cultures will try to look for PO options.       Hypothyroidism  Review home meds and adjust if needed.       Type 2 diabetes mellitus without complication, without long-term current use of insulin  SSI and monitor         VTE Risk Mitigation (From admission, onward)           Ordered     enoxaparin injection 40 mg  Daily         02/17/25 2238     IP VTE HIGH RISK PATIENT  Once         02/17/25 2238     Place sequential compression device  Until discontinued         02/17/25 2238                    Discharge Planning   NIGEL:      Code Status: Full Code    Medical Readiness for Discharge Date:   Discharge Plan A: Home Health                        Elmer Rainey DO  Department of Hospital Medicine   [facility]

## 2025-02-20 NOTE — PLAN OF CARE
Problem: Skin Injury Risk Increased  Goal: Skin Health and Integrity  2/20/2025 0542 by Real Zuniga, RN  Outcome: Progressing  2/20/2025 0252 by Real Zuniga RN  Outcome: Progressing     Problem: Adult Inpatient Plan of Care  Goal: Plan of Care Review  2/20/2025 0542 by Real Zuniga, RN  Outcome: Progressing  2/20/2025 0252 by Real Zuniga RN  Outcome: Progressing  Goal: Patient-Specific Goal (Individualized)  2/20/2025 0542 by Real Zuniga, RN  Outcome: Progressing  2/20/2025 0252 by Real Zuniga RN  Outcome: Progressing  Goal: Absence of Hospital-Acquired Illness or Injury  2/20/2025 0542 by Real Zuniga RN  Outcome: Progressing  2/20/2025 0252 by Real Zuniga RN  Outcome: Progressing  Goal: Optimal Comfort and Wellbeing  2/20/2025 0542 by Real Zuniga RN  Outcome: Progressing  2/20/2025 0252 by Real Zuniga RN  Outcome: Progressing  Goal: Readiness for Transition of Care  2/20/2025 0542 by Real Zuniga, RN  Outcome: Progressing  2/20/2025 0252 by Real Zuniga RN  Outcome: Progressing     Problem: Diabetes Comorbidity  Goal: Blood Glucose Level Within Targeted Range  2/20/2025 0542 by Real Zuniga, RN  Outcome: Progressing  2/20/2025 0252 by Real Zuniga, RN  Outcome: Progressing     Problem: Fall Injury Risk  Goal: Absence of Fall and Fall-Related Injury  2/20/2025 0542 by Real Zuniga, RN  Outcome: Progressing  2/20/2025 0252 by Real Zuniga, RN  Outcome: Progressing     Problem: UTI (Urinary Tract Infection)  Goal: Improved Infection Symptoms  2/20/2025 0542 by Real Zuniga, RN  Outcome: Progressing  2/20/2025 0252 by Real Zuniga, RN  Outcome: Progressing     Problem: Violence Risk or Actual  Goal: Anger and Impulse Control  2/20/2025 0542 by Real Zuniga RN  Outcome: Progressing  2/20/2025 0252 by Real Zuniga RN  Outcome: Progressing

## 2025-02-20 NOTE — SUBJECTIVE & OBJECTIVE
Interval History: doing well, DC plan for tomorrow. Getting PT today    Review of Systems   Respiratory:  Negative for shortness of breath.    Cardiovascular:  Negative for chest pain.   Gastrointestinal:  Negative for abdominal pain, nausea and vomiting.   Neurological:  Positive for weakness.   Psychiatric/Behavioral:  Negative for agitation and confusion.    All other systems reviewed and are negative.    Objective:     Vital Signs (Most Recent):  Temp: 98 °F (36.7 °C) (02/19/25 2015)  Pulse: 87 (02/19/25 2015)  Resp: 20 (02/19/25 2015)  BP: (!) 148/68 (02/19/25 2015)  SpO2: (!) 93 % (02/19/25 2015) Vital Signs (24h Range):  Temp:  [98 °F (36.7 °C)-98.4 °F (36.9 °C)] 98 °F (36.7 °C)  Pulse:  [72-87] 87  Resp:  [20] 20  SpO2:  [93 %] 93 %  BP: (148-197)/() 148/68     Weight: 86.1 kg (189 lb 12.8 oz)  Body mass index is 29.73 kg/m².    Intake/Output Summary (Last 24 hours) at 2/19/2025 2314  Last data filed at 2/19/2025 1842  Gross per 24 hour   Intake 577 ml   Output 200 ml   Net 377 ml         Physical Exam  Vitals reviewed.   Constitutional:       General: She is not in acute distress.     Appearance: Normal appearance.   HENT:      Head: Normocephalic and atraumatic.   Eyes:      General: No scleral icterus.     Extraocular Movements: Extraocular movements intact.      Conjunctiva/sclera: Conjunctivae normal.      Pupils: Pupils are equal, round, and reactive to light.   Cardiovascular:      Rate and Rhythm: Normal rate and regular rhythm.      Heart sounds: No murmur heard.     No friction rub. No gallop.   Pulmonary:      Effort: Pulmonary effort is normal. No respiratory distress.      Breath sounds: Normal breath sounds. No wheezing or rales.   Abdominal:      General: Abdomen is flat. Bowel sounds are normal. There is no distension.      Palpations: Abdomen is soft.      Tenderness: There is no abdominal tenderness. There is no guarding.   Musculoskeletal:         General: No swelling.      Right  lower leg: No edema.      Left lower leg: No edema.   Skin:     General: Skin is warm and dry.      Coloration: Skin is not jaundiced.      Findings: No rash.   Neurological:      General: No focal deficit present.      Mental Status: She is alert. She is disoriented.      Sensory: No sensory deficit.      Motor: Weakness present.   Psychiatric:         Behavior: Behavior normal.      Comments: Mild confusion, likely baseline              Significant Labs: All pertinent labs within the past 24 hours have been reviewed.  Recent Lab Results         02/19/25  1945   02/19/25  1632   02/19/25  1038   02/19/25  0512        Anion Gap       16       Baso #       0.03       Basophil %       0.4       BUN       16       BUN/CREAT RATIO       15       Calcium       9.5       Chloride       101       CO2       24       Creatinine       1.09       Differential Method       Auto       eGFR       51  Comment: Estimated GFR calculated using the CKD-EPI creatinine (2021) equation.       Eos #       0.08       Eos %       1.2       Glucose       144       Hematocrit       43.3       Hemoglobin       13.6       Lymph #       2.05       Lymph %       30.1       MCH       28.8       MCHC       31.4       MCV       91.5       Mono #       0.65       Mono %       9.6       MPV       9.7       Neutrophils, Abs       3.99       Neutrophils Relative       58.7       Platelet Count       219       POC Glucose 181   181   127         Potassium       4.2       RBC       4.73       RDW       14.5       Sodium       137       WBC       6.80               Significant Imaging: I have reviewed all pertinent imaging results/findings within the past 24 hours.

## 2025-02-20 NOTE — ASSESSMENT & PLAN NOTE
Seems like a chronic issue with her.  She has multiple hospital admissions to [facility], [facility] and here.  All seem to be the same issue.      Seems better today, improved

## 2025-02-20 NOTE — SUBJECTIVE & OBJECTIVE
Interval History: doing better, working with therapy plan to swing tomorrow     Review of Systems   Respiratory:  Negative for shortness of breath.    Cardiovascular:  Negative for chest pain.   Gastrointestinal:  Negative for abdominal pain, nausea and vomiting.   Neurological:  Positive for weakness.   Psychiatric/Behavioral:  Negative for agitation and confusion.    All other systems reviewed and are negative.    Objective:     Vital Signs (Most Recent):  Temp: 98 °F (36.7 °C) (02/20/25 0705)  Pulse: 70 (02/20/25 0705)  Resp: 18 (02/20/25 0705)  BP: (!) 165/76 (02/20/25 0705)  SpO2: (!) 93 % (02/20/25 0705) Vital Signs (24h Range):  Temp:  [97.6 °F (36.4 °C)-98 °F (36.7 °C)] 98 °F (36.7 °C)  Pulse:  [70-87] 70  Resp:  [18-20] 18  SpO2:  [93 %-97 %] 93 %  BP: ()/(57-76) 165/76     Weight: 85.9 kg (189 lb 6.4 oz)  Body mass index is 29.66 kg/m².    Intake/Output Summary (Last 24 hours) at 2/20/2025 1158  Last data filed at 2/20/2025 0604  Gross per 24 hour   Intake 977 ml   Output 200 ml   Net 777 ml         Physical Exam  Vitals reviewed.   Constitutional:       General: She is not in acute distress.     Appearance: Normal appearance.   HENT:      Head: Normocephalic and atraumatic.   Eyes:      General: No scleral icterus.     Extraocular Movements: Extraocular movements intact.      Conjunctiva/sclera: Conjunctivae normal.      Pupils: Pupils are equal, round, and reactive to light.   Cardiovascular:      Rate and Rhythm: Normal rate and regular rhythm.      Heart sounds: No murmur heard.     No friction rub. No gallop.   Pulmonary:      Effort: Pulmonary effort is normal. No respiratory distress.      Breath sounds: Normal breath sounds. No wheezing or rales.   Abdominal:      General: Abdomen is flat. Bowel sounds are normal. There is no distension.      Palpations: Abdomen is soft.      Tenderness: There is no abdominal tenderness. There is no guarding.   Musculoskeletal:         General: No swelling.       Right lower leg: No edema.      Left lower leg: No edema.   Skin:     General: Skin is warm and dry.      Coloration: Skin is not jaundiced.      Findings: No rash.   Neurological:      General: No focal deficit present.      Mental Status: She is alert. She is disoriented.      Sensory: No sensory deficit.      Motor: Weakness present.   Psychiatric:         Behavior: Behavior normal.         Thought Content: Thought content normal.      Comments: Seems more alert and focused              Significant Labs: All pertinent labs within the past 24 hours have been reviewed.  Recent Lab Results         02/20/25  1111   02/19/25  1945   02/19/25  1632        POC Glucose 124   181   181               Significant Imaging: I have reviewed all pertinent imaging results/findings within the past 24 hours.

## 2025-02-20 NOTE — PLAN OF CARE
I spoke with patient and granddaughter at bedside and called and spoke with daughter on speaker. I explained that based on PT recs, she would be a good candidate for swingbed. Patient choice form given. Gave choice for [facility].

## 2025-02-20 NOTE — ASSESSMENT & PLAN NOTE
ON ABX.  Review of multiple cultures will try to look for PO options.   Changed ABX according to C&S.  ON DC to SWB change to PO.  Treat for 7 days total.

## 2025-02-20 NOTE — PROGRESS NOTES
[facility] Medicine  Progress Note    Patient Name: Michell Dhillon  MRN: [id number]  Patient Class: IP- Inpatient   Admission Date: 2/17/2025  Length of Stay: 2 days  Attending Physician: Elmer Rainey DO  Primary Care Provider: Daryl Moscoso NP        Subjective     Principal Problem:Acute confusional state        HPI:  79-year-old  female history of CVA, type 2 diabetes, hypertension, hyperlipidemia, recurrent UTI sent from home for confusion. Patient has been admitted multiple times to many facilities with exact same presentation.  Reviewing a multitude of urine cultures in Epic and various facilities she has had numerous urine cultures with various organisms.  She is awake and talking this am but unaware of previous events and why she is admitted. Eating breakfast with no problem.     Overview/Hospital Course:  2/19 Doing well, changed ABX according to C&S.  Discussed SWB and she is not interested.  Will get some PT today and plan on DC tomorrow with .     2/20 Much improved, now willing to do SWB.  Would be better given her multiple hospital admissions for the same issue.     Interval History: doing better, working with therapy plan to swing tomorrow     Review of Systems   Respiratory:  Negative for shortness of breath.    Cardiovascular:  Negative for chest pain.   Gastrointestinal:  Negative for abdominal pain, nausea and vomiting.   Neurological:  Positive for weakness.   Psychiatric/Behavioral:  Negative for agitation and confusion.    All other systems reviewed and are negative.    Objective:     Vital Signs (Most Recent):  Temp: 98 °F (36.7 °C) (02/20/25 0705)  Pulse: 70 (02/20/25 0705)  Resp: 18 (02/20/25 0705)  BP: (!) 165/76 (02/20/25 0705)  SpO2: (!) 93 % (02/20/25 0705) Vital Signs (24h Range):  Temp:  [97.6 °F (36.4 °C)-98 °F (36.7 °C)] 98 °F (36.7 °C)  Pulse:  [70-87] 70  Resp:  [18-20] 18  SpO2:  [93 %-97 %] 93 %  BP: ()/(57-76)  165/76     Weight: 85.9 kg (189 lb 6.4 oz)  Body mass index is 29.66 kg/m².    Intake/Output Summary (Last 24 hours) at 2/20/2025 1158  Last data filed at 2/20/2025 0604  Gross per 24 hour   Intake 977 ml   Output 200 ml   Net 777 ml         Physical Exam  Vitals reviewed.   Constitutional:       General: She is not in acute distress.     Appearance: Normal appearance.   HENT:      Head: Normocephalic and atraumatic.   Eyes:      General: No scleral icterus.     Extraocular Movements: Extraocular movements intact.      Conjunctiva/sclera: Conjunctivae normal.      Pupils: Pupils are equal, round, and reactive to light.   Cardiovascular:      Rate and Rhythm: Normal rate and regular rhythm.      Heart sounds: No murmur heard.     No friction rub. No gallop.   Pulmonary:      Effort: Pulmonary effort is normal. No respiratory distress.      Breath sounds: Normal breath sounds. No wheezing or rales.   Abdominal:      General: Abdomen is flat. Bowel sounds are normal. There is no distension.      Palpations: Abdomen is soft.      Tenderness: There is no abdominal tenderness. There is no guarding.   Musculoskeletal:         General: No swelling.      Right lower leg: No edema.      Left lower leg: No edema.   Skin:     General: Skin is warm and dry.      Coloration: Skin is not jaundiced.      Findings: No rash.   Neurological:      General: No focal deficit present.      Mental Status: She is alert. She is disoriented.      Sensory: No sensory deficit.      Motor: Weakness present.   Psychiatric:         Behavior: Behavior normal.         Thought Content: Thought content normal.      Comments: Seems more alert and focused              Significant Labs: All pertinent labs within the past 24 hours have been reviewed.  Recent Lab Results         02/20/25  1111   02/19/25  1945   02/19/25  1632        POC Glucose 124   181   181               Significant Imaging: I have reviewed all pertinent imaging results/findings within  the past 24 hours.    Assessment and Plan     * Acute confusional state  Seems like a chronic issue with her.  She has multiple hospital admissions to [facility] and here.  All seem to be the same issue.      Seems better today, improved       CKD (chronic kidney disease) stage 3, GFR 30-59 ml/min  Creatine stable for now. BMP reviewed- noted Estimated Creatinine Clearance: 47.1 mL/min (A) (based on SCr of 1.08 mg/dL (H)). according to latest data. Based on current GFR, CKD stage is stage 3 - GFR 30-59.  Monitor UOP and serial BMP and adjust therapy as needed. Renally dose meds. Avoid nephrotoxic medications and procedures.    Acute cystitis without hematuria  ON ABX.  Review of multiple cultures will try to look for PO options.   Changed ABX according to C&S.  ON DC to SWB change to PO.  Treat for 7 days total.       Hypothyroidism  Review home meds and adjust if needed.       Type 2 diabetes mellitus without complication, without long-term current use of insulin  SSI and monitor         VTE Risk Mitigation (From admission, onward)           Ordered     enoxaparin injection 40 mg  Daily         02/17/25 2238     IP VTE HIGH RISK PATIENT  Once         02/17/25 2238     Place sequential compression device  Until discontinued         02/17/25 2238                    Discharge Planning   NIGEL:      Code Status: Full Code   Medical Readiness for Discharge Date:   Discharge Plan A: Home Health                        Elmer Rainey DO  Department of Hospital Medicine   [facility]

## 2025-02-20 NOTE — PLAN OF CARE
Problem: Skin Injury Risk Increased  Goal: Skin Health and Integrity  Outcome: Progressing     Problem: Adult Inpatient Plan of Care  Goal: Plan of Care Review  Outcome: Progressing  Goal: Patient-Specific Goal (Individualized)  Outcome: Progressing  Goal: Absence of Hospital-Acquired Illness or Injury  Outcome: Progressing  Goal: Optimal Comfort and Wellbeing  Outcome: Progressing  Goal: Readiness for Transition of Care  Outcome: Progressing     Problem: Diabetes Comorbidity  Goal: Blood Glucose Level Within Targeted Range  Outcome: Progressing     Problem: Fall Injury Risk  Goal: Absence of Fall and Fall-Related Injury  Outcome: Progressing     Problem: UTI (Urinary Tract Infection)  Goal: Improved Infection Symptoms  Outcome: Progressing     Problem: Violence Risk or Actual  Goal: Anger and Impulse Control  Outcome: Progressing

## 2025-02-21 ENCOUNTER — HOSPITAL ENCOUNTER (INPATIENT)
Facility: HOSPITAL | Age: 80
LOS: 17 days | Discharge: HOME HEALTH | DRG: 884 | End: 2025-03-10
Attending: HOSPITALIST | Admitting: HOSPITALIST
Payer: MEDICARE

## 2025-02-21 VITALS
OXYGEN SATURATION: 92 % | RESPIRATION RATE: 20 BRPM | HEIGHT: 67 IN | SYSTOLIC BLOOD PRESSURE: 139 MMHG | HEART RATE: 70 BPM | TEMPERATURE: 98 F | BODY MASS INDEX: 28.2 KG/M2 | WEIGHT: 179.69 LBS | DIASTOLIC BLOOD PRESSURE: 72 MMHG

## 2025-02-21 DIAGNOSIS — R53.81 PHYSICAL DECONDITIONING: ICD-10-CM

## 2025-02-21 DIAGNOSIS — R53.81 DEBILITY: Primary | ICD-10-CM

## 2025-02-21 DIAGNOSIS — Z51.89 ADMISSION FOR AFTERCARE: ICD-10-CM

## 2025-02-21 LAB
GLUCOSE SERPL-MCNC: 111 MG/DL (ref 70–105)
GLUCOSE SERPL-MCNC: 124 MG/DL (ref 70–105)
GLUCOSE SERPL-MCNC: 126 MG/DL (ref 70–105)
GLUCOSE SERPL-MCNC: 126 MG/DL (ref 70–105)

## 2025-02-21 PROCEDURE — 25000003 PHARM REV CODE 250: Performed by: NURSE PRACTITIONER

## 2025-02-21 PROCEDURE — 82962 GLUCOSE BLOOD TEST: CPT

## 2025-02-21 PROCEDURE — 94799 UNLISTED PULMONARY SVC/PX: CPT

## 2025-02-21 PROCEDURE — 27000958

## 2025-02-21 PROCEDURE — 27000987 HC MATTRESS, MATRIX LOW PROFILE

## 2025-02-21 PROCEDURE — 63600175 PHARM REV CODE 636 W HCPCS: Performed by: NURSE PRACTITIONER

## 2025-02-21 PROCEDURE — 25000003 PHARM REV CODE 250: Performed by: HOSPITALIST

## 2025-02-21 PROCEDURE — 94761 N-INVAS EAR/PLS OXIMETRY MLT: CPT

## 2025-02-21 PROCEDURE — 97110 THERAPEUTIC EXERCISES: CPT

## 2025-02-21 PROCEDURE — 63600175 PHARM REV CODE 636 W HCPCS: Performed by: HOSPITALIST

## 2025-02-21 PROCEDURE — 97166 OT EVAL MOD COMPLEX 45 MIN: CPT

## 2025-02-21 PROCEDURE — 11000004 HC SNF PRIVATE

## 2025-02-21 PROCEDURE — 97530 THERAPEUTIC ACTIVITIES: CPT

## 2025-02-21 PROCEDURE — 99900035 HC TECH TIME PER 15 MIN (STAT)

## 2025-02-21 RX ORDER — AMOXICILLIN 250 MG
1 CAPSULE ORAL 2 TIMES DAILY
Status: DISCONTINUED | OUTPATIENT
Start: 2025-02-21 | End: 2025-02-21

## 2025-02-21 RX ORDER — ERGOCALCIFEROL 1.25 MG/1
50000 CAPSULE ORAL
Status: DISCONTINUED | OUTPATIENT
Start: 2025-02-22 | End: 2025-03-10 | Stop reason: HOSPADM

## 2025-02-21 RX ORDER — IBUPROFEN 200 MG
24 TABLET ORAL
Status: DISCONTINUED | OUTPATIENT
Start: 2025-02-21 | End: 2025-03-10 | Stop reason: HOSPADM

## 2025-02-21 RX ORDER — CLOPIDOGREL BISULFATE 75 MG/1
75 TABLET ORAL DAILY
Status: DISCONTINUED | OUTPATIENT
Start: 2025-02-22 | End: 2025-03-10 | Stop reason: HOSPADM

## 2025-02-21 RX ORDER — AMOXICILLIN 250 MG
1 CAPSULE ORAL 2 TIMES DAILY PRN
Status: DISCONTINUED | OUTPATIENT
Start: 2025-02-21 | End: 2025-03-10 | Stop reason: HOSPADM

## 2025-02-21 RX ORDER — LOSARTAN POTASSIUM 50 MG/1
100 TABLET ORAL DAILY
Status: DISCONTINUED | OUTPATIENT
Start: 2025-02-22 | End: 2025-03-10 | Stop reason: HOSPADM

## 2025-02-21 RX ORDER — TALC
6 POWDER (GRAM) TOPICAL NIGHTLY PRN
Status: DISCONTINUED | OUTPATIENT
Start: 2025-02-21 | End: 2025-03-10 | Stop reason: HOSPADM

## 2025-02-21 RX ORDER — CEPHALEXIN 500 MG/1
500 CAPSULE ORAL EVERY 8 HOURS
Status: COMPLETED | OUTPATIENT
Start: 2025-02-21 | End: 2025-02-28

## 2025-02-21 RX ORDER — LEVOTHYROXINE SODIUM 88 UG/1
88 TABLET ORAL
Status: DISCONTINUED | OUTPATIENT
Start: 2025-02-22 | End: 2025-03-10 | Stop reason: HOSPADM

## 2025-02-21 RX ORDER — METOPROLOL TARTRATE 50 MG/1
50 TABLET ORAL 2 TIMES DAILY
Status: DISCONTINUED | OUTPATIENT
Start: 2025-02-21 | End: 2025-03-10 | Stop reason: HOSPADM

## 2025-02-21 RX ORDER — CIPROFLOXACIN 250 MG/1
500 TABLET, FILM COATED ORAL 2 TIMES DAILY
Status: DISCONTINUED | OUTPATIENT
Start: 2025-02-21 | End: 2025-02-21

## 2025-02-21 RX ORDER — LEVOTHYROXINE SODIUM 88 UG/1
88 TABLET ORAL EVERY MORNING
Status: DISCONTINUED | OUTPATIENT
Start: 2025-02-22 | End: 2025-02-21

## 2025-02-21 RX ORDER — GLUCAGON 1 MG
1 KIT INJECTION
Status: DISCONTINUED | OUTPATIENT
Start: 2025-02-21 | End: 2025-03-10 | Stop reason: HOSPADM

## 2025-02-21 RX ORDER — ACETAMINOPHEN 325 MG/1
650 TABLET ORAL EVERY 4 HOURS PRN
Status: DISCONTINUED | OUTPATIENT
Start: 2025-02-21 | End: 2025-03-10 | Stop reason: HOSPADM

## 2025-02-21 RX ORDER — INSULIN ASPART 100 [IU]/ML
0-5 INJECTION, SOLUTION INTRAVENOUS; SUBCUTANEOUS
Status: DISCONTINUED | OUTPATIENT
Start: 2025-02-21 | End: 2025-03-10 | Stop reason: HOSPADM

## 2025-02-21 RX ORDER — CALCIUM CARBONATE 200(500)MG
500 TABLET,CHEWABLE ORAL 2 TIMES DAILY PRN
Status: DISCONTINUED | OUTPATIENT
Start: 2025-02-21 | End: 2025-03-10 | Stop reason: HOSPADM

## 2025-02-21 RX ORDER — IBUPROFEN 200 MG
16 TABLET ORAL
Status: DISCONTINUED | OUTPATIENT
Start: 2025-02-21 | End: 2025-03-10 | Stop reason: HOSPADM

## 2025-02-21 RX ORDER — CEPHALEXIN 500 MG/1
500 CAPSULE ORAL EVERY 8 HOURS
Status: DISCONTINUED | OUTPATIENT
Start: 2025-02-21 | End: 2025-02-21 | Stop reason: HOSPADM

## 2025-02-21 RX ORDER — ACETAMINOPHEN 325 MG/1
650 TABLET ORAL EVERY 6 HOURS PRN
Status: DISCONTINUED | OUTPATIENT
Start: 2025-02-21 | End: 2025-03-10 | Stop reason: HOSPADM

## 2025-02-21 RX ORDER — ATORVASTATIN CALCIUM 40 MG/1
40 TABLET, FILM COATED ORAL DAILY
Status: DISCONTINUED | OUTPATIENT
Start: 2025-02-21 | End: 2025-02-21

## 2025-02-21 RX ORDER — ENOXAPARIN SODIUM 100 MG/ML
40 INJECTION SUBCUTANEOUS EVERY 24 HOURS
Status: DISCONTINUED | OUTPATIENT
Start: 2025-02-21 | End: 2025-02-24

## 2025-02-21 RX ADMIN — CEFAZOLIN 1 G: 330 INJECTION, POWDER, FOR SOLUTION INTRAMUSCULAR; INTRAVENOUS at 06:02

## 2025-02-21 RX ADMIN — LEVOTHYROXINE SODIUM 88 MCG: 0.09 TABLET ORAL at 06:02

## 2025-02-21 RX ADMIN — CLOPIDOGREL BISULFATE 75 MG: 75 TABLET, FILM COATED ORAL at 08:02

## 2025-02-21 RX ADMIN — METOPROLOL TARTRATE 50 MG: 50 TABLET, FILM COATED ORAL at 08:02

## 2025-02-21 RX ADMIN — ACETAMINOPHEN 650 MG: 325 TABLET ORAL at 04:02

## 2025-02-21 RX ADMIN — METOPROLOL TARTRATE 50 MG: 50 TABLET, FILM COATED ORAL at 09:02

## 2025-02-21 RX ADMIN — CEPHALEXIN 500 MG: 500 CAPSULE ORAL at 09:02

## 2025-02-21 RX ADMIN — CEPHALEXIN 500 MG: 500 CAPSULE ORAL at 03:02

## 2025-02-21 RX ADMIN — LOSARTAN POTASSIUM 100 MG: 50 TABLET, FILM COATED ORAL at 08:02

## 2025-02-21 RX ADMIN — ENOXAPARIN SODIUM 40 MG: 40 INJECTION SUBCUTANEOUS at 04:02

## 2025-02-21 NOTE — HOSPITAL COURSE
"  2/19 Doing well, changed ABX according to C&S.  Discussed SWB and she is not interested.  Will get some PT today and plan on DC tomorrow with HH.      2/20 Much improved, now willing to do SWB.  Would be better given her multiple hospital admissions for the same issue.      2/21: Patient improving, will swing today to start PT/OT. She is complaining of right foot being "sore" States "I think it is gout". She has no swelling, very mild tenderness to palpation. Denies injury.     2/24 Looks better, working with PT.  Seems more awake.    2/28 Continues to improve, doing well    3.3 Doing well, working with PT, alert more    3/10 DC home, did well here.  No UTI issues when being bathed regularly and cared for.  DC with HH.  "

## 2025-02-21 NOTE — HPI
79-year-old  female history of CVA, type 2 diabetes, hypertension, hyperlipidemia, recurrent UTI sent from home for confusion. Patient has been admitted multiple times to many facilities with exact same presentation.  Reviewing a multitude of urine cultures in Epic and various facilities she has had numerous urine cultures with various organisms.

## 2025-02-21 NOTE — PLAN OF CARE
Problem: Adult Inpatient Plan of Care  Goal: Optimal Comfort and Wellbeing  Outcome: Progressing     Problem: Diabetes Comorbidity  Goal: Blood Glucose Level Within Targeted Range  Outcome: Progressing     Problem: Fall Injury Risk  Goal: Absence of Fall and Fall-Related Injury  Outcome: Progressing     Problem: UTI (Urinary Tract Infection)  Goal: Improved Infection Symptoms  Outcome: Progressing

## 2025-02-21 NOTE — PT/OT/SLP EVAL
Occupational Therapy   Evaluation    Name: Michell Dhillon  MRN: [id number]  Admitting Diagnosis: recent UTI  Recent Surgery: * No surgery found *      Recommendations:     Discharge Recommendations: Low Intensity Therapy  Discharge Equipment Recommendations:  none  Barriers to discharge:  None    Assessment:     Michell Dhillon is a 79 y.o. female with a medical diagnosis of recent UTI.  She presents with complaint of feeling like she is having bout of gout in right foot; nursing aware. Performance deficits affecting function: weakness, impaired endurance, impaired self care skills, impaired functional mobility, impaired balance, pain.      Rehab Prognosis: Good; patient would benefit from acute skilled OT services to address these deficits and reach maximum level of function.       Plan:     Patient to be seen 5 x/week to address the above listed problems via self-care/home management, therapeutic exercises, therapeutic activities, wheelchair management/training  Plan of Care Expires: 03/21/25  Plan of Care Reviewed with: patient, caregiver, daughter    Subjective     Chief Complaint: weakness, poor balance due to low endurance and weakness  Patient/Family Comments/goals: home with intermittent svn from family, lives with son    Occupational Profile:  Living Environment: pt has ramp; spends most of her time in her home, pivots back and forth to w/c, bed, and BSC; she does not have hospital bed per her report  Previous level of function: SBA to mod I to get back and forth to BSC, has assist for bathing, dressing  Roles and Routines: retired homemaker, 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  Patient found HOB elevated with  (no lines or drains) upon OT entry to room.    General Precautions: Standard, fall  Orthopedic Precautions:    Braces:    Respiratory Status: Room air    Occupational Performance:    Bed Mobility:    Patient completed Rolling/Turning to Left with  minimum assistance  Patient completed Rolling/Turning to Right with minimum assistance  Patient completed Scooting/Bridging with moderate assistance  Patient completed Supine to Sit with moderate assistance    Functional Mobility/Transfers:  Patient completed Sit <> Stand Transfer with moderate assistance  with  hand-held assist, rolling walker, and bed in elevated position   Functional Mobility: mod a overall for safety and most effective completion    Activities of Daily Living:  Feeding:  stand by assistance setup  Grooming: minimum assistance setup at bedside  Bathing: maximal assistance for thoroughness of completion   Upper Body Dressing: minimum assistance bedside  Lower Body Dressing: maximal assistance bedside  Toileting: moderate assistance BSC at bedside    Cognitive/Visual Perceptual:  Cognitive/Psychosocial Skills:     -       Oriented to: Person, Place, Time, and Situation   -       Follows Commands/attention:Follows two-step commands  -       Communication: clear/fluent  -       Memory: No Deficits noted and will cont to assess   -       Safety awareness/insight to disability: intact   -       Mood/Affect/Coping skills/emotional control: Appropriate to situation, Cooperative, and Pleasant    Physical Exam:  Balance:    -       sitting upright on EOB with SBA after getting into sitting fully with mod a; standing with RW=CGA static; no dynamic standing attempted  Dominant hand:    -       right  Upper Extremity Range of Motion:     -       Right Upper Extremity: WFL  -       Left Upper Extremity: WFL  Upper  Extremity Strength:    -       Right Upper Extremity: 3/5  -       Left Upper Extremity: 3/5   Strength:    -       Right Upper Extremity: 3/5  -       Left Upper Extremity: 3/5  Fine Motor Coordination:    -       Intact  Gross motor coordination:   WFL    AMPAC 6 Click ADL:  AMPAC Total Score: 14    Treatment & Education:  Pt educated on OT role/POC.   Importance of OOB activity with staff assistance.  Importance of sitting up in the chair throughout the day as tolerated, especially for meals   Safety during functional t/f and mobility with use of RW and BSC at bedside  Importance of assisting with self-care activities   All questions/concerns answered within OT scope of practice   Evaluation completed at bedside, family present.    Patient left sitting edge of bed with call button in reach and family present    GOALS:   Multidisciplinary Problems       Occupational Therapy Goals          Problem: Occupational Therapy    Goal Priority Disciplines Outcome Interventions   Occupational Therapy Goal     OT, PT/OT Progressing    Description: STG: within 2 weeks  Pt will perform grooming with setup at sinkside  Pt will bathe with min a from sinkside  Pt will perform UE dressing with SVN  Pt will perform LE dressing with min a at sinkside  Pt will sit EOB x 30 min with SBA assistance  Pt will transfer bed/chair/bsc with SBA  Pt will perform standing task x 5 min with SVN assistance  Pt will tolerate 45 minutes of tx without fatigue      LT.Restore to max I with self care and mobility.                           History:     Past Medical History:   Diagnosis Date    Arthritis     Diabetes mellitus, type 2     Hyperlipidemia     Hypertension     Hypothyroidism     Transient cerebral ischemic attack, unspecified 2021         Past Surgical History:   Procedure Laterality Date    BLADDER SUSPENSION      unsure of the dates - patient has had this done 2 times    CATARACT EXTRACTION Right     patient unsure     HYSTERECTOMY         Time Tracking:     OT Date of Treatment: 02/21/25  OT Start Time: 1423  OT Stop Time: 1433  OT Total Time (min): 10 min    Billable Minutes:Evaluation 10    2/21/2025

## 2025-02-21 NOTE — PLAN OF CARE
Problem: Occupational Therapy  Goal: Occupational Therapy Goal  Description: STG: within 2 weeks  Pt will perform grooming with setup at sinkside  Pt will bathe with min a from sinkside  Pt will perform UE dressing with SVN  Pt will perform LE dressing with min a at sinkside  Pt will sit EOB x 30 min with SBA assistance  Pt will transfer bed/chair/bsc with SBA  Pt will perform standing task x 5 min with SVN assistance  Pt will tolerate 45 minutes of tx without fatigue      LT.Restore to max I with self care and mobility.    Outcome: Progressing

## 2025-02-21 NOTE — PLAN OF CARE
[facility]  Discharge Final Note    Primary Care Provider: Daryl Moscoso NP    Expected Discharge Date:     Final Discharge Note (most recent)       Final Note - 02/21/25 1016          Final Note    Assessment Type Final Discharge Note     Anticipated Discharge Disposition Planned Readmission - Skilled Nursing Facility      What phone number can be called within the next 1-3 days to see how you are doing after discharge? [phone]        Post-Acute Status    Post-Acute Authorization Placement     Post-Acute Placement Status Set-up Complete/Auth obtained     Patient choice form signed by patient/caregiver List from CMS Compare     Discharge Delays None known at this time                     Important Message from Medicare  Important Message from Medicare regarding Discharge Appeal Rights: Given to patient/caregiver, Explained to patient/caregiver, Signed/date by patient/caregiver     Date IMM was signed: 02/20/25  Time IMM was signed: 1000      Ms. Dhillon to dc/readmit to [facility] for therapy services.

## 2025-02-21 NOTE — SUBJECTIVE & OBJECTIVE
Review of Systems   Constitutional:  Negative for chills and fever.   Respiratory:  Negative for cough, shortness of breath and wheezing.    Gastrointestinal:  Negative for abdominal pain, nausea and vomiting.   Musculoskeletal:  Positive for arthralgias, gait problem and myalgias. Negative for joint swelling.   Skin:  Negative for color change and wound.   Neurological:  Positive for weakness.   Psychiatric/Behavioral:  Positive for confusion (mild).    All other systems reviewed and are negative.    Objective:     Vital Signs (Most Recent):  Temp: 98.7 °F (37.1 °C) (02/21/25 1303)  Pulse: 74 (02/21/25 1436)  Resp: 20 (02/21/25 1436)  BP: 139/72 (02/21/25 1302)  SpO2: 97 % (02/21/25 1436) Vital Signs (24h Range):  Temp:  [97.8 °F (36.6 °C)-98.7 °F (37.1 °C)] 98.7 °F (37.1 °C)  Pulse:  [70-79] 74  Resp:  [18-20] 20  SpO2:  [90 %-97 %] 97 %  BP: (130-154)/(67-75) 139/72     Weight: 81.5 kg (179 lb 11.2 oz)  Body mass index is 28.14 kg/m².    Intake/Output Summary (Last 24 hours) at 2/21/2025 1443  Last data filed at 2/21/2025 1303  Gross per 24 hour   Intake 300 ml   Output --   Net 300 ml         Physical Exam  Vitals and nursing note reviewed.   Constitutional:       Appearance: She is obese.   HENT:      Head: Normocephalic.      Right Ear: External ear normal.      Left Ear: External ear normal.      Nose: Nose normal.      Mouth/Throat:      Mouth: Mucous membranes are moist.   Eyes:      Extraocular Movements: Extraocular movements intact.      Pupils: Pupils are equal, round, and reactive to light.   Cardiovascular:      Rate and Rhythm: Normal rate and regular rhythm.      Pulses: Normal pulses.      Heart sounds: Normal heart sounds.   Pulmonary:      Effort: Pulmonary effort is normal.      Breath sounds: Normal breath sounds.   Abdominal:      General: There is no distension.      Palpations: Abdomen is soft.      Tenderness: There is no abdominal tenderness.   Musculoskeletal:         General:  Tenderness present. No swelling. Normal range of motion.      Cervical back: Normal range of motion and neck supple.   Skin:     General: Skin is warm.      Capillary Refill: Capillary refill takes 2 to 3 seconds.   Neurological:      Mental Status: She is alert. She is disoriented.   Psychiatric:         Mood and Affect: Mood normal.         Behavior: Behavior normal.         Thought Content: Thought content normal.         Judgment: Judgment normal.         Significant Labs: All pertinent labs within the past 24 hours have been reviewed.  Recent Lab Results         02/21/25  1108   02/21/25  0624   02/20/25  2059   02/20/25  1658        POC Glucose 126   126   121   114               Significant Imaging: I have reviewed all pertinent imaging results/findings within the past 24 hours.

## 2025-02-21 NOTE — ASSESSMENT & PLAN NOTE
Patient's blood pressure range in the last 24 hours was: BP  Min: 130/67  Max: 154/75.The patient's inpatient anti-hypertensive regimen is listed below:  Current Antihypertensives  metoprolol tartrate (LOPRESSOR) tablet 50 mg, 2 times daily, Oral  losartan tablet 100 mg, Daily, Oral    Plan  - BP is controlled, no changes needed to their regimen

## 2025-02-21 NOTE — DISCHARGE SUMMARY
"[facility] Medicine  Progress Note    Patient Name: Michell Dhillon  MRN: [id number]  Patient Class: IP- Inpatient   Admission Date: 2/17/2025  Length of Stay: 3 days  Attending Physician: No att. providers found  Primary Care Provider: Daryl Moscoso NP        Subjective     Principal Problem:Acute confusional state        HPI:  79-year-old  female history of CVA, type 2 diabetes, hypertension, hyperlipidemia, recurrent UTI sent from home for confusion. Patient has been admitted multiple times to many facilities with exact same presentation.  Reviewing a multitude of urine cultures in Epic and various facilities she has had numerous urine cultures with various organisms.  She is awake and talking this am but unaware of previous events and why she is admitted. Eating breakfast with no problem.     Overview/Hospital Course:  2/19 Doing well, changed ABX according to C&S.  Discussed SWB and she is not interested.  Will get some PT today and plan on DC tomorrow with .     2/20 Much improved, now willing to do SWB.  Would be better given her multiple hospital admissions for the same issue.     2/21: Patient improving, will swing today to start PT/OT. She is complaining of right foot being "sore" States "I think it is gout". She has no swelling, very mild tenderness to palpation. Denies injury.         Review of Systems   Constitutional:  Negative for chills and fever.   Respiratory:  Negative for cough, shortness of breath and wheezing.    Gastrointestinal:  Negative for abdominal pain, nausea and vomiting.   Musculoskeletal:  Positive for arthralgias, gait problem and myalgias. Negative for joint swelling.   Skin:  Negative for color change and wound.   Neurological:  Positive for weakness.   Psychiatric/Behavioral:  Positive for confusion (mild).    All other systems reviewed and are negative.    Objective:     Vital Signs (Most Recent):  Temp: 97.8 °F (36.6 °C) " (02/21/25 0729)  Pulse: 70 (02/21/25 0729)  Resp: 20 (02/21/25 0729)  BP: 139/72 (02/21/25 0729)  SpO2: (!) 92 % (02/21/25 0729) Vital Signs (24h Range):  Temp:  [97.8 °F (36.6 °C)-98.2 °F (36.8 °C)] 97.8 °F (36.6 °C)  Pulse:  [70-79] 70  Resp:  [18-20] 20  SpO2:  [90 %-92 %] 92 %  BP: (130-154)/(67-75) 139/72     Weight: 81.5 kg (179 lb 11.2 oz)  Body mass index is 28.14 kg/m².    Intake/Output Summary (Last 24 hours) at 2/21/2025 1200  Last data filed at 2/21/2025 0800  Gross per 24 hour   Intake 1520 ml   Output --   Net 1520 ml         Physical Exam  Vitals and nursing note reviewed.   Constitutional:       Appearance: She is obese.   HENT:      Head: Normocephalic.      Right Ear: External ear normal.      Left Ear: External ear normal.      Nose: Nose normal.      Mouth/Throat:      Mouth: Mucous membranes are moist.   Eyes:      Extraocular Movements: Extraocular movements intact.      Pupils: Pupils are equal, round, and reactive to light.   Cardiovascular:      Rate and Rhythm: Normal rate and regular rhythm.      Pulses: Normal pulses.      Heart sounds: Normal heart sounds.   Pulmonary:      Effort: Pulmonary effort is normal.      Breath sounds: Normal breath sounds.   Abdominal:      General: There is no distension.      Palpations: Abdomen is soft.      Tenderness: There is no abdominal tenderness.   Musculoskeletal:         General: Tenderness present. No swelling. Normal range of motion.      Cervical back: Normal range of motion and neck supple.   Skin:     General: Skin is warm.      Capillary Refill: Capillary refill takes 2 to 3 seconds.   Neurological:      Mental Status: She is alert. She is disoriented.   Psychiatric:         Mood and Affect: Mood normal.         Behavior: Behavior normal.         Thought Content: Thought content normal.         Judgment: Judgment normal.             Significant Labs: All pertinent labs within the past 24 hours have been reviewed.  Recent Lab Results          02/21/25  1108   02/21/25  0624   02/20/25 2059 02/20/25  1658        POC Glucose 126   126   121   114               Significant Imaging: I have reviewed all pertinent imaging results/findings within the past 24 hours.    Assessment and Plan     * Acute confusional state  Seems like a chronic issue with her.  She has multiple hospital admissions to [facility] and here.  All seem to be the same issue.      Seems better today, improved       CKD (chronic kidney disease) stage 3, GFR 30-59 ml/min  Creatine stable for now. BMP reviewed- noted Estimated Creatinine Clearance: 47.1 mL/min (A) (based on SCr of 1.08 mg/dL (H)). according to latest data. Based on current GFR, CKD stage is stage 3 - GFR 30-59.  Monitor UOP and serial BMP and adjust therapy as needed. Renally dose meds. Avoid nephrotoxic medications and procedures.    Acute cystitis without hematuria  ON ABX.  Review of multiple cultures will try to look for PO options.   Changed ABX according to C&S.  ON DC to SWB change to PO.  Treat for 7 days total.       Hypothyroidism  Review home meds and adjust if needed.       Type 2 diabetes mellitus without complication, without long-term current use of insulin  SSI and monitor         VTE Risk Mitigation (From admission, onward)           Ordered     enoxaparin injection 40 mg  Daily         02/17/25 2238     IP VTE HIGH RISK PATIENT  Once         02/17/25 2238     Place sequential compression device  Until discontinued         02/17/25 2238                    Discharge Planning   NIGEL: 2/21/2025     Code Status: Full Code   Medical Readiness for Discharge Date: 2/21/2025  Discharge Plan A: Home Health   Discharge Delays: None known at this time                    JASSI Sánchez  Department of Hospital Medicine   [facility]

## 2025-02-21 NOTE — PT/OT/SLP PROGRESS
Physical Therapy Treatment    Patient Name:  Michell Dhillon   MRN:  [id number]    Recommendations:     Discharge Recommendations:    Discharge Equipment Recommendations: wheelchair, bedside commode, walker, rolling  Barriers to discharge: Decreased caregiver support and poor functional mobility    Assessment:     Michell Dhillon is a 79 y.o. female admitted with a medical diagnosis of Admission for aftercare.  She presents with the following impairments/functional limitations: weakness, impaired endurance, impaired sensation, impaired self care skills, impaired functional mobility, impaired balance, gait instability, decreased coordination, pain, decreased ROM .    Rehab Prognosis: Good; patient would benefit from acute skilled PT services to address these deficits and reach maximum level of function.    Recent Surgery: * No surgery found *      Plan:     During this hospitalization, patient to be seen 5 x/week to address the identified rehab impairments via gait training, therapeutic activities, therapeutic exercises and progress toward the following goals:    Plan of Care Expires:  03/02/25    Subjective     Chief Complaint: Her R leg hurts  Patient/Family Comments/goals: Patient feels like she is getting stronger  Pain/Comfort:  Pain Rating 1: 4/10  Location - Side 1: Right  Location 1: leg      Objective:     Communicated with RN prior to session.  Patient found up in chair   upon PT entry to room.     General Precautions: Standard, fall  Orthopedic Precautions:    Braces: N/A  Respiratory Status: Room air     Functional Mobility:  Bed Mobility:     Supine to Sit: minimum assistance  Sit to Supine: minimum assistance  Transfers:     Sit to Stand:  moderate assistance with rolling walker  Wheelchair Propulsion:  Pt propelled Standard wheelchair x 100 feet on Level tile with  Bilateral upper extremity and Bilateral lower extremity with Contact Guard Assistance.       AM-PAC 6 CLICK MOBILITY  Turning over in bed  (including adjusting bedclothes, sheets and blankets)?: 3  Sitting down on and standing up from a chair with arms (e.g., wheelchair, bedside commode, etc.): 3  Moving from lying on back to sitting on the side of the bed?: 3  Moving to and from a bed to a chair (including a wheelchair)?: 2  Need to walk in hospital room?: 1  Climbing 3-5 steps with a railing?: 1  Basic Mobility Total Score: 13       Treatment & Education:  Patient left up in chair with all lines intact..  There ex for BLE hip flexion, ABD, LAQ, DF/ PF and sit to  stand x 4 repetitions.  Transfers:     Sit to Stand:  moderate assistance with rolling walker  Wheelchair Propulsion:  Pt propelled Standard wheelchair x 100 feet on Level tile with  Bilateral upper extremity and Bilateral lower extremity with Contact Guard Assistance.       GOALS:   Multidisciplinary Problems       Physical Therapy Goals       Not on file                    DME Justifications:  No DME recommended requiring DME justifications    Time Tracking:     PT Received On: 02/21/25  PT Start Time: 1230     PT Stop Time: 1300  PT Total Time (min): 30 min     Billable Minutes: Therapeutic Activity 20 and Therapeutic Exercise 10    Treatment Type: Treatment  PT/PTA: PT     Number of PTA visits since last PT visit: 0     02/21/2025

## 2025-02-21 NOTE — SUBJECTIVE & OBJECTIVE
Review of Systems   Constitutional:  Negative for chills and fever.   Respiratory:  Negative for cough, shortness of breath and wheezing.    Gastrointestinal:  Negative for abdominal pain, nausea and vomiting.   Musculoskeletal:  Positive for arthralgias, gait problem and myalgias. Negative for joint swelling.   Skin:  Negative for color change and wound.   Neurological:  Positive for weakness.   Psychiatric/Behavioral:  Positive for confusion (mild).    All other systems reviewed and are negative.    Objective:     Vital Signs (Most Recent):  Temp: 97.8 °F (36.6 °C) (02/21/25 0729)  Pulse: 70 (02/21/25 0729)  Resp: 20 (02/21/25 0729)  BP: 139/72 (02/21/25 0729)  SpO2: (!) 92 % (02/21/25 0729) Vital Signs (24h Range):  Temp:  [97.8 °F (36.6 °C)-98.2 °F (36.8 °C)] 97.8 °F (36.6 °C)  Pulse:  [70-79] 70  Resp:  [18-20] 20  SpO2:  [90 %-92 %] 92 %  BP: (130-154)/(67-75) 139/72     Weight: 81.5 kg (179 lb 11.2 oz)  Body mass index is 28.14 kg/m².    Intake/Output Summary (Last 24 hours) at 2/21/2025 1200  Last data filed at 2/21/2025 0800  Gross per 24 hour   Intake 1520 ml   Output --   Net 1520 ml         Physical Exam  Vitals and nursing note reviewed.   Constitutional:       Appearance: She is obese.   HENT:      Head: Normocephalic.      Right Ear: External ear normal.      Left Ear: External ear normal.      Nose: Nose normal.      Mouth/Throat:      Mouth: Mucous membranes are moist.   Eyes:      Extraocular Movements: Extraocular movements intact.      Pupils: Pupils are equal, round, and reactive to light.   Cardiovascular:      Rate and Rhythm: Normal rate and regular rhythm.      Pulses: Normal pulses.      Heart sounds: Normal heart sounds.   Pulmonary:      Effort: Pulmonary effort is normal.      Breath sounds: Normal breath sounds.   Abdominal:      General: There is no distension.      Palpations: Abdomen is soft.      Tenderness: There is no abdominal tenderness.   Musculoskeletal:         General:  Tenderness present. No swelling. Normal range of motion.      Cervical back: Normal range of motion and neck supple.   Skin:     General: Skin is warm.      Capillary Refill: Capillary refill takes 2 to 3 seconds.   Neurological:      Mental Status: She is alert. She is disoriented.   Psychiatric:         Mood and Affect: Mood normal.         Behavior: Behavior normal.         Thought Content: Thought content normal.         Judgment: Judgment normal.             Significant Labs: All pertinent labs within the past 24 hours have been reviewed.  Recent Lab Results         02/21/25  1108   02/21/25  0624   02/20/25  2059   02/20/25  1658        POC Glucose 126   126   121   114               Significant Imaging: I have reviewed all pertinent imaging results/findings within the past 24 hours.

## 2025-02-21 NOTE — PROGRESS NOTES
"[facility] Medicine  Progress Note    Patient Name: Michell Dhillon  MRN: [id number]  Patient Class: IP- Swing   Admission Date: 2/21/2025  Length of Stay: 0 days  Attending Physician: Elmer Rainey DO  Primary Care Provider: Daryl Moscoso NP        Subjective     Principal Problem:Admission for aftercare        HPI:    79-year-old  female history of CVA, type 2 diabetes, hypertension, hyperlipidemia, recurrent UTI sent from home for confusion. Patient has been admitted multiple times to many facilities with exact same presentation.  Reviewing a multitude of urine cultures in Epic and various facilities she has had numerous urine cultures with various organisms.      Overview/Hospital Course:    2/19 Doing well, changed ABX according to C&S.  Discussed SWB and she is not interested.  Will get some PT today and plan on DC tomorrow with HH.      2/20 Much improved, now willing to do SWB.  Would be better given her multiple hospital admissions for the same issue.      2/21: Patient improving, will swing today to start PT/OT. She is complaining of right foot being "sore" States "I think it is gout". She has no swelling, very mild tenderness to palpation. Denies injury.       Review of Systems   Constitutional:  Negative for chills and fever.   Respiratory:  Negative for cough, shortness of breath and wheezing.    Gastrointestinal:  Negative for abdominal pain, nausea and vomiting.   Musculoskeletal:  Positive for arthralgias, gait problem and myalgias. Negative for joint swelling.   Skin:  Negative for color change and wound.   Neurological:  Positive for weakness.   Psychiatric/Behavioral:  Positive for confusion (mild).    All other systems reviewed and are negative.    Objective:     Vital Signs (Most Recent):  Temp: 98.7 °F (37.1 °C) (02/21/25 1303)  Pulse: 74 (02/21/25 1436)  Resp: 20 (02/21/25 1436)  BP: 139/72 (02/21/25 1302)  SpO2: 97 % (02/21/25 1436) " Vital Signs (24h Range):  Temp:  [97.8 °F (36.6 °C)-98.7 °F (37.1 °C)] 98.7 °F (37.1 °C)  Pulse:  [70-79] 74  Resp:  [18-20] 20  SpO2:  [90 %-97 %] 97 %  BP: (130-154)/(67-75) 139/72     Weight: 81.5 kg (179 lb 11.2 oz)  Body mass index is 28.14 kg/m².    Intake/Output Summary (Last 24 hours) at 2/21/2025 1443  Last data filed at 2/21/2025 1303  Gross per 24 hour   Intake 300 ml   Output --   Net 300 ml         Physical Exam  Vitals and nursing note reviewed.   Constitutional:       Appearance: She is obese.   HENT:      Head: Normocephalic.      Right Ear: External ear normal.      Left Ear: External ear normal.      Nose: Nose normal.      Mouth/Throat:      Mouth: Mucous membranes are moist.   Eyes:      Extraocular Movements: Extraocular movements intact.      Pupils: Pupils are equal, round, and reactive to light.   Cardiovascular:      Rate and Rhythm: Normal rate and regular rhythm.      Pulses: Normal pulses.      Heart sounds: Normal heart sounds.   Pulmonary:      Effort: Pulmonary effort is normal.      Breath sounds: Normal breath sounds.   Abdominal:      General: There is no distension.      Palpations: Abdomen is soft.      Tenderness: There is no abdominal tenderness.   Musculoskeletal:         General: Tenderness present. No swelling. Normal range of motion.      Cervical back: Normal range of motion and neck supple.   Skin:     General: Skin is warm.      Capillary Refill: Capillary refill takes 2 to 3 seconds.   Neurological:      Mental Status: She is alert. She is disoriented.   Psychiatric:         Mood and Affect: Mood normal.         Behavior: Behavior normal.         Thought Content: Thought content normal.         Judgment: Judgment normal.         Significant Labs: All pertinent labs within the past 24 hours have been reviewed.  Recent Lab Results         02/21/25  1108   02/21/25  0624   02/20/25  2059   02/20/25  1658        POC Glucose 126   126   121   114               Significant  Imaging: I have reviewed all pertinent imaging results/findings within the past 24 hours.    Assessment and Plan     * Admission for aftercare  PT/OT      Hypothyroidism  Continue home medication      Type 2 diabetes mellitus without complication, without long-term current use of insulin  Continue home meds  FSG      Benign essential HTN  Patient's blood pressure range in the last 24 hours was: BP  Min: 130/67  Max: 154/75.The patient's inpatient anti-hypertensive regimen is listed below:  Current Antihypertensives  metoprolol tartrate (LOPRESSOR) tablet 50 mg, 2 times daily, Oral  losartan tablet 100 mg, Daily, Oral    Plan  - BP is controlled, no changes needed to their regimen        VTE Risk Mitigation (From admission, onward)           Ordered     Reason for No Pharmacological VTE Prophylaxis  Once        Question:  Reasons:  Answer:  Already adequately anticoagulated on oral Anticoagulants    02/21/25 1418     Place RENITA hose  Until discontinued         02/21/25 1418     IP VTE HIGH RISK PATIENT  Once         02/21/25 1418                    Discharge Planning   NIGEL: 3/7/2025     Code Status: Full Code   Medical Readiness for Discharge Date:   Discharge Plan A: Home Health                Please place Justification for DME        JASSI Sánchez  Department of Hospital Medicine   [facility]

## 2025-02-21 NOTE — CONSULTS
Pt AAO sitting on bedside OT in room with patient. RT Reviewed chart. No RT orders at present suggested.

## 2025-02-22 LAB
GLUCOSE SERPL-MCNC: 112 MG/DL (ref 70–105)
GLUCOSE SERPL-MCNC: 113 MG/DL (ref 70–105)
GLUCOSE SERPL-MCNC: 118 MG/DL (ref 70–105)
GLUCOSE SERPL-MCNC: 156 MG/DL (ref 70–105)

## 2025-02-22 PROCEDURE — 27000958

## 2025-02-22 PROCEDURE — 27000987 HC MATTRESS, MATRIX LOW PROFILE

## 2025-02-22 PROCEDURE — 25000003 PHARM REV CODE 250: Performed by: NURSE PRACTITIONER

## 2025-02-22 PROCEDURE — 82962 GLUCOSE BLOOD TEST: CPT

## 2025-02-22 PROCEDURE — 11000004 HC SNF PRIVATE

## 2025-02-22 PROCEDURE — 63600175 PHARM REV CODE 636 W HCPCS: Performed by: NURSE PRACTITIONER

## 2025-02-22 RX ADMIN — METOPROLOL TARTRATE 50 MG: 50 TABLET, FILM COATED ORAL at 09:02

## 2025-02-22 RX ADMIN — METOPROLOL TARTRATE 50 MG: 50 TABLET, FILM COATED ORAL at 08:02

## 2025-02-22 RX ADMIN — ERGOCALCIFEROL 50000 UNITS: 1.25 CAPSULE, LIQUID FILLED ORAL at 08:02

## 2025-02-22 RX ADMIN — LEVOTHYROXINE SODIUM 88 MCG: 0.09 TABLET ORAL at 06:02

## 2025-02-22 RX ADMIN — CEPHALEXIN 500 MG: 500 CAPSULE ORAL at 09:02

## 2025-02-22 RX ADMIN — LOSARTAN POTASSIUM 100 MG: 50 TABLET, FILM COATED ORAL at 08:02

## 2025-02-22 RX ADMIN — CEPHALEXIN 500 MG: 500 CAPSULE ORAL at 02:02

## 2025-02-22 RX ADMIN — SENNOSIDES AND DOCUSATE SODIUM 1 TABLET: 50; 8.6 TABLET ORAL at 09:02

## 2025-02-22 RX ADMIN — ENOXAPARIN SODIUM 40 MG: 40 INJECTION SUBCUTANEOUS at 06:02

## 2025-02-22 RX ADMIN — CLOPIDOGREL BISULFATE 75 MG: 75 TABLET, FILM COATED ORAL at 08:02

## 2025-02-22 RX ADMIN — CEPHALEXIN 500 MG: 500 CAPSULE ORAL at 06:02

## 2025-02-22 NOTE — PLAN OF CARE
Problem: Adult Inpatient Plan of Care  Goal: Plan of Care Review  Outcome: Progressing  Goal: Patient-Specific Goal (Individualized)  Outcome: Progressing  Goal: Optimal Comfort and Wellbeing  Outcome: Progressing     Problem: Diabetes Comorbidity  Goal: Blood Glucose Level Within Targeted Range  Outcome: Progressing     Problem: Skin Injury Risk Increased  Goal: Skin Health and Integrity  Outcome: Progressing

## 2025-02-22 NOTE — PLAN OF CARE
Problem: Adult Inpatient Plan of Care  Goal: Optimal Comfort and Wellbeing  Outcome: Progressing     Problem: Diabetes Comorbidity  Goal: Blood Glucose Level Within Targeted Range  Outcome: Progressing     Problem: Skin Injury Risk Increased  Goal: Skin Health and Integrity  Outcome: Progressing

## 2025-02-23 LAB
GLUCOSE SERPL-MCNC: 111 MG/DL (ref 70–105)
GLUCOSE SERPL-MCNC: 116 MG/DL (ref 70–105)
GLUCOSE SERPL-MCNC: 130 MG/DL (ref 70–105)
GLUCOSE SERPL-MCNC: 207 MG/DL (ref 70–105)

## 2025-02-23 PROCEDURE — 27000987 HC MATTRESS, MATRIX LOW PROFILE

## 2025-02-23 PROCEDURE — 25000003 PHARM REV CODE 250: Performed by: NURSE PRACTITIONER

## 2025-02-23 PROCEDURE — 63600175 PHARM REV CODE 636 W HCPCS: Performed by: NURSE PRACTITIONER

## 2025-02-23 PROCEDURE — 27000958

## 2025-02-23 PROCEDURE — 82962 GLUCOSE BLOOD TEST: CPT

## 2025-02-23 PROCEDURE — 11000004 HC SNF PRIVATE

## 2025-02-23 RX ADMIN — METOPROLOL TARTRATE 50 MG: 50 TABLET, FILM COATED ORAL at 08:02

## 2025-02-23 RX ADMIN — CEPHALEXIN 500 MG: 500 CAPSULE ORAL at 02:02

## 2025-02-23 RX ADMIN — LOSARTAN POTASSIUM 100 MG: 50 TABLET, FILM COATED ORAL at 08:02

## 2025-02-23 RX ADMIN — CLOPIDOGREL BISULFATE 75 MG: 75 TABLET, FILM COATED ORAL at 08:02

## 2025-02-23 RX ADMIN — CEPHALEXIN 500 MG: 500 CAPSULE ORAL at 09:02

## 2025-02-23 RX ADMIN — CEPHALEXIN 500 MG: 500 CAPSULE ORAL at 06:02

## 2025-02-23 RX ADMIN — ENOXAPARIN SODIUM 40 MG: 40 INJECTION SUBCUTANEOUS at 05:02

## 2025-02-23 RX ADMIN — LEVOTHYROXINE SODIUM 88 MCG: 0.09 TABLET ORAL at 06:02

## 2025-02-23 RX ADMIN — INSULIN ASPART 1 UNITS: 100 INJECTION, SOLUTION INTRAVENOUS; SUBCUTANEOUS at 09:02

## 2025-02-23 RX ADMIN — METOPROLOL TARTRATE 50 MG: 50 TABLET, FILM COATED ORAL at 09:02

## 2025-02-23 NOTE — CONSULTS
[facility]  Adult Nutrition  Consult Note         Reason for Assessment  Reason For Assessment: consult        Assessment and Plan  Consult received and appreciated. Patient admitted 2/21 with a dx of confusion and pmh of DM, CVA and recurrent UTI. Patient is ordered a 2000 calorie consistent carbohydrate with %. Patient is 81.5 kg with a BMI of 28.14 which is WNL per geriatric standards. Recommend to continue diet as tolerated. Encourage po intakes. RD Following.        Learning Needs/Social Determinants of Health    Learning Assessment       02/21/2025 1301 [facility] (2/21/2025 - Present)   Created by Naty Hayes, RN - RN (Nurse) Status: Complete                 PRIMARY LEARNER     Primary Learner Name:  CONRADO MARRERO  - 02/21/2025 1301    Relationship:  Patient  - 02/21/2025 1301    Does the primary learner have any barriers to learning?:  No Barriers  - 02/21/2025 1301    What is the preferred language of the primary learner?:  English  - 02/21/2025 1301    Is an  required?:  No  - 02/21/2025 1301    How does the primary learner prefer to learn new concepts?:  Listening  - 02/21/2025 1301    How often do you need to have someone help you read instructions, pamphlets, or written material from your doctor or pharmacy?:  Sometimes  - 02/21/2025 1301        CO-LEARNER #1     No question answered        CO-LEARNER #2     No question answered        SPECIAL TOPICS     No question answered        ANSWERED BY:     No question answered        Comments         Edit History       Naty Hayes, RN - RN (Nurse)   02/21/2025 1301                           Social Drivers of Health     Tobacco Use: Low Risk  (2/17/2025)    Patient History     Smoking Tobacco Use: Never     Smokeless Tobacco Use: Never     Passive Exposure: Not on file   Alcohol Use: Not At Risk (2/21/2025)    AUDIT-C     Frequency of Alcohol  Consumption: Never     Average Number of Drinks: Patient does not drink     Frequency of Binge Drinking: Never   Financial Resource Strain: Low Risk  (2/21/2025)    Overall Financial Resource Strain (CARDIA)     Difficulty of Paying Living Expenses: Not hard at all   Food Insecurity: No Food Insecurity (2/21/2025)    Hunger Vital Sign     Worried About Running Out of Food in the Last Year: Never true     Ran Out of Food in the Last Year: Never true   Transportation Needs: No Transportation Needs (11/23/2024)    Received from [facility] and Its Subsidiaries and Affiliates    PRAPARE - Transportation     Lack of Transportation (Medical): No     Lack of Transportation (Non-Medical): No   Physical Activity: Inactive (2/21/2025)    Exercise Vital Sign     Days of Exercise per Week: 0 days     Minutes of Exercise per Session: 0 min   Stress: No Stress Concern Present (2/21/2025)    [employer] [place] [place] [employer] - Occupational Stress Questionnaire     Feeling of Stress : Not at all   Housing Stability: Low Risk  (2/21/2025)    Housing Stability Vital Sign     Unable to Pay for Housing in the Last Year: No     Number of Times Moved in the Last Year: Not on file     Homeless in the Last Year: No   Depression: Not at risk (2/9/2025)    [facility]    PHQ-2     PHQ-2 Total: 0   Utilities: Not At Risk (2/21/2025)    [facility] Utilities     Threatened with loss of utilities: No   Health Literacy: Inadequate Health Literacy (2/21/2025)     Health Literacy     Frequency of need for help with medical instructions: Sometimes   Social Isolation: Socially Integrated (2/21/2025)    Social Isolation     Social Isolation: 1          Malnutrition  Is Patient Malnourished: No    Nutrition Diagnosis  Altered nutrition related laboratory values related to Diabetes Mellitus as evidenced by elevated  "BG  Comments: continue diet as tolerated    Recent Labs   Lab 02/23/25  1119   POCGLU 116*     Comments on Glucose: DM    Nutrition Prescription / Recommendations  Recommendation/Intervention: Continue diet as tolerated  Goals: po intakes 75% during admission  Nutrition Goal Status: new  Current Diet Order: 2000 calorie consistent carbohydrate  Nutrition Order Comments: %  Chewing or Swallowing Difficulty?: No Chewing or swallowing difficulty  Recommended Diet: Consistent Carbohydrate 2000 (75g Carbs)  Recommended Oral Supplement: No Oral Supplements  Is Nutrition Support Recommended: [facility] Support: No  Is Nutrition Education Recommended: No    Monitor and Evaluation  % current Intake: P.O. intake of 75 - 100 %  % intake to meet estimated needs: 75 - 100 %  Food and Nutrient Intake: energy intake, food and beverage intake  Food and Nutrient Adminstration: diet order  Anthropometric Measurements: height/length, weight, weight change, body mass index  Biochemical Data, Medical Tests and Procedures: electrolyte and renal panel, gastrointestinal profile, glucose/endocrine profile, inflammatory profile  Tolerance: tolerating    Current Medical Diagnosis and Past Medical History  Diagnosis: diabetes diagnosis/complications, renal disease, cardiac disease  Past Medical History:   Diagnosis Date    Arthritis     Diabetes mellitus, type 2     Hyperlipidemia     Hypertension     Hypothyroidism     Transient cerebral ischemic attack, unspecified 04/14/2021       Nutrition/Diet History  Spiritual, Cultural Beliefs, Orthodox Practices, Values that Affect Care: yes  Food Allergies: NKFA  Factors Affecting Nutritional Intake: None identified at this time    Lab/Procedures/Meds  No results for input(s): "NA", "K", "BUN", "CREATININE", "CALCIUM", "ALBUMIN", "CL", "ALT", "AST", "PHOS" in the last 72 hours.  Last A1c:   Lab Results   Component Value Date    HGBA1C 6.3 02/17/2025    HGBA1C 8.5 03/06/2024     Lab " "Results   Component Value Date    RBC 4.73 02/19/2025    HGB 13.6 02/19/2025    HCT 43.3 02/19/2025    MCV 91.5 02/19/2025    MCH 28.8 02/19/2025    MCHC 31.4 (L) 02/19/2025     Pertinent Labs Reviewed: reviewed  Pertinent Medications Reviewed: reviewed  Scheduled Meds:   cephALEXin  500 mg Oral Q8H    clopidogreL  75 mg Oral Daily    enoxparin  40 mg Subcutaneous Q24H (prophylaxis, 1700)    ergocalciferol  50,000 Units Oral Q7 Days    levothyroxine  88 mcg Oral Before breakfast    losartan  100 mg Oral Daily    metoprolol tartrate  50 mg Oral BID     Continuous Infusions:  PRN Meds:.  Current Facility-Administered Medications:     acetaminophen, 650 mg, Oral, Q6H PRN    acetaminophen, 650 mg, Oral, Q4H PRN    calcium carbonate, 500 mg, Oral, BID PRN    dextrose 50%, 12.5 g, Intravenous, PRN    dextrose 50%, 25 g, Intravenous, PRN    glucagon (human recombinant), 1 mg, Intramuscular, PRN    glucose, 16 g, Oral, PRN    glucose, 24 g, Oral, PRN    insulin aspart U-100, 0-5 Units, Subcutaneous, QID (AC + HS) PRN    melatonin, 6 mg, Oral, Nightly PRN    senna-docusate 8.6-50 mg, 1 tablet, Oral, BID PRN    Anthropometrics  Height: 5' 7" (170.2 cm)  Height (inches): 67 in  Height Method: Stated  Weight: 81.5 kg (179 lb 11.2 oz)  Weight (lb): 179.7 lb  Weight Method: Bed Scale  Ideal Body Weight (IBW), Female: 135 lb  % Ideal Body Weight, Female (lb): 133.11 %  BMI (Calculated): 28.1       Estimated/Assessed Needs      Temp: 98 °F (36.7 °C)Oral  Weight Used For Calorie Calculations: 81.5 kg (179 lb 10.8 oz)   Energy Need Method: Kcal/kg Energy Calorie Requirements (kcal): 7741-2872  Weight Used For Protein Calculations: 81.5 kg (179 lb 10.8 oz)  Protein Requirements: 65-82  Estimated Fluid Requirement Method: RDA Method    RDA Method (mL): 1630       Nutrition by Nursing     Intake (%): 75%        Last Bowel Movement: 02/18/25                Nutrition Follow-Up  RD Follow-up?: Yes      Nutrition Discharge Planning: RD " following for dc needs          Available via Secure Chat

## 2025-02-23 NOTE — PLAN OF CARE
Problem: Adult Inpatient Plan of Care  Goal: Optimal Comfort and Wellbeing  Outcome: Progressing     Problem: Diabetes Comorbidity  Goal: Blood Glucose Level Within Targeted Range  Outcome: Progressing     Problem: Skin Injury Risk Increased  Goal: Skin Health and Integrity  Outcome: Progressing     Problem: Infection  Goal: Absence of Infection Signs and Symptoms  Outcome: Progressing

## 2025-02-23 NOTE — PLAN OF CARE
Problem: Adult Inpatient Plan of Care  Goal: Plan of Care Review  Outcome: Progressing  Goal: Patient-Specific Goal (Individualized)  Outcome: Progressing  Goal: Optimal Comfort and Wellbeing  Outcome: Progressing     Problem: Diabetes Comorbidity  Goal: Blood Glucose Level Within Targeted Range  Outcome: Progressing

## 2025-02-24 ENCOUNTER — PATIENT OUTREACH (OUTPATIENT)
Dept: ADMINISTRATIVE | Facility: CLINIC | Age: 80
End: 2025-02-24

## 2025-02-24 PROBLEM — R53.81 DEBILITY: Status: ACTIVE | Noted: 2025-02-24

## 2025-02-24 LAB
ANION GAP SERPL CALCULATED.3IONS-SCNC: 12 MMOL/L (ref 7–16)
BASOPHILS # BLD AUTO: 0.03 K/UL (ref 0–0.2)
BASOPHILS NFR BLD AUTO: 0.5 % (ref 0–1)
BUN SERPL-MCNC: 20 MG/DL (ref 10–20)
BUN/CREAT SERPL: 24 (ref 6–20)
CALCIUM SERPL-MCNC: 8.8 MG/DL (ref 8.4–10.2)
CHLORIDE SERPL-SCNC: 104 MMOL/L (ref 98–107)
CO2 SERPL-SCNC: 22 MMOL/L (ref 23–31)
CREAT SERPL-MCNC: 0.83 MG/DL (ref 0.55–1.02)
DIFFERENTIAL METHOD BLD: ABNORMAL
EGFR (NO RACE VARIABLE) (RUSH/TITUS): 71 ML/MIN/1.73M2
EOSINOPHIL # BLD AUTO: 0.11 K/UL (ref 0–0.5)
EOSINOPHIL NFR BLD AUTO: 2 % (ref 1–4)
ERYTHROCYTE [DISTWIDTH] IN BLOOD BY AUTOMATED COUNT: 14.6 % (ref 11.5–14.5)
GLUCOSE SERPL-MCNC: 108 MG/DL (ref 70–105)
GLUCOSE SERPL-MCNC: 109 MG/DL (ref 82–115)
GLUCOSE SERPL-MCNC: 110 MG/DL (ref 70–105)
GLUCOSE SERPL-MCNC: 137 MG/DL (ref 70–105)
GLUCOSE SERPL-MCNC: 188 MG/DL (ref 70–105)
HCT VFR BLD AUTO: 36.6 % (ref 38–47)
HGB BLD-MCNC: 11.2 G/DL (ref 12–16)
LYMPHOCYTES # BLD AUTO: 1.69 K/UL (ref 1–4.8)
LYMPHOCYTES NFR BLD AUTO: 30.5 % (ref 27–41)
MCH RBC QN AUTO: 28.9 PG (ref 27–31)
MCHC RBC AUTO-ENTMCNC: 30.6 G/DL (ref 32–36)
MCV RBC AUTO: 94.3 FL (ref 80–96)
MONOCYTES # BLD AUTO: 0.6 K/UL (ref 0–0.8)
MONOCYTES NFR BLD AUTO: 10.8 % (ref 2–6)
MPC BLD CALC-MCNC: 9.8 FL (ref 9.4–12.4)
NEUTROPHILS # BLD AUTO: 3.11 K/UL (ref 1.8–7.7)
NEUTROPHILS NFR BLD AUTO: 56.2 % (ref 53–65)
PLATELET # BLD AUTO: 229 K/UL (ref 150–400)
POTASSIUM SERPL-SCNC: 4.4 MMOL/L (ref 3.5–5.1)
RBC # BLD AUTO: 3.88 M/UL (ref 4.2–5.4)
SODIUM SERPL-SCNC: 134 MMOL/L (ref 136–145)
WBC # BLD AUTO: 5.54 K/UL (ref 4.5–11)

## 2025-02-24 PROCEDURE — 97110 THERAPEUTIC EXERCISES: CPT

## 2025-02-24 PROCEDURE — 80048 BASIC METABOLIC PNL TOTAL CA: CPT | Performed by: NURSE PRACTITIONER

## 2025-02-24 PROCEDURE — 36415 COLL VENOUS BLD VENIPUNCTURE: CPT | Performed by: NURSE PRACTITIONER

## 2025-02-24 PROCEDURE — 97162 PT EVAL MOD COMPLEX 30 MIN: CPT

## 2025-02-24 PROCEDURE — 85025 COMPLETE CBC W/AUTO DIFF WBC: CPT | Performed by: NURSE PRACTITIONER

## 2025-02-24 PROCEDURE — 82962 GLUCOSE BLOOD TEST: CPT

## 2025-02-24 PROCEDURE — 27000958

## 2025-02-24 PROCEDURE — 94761 N-INVAS EAR/PLS OXIMETRY MLT: CPT

## 2025-02-24 PROCEDURE — 97530 THERAPEUTIC ACTIVITIES: CPT

## 2025-02-24 PROCEDURE — 25000003 PHARM REV CODE 250: Performed by: NURSE PRACTITIONER

## 2025-02-24 PROCEDURE — 27000987 HC MATTRESS, MATRIX LOW PROFILE

## 2025-02-24 PROCEDURE — 11000004 HC SNF PRIVATE

## 2025-02-24 PROCEDURE — 99308 SBSQ NF CARE LOW MDM 20: CPT | Mod: ,,, | Performed by: HOSPITALIST

## 2025-02-24 RX ADMIN — CEPHALEXIN 500 MG: 500 CAPSULE ORAL at 02:02

## 2025-02-24 RX ADMIN — METOPROLOL TARTRATE 50 MG: 50 TABLET, FILM COATED ORAL at 08:02

## 2025-02-24 RX ADMIN — CLOPIDOGREL BISULFATE 75 MG: 75 TABLET, FILM COATED ORAL at 08:02

## 2025-02-24 RX ADMIN — CEPHALEXIN 500 MG: 500 CAPSULE ORAL at 08:02

## 2025-02-24 RX ADMIN — CEPHALEXIN 500 MG: 500 CAPSULE ORAL at 06:02

## 2025-02-24 RX ADMIN — LOSARTAN POTASSIUM 100 MG: 50 TABLET, FILM COATED ORAL at 08:02

## 2025-02-24 RX ADMIN — LEVOTHYROXINE SODIUM 88 MCG: 0.09 TABLET ORAL at 06:02

## 2025-02-24 NOTE — PT/OT/SLP PROGRESS
Occupational Therapy  Treatment    Michell Dhillon   MRN: [id number]   Admitting Diagnosis: Debility    OT Date of Treatment: 02/24/25   OT Start Time: 1325  OT Stop Time: 1355  OT Total Time (min): 30 min    Billable Minutes:  Therapeutic Activity 15 and Therapeutic Exercise 15    OT/YONI: OT          General Precautions: Standard, fall  Orthopedic Precautions:    Braces:    Respiratory Status: Room air         Subjective:  Communicated with pt and PT prior to session.  No new complaints       Objective:        Functional Mobility:  Bed Mobility: NA today       Transfers: NA today        Functional Ambulation: see PT    Activities of Daily Living:     Feeding adaptive equipment:  none     UE adaptive equipment: none     LE adaptive equipment: none                    Bathing adaptive equipment: none    Balance:   Static Sit: FAIR+: Able to take MINIMAL challenges from all directions  Dynamic Sit: FAIR+: Maintains balance through MINIMAL excursions of active trunk motion  Static Stand: POOR+: Needs MINIMAL assist to maintain  Dynamic stand: POOR+: Needs MIN (minimal ) assist during gait    Therapeutic Activities and Exercises:  To improve UB endurance, strength, OT facilitated pt with:  UBE x 8 min with 1 RB's throughout, low level resist  GREEN PUTTY to simulate kitchen prep task and improve FM and     To improve reach, AROM, FM/ and trunk rotation with core forward leaning engagement:   seated Reciprocal pulleys x 6 min in sh flexion and abduction      AM-PAC 6 CLICK ADL   How much help from another person does this patient currently need?   1 = Unable, Total/Dependent Assistance  2 = A lot, Maximum/Moderate Assistance  3 = A little, Minimum/Contact Guard/Supervision  4 = None, Modified [place]/Independent    Putting on and taking off regular lower body clothing? : 2  Bathing (including washing, rinsing, drying)?: 2  Toileting, which includes using toilet, bedpan, or urinal? : 2  Putting on and taking  "off regular upper body clothing?: 2  Taking care of personal grooming such as brushing teeth?: 3  Eating meals?: 3  Daily Activity Total Score: 14     AM-PAC Raw Score CMS "G-Code Modifier Level of Impairment Assistance   6 % Total / Unable   7 - 8 CM 80 - 100% Maximal Assist   9-13 CL 60 - 80% Moderate Assist   14 - 19 CK 40 - 60% Moderate Assist   20 - 22 CJ 20 - 40% Minimal Assist   23 CI 1-20% SBA / CGA   24 CH 0% Independent/ Mod I       Patient left up in chair with call button in reach and nursing notified    ASSESSMENT:  Michell Dhillon is a 79 y.o. female with a medical diagnosis of Debility and presents with no new complaints.    Rehab identified problem list/impairments:     Rehab potential is fair.    Activity tolerance: Fair    Discharge recommendations: Low Intensity Therapy   Barriers to discharge: Barriers to Discharge: None    Equipment recommendations: none    GOALS:   Multidisciplinary Problems       Occupational Therapy Goals          Problem: Occupational Therapy    Goal Priority Disciplines Outcome Interventions   Occupational Therapy Goal     OT, PT/OT Progressing    Description: STG: within 2 weeks  Pt will perform grooming with setup at sinkside ONGOING/PROGRESSING  Pt will bathe with min a from sinkside ONGOING/PROGRESSING  Pt will perform UE dressing with SVN ONGOING/PROGRESSING  Pt will perform LE dressing with min a at sinkside ONGOING/PROGRESSING  Pt will sit EOB x 30 min with SBA assistance ONGOING/PROGRESSING  Pt will transfer bed/chair/bsc with SBA ONGOING/PROGRESSING  Pt will perform standing task x 5 min with SVN assistance ONGOING/PROGRESSING  Pt will tolerate 45 minutes of tx without fatigue ONGOING/PROGRESSING      LT.Restore to max I with self care and mobility.                         Plan:  Patient to be seen 5 x/week to address the above listed problems via self-care/home management, therapeutic exercises, therapeutic activities, wheelchair " management/training  Plan of Care expires: 03/21/25  Plan of Care reviewed with: patient, caregiver, daughter         02/24/2025

## 2025-02-24 NOTE — PT/OT/SLP EVAL
Physical Therapy Evaluation    Patient Name:  Michell Dhillon   MRN:  [id number]    Recommendations:     Discharge Recommendations: Moderate Intensity Therapy   Discharge Equipment Recommendations: walker, rolling, bedside commode, wheelchair   Barriers to discharge: Decreased caregiver support and poor functional mobility    Assessment:     Michell Dhillon is a 79 y.o. female admitted with a medical diagnosis of Debility.  She presents with the following impairments/functional limitations: weakness, impaired endurance, impaired sensation, gait instability, impaired balance, decreased coordination, decreased lower extremity function, pain, impaired muscle length, impaired joint extensibility Patient lives at home with family , but usually is home with  who cannot assist her at home. Patient is independent with [place] to and from bed and commode at baseline . Patient currently needs min assist with these activties.    Rehab Prognosis: Good; patient would benefit from acute skilled PT services to address these deficits and reach maximum level of function.    Recent Surgery: * No surgery found *      Plan:     During this hospitalization, patient to be seen 5 x/week to address the identified rehab impairments via gait training, therapeutic activities, therapeutic exercises and progress toward the following goals:    Plan of Care Expires:  03/28/25    Subjective     Chief Complaint: Patient states she is weak  Patient/Family Comments/goals: Patient wants to be independent in the Home from her WC  Pain/Comfort:  Pain Rating 1: 2/10  Location - Side 1: Right  Location 1: leg    Patients cultural, spiritual, Lutheran conflicts given the current situation: yes    Living Environment:  Patient lives at home with family  Prior to admission, patients level of function was independent with wheechair to bed and commode transfers.  Equipment used at home: walker, rolling, bedside commode, wheelchair.  DME owned (not  currently used): rolling walker, bedside commode, wheelchair, and hospital bed.  Upon discharge, patient will have assistance from family.    Objective:     Communicated with RN prior to session.  Patient found supine with    upon PT entry to room.    General Precautions: Standard, fall  Orthopedic Precautions:N/A   Braces: N/A  Respiratory Status: Room air    Exams:  Cognitive Exam:  Patient is oriented to Person, Place, Time, and Situation    Functional Mobility:  Bed Mobility:     Supine to Sit: minimum assistance  Sit to Supine: minimum assistance  Transfers:     Bed to Chair: minimum assistance with  no AD  using  Stand Pivot  Wheelchair Propulsion:  Pt propelled Standard wheelchair x 50 feet on Level tile with  Bilateral upper extremity with Contact Guard Assistance.       AM-PAC 6 CLICK MOBILITY  Total Score:14       Treatment & Education:  Initial Evaluation performed    Patient left up in chair with  OT present.    GOALS:   Multidisciplinary Problems       Physical Therapy Goals          Problem: Physical Therapy    Goal Priority Disciplines Outcome Interventions   Physical Therapy Goal     PT, PT/OT     Description: Short Term Goals (1 Week)  Patient will be modified independent with supine to sit     Long Term Goals (4 Weeks)  Patient will need SBA for sit to stand with use of RW  Patient will be independent with WC mobility x 200 feet on level surfaces.  Patient will be independent with bed to commode transfer.                             DME Justifications:  No DME recommended requiring DME justifications    History:     Past Medical History:   Diagnosis Date    Arthritis     Diabetes mellitus, type 2     Hyperlipidemia     Hypertension     Hypothyroidism     Transient cerebral ischemic attack, unspecified 04/14/2021       Past Surgical History:   Procedure Laterality Date    BLADDER SUSPENSION      unsure of the dates - patient has had this done 2 times    CATARACT EXTRACTION Right     patient unsure     HYSTERECTOMY         Time Tracking:     PT Received On: 02/24/25  PT Start Time: 1300  PT Stop Time: 1325  PT Total Time (min): 1325    Billable Minutes: Evaluation 27      02/24/2025

## 2025-02-24 NOTE — PLAN OF CARE
Problem: Occupational Therapy  Goal: Occupational Therapy Goal  Description: STG: within 2 weeks  Pt will perform grooming with setup at sinkside ONGOING/PROGRESSING  Pt will bathe with min a from sinkside ONGOING/PROGRESSING  Pt will perform UE dressing with SVN ONGOING/PROGRESSING  Pt will perform LE dressing with min a at sinkside ONGOING/PROGRESSING  Pt will sit EOB x 30 min with SBA assistance ONGOING/PROGRESSING  Pt will transfer bed/chair/bsc with SBA ONGOING/PROGRESSING  Pt will perform standing task x 5 min with SVN assistance ONGOING/PROGRESSING  Pt will tolerate 45 minutes of tx without fatigue ONGOING/PROGRESSING      LT.Restore to max I with self care and mobility.    Outcome: Progressing

## 2025-02-24 NOTE — ASSESSMENT & PLAN NOTE
Patient with Acute on chronic debility due to age-related physical debility. The patient's latest AMPAC (Activity Measure for Post Acute Care) Score is listed below.    AM-PAC Score - How much help does the patient need for each activity listed  Basic Mobility Total Score: 13  Turning over in bed (including adjusting bedclothes, sheets and blankets)?: A little  Sitting down on and standing up from a chair with arms (e.g., wheelchair, bedside commode, etc.): A little  Moving from lying on back to sitting on the side of the bed?: A little  Moving to and from a bed to a chair (including a wheelchair)?: A lot  Need to walk in hospital room?: Unable  Climbing 3-5 steps with a railing?: Unable    Plan  - Progressive mobility protocol initated  - PT/OT consulted  - Fall precautions in place  -

## 2025-02-24 NOTE — PROGRESS NOTES
"[facility] Medicine  Progress Note    Patient Name: Michell Dhillon  MRN: [id number]  Patient Class: IP- Swing   Admission Date: 2/21/2025  Length of Stay: 3 days  Attending Physician: Elmer Rainey DO  Primary Care Provider: Daryl Moscoso NP        Subjective     Principal Problem:Admission for aftercare        HPI:    79-year-old  female history of CVA, type 2 diabetes, hypertension, hyperlipidemia, recurrent UTI sent from home for confusion. Patient has been admitted multiple times to many facilities with exact same presentation.  Reviewing a multitude of urine cultures in Epic and various facilities she has had numerous urine cultures with various organisms.      Overview/Hospital Course:    2/19 Doing well, changed ABX according to C&S.  Discussed SWB and she is not interested.  Will get some PT today and plan on DC tomorrow with HH.      2/20 Much improved, now willing to do SWB.  Would be better given her multiple hospital admissions for the same issue.      2/21: Patient improving, will swing today to start PT/OT. She is complaining of right foot being "sore" States "I think it is gout". She has no swelling, very mild tenderness to palpation. Denies injury.     2/24 Looks better, working with PT.  Seems more awake.    Interval History: seems better, working with PT    Review of Systems   Respiratory:  Negative for shortness of breath.    Cardiovascular:  Negative for chest pain.   Gastrointestinal:  Negative for abdominal pain, nausea and vomiting.   Neurological:  Positive for weakness.   Psychiatric/Behavioral:  Negative for agitation and confusion.    All other systems reviewed and are negative.    Objective:     Vital Signs (Most Recent):  Temp: 97.9 °F (36.6 °C) (02/24/25 0740)  Pulse: 76 (02/24/25 0740)  Resp: 18 (02/24/25 0740)  BP: 130/72 (02/24/25 0740)  SpO2: 99 % (per nursing staff check) (02/24/25 0846) Vital Signs (24h Range):  Temp:  " [97.9 °F (36.6 °C)-98.2 °F (36.8 °C)] 97.9 °F (36.6 °C)  Pulse:  [76-83] 76  Resp:  [18-20] 18  SpO2:  [95 %-99 %] 99 %  BP: (130-150)/(72-79) 130/72     Weight: 83 kg (183 lb)  Body mass index is 28.66 kg/m².    Intake/Output Summary (Last 24 hours) at 2/24/2025 1131  Last data filed at 2/23/2025 1848  Gross per 24 hour   Intake 835 ml   Output --   Net 835 ml         Physical Exam  Vitals and nursing note reviewed.   Constitutional:       Appearance: She is obese.   HENT:      Head: Normocephalic.      Nose: Nose normal.      Mouth/Throat:      Mouth: Mucous membranes are moist.   Eyes:      Extraocular Movements: Extraocular movements intact.      Pupils: Pupils are equal, round, and reactive to light.   Cardiovascular:      Rate and Rhythm: Normal rate and regular rhythm.      Pulses: Normal pulses.      Heart sounds: Normal heart sounds.   Pulmonary:      Effort: Pulmonary effort is normal.      Breath sounds: Normal breath sounds.   Abdominal:      General: There is no distension.      Palpations: Abdomen is soft.      Tenderness: There is no abdominal tenderness.   Musculoskeletal:         General: No swelling or tenderness. Normal range of motion.      Cervical back: Normal range of motion and neck supple.   Skin:     General: Skin is warm.      Capillary Refill: Capillary refill takes 2 to 3 seconds.   Neurological:      General: No focal deficit present.      Mental Status: She is alert. Mental status is at baseline.      Motor: Weakness present.   Psychiatric:         Mood and Affect: Mood normal.         Behavior: Behavior normal.         Thought Content: Thought content normal.         Judgment: Judgment normal.             Significant Labs: All pertinent labs within the past 24 hours have been reviewed.  Recent Lab Results  (Last 5 results in the past 24 hours)        02/24/25  1102   02/24/25  0648   02/24/25  0647   02/24/25  0618   02/23/25 2024        Anion Gap     12           Baso #   0.03              Basophil %   0.5             BUN     20           BUN/CREAT RATIO     24           Calcium     8.8           Chloride     104           CO2     22           Creatinine     0.83           Differential Method   Auto             eGFR     71  Comment: Estimated GFR calculated using the CKD-EPI creatinine (2021) equation.           Eos #   0.11             Eos %   2.0             Glucose     109           Hematocrit   36.6             Hemoglobin   11.2             Lymph #   1.69             Lymph %   30.5             MCH   28.9             MCHC   30.6             MCV   94.3             Mono #   0.60             Mono %   10.8             MPV   9.8             Neutrophils, Abs   3.11             Neutrophils Relative   56.2             Platelet Count   229             POC Glucose 108       110   207       Potassium     4.4           RBC   3.88             RDW   14.6             Sodium     134           WBC   5.54                                    Significant Imaging: I have reviewed all pertinent imaging results/findings within the past 24 hours.    Assessment and Plan     * Admission for aftercare  PT/OT      Hypothyroidism  Continue home medication      Type 2 diabetes mellitus without complication, without long-term current use of insulin  Continue home meds  FSG      Benign essential HTN  Patient's blood pressure range in the last 24 hours was: BP  Min: 130/67  Max: 154/75.The patient's inpatient anti-hypertensive regimen is listed below:  Current Antihypertensives  metoprolol tartrate (LOPRESSOR) tablet 50 mg, 2 times daily, Oral  losartan tablet 100 mg, Daily, Oral    Plan  - BP is controlled, no changes needed to their regimen        VTE Risk Mitigation (From admission, onward)           Ordered     enoxaparin injection 40 mg  Every 24 hours         02/21/25 9377     Reason for No Pharmacological VTE Prophylaxis  Once        Question:  Reasons:  Answer:  Already adequately anticoagulated on oral Anticoagulants     02/21/25 1418     Place RENITA hose  Until discontinued         02/21/25 1418     IP VTE HIGH RISK PATIENT  Once         02/21/25 1418                    Discharge Planning   NIGEL: 3/7/2025     Code Status: Full Code   Medical Readiness for Discharge Date:   Discharge Plan A: Home Health                Please place Justification for DME        Elmer Rainey DO  Department of Hospital Medicine   [facility]

## 2025-02-24 NOTE — H&P
[facility] Medicine  History & Physical    Patient Name: Michell Dhillon  MRN: [id number]  Patient Class: IP- Swing  Admission Date: 2/21/2025  Attending Physician: Elmer Rainey DO   Primary Care Provider: Daryl Moscoso NP         Patient information was obtained from patient, past medical records, and ER records.     Subjective:     Principal Problem:Debility    Chief Complaint: No chief complaint on file.       HPI:   79-year-old  female history of CVA, type 2 diabetes, hypertension, hyperlipidemia, recurrent UTI sent from home for confusion. Patient has been admitted multiple times to many facilities with exact same presentation.  Reviewing a multitude of urine cultures in Epic and various facilities she has had numerous urine cultures with various organisms.      Interval History: seems better, working with PT    Review of Systems   Respiratory:  Negative for shortness of breath.    Cardiovascular:  Negative for chest pain.   Gastrointestinal:  Negative for abdominal pain, nausea and vomiting.   Neurological:  Positive for weakness.   Psychiatric/Behavioral:  Negative for agitation and confusion.    All other systems reviewed and are negative.    Objective:     Vital Signs (Most Recent):  Temp: 97.9 °F (36.6 °C) (02/24/25 0740)  Pulse: 76 (02/24/25 0740)  Resp: 18 (02/24/25 0740)  BP: 130/72 (02/24/25 0740)  SpO2: 99 % (per nursing staff check) (02/24/25 0846) Vital Signs (24h Range):  Temp:  [97.9 °F (36.6 °C)-98.2 °F (36.8 °C)] 97.9 °F (36.6 °C)  Pulse:  [76-83] 76  Resp:  [18-20] 18  SpO2:  [95 %-99 %] 99 %  BP: (130-150)/(72-79) 130/72     Weight: 83 kg (183 lb)  Body mass index is 28.66 kg/m².    Intake/Output Summary (Last 24 hours) at 2/24/2025 1131  Last data filed at 2/23/2025 1848  Gross per 24 hour   Intake 835 ml   Output --   Net 835 ml         Physical Exam  Vitals and nursing note reviewed.   Constitutional:       Appearance: She is  obese.   HENT:      Head: Normocephalic.      Nose: Nose normal.      Mouth/Throat:      Mouth: Mucous membranes are moist.   Eyes:      Extraocular Movements: Extraocular movements intact.      Pupils: Pupils are equal, round, and reactive to light.   Cardiovascular:      Rate and Rhythm: Normal rate and regular rhythm.      Pulses: Normal pulses.      Heart sounds: Normal heart sounds.   Pulmonary:      Effort: Pulmonary effort is normal.      Breath sounds: Normal breath sounds.   Abdominal:      General: There is no distension.      Palpations: Abdomen is soft.      Tenderness: There is no abdominal tenderness.   Musculoskeletal:         General: No swelling or tenderness. Normal range of motion.      Cervical back: Normal range of motion and neck supple.   Skin:     General: Skin is warm.      Capillary Refill: Capillary refill takes 2 to 3 seconds.   Neurological:      General: No focal deficit present.      Mental Status: She is alert. Mental status is at baseline.      Motor: Weakness present.   Psychiatric:         Mood and Affect: Mood normal.         Behavior: Behavior normal.         Thought Content: Thought content normal.         Judgment: Judgment normal.             Significant Labs: All pertinent labs within the past 24 hours have been reviewed.  Recent Lab Results  (Last 5 results in the past 24 hours)        02/24/25  1102   02/24/25  0648   02/24/25  0647   02/24/25  0618   02/23/25 2024        Anion Gap     12           Baso #   0.03             Basophil %   0.5             BUN     20           BUN/CREAT RATIO     24           Calcium     8.8           Chloride     104           CO2     22           Creatinine     0.83           Differential Method   Auto             eGFR     71  Comment: Estimated GFR calculated using the CKD-EPI creatinine (2021) equation.           Eos #   0.11             Eos %   2.0             Glucose     109           Hematocrit   36.6             Hemoglobin   11.2              Lymph #   1.69             Lymph %   30.5             MCH   28.9             MCHC   30.6             MCV   94.3             Mono #   0.60             Mono %   10.8             MPV   9.8             Neutrophils, Abs   3.11             Neutrophils Relative   56.2             Platelet Count   229             POC Glucose 108       110   207       Potassium     4.4           RBC   3.88             RDW   14.6             Sodium     134           WBC   5.54                                    Significant Imaging: I have reviewed all pertinent imaging results/findings within the past 24 hours.  Assessment/Plan:     * Debility  Patient with Acute on chronic debility due to age-related physical debility. The patient's latest AMPAC (Activity Measure for Post Acute Care) Score is listed below.    AM-PAC Score - How much help does the patient need for each activity listed  Basic Mobility Total Score: 13  Turning over in bed (including adjusting bedclothes, sheets and blankets)?: A little  Sitting down on and standing up from a chair with arms (e.g., wheelchair, bedside commode, etc.): A little  Moving from lying on back to sitting on the side of the bed?: A little  Moving to and from a bed to a chair (including a wheelchair)?: A lot  Need to walk in hospital room?: Unable  Climbing 3-5 steps with a railing?: Unable    Plan  - Progressive mobility protocol initated  - PT/OT consulted  - Fall precautions in place  -           Admission for aftercare  PT/OT      Hypothyroidism  Continue home medication      Type 2 diabetes mellitus without complication, without long-term current use of insulin  Continue home meds  FSG      Benign essential HTN  Patient's blood pressure range in the last 24 hours was: BP  Min: 130/67  Max: 154/75.The patient's inpatient anti-hypertensive regimen is listed below:  Current Antihypertensives  metoprolol tartrate (LOPRESSOR) tablet 50 mg, 2 times daily, Oral  losartan tablet 100 mg, Daily,  "Oral    Plan  - BP is controlled, no changes needed to their regimen        VTE Risk Mitigation (From admission, onward)           Ordered     enoxaparin injection 40 mg  Every 24 hours         02/21/25 1454     Reason for No Pharmacological VTE Prophylaxis  Once        Question:  Reasons:  Answer:  Already adequately anticoagulated on oral Anticoagulants    02/21/25 1418     Place RENITA hose  Until discontinued         02/21/25 1418     IP VTE HIGH RISK PATIENT  Once         02/21/25 1418                               [facility] Hospital Medicine  Progress Note    Patient Name: Michell Dhillon  MRN: [id number]  Patient Class: IP- Swing   Admission Date: 2/21/2025  Length of Stay: 0 days  Attending Physician: Elmer Rainey DO  Primary Care Provider: Daryl Moscoso NP        Subjective    Principal Problem:Admission for aftercare        HPI:    79-year-old  female history of CVA, type 2 diabetes, hypertension, hyperlipidemia, recurrent UTI sent from home for confusion. Patient has been admitted multiple times to many facilities with exact same presentation.  Reviewing a multitude of urine cultures in Epic and various facilities she has had numerous urine cultures with various organisms.      Overview/Hospital Course:    2/19 Doing well, changed ABX according to C&S.  Discussed SWB and she is not interested.  Will get some PT today and plan on DC tomorrow with HH.      2/20 Much improved, now willing to do SWB.  Would be better given her multiple hospital admissions for the same issue.      2/21: Patient improving, will swing today to start PT/OT. She is complaining of right foot being "sore" States "I think it is gout". She has no swelling, very mild tenderness to palpation. Denies injury.       Review of Systems   Constitutional:  Negative for chills and fever.   Respiratory:  Negative for cough, shortness of breath and wheezing.    Gastrointestinal:  Negative for " abdominal pain, nausea and vomiting.   Musculoskeletal:  Positive for arthralgias, gait problem and myalgias. Negative for joint swelling.   Skin:  Negative for color change and wound.   Neurological:  Positive for weakness.   Psychiatric/Behavioral:  Positive for confusion (mild).    All other systems reviewed and are negative.    Objective:     Vital Signs (Most Recent):  Temp: 98.7 °F (37.1 °C) (02/21/25 1303)  Pulse: 74 (02/21/25 1436)  Resp: 20 (02/21/25 1436)  BP: 139/72 (02/21/25 1302)  SpO2: 97 % (02/21/25 1436) Vital Signs (24h Range):  Temp:  [97.8 °F (36.6 °C)-98.7 °F (37.1 °C)] 98.7 °F (37.1 °C)  Pulse:  [70-79] 74  Resp:  [18-20] 20  SpO2:  [90 %-97 %] 97 %  BP: (130-154)/(67-75) 139/72     Weight: 81.5 kg (179 lb 11.2 oz)  Body mass index is 28.14 kg/m².    Intake/Output Summary (Last 24 hours) at 2/21/2025 1443  Last data filed at 2/21/2025 1303  Gross per 24 hour   Intake 300 ml   Output --   Net 300 ml         Physical Exam  Vitals and nursing note reviewed.   Constitutional:       Appearance: She is obese.   HENT:      Head: Normocephalic.      Right Ear: External ear normal.      Left Ear: External ear normal.      Nose: Nose normal.      Mouth/Throat:      Mouth: Mucous membranes are moist.   Eyes:      Extraocular Movements: Extraocular movements intact.      Pupils: Pupils are equal, round, and reactive to light.   Cardiovascular:      Rate and Rhythm: Normal rate and regular rhythm.      Pulses: Normal pulses.      Heart sounds: Normal heart sounds.   Pulmonary:      Effort: Pulmonary effort is normal.      Breath sounds: Normal breath sounds.   Abdominal:      General: There is no distension.      Palpations: Abdomen is soft.      Tenderness: There is no abdominal tenderness.   Musculoskeletal:         General: Tenderness present. No swelling. Normal range of motion.      Cervical back: Normal range of motion and neck supple.   Skin:     General: Skin is warm.      Capillary Refill: Capillary  refill takes 2 to 3 seconds.   Neurological:      Mental Status: She is alert. She is disoriented.   Psychiatric:         Mood and Affect: Mood normal.         Behavior: Behavior normal.         Thought Content: Thought content normal.         Judgment: Judgment normal.         Significant Labs: All pertinent labs within the past 24 hours have been reviewed.  Recent Lab Results         02/21/25  1108   02/21/25  0624   02/20/25  2059   02/20/25  1658        POC Glucose 126   126   121   114               Significant Imaging: I have reviewed all pertinent imaging results/findings within the past 24 hours.    Assessment and Plan    * Admission for aftercare  PT/OT      Hypothyroidism  Continue home medication      Type 2 diabetes mellitus without complication, without long-term current use of insulin  Continue home meds  FSG      Benign essential HTN  Patient's blood pressure range in the last 24 hours was: BP  Min: 130/67  Max: 154/75.The patient's inpatient anti-hypertensive regimen is listed below:  Current Antihypertensives  metoprolol tartrate (LOPRESSOR) tablet 50 mg, 2 times daily, Oral  losartan tablet 100 mg, Daily, Oral    Plan  - BP is controlled, no changes needed to their regimen        VTE Risk Mitigation (From admission, onward)           Ordered     Reason for No Pharmacological VTE Prophylaxis  Once        Question:  Reasons:  Answer:  Already adequately anticoagulated on oral Anticoagulants    02/21/25 1418     Place RENITA hose  Until discontinued         02/21/25 1418     IP VTE HIGH RISK PATIENT  Once         02/21/25 1418                    Discharge Planning   NIGEL: 3/7/2025     Code Status: Full Code   Medical Readiness for Discharge Date:   Discharge Plan A: Home Health                Please place Justification for DME        JASSI Sánchez  Department of Hospital Medicine [facility]      Elmer Rainey DO  Department [facility] Medicine  [facility]

## 2025-02-24 NOTE — PLAN OF CARE
Short Term Goals (1 Week)  Patient will be modified independent with supine to sit     Long Term Goals (4 Weeks)  Patient will need SBA for sit to stand with use of RW  Patient will be independent with WC mobility x 200 feet on level surfaces.  Patient will be independent with bed to commode transfer.

## 2025-02-25 LAB
GLUCOSE SERPL-MCNC: 109 MG/DL (ref 70–105)
GLUCOSE SERPL-MCNC: 110 MG/DL (ref 70–105)
GLUCOSE SERPL-MCNC: 117 MG/DL (ref 70–105)
GLUCOSE SERPL-MCNC: 123 MG/DL (ref 70–105)

## 2025-02-25 PROCEDURE — 25000003 PHARM REV CODE 250: Performed by: NURSE PRACTITIONER

## 2025-02-25 PROCEDURE — 97530 THERAPEUTIC ACTIVITIES: CPT

## 2025-02-25 PROCEDURE — 27000958

## 2025-02-25 PROCEDURE — 97110 THERAPEUTIC EXERCISES: CPT

## 2025-02-25 PROCEDURE — 27000987 HC MATTRESS, MATRIX LOW PROFILE

## 2025-02-25 PROCEDURE — 11000004 HC SNF PRIVATE

## 2025-02-25 PROCEDURE — 82962 GLUCOSE BLOOD TEST: CPT

## 2025-02-25 RX ADMIN — METOPROLOL TARTRATE 50 MG: 50 TABLET, FILM COATED ORAL at 08:02

## 2025-02-25 RX ADMIN — CEPHALEXIN 500 MG: 500 CAPSULE ORAL at 02:02

## 2025-02-25 RX ADMIN — CEPHALEXIN 500 MG: 500 CAPSULE ORAL at 09:02

## 2025-02-25 RX ADMIN — CLOPIDOGREL BISULFATE 75 MG: 75 TABLET, FILM COATED ORAL at 08:02

## 2025-02-25 RX ADMIN — METOPROLOL TARTRATE 50 MG: 50 TABLET, FILM COATED ORAL at 09:02

## 2025-02-25 RX ADMIN — LEVOTHYROXINE SODIUM 88 MCG: 0.09 TABLET ORAL at 05:02

## 2025-02-25 RX ADMIN — LOSARTAN POTASSIUM 100 MG: 50 TABLET, FILM COATED ORAL at 08:02

## 2025-02-25 RX ADMIN — CEPHALEXIN 500 MG: 500 CAPSULE ORAL at 05:02

## 2025-02-25 NOTE — PLAN OF CARE
Problem: UTI (Urinary Tract Infection)  Goal: Improved Infection Symptoms  Outcome: Progressing

## 2025-02-25 NOTE — PT/OT/SLP PROGRESS
Occupational Therapy   Treatment    Name: Michell Dhillon  MRN: [id number]  Admitting Diagnosis:  Debility       Recommendations:     Discharge Recommendations: Low Intensity Therapy  Discharge Equipment Recommendations:  none  Barriers to discharge:  None    Assessment:     Michell Dhillon is a 79 y.o. female with a medical diagnosis of Debility.  She presents with no new complaints. Performance deficits affecting function are weakness, impaired endurance, impaired self care skills, impaired functional mobility, impaired balance, pain.     Rehab Prognosis:  Good; patient would benefit from acute skilled OT services to address these deficits and reach maximum level of function.       Plan:     Patient to be seen 5 x/week to address the above listed problems via self-care/home management, therapeutic exercises, therapeutic activities, wheelchair management/training  Plan of Care Expires: 03/21/25  Plan of Care Reviewed with: patient, caregiver, daughter    Subjective     Chief Complaint: unsteady but getting better  Patient/Family Comments/goals: home with family   Pain/Comfort:       Objective:     Communicated with: pt prior to session.  Patient found up in chair with  (no lines or drains) upon OT entry to room.    General Precautions: Standard, fall    Orthopedic Precautions:   Braces:    Respiratory Status: Room air     Occupational Performance:     Bed Mobility:    Na with OT    [facility] 6 Click ADL:      Treatment & Education:  To improve UB endurance, strength, OT facilitated pt with:  UBE x 10 min with 1 RB's throughout, low level resist  GREEN PUTTY to simulate kitchen prep task and improve FM and , ball formation and small object search    Pt able to wash hands at sinkside with just setup from seated prior to lunch being set up.    Patient left up in chair with call button in reach and family present    GOALS:   Multidisciplinary Problems       Occupational Therapy Goals          Problem: Occupational  Therapy    Goal Priority Disciplines Outcome Interventions   Occupational Therapy Goal     OT, PT/OT Progressing    Description: STG: within 2 weeks  Pt will perform grooming with setup at sinkside ONGOING/PROGRESSING  Pt will bathe with min a from sinkside ONGOING/PROGRESSING  Pt will perform UE dressing with SVN ONGOING/PROGRESSING  Pt will perform LE dressing with min a at sinkside ONGOING/PROGRESSING  Pt will sit EOB x 30 min with SBA assistance ONGOING/PROGRESSING  Pt will transfer bed/chair/bsc with SBA ONGOING/PROGRESSING  Pt will perform standing task x 5 min with SVN assistance ONGOING/PROGRESSING  Pt will tolerate 45 minutes of tx without fatigue ONGOING/PROGRESSING      LT.Restore to max I with self care and mobility.                           Time Tracking:     OT Date of Treatment: 25  OT Start Time: 1045  OT Stop Time: 1125  OT Total Time (min): 40 min    Billable Minutes:Therapeutic Activity 25  Therapeutic Exercise 13, 2 min self care    OT/YONI: OT          2025

## 2025-02-25 NOTE — PT/OT/SLP PROGRESS
Physical Therapy Treatment    Patient Name:  Michell Dhillon   MRN:  [id number]    Recommendations:     Discharge Recommendations: Moderate Intensity Therapy  Discharge Equipment Recommendations: bedside commode, walker, rolling, wheelchair  Barriers to discharge: Decreased caregiver support    Assessment:     Michell Dhillon is a 79 y.o. female admitted with a medical diagnosis of Debility.  She presents with the following impairments/functional limitations: impaired endurance, weakness, impaired functional mobility, impaired balance, decreased lower extremity function, decreased safety awareness, pain, impaired joint extensibility, impaired muscle length .    Rehab Prognosis: Good; patient would benefit from acute skilled PT services to address these deficits and reach maximum level of function.    Recent Surgery: * No surgery found *      Plan:     During this hospitalization, patient to be seen 5 x/week to address the identified rehab impairments via gait training, therapeutic activities, therapeutic exercises, neuromuscular re-education, wheelchair management/training and progress toward the following goals:    Plan of Care Expires:  03/28/25    Subjective     Chief Complaint: Patient states she is still a little weak but would like to go home soon.   Patient/Family Comments/goals: Patient states she is still a little weak but would like to go home soon.   Pain/Comfort:  Pain Rating 1: 2/10  Location - Side 1: Right  Location 1: leg      Objective:     Communicated with RN prior to session.  Patient found up in chair with   upon PT entry to room.     General Precautions: Standard, fall  Orthopedic Precautions: N/A  Braces: N/A  Respiratory Status: Room air     Functional Mobility:  Bed Mobility:     Supine to Sit: contact guard assistance  Sit to Supine: contact guard assistance  Transfers:     Sit to Stand:  contact guard assistance with no AD  Bed to Chair: contact guard assistance with  no AD  using  Squat  Pivot  Wheelchair Propulsion:  Pt propelled Standard wheelchair x 150 feet on Level tile with  Bilateral upper extremity with Contact Guard Assistance.       AM-PAC 6 CLICK MOBILITY  Turning over in bed (including adjusting bedclothes, sheets and blankets)?: 3  Sitting down on and standing up from a chair with arms (e.g., wheelchair, bedside commode, etc.): 3  Moving from lying on back to sitting on the side of the bed?: 3  Moving to and from a bed to a chair (including a wheelchair)?: 3  Need to walk in hospital room?: 1  Climbing 3-5 steps with a railing?: 1  Basic Mobility Total Score: 14       Treatment & Education:  Patient performed Nu step x 8 minutes for warm up. . Patient performed functional mobility as stated above. Patient performed there ex for sitting hip flexion, hip ABD , LAQ, and ankle pumps 3 x 10.  Sit to stand x 5      Patient left up in chair with  OT ..  .jf    GOALS:   Multidisciplinary Problems       Physical Therapy Goals          Problem: Physical Therapy    Goal Priority Disciplines Outcome Interventions   Physical Therapy Goal     PT, PT/OT     Description: Short Term Goals (1 Week)  Patient will be modified independent with supine to sit     Long Term Goals (4 Weeks)  Patient will need SBA for sit to stand with use of RW  Patient will be independent with WC mobility x 200 feet on level surfaces.  Patient will be independent with bed to commode transfer.                             DME Justifications:  No DME recommended requiring DME justifications    Time Tracking:     PT Received On: 02/25/25  PT Start Time: 1017     PT Stop Time: 1043  PT Total Time (min): 26 min     Billable Minutes: Therapeutic Activity 13 and Therapeutic Exercise 13    Treatment Type: Treatment  PT/PTA: PT     Number of PTA visits since last PT visit: 0     02/25/2025

## 2025-02-26 LAB
GLUCOSE SERPL-MCNC: 106 MG/DL (ref 70–105)
GLUCOSE SERPL-MCNC: 117 MG/DL (ref 70–105)
GLUCOSE SERPL-MCNC: 138 MG/DL (ref 70–105)
GLUCOSE SERPL-MCNC: 169 MG/DL (ref 70–105)

## 2025-02-26 PROCEDURE — 82962 GLUCOSE BLOOD TEST: CPT

## 2025-02-26 PROCEDURE — 97110 THERAPEUTIC EXERCISES: CPT

## 2025-02-26 PROCEDURE — 97530 THERAPEUTIC ACTIVITIES: CPT

## 2025-02-26 PROCEDURE — 11000004 HC SNF PRIVATE

## 2025-02-26 PROCEDURE — 27000987 HC MATTRESS, MATRIX LOW PROFILE

## 2025-02-26 PROCEDURE — 27000958

## 2025-02-26 PROCEDURE — A6212 FOAM DRG <=16 SQ IN W/BORDER: HCPCS

## 2025-02-26 PROCEDURE — 25000003 PHARM REV CODE 250: Performed by: NURSE PRACTITIONER

## 2025-02-26 RX ADMIN — CEPHALEXIN 500 MG: 500 CAPSULE ORAL at 09:02

## 2025-02-26 RX ADMIN — CLOPIDOGREL BISULFATE 75 MG: 75 TABLET, FILM COATED ORAL at 09:02

## 2025-02-26 RX ADMIN — CEPHALEXIN 500 MG: 500 CAPSULE ORAL at 05:02

## 2025-02-26 RX ADMIN — CEPHALEXIN 500 MG: 500 CAPSULE ORAL at 02:02

## 2025-02-26 RX ADMIN — METOPROLOL TARTRATE 50 MG: 50 TABLET, FILM COATED ORAL at 09:02

## 2025-02-26 RX ADMIN — LEVOTHYROXINE SODIUM 88 MCG: 0.09 TABLET ORAL at 05:02

## 2025-02-26 RX ADMIN — LOSARTAN POTASSIUM 100 MG: 50 TABLET, FILM COATED ORAL at 09:02

## 2025-02-26 NOTE — PT/OT/SLP PROGRESS
Physical Therapy Treatment    Patient Name:  Michell Dhillon   MRN:  [id number]    Recommendations:     Discharge Recommendations: Moderate Intensity Therapy  Discharge Equipment Recommendations: walker, rolling, wheelchair, bedside commode  Barriers to discharge: Decreased caregiver support and decreased functional mobility    Assessment:     Michell Dhillon is a 79 y.o. female admitted with a medical diagnosis of Debility.  She presents with the following impairments/functional limitations: impaired endurance, impaired balance, decreased lower extremity function, impaired functional mobility, gait instability, impaired joint extensibility, impaired muscle length .    Rehab Prognosis: Good; patient would benefit from acute skilled PT services to address these deficits and reach maximum level of function.    Recent Surgery: * No surgery found *      Plan:     During this hospitalization, patient to be seen 5 x/week to address the identified rehab impairments via therapeutic activities, therapeutic exercises, neuromuscular re-education, wheelchair management/training and progress toward the following goals:    Plan of Care Expires:  03/28/25    Subjective     Chief Complaint: Patient states she is feeling better  Patient/Family Comments/goals: Patient wants to be able to transfer by herself.  Pain/Comfort:  Pain Rating 1: 2/10  Location - Side 1: Right  Location 1: leg      Objective:     Communicated with RN prior to session.  Patient found up in chair with   upon PT entry to room.     General Precautions: Standard, fall  Orthopedic Precautions: N/A  Braces: N/A  Respiratory Status: Room air     Functional Mobility:  Bed Mobility:     Supine to Sit: contact guard assistance  Transfers:     Sit to Stand:  contact guard assistance with rolling walker  Gait: N/A  Balance: Fair + static standing balance      AM-PAC 6 CLICK MOBILITY  Turning over in bed (including adjusting bedclothes, sheets and blankets)?: 3  Sitting  down on and standing up from a chair with arms (e.g., wheelchair, bedside commode, etc.): 3  Moving from lying on back to sitting on the side of the bed?: 3  Moving to and from a bed to a chair (including a wheelchair)?: 3  Need to walk in hospital room?: 1  Climbing 3-5 steps with a railing?: 1  Basic Mobility Total Score: 14       Treatment & Education:  Patient performed activities as stated above. Stand and pivot with CGA x 5. Sit to stand and standing tolerance 3 x 2 minute stand with CGA. Sitting hip flexion ABD, LAQ 2 x 10 with 2#.    Patient left up in chair with all lines intact and call button in reach..    GOALS:   Multidisciplinary Problems       Physical Therapy Goals          Problem: Physical Therapy    Goal Priority Disciplines Outcome Interventions   Physical Therapy Goal     PT, PT/OT     Description: Short Term Goals (1 Week)  Patient will be modified independent with supine to sit     Long Term Goals (4 Weeks)  Patient will need SBA for sit to stand with use of RW  Patient will be independent with WC mobility x 200 feet on level surfaces.  Patient will be independent with bed to commode transfer.                             DME Justifications:  No DME recommended requiring DME justifications    Time Tracking:     PT Received On: 02/26/25  PT Start Time: 1310     PT Stop Time: 1340  PT Total Time (min): 30 min     Billable Minutes: Therapeutic Activity 17 and Therapeutic Exercise 13    Treatment Type: Treatment  PT/PTA: PT     Number of PTA visits since last PT visit: 0     02/26/2025

## 2025-02-26 NOTE — PLAN OF CARE
Problem: Adult Inpatient Plan of Care  Goal: Plan of Care Review  Outcome: Progressing     Problem: Adult Inpatient Plan of Care  Goal: Patient-Specific Goal (Individualized)  Outcome: Progressing     Problem: Adult Inpatient Plan of Care  Goal: Absence of Hospital-Acquired Illness or Injury  Outcome: Progressing     Problem: Adult Inpatient Plan of Care  Goal: Optimal Comfort and Wellbeing  Outcome: Progressing     Problem: Adult Inpatient Plan of Care  Goal: Readiness for Transition of Care  Outcome: Progressing     Problem: Diabetes Comorbidity  Goal: Blood Glucose Level Within Targeted Range  Outcome: Progressing     Problem: Skin Injury Risk Increased  Goal: Skin Health and Integrity  Outcome: Progressing     Problem: Infection  Goal: Absence of Infection Signs and Symptoms  Outcome: Progressing     Problem: UTI (Urinary Tract Infection)  Goal: Improved Infection Symptoms  Outcome: Progressing     Problem: Fall Injury Risk  Goal: Absence of Fall and Fall-Related Injury  Outcome: Progressing

## 2025-02-26 NOTE — PT/OT/SLP PROGRESS
Physical Therapy Treatment    Patient Name:  Michell Dhillon   MRN:  [id number]    Recommendations:     Discharge Recommendations: Moderate Intensity Therapy  Discharge Equipment Recommendations: walker, rolling, wheelchair, bedside commode  Barriers to discharge: Decreased caregiver support    Assessment:     Michell Dhillon is a 79 y.o. female admitted with a medical diagnosis of Debility.  She presents with the following impairments/functional limitations: impaired endurance, impaired balance, decreased lower extremity function, impaired functional mobility, gait instability, impaired joint extensibility, impaired muscle length .    Rehab Prognosis: Good; patient would benefit from acute skilled PT services to address these deficits and reach maximum level of function.    Recent Surgery: * No surgery found *      Plan:     During this hospitalization, patient to be seen 5 x/week to address the identified rehab impairments via therapeutic activities, therapeutic exercises, neuromuscular re-education, wheelchair management/training and progress toward the following goals:    Plan of Care Expires:  03/28/25    Subjective     Chief Complaint: Patient states she is moving better.  Patient/Family Comments/goals: Patient feels like she is moving better  Pain/Comfort:  Pain Rating 1: 2/10  Location - Side 1: Right  Location 1: leg      Objective:     Communicated with RN prior to session.  Patient found up in chair with   upon PT entry to room.     General Precautions: Standard, fall  Orthopedic Precautions: N/A  Braces: N/A  Respiratory Status: Room air     Functional Mobility:  Bed Mobility:     Supine to Sit: contact guard assistance  Sit to Supine: contact guard assistance  Transfers:     Sit to Stand:  minimum assistance with rolling walker  Balance: fair  Wheelchair Propulsion:  Pt propelled Standard wheelchair x 150 feet on Level tile with  Bilateral upper extremity with Contact Guard Assistance.       AM-PAC 6  CLICK MOBILITY  Turning over in bed (including adjusting bedclothes, sheets and blankets)?: 3  Sitting down on and standing up from a chair with arms (e.g., wheelchair, bedside commode, etc.): 3  Moving from lying on back to sitting on the side of the bed?: 3  Moving to and from a bed to a chair (including a wheelchair)?: 3  Need to walk in hospital room?: 1  Climbing 3-5 steps with a railing?: 1  Basic Mobility Total Score: 14       Treatment & Education:  Patient performed activities as stated above  There ex for hip flexion, LAQ ,ABD, DF, PF,    Patient left up in chair with call button in reach..    GOALS:   Multidisciplinary Problems       Physical Therapy Goals          Problem: Physical Therapy    Goal Priority Disciplines Outcome Interventions   Physical Therapy Goal     PT, PT/OT     Description: Short Term Goals (1 Week)  Patient will be modified independent with supine to sit     Long Term Goals (4 Weeks)  Patient will need SBA for sit to stand with use of RW  Patient will be independent with WC mobility x 200 feet on level surfaces.  Patient will be independent with bed to commode transfer.                             DME Justifications:  No DME recommended requiring DME justifications    Time Tracking:     PT Received On: 02/26/25  PT Start Time: 1310     PT Stop Time: 1340  PT Total Time (min): 30 min     Billable Minutes: Therapeutic Activity 17 and Therapeutic Exercise 13    Treatment Type: Treatment  PT/PTA: PT     Number of PTA visits since last PT visit: 0     02/26/2025

## 2025-02-26 NOTE — PT/OT/SLP PROGRESS
Occupational Therapy   Treatment    Name: Michell Dhillon  MRN: [id number]  Admitting Diagnosis:  Debility       Recommendations:     Discharge Recommendations: Low Intensity Therapy  Discharge Equipment Recommendations:  none  Barriers to discharge:  None    Assessment:     Michell Dhillon is a 79 y.o. female with a medical diagnosis of Debility.  She presents with no new complaints, feeling well today. Performance deficits affecting function are weakness, impaired endurance, impaired self care skills, impaired functional mobility, impaired balance, pain.     Rehab Prognosis:  Good; patient would benefit from acute skilled OT services to address these deficits and reach maximum level of function.       Plan:     Patient to be seen 5 x/week to address the above listed problems via self-care/home management, therapeutic exercises, therapeutic activities, wheelchair management/training  Plan of Care Expires: 03/21/25  Plan of Care Reviewed with: patient, caregiver, daughter    Subjective     Chief Complaint: improving overall, no significant complaints  Patient/Family Comments/goals: home with family  Pain/Comfort:       Objective:     Communicated with: pt and her GDIL prior to session.  Patient found up in chair with  (no lines or drains) upon OT entry to room.    General Precautions: Standard, fall    Orthopedic Precautions:   Braces:    Respiratory Status: Room air     Occupational Performance:     Bed Mobility:    Na with OT      [facility] 6 Click ADL:      Treatment & Education:  To improve UB endurance, strength, OT facilitated pt with:  UBE x 10min with no RB's throughout, low level resist  1 kg ball lifting in ch press, sh flexion, and horiz abd/add 15 reps each direction 1 set each      To improve reach, AROM, FM/ and trunk rotation with core forward leaning engagement:   Pt performed laundry folding at tabletop x 10 min with 1/2# wrist weights to promote resistive strengthening and endurance; pt did very  well with this task from seated  Towel scrubbing at tabletop with 3# weighted towel in multi directional pushes/pulls x 6 min       Patient left up in chair with call button in reach      GOALS:   Multidisciplinary Problems       Occupational Therapy Goals          Problem: Occupational Therapy    Goal Priority Disciplines Outcome Interventions   Occupational Therapy Goal     OT, PT/OT Progressing    Description: STG: within 2 weeks  Pt will perform grooming with setup at sinkside ONGOING/PROGRESSING  Pt will bathe with min a from sinkside ONGOING/PROGRESSING  Pt will perform UE dressing with SVN ONGOING/PROGRESSING  Pt will perform LE dressing with min a at sinkside ONGOING/PROGRESSING  Pt will sit EOB x 30 min with SBA assistance ONGOING/PROGRESSING  Pt will transfer bed/chair/bsc with SBA ONGOING/PROGRESSING  Pt will perform standing task x 5 min with SVN assistance ONGOING/PROGRESSING  Pt will tolerate 45 minutes of tx without fatigue ONGOING/PROGRESSING      LT.Restore to max I with self care and mobility.                             Time Tracking:     OT Date of Treatment: 25  OT Start Time: 1007  OT Stop Time: 1045  OT Total Time (min): 38 min    Billable Minutes:Therapeutic Activity 25  Therapeutic Exercise 15    OT/YONI: OT          2025

## 2025-02-27 LAB
GLUCOSE SERPL-MCNC: 108 MG/DL (ref 70–105)
GLUCOSE SERPL-MCNC: 120 MG/DL (ref 70–105)
GLUCOSE SERPL-MCNC: 130 MG/DL (ref 70–105)
GLUCOSE SERPL-MCNC: 152 MG/DL (ref 70–105)

## 2025-02-27 PROCEDURE — 25000003 PHARM REV CODE 250: Performed by: NURSE PRACTITIONER

## 2025-02-27 PROCEDURE — 97110 THERAPEUTIC EXERCISES: CPT | Mod: CQ

## 2025-02-27 PROCEDURE — 27000958

## 2025-02-27 PROCEDURE — 11000004 HC SNF PRIVATE

## 2025-02-27 PROCEDURE — 82962 GLUCOSE BLOOD TEST: CPT

## 2025-02-27 PROCEDURE — 27000987 HC MATTRESS, MATRIX LOW PROFILE

## 2025-02-27 RX ADMIN — METOPROLOL TARTRATE 50 MG: 50 TABLET, FILM COATED ORAL at 08:02

## 2025-02-27 RX ADMIN — LOSARTAN POTASSIUM 100 MG: 50 TABLET, FILM COATED ORAL at 09:02

## 2025-02-27 RX ADMIN — LEVOTHYROXINE SODIUM 88 MCG: 0.09 TABLET ORAL at 06:02

## 2025-02-27 RX ADMIN — METOPROLOL TARTRATE 50 MG: 50 TABLET, FILM COATED ORAL at 09:02

## 2025-02-27 RX ADMIN — CLOPIDOGREL BISULFATE 75 MG: 75 TABLET, FILM COATED ORAL at 09:02

## 2025-02-27 RX ADMIN — CEPHALEXIN 500 MG: 500 CAPSULE ORAL at 02:02

## 2025-02-27 RX ADMIN — CEPHALEXIN 500 MG: 500 CAPSULE ORAL at 06:02

## 2025-02-27 RX ADMIN — CEPHALEXIN 500 MG: 500 CAPSULE ORAL at 08:02

## 2025-02-27 NOTE — PT/OT/SLP PROGRESS
Physical Therapy Treatment    Patient Name:  Michell Dhillon   MRN:  [id number]    Recommendations:     Discharge Recommendations: Moderate Intensity Therapy  Discharge Equipment Recommendations: walker, rolling, wheelchair, bedside commode  Barriers to discharge: None    Assessment:     Michell Dhillon is a 79 y.o. female admitted with a medical diagnosis of Debility.  She presents with the following impairments/functional limitations: impaired endurance, impaired self care skills, impaired functional mobility, gait instability, impaired balance Pt was able to stand for 45 sec x 2 with a rest break.    Rehab Prognosis: Fair; patient would benefit from acute skilled PT services to address these deficits and reach maximum level of function.    Recent Surgery: * No surgery found *      Plan:     During this hospitalization, patient to be seen 5 x/week to address the identified rehab impairments via gait training, therapeutic activities, therapeutic exercises and progress toward the following goals:    Plan of Care Expires:  03/28/25    Subjective     Chief Complaint: none  Patient/Family Comments/goals: return to home  Pain/Comfort:  Pain Rating 1: 0/10  Pain Rating Post-Intervention 1: 0/10      Objective:     Communicated with nursing prior to session.  Patient found up in chair with   upon PT entry to room.     General Precautions: Standard, fall  Orthopedic Precautions: N/A  Braces: N/A  Respiratory Status: Room air     Functional Mobility:  Transfers:     Sit to Stand:  contact guard assistance with rolling walker      AM-PAC 6 CLICK MOBILITY  Turning over in bed (including adjusting bedclothes, sheets and blankets)?: 3  Sitting down on and standing up from a chair with arms (e.g., wheelchair, bedside commode, etc.): 3  Moving from lying on back to sitting on the side of the bed?: 3  Moving to and from a bed to a chair (including a wheelchair)?: 3  Need to walk in hospital room?: 1  Climbing 3-5 steps with a  railing?: 1  Basic Mobility Total Score: 14       Treatment & Education:    Transfer listed above  Seated LAQs, seated marches, nu step x 5 min, seated hip abd, seated heel-toe raises, sit to stand x 2    Patient left up in chair with call button in reach and chair alarm on..    GOALS:   Multidisciplinary Problems       Physical Therapy Goals          Problem: Physical Therapy    Goal Priority Disciplines Outcome Interventions   Physical Therapy Goal     PT, PT/OT     Description: Short Term Goals (1 Week)  Patient will be modified independent with supine to sit     Long Term Goals (4 Weeks)  Patient will need SBA for sit to stand with use of RW  Patient will be independent with WC mobility x 200 feet on level surfaces.  Patient will be independent with bed to commode transfer.                             DME Justifications:   Michell's mobility limitation cannot be sufficiently resolved by the use of a cane. Her functional mobility deficit can be sufficiently resolved with the use of a Rolling Walker. Patient's mobility limitation significantly impairs their ability to participate in one of more activities of daily living.  The use of a RW will significantly improve the patient's ability to participate in MRADLS and the patient will use it on regular basis in the home.    Time Tracking:     PT Received On: 02/27/25  PT Start Time: 1355     PT Stop Time: 1419  PT Total Time (min): 24 min     Billable Minutes: Therapeutic Exercise 24    Treatment Type: Treatment  PT/PTA: PTA     Number of PTA visits since last PT visit: 1 02/27/2025

## 2025-02-27 NOTE — PLAN OF CARE
[facility]   Interdisciplinary Team Meeting    Patient: Michell Dhillon   Today's Date: 2/27/2025   Estimated D/C Date: 3/7/2025        Physician: Elmer Rainey DO Nurse Practitioner: Manjinder Landrum NP   Pharmacy: Tyelr Will, PharmD Unit Director: Quin Steele RN   : Min Barraza RN Physical/Occupational Therapy: Ady Cannon PT   Speech Therapy: ST Jonel Activity Therapy: Yoon Kingsley   Nursing: Quin Steele RN  Respiratory: Shyann Blackwood, RT Dietary: Hannah Huang RD  Other: N/A     Nursing  New Symptoms/Problems: N/A  Last Bowel Movement: 02/26/25  Urine: continent  Robb: No  Bowel: continent   Constipated: No  Diarrhea: No   Isolation: No  Wound Care: No  Wound Location/Tx: N/A  Cognition: WNL  Aspiration Precautions: No  Comment(s): N/A    Respiratory:   O2 Device: Room Air  O2 Flow: N/A  SpO2: 93%  Neb Tx: No  Comment(s): N/A    Dietary    Nutrition: Diabetic  Comment(s): N/A    Speech Therapy  Speech/Swallowing: No current speech or swallowing issues  Comment(s): No    Physical Therapy  Gait/Assistive Device: N/A ELOS: Plan to DC 3/7/2025    Transfers: Contact Guard Assistance  Bed Mobility: Standby Assistance Range of Motion/Restrictions: N/A  Comment(s): N/A     Occupational Therapy  Eating/Grooming: Modified Independent Toileting: Minimal Assistance   Bathing: Minimal Assistance Dressing (Upper Body): Standby Assistance   Dressing (Lower Body): Minimal Assistance Comment(s): N/A     Activity Therapy  Level of participation: Active participation  Comment(s): N/A    Pharmacy   Medication Changes (see MD orders in chart): No  Labs Reviewed: Yes  New Lab Orders: No  Comment(s): N/A      Tx Plan/Recommendations reviewed with family and/or patient on (date) 2/27.  Additional family Conference/Training: N/A  D/C Plan/Recommendations: Home with HH  NIGEL: 3/7/2025  Comment(s): DISCHARGE HOME WITH HOME  HEALTH 3/5

## 2025-02-27 NOTE — PLAN OF CARE
Problem: Adult Inpatient Plan of Care  Goal: Optimal Comfort and Wellbeing  Outcome: Progressing     Problem: Diabetes Comorbidity  Goal: Blood Glucose Level Within Targeted Range  Outcome: Progressing     Problem: Skin Injury Risk Increased  Goal: Skin Health and Integrity  Outcome: Progressing     Problem: Infection  Goal: Absence of Infection Signs and Symptoms  Outcome: Progressing     Problem: Fall Injury Risk  Goal: Absence of Fall and Fall-Related Injury  Outcome: Progressing

## 2025-02-27 NOTE — CONSULTS
[facility]  Adult Nutrition  Follow-up Note         Reason for Assessment  Reason For Assessment: RD follow-up        Assessment and Plan  2/27/2025 RD Follow up: Patient continues on a 2000 calorie consistent carbohydrate diet. Po intakes 50-75% on average per flowsheets. Current weight 83 kg. Last BM noted 2/26. Recommend to continue diet as ordered and encourage po intakes. RD Following.    Consult received and appreciated. Patient admitted 2/21 with a dx of confusion and pmh of DM, CVA and recurrent UTI. Patient is ordered a 2000 calorie consistent carbohydrate with %. Patient is 81.5 kg with a BMI of 28.14 which is WNL per geriatric standards. Recommend to continue diet as tolerated. Encourage po intakes. RD Following.        Learning Needs/Social Determinants of Health    Learning Assessment       02/21/2025 1301 [facility] (2/21/2025 - Present)   Created by Naty Hayes, RN - RN (Nurse) Status: Complete                 PRIMARY LEARNER     Primary Learner Name:  CONRADO MARRERO  - 02/21/2025 1301    Relationship:  Patient  - 02/21/2025 1301    Does the primary learner have any barriers to learning?:  No Barriers  - 02/21/2025 1301    What is the preferred language of the primary learner?:  English  - 02/21/2025 1301    Is an  required?:  No  - 02/21/2025 1301    How does the primary learner prefer to learn new concepts?:  Listening  - 02/21/2025 1301    How often do you need to have someone help you read instructions, pamphlets, or written material from your doctor or pharmacy?:  Sometimes  - 02/21/2025 1301        CO-LEARNER #1     No question answered        CO-LEARNER #2     No question answered        SPECIAL TOPICS     No question answered        ANSWERED BY:     No question answered        Comments         Edit History       Naty Hayes, RN - RN (Nurse)   02/21/2025 1301                            Social Drivers of Health     Tobacco Use: Low Risk  (2/17/2025)    Patient History     Smoking Tobacco Use: Never     Smokeless Tobacco Use: Never     Passive Exposure: Not on file   Alcohol Use: Not At Risk (2/21/2025)    AUDIT-C     Frequency of Alcohol Consumption: Never     Average Number of Drinks: Patient does not drink     Frequency of Binge Drinking: Never   Financial Resource Strain: Low Risk  (2/21/2025)    Overall Financial Resource Strain (CARDIA)     Difficulty of Paying Living Expenses: Not hard at all   Food Insecurity: No Food Insecurity (2/21/2025)    Hunger Vital Sign     Worried About Running Out of Food in the Last Year: Never true     Ran Out of Food in the Last Year: Never true   Transportation Needs: No Transportation Needs (11/23/2024)    [facility]    PRAPARE - Transportation     Lack of Transportation (Medical): No     Lack of Transportation (Non-Medical): No   Physical Activity: Inactive (2/21/2025)    Exercise Vital Sign     Days of Exercise per Week: 0 days     Minutes of Exercise per Session: 0 min   Stress: No Stress Concern Present (2/21/2025)    [employer] [place] [facility] - Occupational Stress Questionnaire     Feeling of Stress : Not at all   Housing Stability: Low Risk  (2/21/2025)    Housing Stability Vital Sign     Unable to Pay for Housing in the Last Year: No     Number of Times Moved in the Last Year: Not on file     Homeless in the Last Year: No   Depression: Not at risk (2/9/2025)    Received from [facility]    PHQ-2     PHQ-2 Total: 0   Utilities: Not At Risk (2/21/2025)    [facility] Utilities     Threatened with loss of utilities: No   Health Literacy: Inadequate Health Literacy (2/21/2025)     Health Literacy     Frequency of need for help with medical instructions: Sometimes   Social Isolation:  "Socially Integrated (2/21/2025)    Social Isolation     Social Isolation: 1          Malnutrition  Is Patient Malnourished: No    Nutrition Diagnosis  Altered nutrition related laboratory values related to Diabetes Mellitus as evidenced by elevated BG  Comments: continue diet as tolerated    Recent Labs   Lab 02/27/25  1137   POCGLU 108*     Comments on Glucose: DM    Nutrition Prescription / Recommendations  Recommendation/Intervention: Continue diet as tolerated  Goals: po intakes 75% during admission  Nutrition Goal Status: progressing towards goal  Current Diet Order: 2000 calorie consistent carbohydrate  Nutrition Order Comments: 50-75%  Chewing or Swallowing Difficulty?: No Chewing or swallowing difficulty  Recommended Diet: Consistent Carbohydrate 2000 (75g Carbs)  Recommended Oral Supplement: No Oral Supplements  Is Nutrition Support Recommended: [facility] Support: No  Is Nutrition Education Recommended: No    Monitor and Evaluation  % current Intake: P.O. intake of 50 - 75 %  % intake to meet estimated needs: 50 - 75 %     Tolerance: tolerating    Current Medical Diagnosis and Past Medical History  Diagnosis: diabetes diagnosis/complications, renal disease, cardiac disease  Past Medical History:   Diagnosis Date    Arthritis     Diabetes mellitus, type 2     Hyperlipidemia     Hypertension     Hypothyroidism     Transient cerebral ischemic attack, unspecified 04/14/2021       Nutrition/Diet History  Spiritual, Cultural Beliefs, Rastafarian Practices, Values that Affect Care: yes  Food Allergies: NKFA  Factors Affecting Nutritional Intake: None identified at this time    Lab/Procedures/Meds  No results for input(s): "NA", "K", "BUN", "CREATININE", "CALCIUM", "ALBUMIN", "CL", "ALT", "AST", "PHOS" in the last 72 hours.  Last A1c:   Lab Results   Component Value Date    HGBA1C 6.3 02/17/2025    HGBA1C 8.5 03/06/2024     Lab Results   Component Value Date    RBC 3.88 (L) 02/24/2025    HGB 11.2 (L) " "02/24/2025    HCT 36.6 (L) 02/24/2025    MCV 94.3 02/24/2025    MCH 28.9 02/24/2025    MCHC 30.6 (L) 02/24/2025     Pertinent Labs Reviewed: reviewed  Pertinent Medications Reviewed: reviewed  Scheduled Meds:   cephALEXin  500 mg Oral Q8H    clopidogreL  75 mg Oral Daily    ergocalciferol  50,000 Units Oral Q7 Days    levothyroxine  88 mcg Oral Before breakfast    losartan  100 mg Oral Daily    metoprolol tartrate  50 mg Oral BID     Continuous Infusions:  PRN Meds:.  Current Facility-Administered Medications:     acetaminophen, 650 mg, Oral, Q6H PRN    acetaminophen, 650 mg, Oral, Q4H PRN    calcium carbonate, 500 mg, Oral, BID PRN    dextrose 50%, 12.5 g, Intravenous, PRN    dextrose 50%, 25 g, Intravenous, PRN    glucagon (human recombinant), 1 mg, Intramuscular, PRN    glucose, 16 g, Oral, PRN    glucose, 24 g, Oral, PRN    insulin aspart U-100, 0-5 Units, Subcutaneous, QID (AC + HS) PRN    melatonin, 6 mg, Oral, Nightly PRN    senna-docusate 8.6-50 mg, 1 tablet, Oral, BID PRN    Anthropometrics  Height: 5' 7" (170.2 cm)  Height (inches): 67 in  Height Method: Stated  Weight: 83 kg (183 lb)  Weight (lb): 183 lb  Weight Method: Bed Scale  Ideal Body Weight (IBW), Female: 135 lb  % Ideal Body Weight, Female (lb): 133.11 %  BMI (Calculated): 28.7       Estimated/Assessed Needs      Temp: 98.3 °F (36.8 °C)Oral  Weight Used For Calorie Calculations: 81.5 kg (179 lb 10.8 oz)   Energy Need Method: Kcal/kg Energy Calorie Requirements (kcal): 7124-2230  Weight Used For Protein Calculations: 81.5 kg (179 lb 10.8 oz)  Protein Requirements: 65-82  Estimated Fluid Requirement Method: RDA Method    RDA Method (mL): 1630       Nutrition by Nursing  Diet/Nutrition Received: consistent carb/diabetic diet  Intake (%): 50%  Diet/Feeding Assistance: none  Diet/Feeding Tolerance: good  Last Bowel Movement: 02/26/25                Nutrition Follow-Up  RD Follow-up?: Yes      Nutrition Discharge Planning: RD following for dc " needs          Available via Secure Chat

## 2025-02-28 LAB
GLUCOSE SERPL-MCNC: 102 MG/DL (ref 70–105)
GLUCOSE SERPL-MCNC: 110 MG/DL (ref 70–105)
GLUCOSE SERPL-MCNC: 119 MG/DL (ref 70–105)
GLUCOSE SERPL-MCNC: 160 MG/DL (ref 70–105)

## 2025-02-28 PROCEDURE — 97530 THERAPEUTIC ACTIVITIES: CPT

## 2025-02-28 PROCEDURE — 27000987 HC MATTRESS, MATRIX LOW PROFILE

## 2025-02-28 PROCEDURE — 27000958

## 2025-02-28 PROCEDURE — 25000003 PHARM REV CODE 250: Performed by: NURSE PRACTITIONER

## 2025-02-28 PROCEDURE — 11000004 HC SNF PRIVATE

## 2025-02-28 PROCEDURE — 99308 SBSQ NF CARE LOW MDM 20: CPT | Mod: ,,, | Performed by: HOSPITALIST

## 2025-02-28 PROCEDURE — 97110 THERAPEUTIC EXERCISES: CPT

## 2025-02-28 PROCEDURE — 82962 GLUCOSE BLOOD TEST: CPT

## 2025-02-28 RX ORDER — CETIRIZINE HYDROCHLORIDE 10 MG/1
10 TABLET ORAL NIGHTLY
Status: DISCONTINUED | OUTPATIENT
Start: 2025-02-28 | End: 2025-03-10 | Stop reason: HOSPADM

## 2025-02-28 RX ADMIN — CLOPIDOGREL BISULFATE 75 MG: 75 TABLET, FILM COATED ORAL at 08:02

## 2025-02-28 RX ADMIN — METOPROLOL TARTRATE 50 MG: 50 TABLET, FILM COATED ORAL at 08:02

## 2025-02-28 RX ADMIN — CEPHALEXIN 500 MG: 500 CAPSULE ORAL at 06:02

## 2025-02-28 RX ADMIN — CETIRIZINE HYDROCHLORIDE 10 MG: 10 TABLET, FILM COATED ORAL at 08:02

## 2025-02-28 RX ADMIN — LOSARTAN POTASSIUM 100 MG: 50 TABLET, FILM COATED ORAL at 08:02

## 2025-02-28 RX ADMIN — LEVOTHYROXINE SODIUM 88 MCG: 0.09 TABLET ORAL at 06:02

## 2025-02-28 NOTE — PT/OT/SLP DISCHARGE
Occupational Therapy Discharge Summary    Michell Dhillon  MRN: [id number]   Principal Problem: Debility      Patient Discharged from acute Occupational Therapy on 2025.  Please refer to prior OT note dated  for functional status.    Assessment:      Patient has met all goals and is not appropriate for therapy.    Objective:     GOALS:   Multidisciplinary Problems       Occupational Therapy Goals          Problem: Occupational Therapy    Goal Priority Disciplines Outcome Interventions   Occupational Therapy Goal     OT, PT/OT Adequate for Care Transition    Description: STG: within 2 weeks  Pt will perform grooming with setup at sinkside MET  Pt will bathe with min a from sinkside MET  Pt will perform UE dressing with SVN MET  Pt will perform LE dressing with min a at sinkside MET  Pt will sit EOB x 30 min with SBA assistance MET  Pt will transfer bed/chair/bsc with SBA MET  Pt will perform standing task x 5 min with SVN assistance MET as needed at sinkside   Pt will tolerate 45 minutes of tx without fatigue MET      LT.Restore to max I with self care and mobility.                         Reasons for Discontinuation of Therapy Services  Satisfactory goal achievement.      Plan:     Patient Discharged to: Home with Home Health Service and after discharge from hospital swingbed; pt continues to receive PT at this time; pt d/c plans are for by 2025; recommend home health and frequent intermittent svn from family    2025

## 2025-02-28 NOTE — PROGRESS NOTES
"[facility] Medicine  Progress Note    Patient Name: Michell Dhillon  MRN: [id number]  Patient Class: IP- Swing   Admission Date: 2/21/2025  Length of Stay: 7 days  Attending Physician: Elmer Rainey DO  Primary Care Provider: Daryl Moscoso NP        Subjective     Principal Problem:Debility        HPI:    79-year-old  female history of CVA, type 2 diabetes, hypertension, hyperlipidemia, recurrent UTI sent from home for confusion. Patient has been admitted multiple times to many facilities with exact same presentation.  Reviewing a multitude of urine cultures in Epic and various facilities she has had numerous urine cultures with various organisms.      Overview/Hospital Course:    2/19 Doing well, changed ABX according to C&S.  Discussed SWB and she is not interested.  Will get some PT today and plan on DC tomorrow with HH.      2/20 Much improved, now willing to do SWB.  Would be better given her multiple hospital admissions for the same issue.      2/21: Patient improving, will swing today to start PT/OT. She is complaining of right foot being "sore" States "I think it is gout". She has no swelling, very mild tenderness to palpation. Denies injury.     2/24 Looks better, working with PT.  Seems more awake.    2/28 Continues to improve, doing well    Interval History: seems to be doing well.  No major issues     Review of Systems   Respiratory:  Negative for shortness of breath.    Cardiovascular:  Negative for chest pain.   Gastrointestinal:  Negative for abdominal pain, nausea and vomiting.   Musculoskeletal:  Positive for arthralgias.   Neurological:  Positive for weakness.   Psychiatric/Behavioral:  Negative for agitation, behavioral problems and confusion.    All other systems reviewed and are negative.    Objective:     Vital Signs (Most Recent):  Temp: 97.8 °F (36.6 °C) (02/28/25 0713)  Pulse: 73 (02/28/25 0713)  Resp: (!) 22 (02/28/25 0713)  BP: " (!) 156/70 (02/28/25 0713)  SpO2: (!) 92 % (02/28/25 0713) Vital Signs (24h Range):  Temp:  [97.8 °F (36.6 °C)-98 °F (36.7 °C)] 97.8 °F (36.6 °C)  Pulse:  [73-86] 73  Resp:  [20-22] 22  SpO2:  [92 %-100 %] 92 %  BP: (148-156)/(70) 156/70     Weight: 85.1 kg (187 lb 9.6 oz)  Body mass index is 29.38 kg/m².    Intake/Output Summary (Last 24 hours) at 2/28/2025 1323  Last data filed at 2/28/2025 1234  Gross per 24 hour   Intake 1100 ml   Output --   Net 1100 ml         Physical Exam  Vitals and nursing note reviewed.   Constitutional:       Appearance: She is obese.   HENT:      Head: Normocephalic.      Nose: Nose normal.      Mouth/Throat:      Mouth: Mucous membranes are moist.   Eyes:      Extraocular Movements: Extraocular movements intact.      Pupils: Pupils are equal, round, and reactive to light.   Cardiovascular:      Rate and Rhythm: Normal rate and regular rhythm.      Pulses: Normal pulses.      Heart sounds: Normal heart sounds.   Pulmonary:      Effort: Pulmonary effort is normal.      Breath sounds: Normal breath sounds.   Abdominal:      General: There is no distension.      Palpations: Abdomen is soft.      Tenderness: There is no abdominal tenderness.   Musculoskeletal:         General: No swelling or tenderness. Normal range of motion.      Cervical back: Normal range of motion and neck supple.   Skin:     General: Skin is warm.      Capillary Refill: Capillary refill takes 2 to 3 seconds.   Neurological:      General: No focal deficit present.      Mental Status: She is alert. Mental status is at baseline.      Motor: Weakness present.   Psychiatric:         Mood and Affect: Mood normal.         Behavior: Behavior normal.         Thought Content: Thought content normal.         Judgment: Judgment normal.             Significant Labs: All pertinent labs within the past 24 hours have been reviewed.  Recent Lab Results         02/28/25  1123   02/28/25  0618   02/27/25 2048   02/27/25  1720         POC Glucose 110   119   152   130               Significant Imaging: I have reviewed all pertinent imaging results/findings within the past 24 hours.    Assessment and Plan     * Debility  Patient with Acute on chronic debility due to age-related physical debility. The patient's latest AMPAC (Activity Measure for Post Acute Care) Score is listed below.    AM-PAC Score - How much help does the patient need for each activity listed  Basic Mobility Total Score: 13  Turning over in bed (including adjusting bedclothes, sheets and blankets)?: A little  Sitting down on and standing up from a chair with arms (e.g., wheelchair, bedside commode, etc.): A little  Moving from lying on back to sitting on the side of the bed?: A little  Moving to and from a bed to a chair (including a wheelchair)?: A lot  Need to walk in hospital room?: Unable  Climbing 3-5 steps with a railing?: Unable    Plan  - Progressive mobility protocol initated  - PT/OT consulted  - Fall precautions in place  -           Admission for aftercare  PT/OT      Hypothyroidism  Continue home medication      Type 2 diabetes mellitus without complication, without long-term current use of insulin  Continue home meds  FSG      Benign essential HTN  Patient's blood pressure range in the last 24 hours was: BP  Min: 130/67  Max: 154/75.The patient's inpatient anti-hypertensive regimen is listed below:  Current Antihypertensives  metoprolol tartrate (LOPRESSOR) tablet 50 mg, 2 times daily, Oral  losartan tablet 100 mg, Daily, Oral    Plan  - BP is controlled, no changes needed to their regimen        VTE Risk Mitigation (From admission, onward)           Ordered     Reason for No Pharmacological VTE Prophylaxis  Once        Question:  Reasons:  Answer:  Already adequately anticoagulated on oral Anticoagulants    02/21/25 1418     Place RENITA hose  Until discontinued         02/21/25 1418     IP VTE HIGH RISK PATIENT  Once         02/21/25 1418                     Discharge Planning   NIGEL: 3/7/2025     Code Status: Full Code   Medical Readiness for Discharge Date:   Discharge Plan A: Home Health                        Elmer Rainey DO  Department of Hospital Medicine   [facility]

## 2025-02-28 NOTE — PLAN OF CARE
Problem: Adult Inpatient Plan of Care  Goal: Plan of Care Review  Outcome: Progressing  Goal: Patient-Specific Goal (Individualized)  Outcome: Progressing  Goal: Optimal Comfort and Wellbeing  Outcome: Progressing     Problem: UTI (Urinary Tract Infection)  Goal: Improved Infection Symptoms  Outcome: Met

## 2025-02-28 NOTE — PLAN OF CARE
Problem: Occupational Therapy  Goal: Occupational Therapy Goal  Description: STG: within 2 weeks  Pt will perform grooming with setup at sinkside MET  Pt will bathe with min a from sinkside MET  Pt will perform UE dressing with SVN MET  Pt will perform LE dressing with min a at sinkside MET  Pt will sit EOB x 30 min with SBA assistance MET  Pt will transfer bed/chair/bsc with SBA MET  Pt will perform standing task x 5 min with SVN assistance MET as needed at sinkside   Pt will tolerate 45 minutes of tx without fatigue MET      LT.Restore to max I with self care and mobility.    Outcome: Adequate for Care Transition

## 2025-02-28 NOTE — SUBJECTIVE & OBJECTIVE
Interval History: seems to be doing well.  No major issues     Review of Systems   Respiratory:  Negative for shortness of breath.    Cardiovascular:  Negative for chest pain.   Gastrointestinal:  Negative for abdominal pain, nausea and vomiting.   Musculoskeletal:  Positive for arthralgias.   Neurological:  Positive for weakness.   Psychiatric/Behavioral:  Negative for agitation, behavioral problems and confusion.    All other systems reviewed and are negative.    Objective:     Vital Signs (Most Recent):  Temp: 97.8 °F (36.6 °C) (02/28/25 0713)  Pulse: 73 (02/28/25 0713)  Resp: (!) 22 (02/28/25 0713)  BP: (!) 156/70 (02/28/25 0713)  SpO2: (!) 92 % (02/28/25 0713) Vital Signs (24h Range):  Temp:  [97.8 °F (36.6 °C)-98 °F (36.7 °C)] 97.8 °F (36.6 °C)  Pulse:  [73-86] 73  Resp:  [20-22] 22  SpO2:  [92 %-100 %] 92 %  BP: (148-156)/(70) 156/70     Weight: 85.1 kg (187 lb 9.6 oz)  Body mass index is 29.38 kg/m².    Intake/Output Summary (Last 24 hours) at 2/28/2025 1323  Last data filed at 2/28/2025 1234  Gross per 24 hour   Intake 1100 ml   Output --   Net 1100 ml         Physical Exam  Vitals and nursing note reviewed.   Constitutional:       Appearance: She is obese.   HENT:      Head: Normocephalic.      Nose: Nose normal.      Mouth/Throat:      Mouth: Mucous membranes are moist.   Eyes:      Extraocular Movements: Extraocular movements intact.      Pupils: Pupils are equal, round, and reactive to light.   Cardiovascular:      Rate and Rhythm: Normal rate and regular rhythm.      Pulses: Normal pulses.      Heart sounds: Normal heart sounds.   Pulmonary:      Effort: Pulmonary effort is normal.      Breath sounds: Normal breath sounds.   Abdominal:      General: There is no distension.      Palpations: Abdomen is soft.      Tenderness: There is no abdominal tenderness.   Musculoskeletal:         General: No swelling or tenderness. Normal range of motion.      Cervical back: Normal range of motion and neck supple.    Skin:     General: Skin is warm.      Capillary Refill: Capillary refill takes 2 to 3 seconds.   Neurological:      General: No focal deficit present.      Mental Status: She is alert. Mental status is at baseline.      Motor: Weakness present.   Psychiatric:         Mood and Affect: Mood normal.         Behavior: Behavior normal.         Thought Content: Thought content normal.         Judgment: Judgment normal.             Significant Labs: All pertinent labs within the past 24 hours have been reviewed.  Recent Lab Results         02/28/25  1123   02/28/25  0618   02/27/25  2048   02/27/25  1720        POC Glucose 110   119   152   130               Significant Imaging: I have reviewed all pertinent imaging results/findings within the past 24 hours.

## 2025-02-28 NOTE — PLAN OF CARE
Problem: Adult Inpatient Plan of Care  Goal: Optimal Comfort and Wellbeing  Outcome: Progressing     Problem: Diabetes Comorbidity  Goal: Blood Glucose Level Within Targeted Range  Outcome: Progressing     Problem: Skin Injury Risk Increased  Goal: Skin Health and Integrity  Outcome: Progressing     Problem: Fall Injury Risk  Goal: Absence of Fall and Fall-Related Injury  Outcome: Progressing

## 2025-02-28 NOTE — PT/OT/SLP PROGRESS
Physical Therapy Treatment    Patient Name:  Michell Dhillon   MRN:  [id number]    Recommendations:     Discharge Recommendations: Moderate Intensity Therapy  Discharge Equipment Recommendations: walker, rolling, wheelchair, bedside commode  Barriers to discharge: Decreased caregiver support    Assessment:     Michell Dhillon is a 79 y.o. female admitted with a medical diagnosis of Debility.  She presents with the following impairments/functional limitations: impaired endurance, weakness, gait instability, impaired balance, decreased coordination, decreased lower extremity function, decreased ROM, impaired coordination .    Rehab Prognosis: Good; patient would benefit from acute skilled PT services to address these deficits and reach maximum level of function.    Recent Surgery: * No surgery found *      Plan:     During this hospitalization, patient to be seen 5 x/week to address the identified rehab impairments via gait training, therapeutic activities, therapeutic exercises, therapeutic groups, wheelchair management/training and progress toward the following goals:    Plan of Care Expires:  04/28/25    Subjective     Chief Complaint: patient c/o her legs are sensitive to touch  Patient/Family Comments/goals: patient states she is loking forward to going home  Pain/Comfort:  Pain Rating 1: 2/10  Location - Side 1: Bilateral  Location 1: leg      Objective:     Communicated with RN prior to session.  Patient found up in chair with   upon PT entry to room.     General Precautions: Standard, fall  Orthopedic Precautions: Full weight bearing  Braces: N/A  Respiratory Status: Room air     Functional Mobility:  Bed Mobility:     Sit to Supine: stand by assistance  Transfers:     Chair to mat: contact guard assistance with  no AD  using  Squat Pivot      AM-PAC 6 CLICK MOBILITY  Turning over in bed (including adjusting bedclothes, sheets and blankets)?: 3  Sitting down on and standing up from a chair with arms (e.g.,  wheelchair, bedside commode, etc.): 3  Moving from lying on back to sitting on the side of the bed?: 3  Moving to and from a bed to a chair (including a wheelchair)?: 3  Need to walk in hospital room?: 1  Climbing 3-5 steps with a railing?: 1  Basic Mobility Total Score: 14       Treatment & Education:  Bed Mobility:     Sit to Supine: stand by assistance  Transfers:     Chair to mat: contact guard assistance with  no AD  using  Squat Pivot  There ex for hip flexion abd ,LAQ,ankle pumps and sit to stand x 5 with min assist with RW  Patient left up in chair with call button in reach..    GOALS:   Multidisciplinary Problems       Physical Therapy Goals          Problem: Physical Therapy    Goal Priority Disciplines Outcome Interventions   Physical Therapy Goal     PT, PT/OT     Description: Short Term Goals (1 Week)  Patient will be modified independent with supine to sit     Long Term Goals (4 Weeks)  Patient will need SBA for sit to stand with use of RW  Patient will be independent with WC mobility x 200 feet on level surfaces.  Patient will be independent with bed to commode transfer.                             DME Justifications:  No DME recommended requiring DME justifications    Time Tracking:     PT Received On: 02/28/25  PT Start Time: 1345     PT Stop Time: 1410  PT Total Time (min): 25 min     Billable Minutes: Therapeutic Activity 15 and Therapeutic Exercise 10    Treatment Type: Treatment  PT/PTA: PT     Number of PTA visits since last PT visit: 0     02/28/2025

## 2025-02-28 NOTE — PT/OT/SLP PROGRESS
Occupational Therapy   Treatment    Name: Michell Dhillon  MRN: [id number]  Admitting Diagnosis:  Debility       Recommendations:     Discharge Recommendations: Low Intensity Therapy  Discharge Equipment Recommendations:  none  Barriers to discharge:  None    Assessment:     Michell Dhillon is a 79 y.o. female with a medical diagnosis of Debility.  She presents with no new complaints. Performance deficits affecting function are weakness, impaired endurance, impaired self care skills, impaired functional mobility, impaired balance, pain.     Rehab Prognosis:  Good; patient would benefit from acute skilled OT services to address these deficits and reach maximum level of function.       Plan:     Patient to be seen 5 x/week to address the above listed problems via self-care/home management, therapeutic exercises, therapeutic activities, wheelchair management/training  Plan of Care Expires: 03/21/25  Plan of Care Reviewed with: patient, caregiver, daughter    Subjective     Chief Complaint: no significant complaints; pt has returned to baseline  Patient/Family Comments/goals: home with family intermittent assist  Pain/Comfort:       Objective:     Communicated with: pt prior to session.  Patient found up in chair with  (no lines or drains) upon OT entry to room.    General Precautions: Standard, fall    Orthopedic Precautions:   Braces:    Respiratory Status: Room air     Occupational Performance:     Bed Mobility:    Overall mod i    Functional Mobility/Transfers:  Overall SBA from w/c, short pivot transfers only    Activities of Daily Living:  Pt completing ADL care at sinkside with just setup until last few steps, assisted by family at home at baseline      [facility] 6 Click ADL:      Treatment & Education:  To improve UB endurance, strength, OT facilitated pt with:  UBE x 8 min with no RB's throughout, low level resist  GREEN PUTTY to simulate kitchen prep task and improve FM and , does super well with this    To  improve reach, AROM, FM/ and trunk rotation with core forward leaning engagement:   Clothespins vertical board BUE weighted with 1# wristweights to complete whole basket full from seated      Pt able to complete handwashing at sinkside on her own initiation, wheeling herself as needed around room to safely complete.  She does have chair check on to remind for assist during transfer from bed to w/c, but pt mostly likes to sit up in w/c during daytime hours.      Patient left up in chair with call button in reach and chair alarm on    GOALS:   Multidisciplinary Problems       Occupational Therapy Goals          Problem: Occupational Therapy    Goal Priority Disciplines Outcome Interventions   Occupational Therapy Goal     OT, PT/OT Progressing    Description: STG: within 2 weeks  Pt will perform grooming with setup at sinkside ONGOING/PROGRESSING  Pt will bathe with min a from sinkside ONGOING/PROGRESSING  Pt will perform UE dressing with SVN ONGOING/PROGRESSING  Pt will perform LE dressing with min a at sinkside ONGOING/PROGRESSING  Pt will sit EOB x 30 min with SBA assistance ONGOING/PROGRESSING  Pt will transfer bed/chair/bsc with SBA ONGOING/PROGRESSING  Pt will perform standing task x 5 min with SVN assistance ONGOING/PROGRESSING  Pt will tolerate 45 minutes of tx without fatigue ONGOING/PROGRESSING      LT.Restore to max I with self care and mobility.                           Time Tracking:     OT Date of Treatment: 25  OT Start Time: 952  OT Stop Time:   OT Total Time (min): 43 min    Billable Minutes:Therapeutic Activity 30  Therapeutic Exercise 13    OT/YONI: OT          2025

## 2025-03-01 LAB
GLUCOSE SERPL-MCNC: 108 MG/DL (ref 70–105)
GLUCOSE SERPL-MCNC: 125 MG/DL (ref 70–105)
GLUCOSE SERPL-MCNC: 172 MG/DL (ref 70–105)
GLUCOSE SERPL-MCNC: 175 MG/DL (ref 70–105)

## 2025-03-01 PROCEDURE — 27000987 HC MATTRESS, MATRIX LOW PROFILE

## 2025-03-01 PROCEDURE — 25000003 PHARM REV CODE 250: Performed by: NURSE PRACTITIONER

## 2025-03-01 PROCEDURE — 11000004 HC SNF PRIVATE

## 2025-03-01 PROCEDURE — 27000958

## 2025-03-01 PROCEDURE — 82962 GLUCOSE BLOOD TEST: CPT

## 2025-03-01 RX ADMIN — LOSARTAN POTASSIUM 100 MG: 50 TABLET, FILM COATED ORAL at 08:03

## 2025-03-01 RX ADMIN — LEVOTHYROXINE SODIUM 88 MCG: 0.09 TABLET ORAL at 06:03

## 2025-03-01 RX ADMIN — CLOPIDOGREL BISULFATE 75 MG: 75 TABLET, FILM COATED ORAL at 08:03

## 2025-03-01 RX ADMIN — METOPROLOL TARTRATE 50 MG: 50 TABLET, FILM COATED ORAL at 08:03

## 2025-03-01 RX ADMIN — CETIRIZINE HYDROCHLORIDE 10 MG: 10 TABLET, FILM COATED ORAL at 08:03

## 2025-03-01 RX ADMIN — ERGOCALCIFEROL 50000 UNITS: 1.25 CAPSULE, LIQUID FILLED ORAL at 08:03

## 2025-03-01 NOTE — PLAN OF CARE
Problem: Adult Inpatient Plan of Care  Goal: Plan of Care Review  Outcome: Progressing  Goal: Patient-Specific Goal (Individualized)  Outcome: Progressing  Goal: Absence of Hospital-Acquired Illness or Injury  Outcome: Progressing  Goal: Optimal Comfort and Wellbeing  Outcome: Progressing  Goal: Readiness for Transition of Care  Outcome: Progressing     Problem: Diabetes Comorbidity  Goal: Blood Glucose Level Within Targeted Range  Outcome: Progressing  Intervention: Monitor and Manage Glycemia  Flowsheets (Taken 3/1/2025 9908)  Glycemic Management: blood glucose monitored     Problem: Skin Injury Risk Increased  Goal: Skin Health and Integrity  Outcome: Progressing     Problem: Infection  Goal: Absence of Infection Signs and Symptoms  Outcome: Progressing     Problem: Fall Injury Risk  Goal: Absence of Fall and Fall-Related Injury  Outcome: Progressing

## 2025-03-01 NOTE — NURSING
Patient c/o eye problems. L eye is noted to be red and watering. Patient reports that this has happened before & that she took cetirizine for it.   2014 notified CECILIA KEENE of change in condition. New order received for cetirizine 10 mg po @ [facility]

## 2025-03-02 LAB
GLUCOSE SERPL-MCNC: 114 MG/DL (ref 70–105)
GLUCOSE SERPL-MCNC: 123 MG/DL (ref 70–105)
GLUCOSE SERPL-MCNC: 124 MG/DL (ref 70–105)
GLUCOSE SERPL-MCNC: 131 MG/DL (ref 70–105)

## 2025-03-02 PROCEDURE — 25000003 PHARM REV CODE 250: Performed by: NURSE PRACTITIONER

## 2025-03-02 PROCEDURE — 27000987 HC MATTRESS, MATRIX LOW PROFILE

## 2025-03-02 PROCEDURE — 27000958

## 2025-03-02 PROCEDURE — 82962 GLUCOSE BLOOD TEST: CPT

## 2025-03-02 PROCEDURE — 11000004 HC SNF PRIVATE

## 2025-03-02 RX ADMIN — CLOPIDOGREL BISULFATE 75 MG: 75 TABLET, FILM COATED ORAL at 07:03

## 2025-03-02 RX ADMIN — METOPROLOL TARTRATE 50 MG: 50 TABLET, FILM COATED ORAL at 08:03

## 2025-03-02 RX ADMIN — LOSARTAN POTASSIUM 100 MG: 50 TABLET, FILM COATED ORAL at 07:03

## 2025-03-02 RX ADMIN — LEVOTHYROXINE SODIUM 88 MCG: 0.09 TABLET ORAL at 05:03

## 2025-03-02 RX ADMIN — CETIRIZINE HYDROCHLORIDE 10 MG: 10 TABLET, FILM COATED ORAL at 08:03

## 2025-03-02 RX ADMIN — METOPROLOL TARTRATE 50 MG: 50 TABLET, FILM COATED ORAL at 07:03

## 2025-03-02 NOTE — NURSING
Pt assisted to BSC w marika by assist. After she was done, I wheeled her to her husbands room per her request.

## 2025-03-02 NOTE — PLAN OF CARE
Problem: Adult Inpatient Plan of Care  Goal: Plan of Care Review  Outcome: Progressing  Goal: Patient-Specific Goal (Individualized)  Outcome: Progressing  Goal: Absence of Hospital-Acquired Illness or Injury  Outcome: Progressing  Goal: Optimal Comfort and Wellbeing  Outcome: Progressing  Goal: Readiness for Transition of Care  Outcome: Progressing     Problem: Diabetes Comorbidity  Goal: Blood Glucose Level Within Targeted Range  Outcome: Progressing     Problem: Skin Injury Risk Increased  Goal: Skin Health and Integrity  Outcome: Progressing     Problem: Infection  Goal: Absence of Infection Signs and Symptoms  Outcome: Progressing     Problem: Fall Injury Risk  Goal: Absence of Fall and Fall-Related Injury  Outcome: Progressing     Problem: Violence Risk or Actual  Goal: Anger and Impulse Control  Outcome: Met

## 2025-03-03 LAB
ANION GAP SERPL CALCULATED.3IONS-SCNC: 11 MMOL/L (ref 7–16)
BASOPHILS # BLD AUTO: 0.02 K/UL (ref 0–0.2)
BASOPHILS NFR BLD AUTO: 0.4 % (ref 0–1)
BUN SERPL-MCNC: 22 MG/DL (ref 10–20)
BUN/CREAT SERPL: 29 (ref 6–20)
CALCIUM SERPL-MCNC: 9 MG/DL (ref 8.4–10.2)
CHLORIDE SERPL-SCNC: 105 MMOL/L (ref 98–107)
CO2 SERPL-SCNC: 23 MMOL/L (ref 23–31)
CREAT SERPL-MCNC: 0.76 MG/DL (ref 0.55–1.02)
DIFFERENTIAL METHOD BLD: ABNORMAL
EGFR (NO RACE VARIABLE) (RUSH/TITUS): 79 ML/MIN/1.73M2
EOSINOPHIL # BLD AUTO: 0.13 K/UL (ref 0–0.5)
EOSINOPHIL NFR BLD AUTO: 2.6 % (ref 1–4)
ERYTHROCYTE [DISTWIDTH] IN BLOOD BY AUTOMATED COUNT: 14.8 % (ref 11.5–14.5)
GLUCOSE SERPL-MCNC: 103 MG/DL (ref 70–105)
GLUCOSE SERPL-MCNC: 106 MG/DL (ref 82–115)
GLUCOSE SERPL-MCNC: 116 MG/DL (ref 70–105)
GLUCOSE SERPL-MCNC: 117 MG/DL (ref 70–105)
GLUCOSE SERPL-MCNC: 119 MG/DL (ref 70–105)
HCT VFR BLD AUTO: 34.2 % (ref 38–47)
HGB BLD-MCNC: 10.3 G/DL (ref 12–16)
LYMPHOCYTES # BLD AUTO: 1.75 K/UL (ref 1–4.8)
LYMPHOCYTES NFR BLD AUTO: 34.9 % (ref 27–41)
MCH RBC QN AUTO: 28.9 PG (ref 27–31)
MCHC RBC AUTO-ENTMCNC: 30.1 G/DL (ref 32–36)
MCV RBC AUTO: 96.1 FL (ref 80–96)
MONOCYTES # BLD AUTO: 0.45 K/UL (ref 0–0.8)
MONOCYTES NFR BLD AUTO: 9 % (ref 2–6)
MPC BLD CALC-MCNC: 9.8 FL (ref 9.4–12.4)
NEUTROPHILS # BLD AUTO: 2.66 K/UL (ref 1.8–7.7)
NEUTROPHILS NFR BLD AUTO: 53.1 % (ref 53–65)
PLATELET # BLD AUTO: 189 K/UL (ref 150–400)
POTASSIUM SERPL-SCNC: 4.3 MMOL/L (ref 3.5–5.1)
RBC # BLD AUTO: 3.56 M/UL (ref 4.2–5.4)
SODIUM SERPL-SCNC: 135 MMOL/L (ref 136–145)
WBC # BLD AUTO: 5.01 K/UL (ref 4.5–11)

## 2025-03-03 PROCEDURE — 36415 COLL VENOUS BLD VENIPUNCTURE: CPT | Performed by: NURSE PRACTITIONER

## 2025-03-03 PROCEDURE — 99308 SBSQ NF CARE LOW MDM 20: CPT | Mod: ,,, | Performed by: HOSPITALIST

## 2025-03-03 PROCEDURE — 27000958

## 2025-03-03 PROCEDURE — 97110 THERAPEUTIC EXERCISES: CPT

## 2025-03-03 PROCEDURE — 97530 THERAPEUTIC ACTIVITIES: CPT

## 2025-03-03 PROCEDURE — 80048 BASIC METABOLIC PNL TOTAL CA: CPT | Performed by: NURSE PRACTITIONER

## 2025-03-03 PROCEDURE — 25000003 PHARM REV CODE 250: Performed by: NURSE PRACTITIONER

## 2025-03-03 PROCEDURE — 82962 GLUCOSE BLOOD TEST: CPT

## 2025-03-03 PROCEDURE — 27000987 HC MATTRESS, MATRIX LOW PROFILE

## 2025-03-03 PROCEDURE — 11000004 HC SNF PRIVATE

## 2025-03-03 PROCEDURE — 85025 COMPLETE CBC W/AUTO DIFF WBC: CPT | Performed by: NURSE PRACTITIONER

## 2025-03-03 RX ADMIN — LOSARTAN POTASSIUM 100 MG: 50 TABLET, FILM COATED ORAL at 08:03

## 2025-03-03 RX ADMIN — METOPROLOL TARTRATE 50 MG: 50 TABLET, FILM COATED ORAL at 08:03

## 2025-03-03 RX ADMIN — LEVOTHYROXINE SODIUM 88 MCG: 0.09 TABLET ORAL at 05:03

## 2025-03-03 RX ADMIN — CLOPIDOGREL BISULFATE 75 MG: 75 TABLET, FILM COATED ORAL at 08:03

## 2025-03-03 RX ADMIN — CETIRIZINE HYDROCHLORIDE 10 MG: 10 TABLET, FILM COATED ORAL at 08:03

## 2025-03-03 NOTE — PLAN OF CARE
Discharge discussed with patient's daughter, Mikki. She states that her mother requests to stay until Monday due to needing more therapy. PT spoke with daughter, states she can continue therapy which continues to qualify her for swingbed. Plan to dc 3/10/2025.

## 2025-03-03 NOTE — SUBJECTIVE & OBJECTIVE
Interval History: doing well with therapy     Review of Systems   Respiratory:  Negative for shortness of breath.    Cardiovascular:  Negative for chest pain.   Gastrointestinal:  Negative for abdominal pain, nausea and vomiting.   Neurological:  Positive for weakness.   Psychiatric/Behavioral:  Negative for agitation and confusion.    All other systems reviewed and are negative.    Objective:     Vital Signs (Most Recent):  Temp: 97.9 °F (36.6 °C) (03/03/25 0851)  Pulse: 78 (03/03/25 0851)  Resp: 18 (03/03/25 0851)  BP: (!) 141/67 (03/03/25 0851)  SpO2: 97 % (03/03/25 0851) Vital Signs (24h Range):  Temp:  [97.6 °F (36.4 °C)-97.9 °F (36.6 °C)] 97.9 °F (36.6 °C)  Pulse:  [78-82] 78  Resp:  [18] 18  SpO2:  [97 %] 97 %  BP: (141-152)/(67-84) 141/67     Weight: 85.1 kg (187 lb 9.6 oz)  Body mass index is 29.38 kg/m².    Intake/Output Summary (Last 24 hours) at 3/3/2025 1624  Last data filed at 3/3/2025 1515  Gross per 24 hour   Intake 1240 ml   Output 900 ml   Net 340 ml         Physical Exam  Vitals and nursing note reviewed.   Constitutional:       Appearance: She is obese.   HENT:      Head: Normocephalic.      Nose: Nose normal.      Mouth/Throat:      Mouth: Mucous membranes are moist.   Eyes:      Extraocular Movements: Extraocular movements intact.      Pupils: Pupils are equal, round, and reactive to light.   Cardiovascular:      Rate and Rhythm: Normal rate and regular rhythm.      Pulses: Normal pulses.      Heart sounds: Normal heart sounds.   Pulmonary:      Effort: Pulmonary effort is normal.      Breath sounds: Normal breath sounds.   Abdominal:      General: There is no distension.      Palpations: Abdomen is soft.      Tenderness: There is no abdominal tenderness.   Musculoskeletal:         General: No swelling or tenderness. Normal range of motion.      Cervical back: Normal range of motion and neck supple.   Skin:     General: Skin is warm.      Capillary Refill: Capillary refill takes 2 to 3 seconds.    Neurological:      General: No focal deficit present.      Mental Status: She is alert. Mental status is at baseline.      Motor: Weakness present.   Psychiatric:         Mood and Affect: Mood normal.         Behavior: Behavior normal.         Thought Content: Thought content normal.         Judgment: Judgment normal.             Significant Labs: All pertinent labs within the past 24 hours have been reviewed.  Recent Lab Results         03/03/25  1024   03/03/25  0622   03/03/25  0538   03/02/25 2026        Anion Gap   11           Baso #   0.02           Basophil %   0.4           BUN   22           BUN/CREAT RATIO   29           Calcium   9.0           Chloride   105           CO2   23           Creatinine   0.76           Differential Method   Auto           eGFR   79  Comment: Estimated GFR calculated using the CKD-EPI creatinine (2021) equation.           Eos #   0.13           Eos %   2.6           Glucose   106           Hematocrit   34.2           Hemoglobin   10.3           Lymph #   1.75           Lymph %   34.9           MCH   28.9           MCHC   30.1           MCV   96.1           Mono #   0.45           Mono %   9.0           MPV   9.8           Neutrophils, Abs   2.66           Neutrophils Relative   53.1           Platelet Count   189           POC Glucose 116     103   124       Potassium   4.3           RBC   3.56           RDW   14.8           Sodium   135           WBC   5.01                   Significant Imaging: I have reviewed all pertinent imaging results/findings within the past 24 hours.

## 2025-03-03 NOTE — PT/OT/SLP PROGRESS
Physical Therapy Treatment    Patient Name:  Michell Dhillon   MRN:  [id number]    Recommendations:     Discharge Recommendations: Moderate Intensity Therapy  Discharge Equipment Recommendations: bedside commode, walker, rolling, wheelchair  Barriers to discharge: Decreased caregiver support    Assessment:     Michell Dhillon is a 79 y.o. female admitted with a medical diagnosis of Debility.  She presents with the following impairments/functional limitations: weakness, impaired endurance, impaired sensation, impaired self care skills, impaired functional mobility, impaired muscle length, impaired joint extensibility .    Rehab Prognosis: Good; patient would benefit from acute skilled PT services to address these deficits and reach maximum level of function.    Recent Surgery: * No surgery found *      Plan:     During this hospitalization, patient to be seen 5 x/week to address the identified rehab impairments via gait training, therapeutic activities, therapeutic exercises, neuromuscular re-education and progress toward the following goals:    Plan of Care Expires:  04/28/25    Subjective     Chief Complaint: r leg pain  Patient/Family Comments/goals: Patient hopes to go home soon  Pain/Comfort:  Pain Rating 1: 2/10  Location - Side 1: Right  Location 1: leg      Objective:     Communicated with RN prior to session.  Patient found up in chair with   upon PT entry to room.     General Precautions: Standard, fall  Orthopedic Precautions: N/A  Braces: N/A  Respiratory Status: Room air     Functional Mobility:  Bed Mobility:     Sit to Supine: contact guard assistance  Transfers:     Sit to Stand:  contact guard assistance with no AD  Bed to Chair: contact guard assistance with  no AD  using  Squat Pivot  Wheelchair Propulsion:  Pt propelled Standard wheelchair x 150 feet on Level tile with  Bilateral upper extremity with Contact Guard Assistance.       AM-PAC 6 CLICK MOBILITY  Turning over in bed (including adjusting  bedclothes, sheets and blankets)?: 3  Sitting down on and standing up from a chair with arms (e.g., wheelchair, bedside commode, etc.): 3  Moving from lying on back to sitting on the side of the bed?: 3  Moving to and from a bed to a chair (including a wheelchair)?: 3  Need to walk in hospital room?: 1  Climbing 3-5 steps with a railing?: 1  Basic Mobility Total Score: 14       Treatment & Education:  Patient performed activities as stated above.  There ex for hip flexion, ABD and LAQ in sitting 2 x10 with 3#. Sit to statnd x 5 reps with CGA. Patient stood for 2 minutes x 2 with RW    Patient left up in chair with all lines intact and call button in reach..    GOALS:   Multidisciplinary Problems       Physical Therapy Goals          Problem: Physical Therapy    Goal Priority Disciplines Outcome Interventions   Physical Therapy Goal     PT, PT/OT     Description: Short Term Goals (1 Week)  Patient will be modified independent with supine to sit     Long Term Goals (4 Weeks)  Patient will need SBA for sit to stand with use of RW  Patient will be independent with WC mobility x 200 feet on level surfaces.  Patient will be independent with bed to commode transfer.                             DME Justifications:  No DME recommended requiring DME justifications    Time Tracking:     PT Received On: 03/03/25  PT Start Time: 1303     PT Stop Time: 1335  PT Total Time (min): 32 min     Billable Minutes: Therapeutic Activity 17 and Therapeutic Exercise 15    Treatment Type: Treatment  PT/PTA: PT     Number of PTA visits since last PT visit: 0     03/03/2025

## 2025-03-03 NOTE — PLAN OF CARE
Problem: Adult Inpatient Plan of Care  Goal: Plan of Care Review  Outcome: Progressing  Goal: Patient-Specific Goal (Individualized)  Outcome: Progressing  Goal: Absence of Hospital-Acquired Illness or Injury  Outcome: Progressing  Goal: Optimal Comfort and Wellbeing  Outcome: Progressing  Goal: Readiness for Transition of Care  Outcome: Progressing     Problem: Diabetes Comorbidity  Goal: Blood Glucose Level Within Targeted Range  Outcome: Progressing     Problem: Skin Injury Risk Increased  Goal: Skin Health and Integrity  Outcome: Progressing     Problem: Infection  Goal: Absence of Infection Signs and Symptoms  Outcome: Progressing     Problem: Fall Injury Risk  Goal: Absence of Fall and Fall-Related Injury  Outcome: Progressing

## 2025-03-03 NOTE — NURSING
While checking pt's BS, she instructed me she needed to go to bathroom. Pt able to get on BSC, pull down brief, and clean herself by herself. Family present but not participating in pts care nor helping pt with her needs.

## 2025-03-03 NOTE — PROGRESS NOTES
"[facility] Medicine  Progress Note    Patient Name: Michell Dhillon  MRN: [id number]  Patient Class: IP- Swing   Admission Date: 2/21/2025  Length of Stay: 10 days  Attending Physician: Elmer Rainey DO  Primary Care Provider: Daryl Moscoso NP        Subjective     Principal Problem:Debility        HPI:    79-year-old  female history of CVA, type 2 diabetes, hypertension, hyperlipidemia, recurrent UTI sent from home for confusion. Patient has been admitted multiple times to many facilities with exact same presentation.  Reviewing a multitude of urine cultures in Epic and various facilities she has had numerous urine cultures with various organisms.      Overview/Hospital Course:    2/19 Doing well, changed ABX according to C&S.  Discussed SWB and she is not interested.  Will get some PT today and plan on DC tomorrow with HH.      2/20 Much improved, now willing to do SWB.  Would be better given her multiple hospital admissions for the same issue.      2/21: Patient improving, will swing today to start PT/OT. She is complaining of right foot being "sore" States "I think it is gout". She has no swelling, very mild tenderness to palpation. Denies injury.     2/24 Looks better, working with PT.  Seems more awake.    2/28 Continues to improve, doing well    3.3 Doing well, working with PT, alert more    Interval History: doing well with therapy     Review of Systems   Respiratory:  Negative for shortness of breath.    Cardiovascular:  Negative for chest pain.   Gastrointestinal:  Negative for abdominal pain, nausea and vomiting.   Neurological:  Positive for weakness.   Psychiatric/Behavioral:  Negative for agitation and confusion.    All other systems reviewed and are negative.    Objective:     Vital Signs (Most Recent):  Temp: 97.9 °F (36.6 °C) (03/03/25 0851)  Pulse: 78 (03/03/25 0851)  Resp: 18 (03/03/25 0851)  BP: (!) 141/67 (03/03/25 0851)  SpO2: 97 % " (03/03/25 0851) Vital Signs (24h Range):  Temp:  [97.6 °F (36.4 °C)-97.9 °F (36.6 °C)] 97.9 °F (36.6 °C)  Pulse:  [78-82] 78  Resp:  [18] 18  SpO2:  [97 %] 97 %  BP: (141-152)/(67-84) 141/67     Weight: 85.1 kg (187 lb 9.6 oz)  Body mass index is 29.38 kg/m².    Intake/Output Summary (Last 24 hours) at 3/3/2025 1624  Last data filed at 3/3/2025 1515  Gross per 24 hour   Intake 1240 ml   Output 900 ml   Net 340 ml         Physical Exam  Vitals and nursing note reviewed.   Constitutional:       Appearance: She is obese.   HENT:      Head: Normocephalic.      Nose: Nose normal.      Mouth/Throat:      Mouth: Mucous membranes are moist.   Eyes:      Extraocular Movements: Extraocular movements intact.      Pupils: Pupils are equal, round, and reactive to light.   Cardiovascular:      Rate and Rhythm: Normal rate and regular rhythm.      Pulses: Normal pulses.      Heart sounds: Normal heart sounds.   Pulmonary:      Effort: Pulmonary effort is normal.      Breath sounds: Normal breath sounds.   Abdominal:      General: There is no distension.      Palpations: Abdomen is soft.      Tenderness: There is no abdominal tenderness.   Musculoskeletal:         General: No swelling or tenderness. Normal range of motion.      Cervical back: Normal range of motion and neck supple.   Skin:     General: Skin is warm.      Capillary Refill: Capillary refill takes 2 to 3 seconds.   Neurological:      General: No focal deficit present.      Mental Status: She is alert. Mental status is at baseline.      Motor: Weakness present.   Psychiatric:         Mood and Affect: Mood normal.         Behavior: Behavior normal.         Thought Content: Thought content normal.         Judgment: Judgment normal.             Significant Labs: All pertinent labs within the past 24 hours have been reviewed.  Recent Lab Results         03/03/25  1024   03/03/25  0622   03/03/25  0538   03/02/25 2026        Anion Gap   11           Baso #   0.02            Basophil %   0.4           BUN   22           BUN/CREAT RATIO   29           Calcium   9.0           Chloride   105           CO2   23           Creatinine   0.76           Differential Method   Auto           eGFR   79  Comment: Estimated GFR calculated using the CKD-EPI creatinine (2021) equation.           Eos #   0.13           Eos %   2.6           Glucose   106           Hematocrit   34.2           Hemoglobin   10.3           Lymph #   1.75           Lymph %   34.9           MCH   28.9           MCHC   30.1           MCV   96.1           Mono #   0.45           Mono %   9.0           MPV   9.8           Neutrophils, Abs   2.66           Neutrophils Relative   53.1           Platelet Count   189           POC Glucose 116     103   124       Potassium   4.3           RBC   3.56           RDW   14.8           Sodium   135           WBC   5.01                   Significant Imaging: I have reviewed all pertinent imaging results/findings within the past 24 hours.    Assessment and Plan     * Debility  Patient with Acute on chronic debility due to age-related physical debility. The patient's latest AMPAC (Activity Measure for Post Acute Care) Score is listed below.    AM-PAC Score - How much help does the patient need for each activity listed  Basic Mobility Total Score: 13  Turning over in bed (including adjusting bedclothes, sheets and blankets)?: A little  Sitting down on and standing up from a chair with arms (e.g., wheelchair, bedside commode, etc.): A little  Moving from lying on back to sitting on the side of the bed?: A little  Moving to and from a bed to a chair (including a wheelchair)?: A lot  Need to walk in hospital room?: Unable  Climbing 3-5 steps with a railing?: Unable    Plan  - Progressive mobility protocol initated  - PT/OT consulted  - Fall precautions in place  -           Admission for aftercare  PT/OT      Acute confusional state  Better now.  No major issues.       Hypothyroidism  Continue  home medication      Type 2 diabetes mellitus without complication, without long-term current use of insulin  Continue home meds  FSG      Benign essential HTN  Patient's blood pressure range in the last 24 hours was: BP  Min: 130/67  Max: 154/75.The patient's inpatient anti-hypertensive regimen is listed below:  Current Antihypertensives  metoprolol tartrate (LOPRESSOR) tablet 50 mg, 2 times daily, Oral  losartan tablet 100 mg, Daily, Oral    Plan  - BP is controlled, no changes needed to their regimen        VTE Risk Mitigation (From admission, onward)           Ordered     Reason for No Pharmacological VTE Prophylaxis  Once        Question:  Reasons:  Answer:  Already adequately anticoagulated on oral Anticoagulants    02/21/25 1418     Place RENITA hose  Until discontinued         02/21/25 1418     IP VTE HIGH RISK PATIENT  Once         02/21/25 1418                    Discharge Planning   NIGEL: 3/10/2025     Code Status: Full Code   Medical Readiness for Discharge Date:   Discharge Plan A: Home Health                        Elmer Rainey DO  Department of Hospital Medicine   [facility]

## 2025-03-04 LAB
GLUCOSE SERPL-MCNC: 106 MG/DL (ref 70–105)
GLUCOSE SERPL-MCNC: 115 MG/DL (ref 70–105)
GLUCOSE SERPL-MCNC: 129 MG/DL (ref 70–105)
GLUCOSE SERPL-MCNC: 194 MG/DL (ref 70–105)

## 2025-03-04 PROCEDURE — 27000987 HC MATTRESS, MATRIX LOW PROFILE

## 2025-03-04 PROCEDURE — 82962 GLUCOSE BLOOD TEST: CPT

## 2025-03-04 PROCEDURE — 25000003 PHARM REV CODE 250: Performed by: NURSE PRACTITIONER

## 2025-03-04 PROCEDURE — 97110 THERAPEUTIC EXERCISES: CPT

## 2025-03-04 PROCEDURE — 97530 THERAPEUTIC ACTIVITIES: CPT

## 2025-03-04 PROCEDURE — 27000958

## 2025-03-04 PROCEDURE — 11000004 HC SNF PRIVATE

## 2025-03-04 RX ADMIN — LEVOTHYROXINE SODIUM 88 MCG: 0.09 TABLET ORAL at 05:03

## 2025-03-04 RX ADMIN — LOSARTAN POTASSIUM 100 MG: 50 TABLET, FILM COATED ORAL at 09:03

## 2025-03-04 RX ADMIN — CETIRIZINE HYDROCHLORIDE 10 MG: 10 TABLET, FILM COATED ORAL at 08:03

## 2025-03-04 RX ADMIN — METOPROLOL TARTRATE 50 MG: 50 TABLET, FILM COATED ORAL at 09:03

## 2025-03-04 RX ADMIN — CLOPIDOGREL BISULFATE 75 MG: 75 TABLET, FILM COATED ORAL at 09:03

## 2025-03-04 RX ADMIN — METOPROLOL TARTRATE 50 MG: 50 TABLET, FILM COATED ORAL at 08:03

## 2025-03-04 NOTE — PT/OT/SLP PROGRESS
Physical Therapy Treatment    Patient Name:  Michell Dhillon   MRN:  [id number]    Recommendations:     Discharge Recommendations: Moderate Intensity Therapy  Discharge Equipment Recommendations: bedside commode, walker, rolling, wheelchair  Barriers to discharge: Decreased caregiver support and decreased functional mobility    Assessment:     Michell Dhillon is a 79 y.o. female admitted with a medical diagnosis of Debility.  She presents with the following impairments/functional limitations: weakness, impaired endurance, impaired sensation, impaired functional mobility, gait instability, impaired balance, pain, impaired joint extensibility, impaired muscle length .    Rehab Prognosis: Good; patient would benefit from acute skilled PT services to address these deficits and reach maximum level of function.    Recent Surgery: * No surgery found *      Plan:     During this hospitalization, patient to be seen 5 x/week to address the identified rehab impairments via gait training, therapeutic activities, therapeutic exercises, neuromuscular re-education, wheelchair management/training and progress toward the following goals:    Plan of Care Expires:  03/28/25    Subjective     Chief Complaint: Patient states she would like to move better  Patient/Family Comments/goals: Patient wants to be independent with mobility.  Pain/Comfort:  Pain Rating 1: 2/10  Location - Side 1: Right  Location 1: leg      Objective:     Communicated with RN prior to session.  Patient found up in chair with   upon PT entry to room.     General Precautions: Standard, fall  Orthopedic Precautions: N/A  Braces: N/A  Respiratory Status: Room air     Functional Mobility:  Bed Mobility:     Sit to Supine: contact guard assistance  Transfers:     Sit to Stand:  contact guard assistance with rolling walker  Bed to Chair: contact guard assistance with  no AD  using  Squat Pivot  Balance: Patient able to performstanding balance with CGA 3 x 2 minutes with  use of RW  Wheelchair Propulsion:  Pt propelled Standard wheelchair x 155 feet on Level tile with  Right upper extremity and Left upper extremity with Contact Guard Assistance.       AM-PAC 6 CLICK MOBILITY  Turning over in bed (including adjusting bedclothes, sheets and blankets)?: 3  Sitting down on and standing up from a chair with arms (e.g., wheelchair, bedside commode, etc.): 3  Moving from lying on back to sitting on the side of the bed?: 3  Moving to and from a bed to a chair (including a wheelchair)?: 3  Need to walk in hospital room?: 1  Climbing 3-5 steps with a railing?: 1  Basic Mobility Total Score: 14       Treatment & Education:  Bed Mobility:     Sit to Supine: contact guard assistance  Transfers:     Sit to Stand:  contact guard assistance with rolling walker  Bed to Chair: contact guard assistance with  no AD  using  Squat Pivot  Balance: Patient able to performstanding balance with CGA 3 x 2 minutes with use of RW  Wheelchair Propulsion:  Pt propelled Standard wheelchair x 155 feet on Level tile with  Right upper extremity and Left upper extremity with Contact Guard Assistance.   There ex for Nu step x 6 minutes. Sitting hip flexion, ABD, LAQ,ankle pumps 3 x10 with 2# weights  Patient left up in chair with all lines intact, call button in reach, and chair alarm on..  Case conference performed with Chasidy Ravi PTA regarding patient plan of care.   GOALS:   Multidisciplinary Problems       Physical Therapy Goals          Problem: Physical Therapy    Goal Priority Disciplines Outcome Interventions   Physical Therapy Goal     PT, PT/OT     Description: Short Term Goals (1 Week)  Patient will be modified independent with supine to sit     Long Term Goals (4 Weeks)  Patient will need SBA for sit to stand with use of RW  Patient will be independent with WC mobility x 200 feet on level surfaces.  Patient will be independent with bed to commode transfer.                             DME  Justifications:  No DME recommended requiring DME justifications    Time Tracking:     PT Received On: 03/04/25  PT Start Time: 1010     PT Stop Time: 1040  PT Total Time (min): 30 min     Billable Minutes: Therapeutic Activity 15 and Therapeutic Exercise 15    Treatment Type: Treatment  PT/PTA: PT     Number of PTA visits since last PT visit: 0     03/04/2025

## 2025-03-05 LAB
GLUCOSE SERPL-MCNC: 106 MG/DL (ref 70–105)
GLUCOSE SERPL-MCNC: 124 MG/DL (ref 70–105)
GLUCOSE SERPL-MCNC: 226 MG/DL (ref 70–105)
GLUCOSE SERPL-MCNC: 241 MG/DL (ref 70–105)

## 2025-03-05 PROCEDURE — 97530 THERAPEUTIC ACTIVITIES: CPT | Mod: CQ

## 2025-03-05 PROCEDURE — 11000004 HC SNF PRIVATE

## 2025-03-05 PROCEDURE — 63600175 PHARM REV CODE 636 W HCPCS: Performed by: NURSE PRACTITIONER

## 2025-03-05 PROCEDURE — 25000003 PHARM REV CODE 250: Performed by: NURSE PRACTITIONER

## 2025-03-05 PROCEDURE — 97110 THERAPEUTIC EXERCISES: CPT | Mod: CQ

## 2025-03-05 PROCEDURE — 27000987 HC MATTRESS, MATRIX LOW PROFILE

## 2025-03-05 PROCEDURE — 27000958

## 2025-03-05 PROCEDURE — 82962 GLUCOSE BLOOD TEST: CPT

## 2025-03-05 RX ADMIN — CLOPIDOGREL BISULFATE 75 MG: 75 TABLET, FILM COATED ORAL at 10:03

## 2025-03-05 RX ADMIN — INSULIN ASPART 1 UNITS: 100 INJECTION, SOLUTION INTRAVENOUS; SUBCUTANEOUS at 08:03

## 2025-03-05 RX ADMIN — CETIRIZINE HYDROCHLORIDE 10 MG: 10 TABLET, FILM COATED ORAL at 08:03

## 2025-03-05 RX ADMIN — METOPROLOL TARTRATE 50 MG: 50 TABLET, FILM COATED ORAL at 10:03

## 2025-03-05 RX ADMIN — INSULIN ASPART 2 UNITS: 100 INJECTION, SOLUTION INTRAVENOUS; SUBCUTANEOUS at 06:03

## 2025-03-05 RX ADMIN — METOPROLOL TARTRATE 50 MG: 50 TABLET, FILM COATED ORAL at 08:03

## 2025-03-05 RX ADMIN — LOSARTAN POTASSIUM 100 MG: 50 TABLET, FILM COATED ORAL at 10:03

## 2025-03-05 RX ADMIN — LEVOTHYROXINE SODIUM 88 MCG: 0.09 TABLET ORAL at 05:03

## 2025-03-05 NOTE — PT/OT/SLP PROGRESS
Physical Therapy Treatment    Patient Name:  Michell Dhillon   MRN:  [id number]    Recommendations:     Discharge Recommendations: Moderate Intensity Therapy  Discharge Equipment Recommendations: bedside commode, walker, rolling, wheelchair  Barriers to discharge: None    Assessment:     Michell Dhillon is a 79 y.o. female admitted with a medical diagnosis of Debility.  She presents with the following impairments/functional limitations: weakness, impaired endurance, impaired sensation, impaired functional mobility, gait instability, impaired balance, impaired joint extensibility Pt was able to stand x 1 min x 2 with a small rest break between.    Rehab Prognosis: Fair; patient would benefit from acute skilled PT services to address these deficits and reach maximum level of function.    Recent Surgery: * No surgery found *      Plan:     During this hospitalization, patient to be seen 5 x/week to address the identified rehab impairments via gait training, therapeutic activities, therapeutic exercises and progress toward the following goals:    Plan of Care Expires:  03/28/25    Subjective     Chief Complaint: stiffness in knees  Patient/Family Comments/goals: return to home  Pain/Comfort:  Pain Rating 1: 0/10  Pain Rating Post-Intervention 1: 0/10      Objective:     Communicated with nursing prior to session.  Patient found up in chair with   upon PT entry to room.     General Precautions: Standard, fall  Orthopedic Precautions: N/A  Braces: N/A  Respiratory Status: Room air     Functional Mobility:  Transfers:     Sit to Stand:  stand by assistance with rolling walker  Toilet Transfer: stand by assistance with  no AD  using  Stand Pivot      AM-PAC 6 CLICK MOBILITY  Turning over in bed (including adjusting bedclothes, sheets and blankets)?: 3  Sitting down on and standing up from a chair with arms (e.g., wheelchair, bedside commode, etc.): 3  Moving from lying on back to sitting on the side of the bed?: 3  Moving  to and from a bed to a chair (including a wheelchair)?: 3  Need to walk in hospital room?: 1  Climbing 3-5 steps with a railing?: 1  Basic Mobility Total Score: 14       Treatment & Education:    Transfer listed above   Nu step x 6 min, seated LAQs, seated marches, seated heel-toe raises, seated hip ab  Sit to stand x 2, standing x 1 min x 2, toilet transfer    Patient left up in chair with  sitting with  in his room nursing notified ..    GOALS:   Multidisciplinary Problems       Physical Therapy Goals          Problem: Physical Therapy    Goal Priority Disciplines Outcome Interventions   Physical Therapy Goal     PT, PT/OT     Description: Short Term Goals (1 Week)  Patient will be modified independent with supine to sit     Long Term Goals (4 Weeks)  Patient will need SBA for sit to stand with use of RW  Patient will be independent with WC mobility x 200 feet on level surfaces.  Patient will be independent with bed to commode transfer.                             DME Justifications:   Michell's mobility limitation cannot be sufficiently resolved by the use of a cane. Her functional mobility deficit can be sufficiently resolved with the use of a Rolling Walker. Patient's mobility limitation significantly impairs their ability to participate in one of more activities of daily living.  The use of a RW will significantly improve the patient's ability to participate in MRADLS and the patient will use it on regular basis in the home.    Time Tracking:     PT Received On: 03/05/25  PT Start Time: 1240     PT Stop Time: 1310  PT Total Time (min): 30 min     Billable Minutes: Therapeutic Activity 15 and Therapeutic Exercise 15    Treatment Type: Treatment  PT/PTA: PTA     Number of PTA visits since last PT visit: 1 03/05/2025

## 2025-03-06 LAB
GLUCOSE SERPL-MCNC: 115 MG/DL (ref 70–105)
GLUCOSE SERPL-MCNC: 133 MG/DL (ref 70–105)
GLUCOSE SERPL-MCNC: 156 MG/DL (ref 70–105)
GLUCOSE SERPL-MCNC: 220 MG/DL (ref 70–105)

## 2025-03-06 PROCEDURE — 97110 THERAPEUTIC EXERCISES: CPT | Mod: CQ

## 2025-03-06 PROCEDURE — 25000003 PHARM REV CODE 250: Performed by: NURSE PRACTITIONER

## 2025-03-06 PROCEDURE — 27000958

## 2025-03-06 PROCEDURE — 11000004 HC SNF PRIVATE

## 2025-03-06 PROCEDURE — 25000003 PHARM REV CODE 250: Performed by: HOSPITALIST

## 2025-03-06 PROCEDURE — 63600175 PHARM REV CODE 636 W HCPCS: Performed by: NURSE PRACTITIONER

## 2025-03-06 PROCEDURE — 82962 GLUCOSE BLOOD TEST: CPT

## 2025-03-06 PROCEDURE — 97530 THERAPEUTIC ACTIVITIES: CPT | Mod: CQ

## 2025-03-06 PROCEDURE — 27000987 HC MATTRESS, MATRIX LOW PROFILE

## 2025-03-06 RX ADMIN — METOPROLOL TARTRATE 50 MG: 50 TABLET, FILM COATED ORAL at 08:03

## 2025-03-06 RX ADMIN — INSULIN ASPART 1 UNITS: 100 INJECTION, SOLUTION INTRAVENOUS; SUBCUTANEOUS at 08:03

## 2025-03-06 RX ADMIN — METOPROLOL TARTRATE 50 MG: 50 TABLET, FILM COATED ORAL at 10:03

## 2025-03-06 RX ADMIN — LEVOTHYROXINE SODIUM 88 MCG: 0.09 TABLET ORAL at 05:03

## 2025-03-06 RX ADMIN — ACETAMINOPHEN 650 MG: 325 TABLET ORAL at 08:03

## 2025-03-06 RX ADMIN — CETIRIZINE HYDROCHLORIDE 10 MG: 10 TABLET, FILM COATED ORAL at 08:03

## 2025-03-06 RX ADMIN — LOSARTAN POTASSIUM 100 MG: 50 TABLET, FILM COATED ORAL at 10:03

## 2025-03-06 RX ADMIN — CLOPIDOGREL BISULFATE 75 MG: 75 TABLET, FILM COATED ORAL at 10:03

## 2025-03-06 NOTE — PLAN OF CARE
Swing Bed Recertification    Patient Name: Michell Dhillon                                                            MRN: [id number]   : 1945   Diagnosis: Physical deconditioning [R53.81]     I certify that continued skilled inpatient care is necessary for the following reasons: Patient requires continued skilled PT and OT at least five days per week due to the patient's functional deficits. Patient requires continued daily skilled nursing care to meet the patient's medical needs, promote recovery, and ensure medical safety.     Estimated discharge date: 3/10/2025

## 2025-03-06 NOTE — CONSULTS
[facility]  Adult Nutrition  Follow-up Note         Reason for Assessment  Reason For Assessment: RD follow-up        Assessment and Plan  3/06/2025 RD Follow up: Patient continues on a 2000 calorie consistent carbohydrate diet with 50-75% intakes per flowsheets. Current weight 85.1 kg. Last BM noted 3/5. Diet and intake adequate to meet estimated energy needs. Continue diet as tolerated. RD following.    2/27/2025 RD Follow up: Patient continues on a 2000 calorie consistent carbohydrate diet. Po intakes 50-75% on average per flowsheets. Current weight 83 kg. Last BM noted 2/26. Recommend to continue diet as ordered and encourage po intakes. RD Following.    Consult received and appreciated. Patient admitted 2/21 with a dx of confusion and pmh of DM, CVA and recurrent UTI. Patient is ordered a 2000 calorie consistent carbohydrate with %. Patient is 81.5 kg with a BMI of 28.14 which is WNL per geriatric standards. Recommend to continue diet as tolerated. Encourage po intakes. RD Following.        Learning Needs/Social Determinants of Health    Learning Assessment       02/21/2025 1301 [facility] (2/21/2025 - Present)   Created by Naty Hayes, RN - RN (Nurse) Status: Complete                 PRIMARY LEARNER     Primary Learner Name:  CONRADO MARRERO  - 02/21/2025 1301    Relationship:  Patient  - 02/21/2025 1301    Does the primary learner have any barriers to learning?:  No Barriers  - 02/21/2025 1301    What is the preferred language of the primary learner?:  English  - 02/21/2025 1301    Is an  required?:  No  - 02/21/2025 1301    How does the primary learner prefer to learn new concepts?:  Listening  - 02/21/2025 1301    How often do you need to have someone help you read instructions, pamphlets, or written material from your doctor or pharmacy?:  Sometimes  - 02/21/2025 1301        CO-LEARNER #1     No  question answered        CO-LEARNER #2     No question answered        SPECIAL TOPICS     No question answered        ANSWERED BY:     No question answered        Comments         Edit History       Naty Hayes, RN - RN (Nurse)   02/21/2025 1301                           Social Drivers of Health     Tobacco Use: Low Risk  (2/17/2025)    Patient History     Smoking Tobacco Use: Never     Smokeless Tobacco Use: Never     Passive Exposure: Not on file   Alcohol Use: Not At Risk (2/21/2025)    AUDIT-C     Frequency of Alcohol Consumption: Never     Average Number of Drinks: Patient does not drink     Frequency of Binge Drinking: Never   Financial Resource Strain: Low Risk  (2/21/2025)    Overall Financial Resource Strain (CARDIA)     Difficulty of Paying Living Expenses: Not hard at all   Food Insecurity: No Food Insecurity (2/21/2025)    Hunger Vital Sign     Worried About Running Out of Food in the Last Year: Never true     Ran Out of Food in the Last Year: Never true   Transportation Needs: No Transportation Needs (11/23/2024)    [facility] [email or web address] [facility]    PRAPARE - Transportation     Lack of Transportation (Medical): No     Lack of Transportation (Non-Medical): No   Physical Activity: Inactive (2/21/2025)    Exercise Vital Sign     Days of Exercise per Week: 0 days     Minutes of Exercise per Session: 0 min   Stress: No Stress Concern Present (2/21/2025)    [employer] [place] [employer] - Occupational Stress Questionnaire     Feeling of Stress : Not at all   Housing Stability: Low Risk  (2/21/2025)    Housing Stability Vital Sign     Unable to Pay for Housing in the Last Year: No     Number of Times Moved in the Last Year: Not on file     Homeless in the Last Year: No   Depression: Not at risk (2/9/2025)    Received from [email or web address] [facility] and Affiliates    PHQ-2     PHQ-2  "Total: 0   Utilities: Not At Risk (2/21/2025)    [facility] Utilities     Threatened with loss of utilities: No   Health Literacy: Inadequate Health Literacy (2/21/2025)     Health Literacy     Frequency of need for help with medical instructions: Sometimes   Social Isolation: Socially Integrated (2/21/2025)    Social Isolation     Social Isolation: 1          Malnutrition  Is Patient Malnourished: No    Nutrition Diagnosis  Altered nutrition related laboratory values related to Diabetes Mellitus as evidenced by elevated BG  Comments: continue diet as tolerated    Recent Labs   Lab 03/06/25  1049   POCGLU 133*     Comments on Glucose: DM    Nutrition Prescription / Recommendations  Recommendation/Intervention: Continue diet as tolerated  Goals: po intakes 75% during admission  Nutrition Goal Status: progressing towards goal  Current Diet Order: 2000 calorie consistent carbohydrate  Nutrition Order Comments: 50-75%  Chewing or Swallowing Difficulty?: No Chewing or swallowing difficulty  Recommended Diet: Consistent Carbohydrate 2000 (75g Carbs)  Recommended Oral Supplement: No Oral Supplements  Is Nutrition Support Recommended: [facility] Nutrition Support: No  Is Nutrition Education Recommended: No    Monitor and Evaluation  % current Intake: P.O. intake of 50 - 75 %  % intake to meet estimated needs: 50 - 75 %     Tolerance: tolerating    Current Medical Diagnosis and Past Medical History  Diagnosis: diabetes diagnosis/complications, renal disease, cardiac disease  Past Medical History:   Diagnosis Date    Arthritis     Diabetes mellitus, type 2     Hyperlipidemia     Hypertension     Hypothyroidism     Transient cerebral ischemic attack, unspecified 04/14/2021       Nutrition/Diet History  Spiritual, Cultural Beliefs, Jainism Practices, Values that Affect Care: no  Food Allergies: NKFA  Factors Affecting Nutritional Intake: None identified at this time    Lab/Procedures/Meds  No results for input(s): "NA", "K", " ""BUN", "CREATININE", "CALCIUM", "ALBUMIN", "CL", "ALT", "AST", "PHOS" in the last 72 hours.  Last A1c:   Lab Results   Component Value Date    HGBA1C 6.3 02/17/2025    HGBA1C 8.5 03/06/2024     Lab Results   Component Value Date    RBC 3.56 (L) 03/03/2025    HGB 10.3 (L) 03/03/2025    HCT 34.2 (L) 03/03/2025    MCV 96.1 (H) 03/03/2025    MCH 28.9 03/03/2025    MCHC 30.1 (L) 03/03/2025     Pertinent Labs Reviewed: reviewed  Pertinent Medications Reviewed: reviewed  Scheduled Meds:   cetirizine  10 mg Oral QHS    clopidogreL  75 mg Oral Daily    ergocalciferol  50,000 Units Oral Q7 Days    levothyroxine  88 mcg Oral Before breakfast    losartan  100 mg Oral Daily    metoprolol tartrate  50 mg Oral BID     Continuous Infusions:  PRN Meds:.  Current Facility-Administered Medications:     acetaminophen, 650 mg, Oral, Q6H PRN    acetaminophen, 650 mg, Oral, Q4H PRN    calcium carbonate, 500 mg, Oral, BID PRN    dextrose 50%, 12.5 g, Intravenous, PRN    dextrose 50%, 25 g, Intravenous, PRN    glucagon (human recombinant), 1 mg, Intramuscular, PRN    glucose, 16 g, Oral, PRN    glucose, 24 g, Oral, PRN    insulin aspart U-100, 0-5 Units, Subcutaneous, QID (AC + HS) PRN    melatonin, 6 mg, Oral, Nightly PRN    senna-docusate 8.6-50 mg, 1 tablet, Oral, BID PRN    Anthropometrics  Height: 5' 7" (170.2 cm)  Height (inches): 67 in  Height Method: Stated  Weight: 85.1 kg (187 lb 9.6 oz)  Weight (lb): 187.6 lb  Weight Method: Bed Scale  Ideal Body Weight (IBW), Female: 135 lb  % Ideal Body Weight, Female (lb): 133.11 %  BMI (Calculated): 29.4       Estimated/Assessed Needs      Temp: 97.9 °F (36.6 °C)Oral  Weight Used For Calorie Calculations: 81.5 kg (179 lb 10.8 oz)   Energy Need Method: Kcal/kg Energy Calorie Requirements (kcal): 6997-9977  Weight Used For Protein Calculations: 81.5 kg (179 lb 10.8 oz)  Protein Requirements: 65-82  Estimated Fluid Requirement Method: RDA Method    RDA Method (mL): 1630       Nutrition by " Nursing  Diet/Nutrition Received: consistent carb/diabetic diet  Intake (%): 50%  Diet/Feeding Assistance: none  Diet/Feeding Tolerance: good  Last Bowel Movement: 03/05/25                Nutrition Follow-Up  RD Follow-up?: Yes      Nutrition Discharge Planning: RD following for dc needs          Available via Secure Chat

## 2025-03-06 NOTE — PLAN OF CARE
[facility]   Interdisciplinary Team Meeting    Patient: Michell Dhillon   Today's Date: 3/6/2025   Estimated D/C Date: 3/10/2025        Physician: Elmer Rainey DO Nurse Practitioner: Isaias Carrasco NP   Pharmacy: Tyler Will, PharmD Unit Director: Quin Steele RN   : Min Barraza RN Physical/Occupational Therapy: Charisse Pereyra OT   Speech Therapy: [facility] Activity Therapy: Yoon Kingsley   Nursing: Quin Steele RN  Respiratory: Shyann Blackwood,  Dietary: Hannah Huang RD  Other: N/A     Nursing  New Symptoms/Problems: N/A  Last Bowel Movement: 03/05/25  Urine: continent  Robb: No  Bowel: continent   Constipated: No  Diarrhea: No   Isolation: No  Wound Care: No  Wound Location/Tx: N/A  Cognition: WNL  Aspiration Precautions: No  Comment(s): N/A    Respiratory:   O2 Device: Room Air  O2 Flow: N/A  SpO2: 91%  Neb Tx: No  Comment(s): N/A    Dietary    Nutrition: Diabetic  Comment(s): N/A    Speech Therapy  Speech/Swallowing: No current speech or swallowing issues  Comment(s): No    Physical Therapy  Gait/Assistive Device: N/A ELOS: Plan to DC 3/10/2025    Transfers: Standby Assistance  Bed Mobility: Standby Assistance Range of Motion/Restrictions: N/A  Comment(s): N/A     Occupational Therapy  Eating/Grooming: Independent Toileting: Standby Assistance   Bathing: Supervision or Set-up Assistance Dressing (Upper Body): Modified Independent   Dressing (Lower Body): Minimal Assistance Comment(s): N/A     Activity Therapy  Level of participation: Active participation  Comment(s): N/A    Pharmacy   Medication Changes (see MD orders in chart): No  Labs Reviewed: Yes  New Lab Orders: No  Comment(s): N/A      Tx Plan/Recommendations reviewed with family and/or patient on (date) 3/6.  Additional family Conference/Training: N/A  D/C Plan/Recommendations: Home with HH and Home with family  NIGEL: 3/10/2025  Comment(s): PATIENT  TO ID HOME WITH HOME HEALTH ([facility]) ON 3/10. BSC ORDERED FROM [facility]

## 2025-03-06 NOTE — PLAN OF CARE
Called [facility] to confirm receipt of BSC order. Yuliet states Mikki, patient's daughter, will  at time of patient's dc.

## 2025-03-06 NOTE — PT/OT/SLP PROGRESS
Physical Therapy Treatment    Patient Name:  Michell Dhillon   MRN:  [id number]    Recommendations:     Discharge Recommendations: Moderate Intensity Therapy  Discharge Equipment Recommendations: bedside commode, walker, rolling, wheelchair  Barriers to discharge: None    Assessment:     Michell Dhillon is a 79 y.o. female admitted with a medical diagnosis of Debility.  She presents with the following impairments/functional limitations: weakness, impaired endurance, impaired functional mobility, impaired balance, impaired joint extensibility Pt was able to stand for 1 min x 3 with SBA.    Rehab Prognosis: Fair; patient would benefit from acute skilled PT services to address these deficits and reach maximum level of function.    Recent Surgery: * No surgery found *      Plan:     During this hospitalization, patient to be seen 5 x/week to address the identified rehab impairments via gait training, therapeutic activities, therapeutic exercises and progress toward the following goals:    Plan of Care Expires:  03/28/25    Subjective     Chief Complaint: none  Patient/Family Comments/goals: return to home  Pain/Comfort:  Pain Rating 1: 0/10  Pain Rating Post-Intervention 1: 0/10      Objective:     Communicated with nursing prior to session.  Patient found up in chair with   upon PT entry to room.     General Precautions: Standard, fall  Orthopedic Precautions: N/A  Braces: N/A  Respiratory Status: Room air     Functional Mobility:  Transfers:     Sit to Stand:  stand by assistance with rolling walker      AM-PAC 6 CLICK MOBILITY  Turning over in bed (including adjusting bedclothes, sheets and blankets)?: 3  Sitting down on and standing up from a chair with arms (e.g., wheelchair, bedside commode, etc.): 3  Moving from lying on back to sitting on the side of the bed?: 3  Moving to and from a bed to a chair (including a wheelchair)?: 3  Need to walk in hospital room?: 1  Climbing 3-5 steps with a railing?: 1  Basic  Pulmonary Artery Catheter Placement (CPT: 53599)   [facility]    Indication Cardiomyopathy    Consent Patient    Time Out  Correct patient.  Correct procedure.  Correct site.  Availability of necessary equipment.      Site  Internal Jugular.  Right.  New site.      Catheter    Introducer:   Yes   Pulmonary Artery Catheter:  CCO & SvO2     Ultrasound Ultrasound guidance throughout procedure (vessel identification, needle puncture, guidewire, confirmed catheter in vessel)    Fluoroscopy Fluoroscopy not utilized    Technique    preparation Mask, Cap, Handwashing, Sterile gown and Sterile gloves   Site preparation  Chlorhexadine, Full sterile drape   Seldinger technique to place introducer   Pulmonary Artery catheter flow-directed into pulmonary artery with balloon inflated by waveform analysis   Biopatch   Clear dressing per protocol     Findings   Refer to chart for all numbers    Impression   Pa catheter up to 58 cm. CXR reviewed. Advanced PA catheter to 62 cm. Unable to wedge at this time.    Chest x-ray reviewed.  No pneumothorax.     Complications  None immediately observed and Patient tolerated the procedure well.    Attestation I performed the entire procedure. Procedure time not included in any other billing    CPT: 77221 (PA Catheterization)    Jairo Malik MD            Mobility Total Score: 14       Treatment & Education:   Transfer listed above  Nu step x 6 min, seated LAQs, seated marches, seated heel-toe raises, seated hip abd, sit to stand x 3 for 1 min    Patient left up in chair with call button in reach..    GOALS:   Multidisciplinary Problems       Physical Therapy Goals          Problem: Physical Therapy    Goal Priority Disciplines Outcome Interventions   Physical Therapy Goal     PT, PT/OT     Description: Short Term Goals (1 Week)  Patient will be modified independent with supine to sit     Long Term Goals (4 Weeks)  Patient will need SBA for sit to stand with use of RW  Patient will be independent with WC mobility x 200 feet on level surfaces.  Patient will be independent with bed to commode transfer.                             DME Justifications:   Michell's mobility limitation cannot be sufficiently resolved by the use of a cane. Her functional mobility deficit can be sufficiently resolved with the use of a Rolling Walker. Patient's mobility limitation significantly impairs their ability to participate in one of more activities of daily living.  The use of a RW will significantly improve the patient's ability to participate in MRADLS and the patient will use it on regular basis in the home.    Time Tracking:     PT Received On: 03/06/25  PT Start Time: 1247     PT Stop Time: 1317  PT Total Time (min): 30 min     Billable Minutes: Therapeutic Activity 10 and Therapeutic Exercise 15    Treatment Type: Treatment  PT/PTA: PTA     Number of PTA visits since last PT visit: 2 03/06/2025

## 2025-03-07 LAB
GLUCOSE SERPL-MCNC: 109 MG/DL (ref 70–105)
GLUCOSE SERPL-MCNC: 118 MG/DL (ref 70–105)
GLUCOSE SERPL-MCNC: 130 MG/DL (ref 70–105)
GLUCOSE SERPL-MCNC: 143 MG/DL (ref 70–105)

## 2025-03-07 PROCEDURE — 25000003 PHARM REV CODE 250: Performed by: NURSE PRACTITIONER

## 2025-03-07 PROCEDURE — 27000958

## 2025-03-07 PROCEDURE — 97530 THERAPEUTIC ACTIVITIES: CPT | Mod: CQ

## 2025-03-07 PROCEDURE — 97110 THERAPEUTIC EXERCISES: CPT | Mod: CQ

## 2025-03-07 PROCEDURE — 27000987 HC MATTRESS, MATRIX LOW PROFILE

## 2025-03-07 PROCEDURE — 82962 GLUCOSE BLOOD TEST: CPT

## 2025-03-07 PROCEDURE — 11000004 HC SNF PRIVATE

## 2025-03-07 RX ADMIN — METOPROLOL TARTRATE 50 MG: 50 TABLET, FILM COATED ORAL at 08:03

## 2025-03-07 RX ADMIN — CETIRIZINE HYDROCHLORIDE 10 MG: 10 TABLET, FILM COATED ORAL at 08:03

## 2025-03-07 RX ADMIN — CLOPIDOGREL BISULFATE 75 MG: 75 TABLET, FILM COATED ORAL at 08:03

## 2025-03-07 RX ADMIN — LOSARTAN POTASSIUM 100 MG: 50 TABLET, FILM COATED ORAL at 08:03

## 2025-03-07 RX ADMIN — LEVOTHYROXINE SODIUM 88 MCG: 0.09 TABLET ORAL at 06:03

## 2025-03-07 NOTE — PLAN OF CARE
Problem: Adult Inpatient Plan of Care  Goal: Optimal Comfort and Wellbeing  Outcome: Progressing  Goal: Readiness for Transition of Care  Outcome: Progressing     Problem: Diabetes Comorbidity  Goal: Blood Glucose Level Within Targeted Range  Outcome: Progressing     Problem: Skin Injury Risk Increased  Goal: Skin Health and Integrity  Outcome: Progressing     Problem: Fall Injury Risk  Goal: Absence of Fall and Fall-Related Injury  Outcome: Progressing      OFFICE VISIT      Patient: Eloisa Berman Date of Service: 2019   : 1961 MRN: [id number]     SUBJECTIVE:     Chief Complaint   Patient presents with   • Pre-Op Exam     uterian cancer        HISTORY OF PRESENT ILLNESS:  Here today for pre-operative exam.    Preoperative examination:  Procedure: Hysterectomy  Reason for visit: Stage 1 uterine cancer  Date of surgery: 10/04/2019  Surgeon: Dr. Keri Valles  Reaction to anesthesia: Mentions that she is not allergic to anesthesia but feels nauseous.    Migraine without status migrainosus, not intractable, unspecified migraine type: Had no episodes of migraine recently. Complains of vertigo and believes it to be reaction to anesthesia. Reports an episode of vertigo after swinging on tyre with her son and grandson this weekend.    Primary uterine adenocarcinoma: Up-to-date with CT scan and blood work done on Friday, 2019 which was ordered by Dr. Keri Valles at [facility]. Due for EKG. Has history of fibroid. Had dilation and curettage done for hyperplasia. The results for CT scan will be given today, was recommended for CT of stomach, abdomen and pelvis but insurance covered only for pelvis.    Insomnia, unspecified type: Has history of insomnia. Had no significant benefits from Melatonin earlier but inquires about re-consideration and dosage with respect to upcoming surgery. Has habit of over-thinking and wakes up at mid night. Was suggested to manage stress in the last visit.    Additional comments:  Colon cancer screening: Up-to-date with colonoscopy.  Medications: Takes calcium, iron and fish oil supplements. Does not take multi vitamin supplements.  Labs: Is concerned about elevated magnesium level. Mentions that the last lab work report revealed total cholesterol level slightly elevated and HDL level is consistently normal. CBC, vitamin D level and thyroid test resulted normal. Vitamin B12 level was elevated.   PMFSH:  Medical History: Has  surgical history of tonsillectomy.  The Past Medical History, Medication, Allergies, Surgical History, Social History, and Family History have been reviewed and updated as necessary.       PAST MEDICAL HISTORY:  Past Medical History:   Diagnosis Date   • Migraine    • No known problems        MEDICATIONS:  Current Outpatient Medications   Medication Sig   • butalbital-APAP-caffeine (FIORICET) -40 MG per tablet Take 1 tablet by mouth every 4 hours as needed for Pain.     No current facility-administered medications for this visit.        ALLERGIES:  ALLERGIES:  No Known Allergies    PAST SURGICAL HISTORY:  Past Surgical History:   Procedure Laterality Date   • Colonoscopy     • Tonsillectomy     • Uterine fibroid surgery         FAMILY HISTORY:  Family History   Problem Relation Age of Onset   • Dementia/Alzheimers Mother    • Cancer Father         bladder      SOCIAL HISTORY:  Social History     Tobacco Use   Smoking Status Never Smoker   Smokeless Tobacco Never Used     Social History     Substance and Sexual Activity   Alcohol Use Never   • Frequency: Never       Review of Systems  Respiratory: Negative for shortness of breath  Cardiovascular: Negative for chest pain, palpitations.   Neurological: Positive for vertigo.   Psychiatry: Positive for insomnia.  All other systems reviewed and are negative.    OBJECTIVE:     Vitals:    09/17/19 0912   BP: 124/72   Pulse: 84   Resp: 20   Temp: 97.8 °F (36.6 °C)   SpO2: 99%   Weight: 55.2 kg (121 lb 12.8 oz)   Height: 5' 3\" (1.6 m)         Physical Exam  Constitutional: alert, in no acute distress.   Eyes: no discharge, normal conjunctiva, no eyelid swelling, no ptosis and the sclerae were normal. Pupils equal, round and reactive to light and accommodation, conjugate gaze and extraocular movements were intact.   ENT: normal appearing outer ear, normal appearing nose. Examination of the tympanic membrane showed normal landmarks, normal appearing external canal. Nasal  mucosa moist and pink, no nasal discharge. Normal lips. Oral mucosa pink and moist, no oral lesions.   Neck: normal appearing, supple neck and no mass was seen. Thyroid not enlarged and no thyroid nodules. No lymphadenopathy.  Pulmonary: no respiratory distress, normal respiratory rate and effort and no accessory muscle use. Breath sounds clear to auscultation bilaterally.   Cardiovascular: normal rate, no murmurs, regular rhythm. Edema was not present in the lower extremities.   Abdomen: soft, nontender, nondistended, normal bowel sounds and no abdominal mass. No hepatomegaly and no splenomegaly. No umbilical hernia was seen.  Musculoskeletal: normal gait. No musculoskeletal erythema was seen, no joint swelling seen and no joint tenderness was elicited. No scoliosis. Normal range of motion. There was no joint instability noted. Muscle strength and tone were normal.   Psychiatric: oriented to person, oriented to place and oriented to time. Interactive and mood/affect were appropriate.  ment not impaired. Normal attention span. Short term memory intact.   Skin, Hair, Nails: normal skin color and pigmentation and no rash. no foot ulcers and no skin ulcer was seen. Normal skin turgor. No clubbing or cyanosis of the fingernails.    DIAGNOSTIC STUDIES:   LAB RESULTS:    No visits with results within 1 Month(s) from this visit.   Latest known visit with results is:   Lab Services on 08/12/2019   Component Date Value Ref Range Status   • IRON 08/12/2019 109  50 - 170 mcg/dL Final   • Total Iron Binding Capacity 08/12/2019 321  250 - 450 mcg/dL Final   • PERCENT IRON SATURATION 08/12/2019 34  15 - 45 % Final   • B12 08/12/2019 1,277* 211 - 911 pg/mL Final   • MAGNESIUM 08/12/2019 2.5* 1.7 - 2.4 mg/dL Final   • VITAMIND, 25 HYDROXY 08/12/2019 39.6  30.0 - 100.0 ng/mL Final   • TSH 08/12/2019 3.768  0.350 - 5.000 mcUnits/mL Final   • FASTING STATUS 08/12/2019 12  hrs Final   • CHOLESTEROL 08/12/2019 219* <200 mg/dL Final    • CALCULATED LDL 08/12/2019 126  <130 mg/dL Final   • HDL 08/12/2019 76  >49 mg/dL Final   • TRIGLYCERIDE 08/12/2019 83  <150 mg/dL Final   • CALCULATED NON HDL 08/12/2019 143  mg/dL Final   • CHOL/HDL 08/12/2019 2.9  <4.5 Final   • Fasting Status 08/12/2019 12  hrs Final   • Sodium 08/12/2019 142  135 - 145 mmol/L Final   • Potassium 08/12/2019 4.1  3.4 - 5.1 mmol/L Final   • Chloride 08/12/2019 108* 98 - 107 mmol/L Final   • Carbon Dioxide 08/12/2019 29  21 - 32 mmol/L Final   • Anion Gap 08/12/2019 9* 10 - 20 mmol/L Final   • Glucose 08/12/2019 83  65 - 99 mg/dL Final   • BUN 08/12/2019 15  6 - 20 mg/dL Final   • Creatinine 08/12/2019 0.72  0.51 - 0.95 mg/dL Final   • GFR Estimate,  08/12/2019 >90   Final   • GFR Estimate, Non  08/12/2019 >90   Final   • BUN/Creatinine Ratio 08/12/2019 21  7 - 25 Final   • CALCIUM 08/12/2019 10.0  8.4 - 10.2 mg/dL Final   • TOTAL BILIRUBIN 08/12/2019 0.6  0.2 - 1.0 mg/dL Final   • AST/SGOT 08/12/2019 22  <38 Units/L Final   • ALT/SGPT 08/12/2019 28  <64 Units/L Final   • ALK PHOSPHATASE 08/12/2019 131* 45 - 117 Units/L Final   • TOTAL PROTEIN 08/12/2019 7.7  6.4 - 8.2 g/dL Final   • Albumin 08/12/2019 4.7  3.6 - 5.1 g/dL Final   • GLOBULIN 08/12/2019 3.0  2.0 - 4.0 g/dL Final   • A/G Ratio, Serum 08/12/2019 1.6  1.0 - 2.4 Final   • WBC 08/12/2019 6.8  4.2 - 11.0 K/mcL Final   • RBC 08/12/2019 4.69  4.00 - 5.20 mil/mcL Final   • HGB 08/12/2019 14.5  12.0 - 15.5 g/dL Final   • HCT 08/12/2019 44.6  36.0 - 46.5 % Final   • MCV 08/12/2019 95.1  78.0 - 100.0 fl Final   • MCH 08/12/2019 30.9  26.0 - 34.0 pg Final   • MCHC 08/12/2019 32.5  32.0 - 36.5 g/dL Final   • RDW-CV 08/12/2019 12.4  11.0 - 15.0 % Final   • PLT 08/12/2019 271  140 - 450 K/mcL Final   • NRBC 08/12/2019 0  0 /100 WBC Final   • DIFF TYPE 08/12/2019 AUTOMATED DIFFERENTIAL   Final   • Neutrophil 08/12/2019 74  % Final   • LYMPH 08/12/2019 14  % Final   • MONO 08/12/2019 7  % Final   •  EOSIN 08/12/2019 4  % Final   • BASO 08/12/2019 1  % Final   • Percent Immature Granuloctyes 08/12/2019 0  % Final   • Absolute Neutrophil 08/12/2019 5.0  1.8 - 7.7 K/mcL Final   • Absolute Lymph 08/12/2019 1.0  1.0 - 4.0 K/mcL Final   • Absolute Mono 08/12/2019 0.5  0.3 - 0.9 K/mcL Final   • Absolute Eos 08/12/2019 0.3  0.1 - 0.5 K/mcL Final   • Absolute Baso 08/12/2019 0.1  0.0 - 0.3 K/mcL Final   • Absolute Immature Granulocytes 08/12/2019 0.0  0 - 0.2 K/mcl Final           Assessment AND PLAN:   This is a 58 year old year-old female who presents with :    1. Preoperative examination    2. Migraine without status migrainosus, not intractable, unspecified migraine type    3. Primary uterine adenocarcinoma (CMS/HCC)    4. Insomnia, unspecified type          Orders Placed This Encounter   • Electrocardiogram 12-Lead     Preoperative examination:  Ordered EKG, refer to orders.  Informed that clearance will be given after reviewing the EKG and lab reports.    ADDENDUM:Reviewed labs received 9/19/19 from 9/13, and they are acceptable for Pre-operative purposes.  I do not have EKG to review.  EKG done in office acceptable.  Cleared for procedure.     Migraine without status migrainosus, not intractable, unspecified migraine type:  Continue with current management.    Primary uterine adenocarcinoma (CMS/HCC):  Informed that lab reports from [facility] are unable to review as it is not displaying in the system.    Insomnia, unspecified type:  Advised to take Melatonin 1 or 2 mg for 5 days as directed, gradually decrease the intake so that the body produces its own Melatonin then discontinue later.    Follow up as needed.    Entered by Dr. Kalli Rendon, acting as a scribe for Dr. Alvaro Gonzales.

## 2025-03-07 NOTE — PLAN OF CARE
Patient's daughter, Mikki, updated on plan of care. NOMNC verbally explained/questions/concerns answered. Discharge date 3/10/2025. NOMNC left at nurse's desk for daughter to sign.

## 2025-03-07 NOTE — PT/OT/SLP PROGRESS
Physical Therapy Treatment    Patient Name:  Michell Dhillon   MRN:  [id number]    Recommendations:     Discharge Recommendations: Moderate Intensity Therapy  Discharge Equipment Recommendations: bedside commode, walker, rolling, wheelchair  Barriers to discharge: None    Assessment:     Michell Dhillon is a 79 y.o. female admitted with a medical diagnosis of Debility.  She presents with the following impairments/functional limitations: weakness, impaired endurance, impaired functional mobility, decreased lower extremity function Pt was able to stand for 1.5 min x 3 .    Rehab Prognosis: Fair; patient would benefit from acute skilled PT services to address these deficits and reach maximum level of function.    Recent Surgery: * No surgery found *      Plan:     During this hospitalization, patient to be seen 5 x/week to address the identified rehab impairments via gait training, therapeutic activities, therapeutic exercises and progress toward the following goals:    Plan of Care Expires:  03/28/25    Subjective     Chief Complaint: none  Patient/Family Comments/goals: return to home  Pain/Comfort:  Pain Rating 1: 0/10  Pain Rating Post-Intervention 1: 0/10      Objective:     Communicated with nursing prior to session.  Patient found up in chair with   upon PT entry to room.     General Precautions: Standard, fall  Orthopedic Precautions: N/A  Braces: N/A  Respiratory Status: Room air     Functional Mobility:  Transfers:     Sit to Stand:  stand by assistance with rolling walker      AM-PAC 6 CLICK MOBILITY  Turning over in bed (including adjusting bedclothes, sheets and blankets)?: 3  Sitting down on and standing up from a chair with arms (e.g., wheelchair, bedside commode, etc.): 3  Moving from lying on back to sitting on the side of the bed?: 3  Moving to and from a bed to a chair (including a wheelchair)?: 3  Need to walk in hospital room?: 1  Climbing 3-5 steps with a railing?: 1  Basic Mobility Total Score:  14       Treatment & Education:    Transfer listed above  Nu step x 5 min, seated LAQs, seated marches, seated heel-toe raises    Patient left up in chair with call button in reach..    GOALS:   Multidisciplinary Problems       Physical Therapy Goals          Problem: Physical Therapy    Goal Priority Disciplines Outcome Interventions   Physical Therapy Goal     PT, PT/OT     Description: Short Term Goals (1 Week)  Patient will be modified independent with supine to sit     Long Term Goals (4 Weeks)  Patient will need SBA for sit to stand with use of RW  Patient will be independent with WC mobility x 200 feet on level surfaces.  Patient will be independent with bed to commode transfer.                             DME Justifications:   Michell's mobility limitation cannot be sufficiently resolved by the use of a cane. Her functional mobility deficit can be sufficiently resolved with the use of a Rolling Walker. Patient's mobility limitation significantly impairs their ability to participate in one of more activities of daily living.  The use of a RW will significantly improve the patient's ability to participate in MRADLS and the patient will use it on regular basis in the home.    Time Tracking:     PT Received On: 03/07/25  PT Start Time: 1045     PT Stop Time: 1115  PT Total Time (min): 30 min     Billable Minutes: Therapeutic Activity 10 and Therapeutic Exercise 20    Treatment Type: Treatment  PT/PTA: PTA     Number of PTA visits since last PT visit: 3     03/07/2025

## 2025-03-08 LAB
GLUCOSE SERPL-MCNC: 102 MG/DL (ref 70–105)
GLUCOSE SERPL-MCNC: 117 MG/DL (ref 70–105)
GLUCOSE SERPL-MCNC: 144 MG/DL (ref 70–105)
GLUCOSE SERPL-MCNC: 175 MG/DL (ref 70–105)
GLUCOSE SERPL-MCNC: 193 MG/DL (ref 70–105)

## 2025-03-08 PROCEDURE — 82962 GLUCOSE BLOOD TEST: CPT

## 2025-03-08 PROCEDURE — 25000003 PHARM REV CODE 250: Performed by: NURSE PRACTITIONER

## 2025-03-08 PROCEDURE — 27000987 HC MATTRESS, MATRIX LOW PROFILE

## 2025-03-08 PROCEDURE — 27000958

## 2025-03-08 PROCEDURE — 11000004 HC SNF PRIVATE

## 2025-03-08 RX ADMIN — ERGOCALCIFEROL 50000 UNITS: 1.25 CAPSULE, LIQUID FILLED ORAL at 09:03

## 2025-03-08 RX ADMIN — LEVOTHYROXINE SODIUM 88 MCG: 0.09 TABLET ORAL at 06:03

## 2025-03-08 RX ADMIN — METOPROLOL TARTRATE 50 MG: 50 TABLET, FILM COATED ORAL at 08:03

## 2025-03-08 RX ADMIN — LOSARTAN POTASSIUM 100 MG: 50 TABLET, FILM COATED ORAL at 09:03

## 2025-03-08 RX ADMIN — METOPROLOL TARTRATE 50 MG: 50 TABLET, FILM COATED ORAL at 09:03

## 2025-03-08 RX ADMIN — CETIRIZINE HYDROCHLORIDE 10 MG: 10 TABLET, FILM COATED ORAL at 08:03

## 2025-03-08 RX ADMIN — CLOPIDOGREL BISULFATE 75 MG: 75 TABLET, FILM COATED ORAL at 09:03

## 2025-03-09 LAB
GLUCOSE SERPL-MCNC: 117 MG/DL (ref 70–105)
GLUCOSE SERPL-MCNC: 128 MG/DL (ref 70–105)
GLUCOSE SERPL-MCNC: 195 MG/DL (ref 70–105)
GLUCOSE SERPL-MCNC: 92 MG/DL (ref 70–105)

## 2025-03-09 PROCEDURE — 25000003 PHARM REV CODE 250: Performed by: NURSE PRACTITIONER

## 2025-03-09 PROCEDURE — 27000987 HC MATTRESS, MATRIX LOW PROFILE

## 2025-03-09 PROCEDURE — 82962 GLUCOSE BLOOD TEST: CPT

## 2025-03-09 PROCEDURE — 27000958

## 2025-03-09 PROCEDURE — 11000004 HC SNF PRIVATE

## 2025-03-09 RX ADMIN — CLOPIDOGREL BISULFATE 75 MG: 75 TABLET, FILM COATED ORAL at 08:03

## 2025-03-09 RX ADMIN — SENNOSIDES AND DOCUSATE SODIUM 1 TABLET: 50; 8.6 TABLET ORAL at 08:03

## 2025-03-09 RX ADMIN — METOPROLOL TARTRATE 50 MG: 50 TABLET, FILM COATED ORAL at 08:03

## 2025-03-09 RX ADMIN — LEVOTHYROXINE SODIUM 88 MCG: 0.09 TABLET ORAL at 06:03

## 2025-03-09 RX ADMIN — LOSARTAN POTASSIUM 100 MG: 50 TABLET, FILM COATED ORAL at 08:03

## 2025-03-09 RX ADMIN — CETIRIZINE HYDROCHLORIDE 10 MG: 10 TABLET, FILM COATED ORAL at 08:03

## 2025-03-09 NOTE — PLAN OF CARE
Problem: Adult Inpatient Plan of Care  Goal: Optimal Comfort and Wellbeing  Outcome: Progressing  Goal: Readiness for Transition of Care  Outcome: Progressing     Problem: Diabetes Comorbidity  Goal: Blood Glucose Level Within Targeted Range  Outcome: Progressing     Problem: Skin Injury Risk Increased  Goal: Skin Health and Integrity  Outcome: Progressing     Problem: Fall Injury Risk  Goal: Absence of Fall and Fall-Related Injury  Outcome: Progressing

## 2025-03-10 VITALS
SYSTOLIC BLOOD PRESSURE: 119 MMHG | WEIGHT: 187.63 LBS | HEIGHT: 67 IN | DIASTOLIC BLOOD PRESSURE: 78 MMHG | RESPIRATION RATE: 20 BRPM | TEMPERATURE: 98 F | BODY MASS INDEX: 29.45 KG/M2 | HEART RATE: 75 BPM | OXYGEN SATURATION: 95 %

## 2025-03-10 PROBLEM — Z51.89: Status: RESOLVED | Noted: 2025-02-21 | Resolved: 2025-03-10

## 2025-03-10 PROBLEM — F05 ACUTE CONFUSIONAL STATE: Status: RESOLVED | Noted: 2025-02-18 | Resolved: 2025-03-10

## 2025-03-10 LAB
ANION GAP SERPL CALCULATED.3IONS-SCNC: 13 MMOL/L (ref 7–16)
BASOPHILS # BLD AUTO: 0.02 K/UL (ref 0–0.2)
BASOPHILS NFR BLD AUTO: 0.4 % (ref 0–1)
BUN SERPL-MCNC: 22 MG/DL (ref 10–20)
BUN/CREAT SERPL: 27 (ref 6–20)
CALCIUM SERPL-MCNC: 8.9 MG/DL (ref 8.4–10.2)
CHLORIDE SERPL-SCNC: 107 MMOL/L (ref 98–107)
CO2 SERPL-SCNC: 22 MMOL/L (ref 23–31)
CREAT SERPL-MCNC: 0.82 MG/DL (ref 0.55–1.02)
DIFFERENTIAL METHOD BLD: ABNORMAL
EGFR (NO RACE VARIABLE) (RUSH/TITUS): 72 ML/MIN/1.73M2
EOSINOPHIL # BLD AUTO: 0.14 K/UL (ref 0–0.5)
EOSINOPHIL NFR BLD AUTO: 2.7 % (ref 1–4)
ERYTHROCYTE [DISTWIDTH] IN BLOOD BY AUTOMATED COUNT: 14.2 % (ref 11.5–14.5)
GLUCOSE SERPL-MCNC: 114 MG/DL (ref 82–115)
GLUCOSE SERPL-MCNC: 116 MG/DL (ref 70–105)
HCT VFR BLD AUTO: 36 % (ref 38–47)
HGB BLD-MCNC: 10.8 G/DL (ref 12–16)
LYMPHOCYTES # BLD AUTO: 2.03 K/UL (ref 1–4.8)
LYMPHOCYTES NFR BLD AUTO: 38.4 % (ref 27–41)
MCH RBC QN AUTO: 28.6 PG (ref 27–31)
MCHC RBC AUTO-ENTMCNC: 30 G/DL (ref 32–36)
MCV RBC AUTO: 95.5 FL (ref 80–96)
MONOCYTES # BLD AUTO: 0.54 K/UL (ref 0–0.8)
MONOCYTES NFR BLD AUTO: 10.2 % (ref 2–6)
MPC BLD CALC-MCNC: 9.4 FL (ref 9.4–12.4)
NEUTROPHILS # BLD AUTO: 2.55 K/UL (ref 1.8–7.7)
NEUTROPHILS NFR BLD AUTO: 48.3 % (ref 53–65)
PLATELET # BLD AUTO: 208 K/UL (ref 150–400)
POTASSIUM SERPL-SCNC: 4 MMOL/L (ref 3.5–5.1)
RBC # BLD AUTO: 3.77 M/UL (ref 4.2–5.4)
SODIUM SERPL-SCNC: 138 MMOL/L (ref 136–145)
WBC # BLD AUTO: 5.28 K/UL (ref 4.5–11)

## 2025-03-10 PROCEDURE — 36415 COLL VENOUS BLD VENIPUNCTURE: CPT | Performed by: NURSE PRACTITIONER

## 2025-03-10 PROCEDURE — 27000987 HC MATTRESS, MATRIX LOW PROFILE

## 2025-03-10 PROCEDURE — 82962 GLUCOSE BLOOD TEST: CPT

## 2025-03-10 PROCEDURE — 27000958

## 2025-03-10 PROCEDURE — 80048 BASIC METABOLIC PNL TOTAL CA: CPT | Performed by: NURSE PRACTITIONER

## 2025-03-10 PROCEDURE — 25000003 PHARM REV CODE 250: Performed by: NURSE PRACTITIONER

## 2025-03-10 PROCEDURE — 99316 NF DSCHRG MGMT 30 MIN+: CPT | Mod: ,,, | Performed by: HOSPITALIST

## 2025-03-10 PROCEDURE — 85025 COMPLETE CBC W/AUTO DIFF WBC: CPT | Performed by: NURSE PRACTITIONER

## 2025-03-10 RX ADMIN — CLOPIDOGREL BISULFATE 75 MG: 75 TABLET, FILM COATED ORAL at 08:03

## 2025-03-10 RX ADMIN — METOPROLOL TARTRATE 50 MG: 50 TABLET, FILM COATED ORAL at 08:03

## 2025-03-10 RX ADMIN — LEVOTHYROXINE SODIUM 88 MCG: 0.09 TABLET ORAL at 05:03

## 2025-03-10 RX ADMIN — LOSARTAN POTASSIUM 100 MG: 50 TABLET, FILM COATED ORAL at 08:03

## 2025-03-10 NOTE — DISCHARGE SUMMARY
"[facility]  Hospital Medicine  Discharge Summary      Patient Name: Michell Dhillon  MRN: [id number]  SHILOH: [id number]  Patient Class: IP- Swing  Admission Date: 2/21/2025  Hospital Length of Stay: 17 days  Discharge Date and Time:  03/10/2025 10:38 AM  Attending Physician: Elmer Rainey DO   Discharging Provider: Elmer Rainey DO  Primary Care Provider: Daryl Moscoso NP    Primary Care Team: Networked reference to record PCT     HPI:     79-year-old  female history of CVA, type 2 diabetes, hypertension, hyperlipidemia, recurrent UTI sent from home for confusion. Patient has been admitted multiple times to many facilities with exact same presentation.  Reviewing a multitude of urine cultures in Epic and various facilities she has had numerous urine cultures with various organisms.      * No surgery found *      Hospital Course:     2/19 Doing well, changed ABX according to C&S.  Discussed SWB and she is not interested.  Will get some PT today and plan on DC tomorrow with HH.      2/20 Much improved, now willing to do SWB.  Would be better given her multiple hospital admissions for the same issue.      2/21: Patient improving, will swing today to start PT/OT. She is complaining of right foot being "sore" States "I think it is gout". She has no swelling, very mild tenderness to palpation. Denies injury.     2/24 Looks better, working with PT.  Seems more awake.    2/28 Continues to improve, doing well    3.3 Doing well, working with PT, alert more    3/10 DC home, did well here.  No UTI issues when being bathed regularly and cared for.  DC with HH.     Goals of Care Treatment Preferences:  Code Status: Full Code      SDOH Screening:  The patient was screened for utility difficulties, food insecurity, transport difficulties, housing insecurity, and interpersonal safety and there were no concerns identified this admission.     Consults:   Consults (From admission, " onward)          Status Ordering Provider     Inpatient consult to Respiratory Care  Once        Provider:  (Not yet assigned)    Completed CLARISSA FORTUNE     Inpatient consult to Registered Dietitian/Nutritionist  Once        Provider:  (Not yet assigned)    Completed CLARISSA FORTUNE            Assessment & Plan  Debility  Patient with Acute on chronic debility due to age-related physical debility. The patient's latest AMPAC (Activity Measure for Post Acute Care) Score is listed below.    AM-PAC Score - How much help does the patient need for each activity listed  Basic Mobility Total Score: 14  Turning over in bed (including adjusting bedclothes, sheets and blankets)?: A little  Sitting down on and standing up from a chair with arms (e.g., wheelchair, bedside commode, etc.): A little  Moving from lying on back to sitting on the side of the bed?: A little  Moving to and from a bed to a chair (including a wheelchair)?: A little  Need to walk in hospital room?: Unable  Climbing 3-5 steps with a railing?: Unable    Plan  - Progressive mobility protocol initated  - PT/OT consulted  - Fall precautions in place  -         Benign essential HTN  Patient's blood pressure range in the last 24 hours was: BP  Min: 119/78  Max: 156/68.The patient's inpatient anti-hypertensive regimen is listed below:  Current Antihypertensives  metoprolol tartrate (LOPRESSOR) tablet 50 mg, 2 times daily, Oral  losartan tablet 100 mg, Daily, Oral    Plan  - BP is controlled, no changes needed to their regimen    Type 2 diabetes mellitus without complication, without long-term current use of insulin  Continue home meds  FSG    Hypothyroidism  Continue home medication    CKD (chronic kidney disease) stage 3, GFR 30-59 ml/min  Creatine stable for now. BMP reviewed- noted Estimated Creatinine Clearance: 61.3 mL/min (based on SCr of 0.82 mg/dL). according to latest data. Based on current GFR, CKD stage is stage 2 - GFR 60-89.  Monitor UOP  "and serial BMP and adjust therapy as needed. Renally dose meds. Avoid nephrotoxic medications and procedures.  Final Active Diagnoses:    Diagnosis Date Noted POA    PRINCIPAL PROBLEM:  Debility [R53.81] 02/24/2025 Yes    CKD (chronic kidney disease) stage 3, GFR 30-59 ml/min [N18.30] 06/05/2024 Yes    Hypothyroidism [E03.9] 05/13/2022 Yes     Chronic    Benign essential HTN [I10] 01/10/2022 Yes    Type 2 diabetes mellitus without complication, without long-term current use of insulin [E11.9] 01/10/2022 Yes      Problems Resolved During this Admission:    Diagnosis Date Noted Date Resolved POA    Admission for aftercare [Z51.89] 02/21/2025 03/10/2025 Not Applicable    Acute confusional state [F05] 02/18/2025 03/10/2025 Yes       Discharged Condition: good    Disposition: Home-Health Care c    Follow Up:   Follow-up Information       Daryl Moscoso NP Follow up in 1 week(s).    Specialty: Family Medicine  Contact information:  [address] [place] [id number] [phone]                           Patient Instructions:      COMMODE FOR HOME USE     Order Specific Question Answer Comments   Type: Standard    Height: 5' 7" (1.702 m)    Weight: 83 kg (183 lb)    Does patient have medical equipment at home? walker, rolling    Does patient have medical equipment at home? wheelchair    Length of need (1-99 months): 99        Significant Diagnostic Studies: N/A    Pending Diagnostic Studies:       None           Medications:  Reconciled Home Medications:      Medication List        CONTINUE taking these medications      atorvastatin 40 MG tablet  Commonly known as: LIPITOR  Take 40 mg by mouth once daily.     clopidogreL 75 mg tablet  Commonly known as: PLAVIX  Take 75 mg by mouth once daily.     levothyroxine 88 MCG tablet  Commonly known as: SYNTHROID  Take 88 mcg by mouth every morning.     metFORMIN 500 MG tablet  Commonly known as: GLUCOPHAGE  Take 500 mg by mouth 2 (two) times daily with meals.     nebivoloL 10 MG " "Tab  Commonly known as: BYSTOLIC  Take 10 mg by mouth once daily.     olmesartan 40 MG tablet  Commonly known as: BENICAR  Take 40 mg by mouth every morning.     VITAMIN D2 1,250 mcg (50,000 unit) capsule  Generic drug: ergocalciferol  Take by mouth. take 1 capsule once a "week" for 10 weeks, then once a month as directed            STOP taking these medications      chlorproMAZINE 25 MG tablet  Commonly known as: THORAZINE     ciprofloxacin HCl 500 MG tablet  Commonly known as: CIPRO              Indwelling Lines/Drains at time of discharge:   Lines/Drains/Airways       None                   Time spent on the discharge of patient: 32 minutes         Elmer Rainey DO  Department of Hospital Medicine  [facility]  "

## 2025-03-10 NOTE — ASSESSMENT & PLAN NOTE
Patient's blood pressure range in the last 24 hours was: BP  Min: 119/78  Max: 156/68.The patient's inpatient anti-hypertensive regimen is listed below:  Current Antihypertensives  metoprolol tartrate (LOPRESSOR) tablet 50 mg, 2 times daily, Oral  losartan tablet 100 mg, Daily, Oral    Plan  - BP is controlled, no changes needed to their regimen

## 2025-03-10 NOTE — PLAN OF CARE
Problem: Adult Inpatient Plan of Care  Goal: Plan of Care Review  Outcome: Met  Goal: Patient-Specific Goal (Individualized)  Outcome: Met  Goal: Absence of Hospital-Acquired Illness or Injury  Outcome: Met  Goal: Optimal Comfort and Wellbeing  Outcome: Met  Goal: Readiness for Transition of Care  Outcome: Met     Problem: Diabetes Comorbidity  Goal: Blood Glucose Level Within Targeted Range  Outcome: Met     Problem: Skin Injury Risk Increased  Goal: Skin Health and Integrity  Outcome: Met     Problem: Infection  Goal: Absence of Infection Signs and Symptoms  Outcome: Met     Problem: Fall Injury Risk  Goal: Absence of Fall and Fall-Related Injury  Outcome: Met

## 2025-03-10 NOTE — PLAN OF CARE
[facility]  Discharge Final Note    Primary Care Provider: Daryl Moscoso NP    Expected Discharge Date: 3/10/2025    Final Discharge Note (most recent)       Final Note - 03/10/25 1644          Final Note    Assessment Type Final Discharge Note     Anticipated Discharge Disposition Home-Health Care [facility]     What phone number can be called within the next 1-3 days to see how you are doing after discharge? [phone]        Post-Acute Status    Post-Acute Authorization Home Health     Discharge Delays None known at this time                     Important Message from Medicare  Important Message from Medicare regarding Discharge Appeal Rights: Given to patient/caregiver, Explained to patient/caregiver, Signed/date by patient/caregiver (explained in detail to patient's daughter, Mikki. questions/concerns addressed. papers left at nurse's desk for daughter to sign)     Date IMM was signed: 03/06/25  Time IMM was signed: 1550    Contact Info       Daryl Moscoso NP   Specialty: Family Medicine   Relationship: PCP - General    [address] [place] [address] [place] [id number]   Phone: [phone]       Next Steps: Go on 3/18/2025    Instructions: Appointment with Daryl Moscoso NP on 3/18/2025 at 2:00 pm.        Pt discharged Home  to the care of Mikki , referral called to Gretchen Alston at [facility] who will follow up with Leroy wilkinson . [facility] to check that all DME has been delivered .

## 2025-03-10 NOTE — ASSESSMENT & PLAN NOTE
Patient with Acute on chronic debility due to age-related physical debility. The patient's latest AMPAC (Activity Measure for Post Acute Care) Score is listed below.    AM-PAC Score - How much help does the patient need for each activity listed  Basic Mobility Total Score: 14  Turning over in bed (including adjusting bedclothes, sheets and blankets)?: A little  Sitting down on and standing up from a chair with arms (e.g., wheelchair, bedside commode, etc.): A little  Moving from lying on back to sitting on the side of the bed?: A little  Moving to and from a bed to a chair (including a wheelchair)?: A little  Need to walk in hospital room?: Unable  Climbing 3-5 steps with a railing?: Unable    Plan  - Progressive mobility protocol initated  - PT/OT consulted  - Fall precautions in place  -

## 2025-03-10 NOTE — NURSING
Pt daughter (Mikki) given discharge instructions and acknowledged understanding of paperwork. Pt left with daughter via personal vehicle at 1115. Denied any pain. No signs of distress noted.

## 2025-03-10 NOTE — ASSESSMENT & PLAN NOTE
Creatine stable for now. BMP reviewed- noted Estimated Creatinine Clearance: 61.3 mL/min (based on SCr of 0.82 mg/dL). according to latest data. Based on current GFR, CKD stage is stage 2 - GFR 60-89.  Monitor UOP and serial BMP and adjust therapy as needed. Renally dose meds. Avoid nephrotoxic medications and procedures.

## 2025-03-10 NOTE — PLAN OF CARE
Problem: Adult Inpatient Plan of Care  Goal: Plan of Care Review  Outcome: Progressing  Goal: Patient-Specific Goal (Individualized)  Outcome: Progressing  Goal: Absence of Hospital-Acquired Illness or Injury  Outcome: Progressing  Goal: Optimal Comfort and Wellbeing  Outcome: Progressing  Goal: Readiness for Transition of Care  Outcome: Progressing     Problem: Diabetes Comorbidity  Goal: Blood Glucose Level Within Targeted Range  Outcome: Progressing     Problem: Skin Injury Risk Increased  Goal: Skin Health and Integrity  Outcome: Progressing     Problem: Infection  Goal: Absence of Infection Signs and Symptoms  Outcome: Progressing     Problem: Fall Injury Risk  Goal: Absence of Fall and Fall-Related Injury  Outcome: Progressing      ----- Message from Shayla Harris sent at 2/10/2020  4:26 PM CST -----  Contact: [facility]--ref # [id number]--vlcb back [phone]  [facility] - [place], [place] - [address] AT [address] [facility] [phone] (Phone)  [phone] (Fax)      Prescription Name: hydrocortisone-pramoxine (ANALPRAM-HC) 2.5-1 % Crea    Phone Number: [facility]--ref # [id number]--irlm back [phone]    Additional Information:  Pharmacist is requesting a call back to verify

## 2025-05-02 ENCOUNTER — HOSPITAL ENCOUNTER (EMERGENCY)
Facility: HOSPITAL | Age: 80
Discharge: HOME | End: 2025-05-02
Payer: MEDICARE

## 2025-05-02 VITALS
HEIGHT: 67 IN | TEMPERATURE: 98 F | DIASTOLIC BLOOD PRESSURE: 82 MMHG | HEART RATE: 88 BPM | SYSTOLIC BLOOD PRESSURE: 168 MMHG | OXYGEN SATURATION: 99 % | RESPIRATION RATE: 17 BRPM | BODY MASS INDEX: 28.25 KG/M2 | WEIGHT: 180 LBS

## 2025-05-02 DIAGNOSIS — R41.0 CONFUSION: ICD-10-CM

## 2025-05-02 DIAGNOSIS — R41.82 AMS (ALTERED MENTAL STATUS): ICD-10-CM

## 2025-05-02 DIAGNOSIS — E86.0 DEHYDRATION: Primary | ICD-10-CM

## 2025-05-02 LAB
ALBUMIN SERPL BCP-MCNC: 4 G/DL (ref 3.4–4.8)
ALBUMIN/GLOB SERPL: 0.9 {RATIO}
ALP SERPL-CCNC: 91 U/L (ref 40–150)
ALT SERPL W P-5'-P-CCNC: 21 U/L
ANION GAP SERPL CALCULATED.3IONS-SCNC: 18 MMOL/L (ref 7–16)
AST SERPL W P-5'-P-CCNC: 36 U/L (ref 11–45)
BACTERIA #/AREA URNS HPF: ABNORMAL /HPF
BASOPHILS # BLD AUTO: 0.03 K/UL (ref 0–0.2)
BASOPHILS NFR BLD AUTO: 0.3 % (ref 0–1)
BILIRUB SERPL-MCNC: 0.7 MG/DL
BILIRUB UR QL STRIP: NEGATIVE
BUN SERPL-MCNC: 17 MG/DL (ref 10–20)
BUN/CREAT SERPL: 15 (ref 6–20)
CALCIUM SERPL-MCNC: 10.4 MG/DL (ref 8.4–10.2)
CHLORIDE SERPL-SCNC: 103 MMOL/L (ref 98–107)
CLARITY UR: CLEAR
CO2 SERPL-SCNC: 21 MMOL/L (ref 23–31)
COLOR UR: YELLOW
CREAT SERPL-MCNC: 1.12 MG/DL (ref 0.55–1.02)
DIFFERENTIAL METHOD BLD: ABNORMAL
EGFR (NO RACE VARIABLE) (RUSH/TITUS): 50 ML/MIN/1.73M2
EOSINOPHIL # BLD AUTO: 0.12 K/UL (ref 0–0.5)
EOSINOPHIL NFR BLD AUTO: 1.3 % (ref 1–4)
ERYTHROCYTE [DISTWIDTH] IN BLOOD BY AUTOMATED COUNT: 14.7 % (ref 11.5–14.5)
EST. AVERAGE GLUCOSE BLD GHB EST-MCNC: 154 MG/DL
GLOBULIN SER-MCNC: 4.5 G/DL (ref 2–4)
GLUCOSE SERPL-MCNC: 171 MG/DL (ref 82–115)
GLUCOSE UR STRIP-MCNC: NEGATIVE MG/DL
HBA1C MFR BLD HPLC: 7 %
HCT VFR BLD AUTO: 44.2 % (ref 38–47)
HGB BLD-MCNC: 13.6 G/DL (ref 12–16)
KETONES UR STRIP-SCNC: NEGATIVE MG/DL
LACTATE SERPL-SCNC: 1.3 MMOL/L (ref 0.5–2.2)
LEUKOCYTE ESTERASE UR QL STRIP: ABNORMAL
LYMPHOCYTES # BLD AUTO: 2.99 K/UL (ref 1–4.8)
LYMPHOCYTES NFR BLD AUTO: 31.8 % (ref 27–41)
MCH RBC QN AUTO: 28.2 PG (ref 27–31)
MCHC RBC AUTO-ENTMCNC: 30.8 G/DL (ref 32–36)
MCV RBC AUTO: 91.7 FL (ref 80–96)
MONOCYTES # BLD AUTO: 0.8 K/UL (ref 0–0.8)
MONOCYTES NFR BLD AUTO: 8.5 % (ref 2–6)
MPC BLD CALC-MCNC: 10 FL (ref 9.4–12.4)
NEUTROPHILS # BLD AUTO: 5.47 K/UL (ref 1.8–7.7)
NEUTROPHILS NFR BLD AUTO: 58.1 % (ref 53–65)
NITRITE UR QL STRIP: NEGATIVE
PH UR STRIP: 6.5 PH UNITS
PLATELET # BLD AUTO: 254 K/UL (ref 150–400)
POTASSIUM SERPL-SCNC: 4.5 MMOL/L (ref 3.5–5.1)
PROT SERPL-MCNC: 8.5 G/DL (ref 5.8–7.6)
PROT UR QL STRIP: 100
RBC # BLD AUTO: 4.82 M/UL (ref 4.2–5.4)
RBC # UR STRIP: NEGATIVE /UL
RBC #/AREA URNS HPF: ABNORMAL /HPF
SODIUM SERPL-SCNC: 137 MMOL/L (ref 136–145)
SP GR UR STRIP: 1.01
UROBILINOGEN UR STRIP-ACNC: 0.2 MG/DL
WBC # BLD AUTO: 9.41 K/UL (ref 4.5–11)
WBC #/AREA URNS HPF: ABNORMAL /HPF

## 2025-05-02 PROCEDURE — 87040 BLOOD CULTURE FOR BACTERIA: CPT | Performed by: NURSE PRACTITIONER

## 2025-05-02 PROCEDURE — 80053 COMPREHEN METABOLIC PANEL: CPT | Performed by: NURSE PRACTITIONER

## 2025-05-02 PROCEDURE — 83036 HEMOGLOBIN GLYCOSYLATED A1C: CPT | Performed by: NURSE PRACTITIONER

## 2025-05-02 PROCEDURE — 83605 ASSAY OF LACTIC ACID: CPT | Performed by: NURSE PRACTITIONER

## 2025-05-02 PROCEDURE — 96360 HYDRATION IV INFUSION INIT: CPT

## 2025-05-02 PROCEDURE — 81001 URINALYSIS AUTO W/SCOPE: CPT | Performed by: NURSE PRACTITIONER

## 2025-05-02 PROCEDURE — 36415 COLL VENOUS BLD VENIPUNCTURE: CPT | Performed by: NURSE PRACTITIONER

## 2025-05-02 PROCEDURE — 99285 EMERGENCY DEPT VISIT HI MDM: CPT | Mod: 25

## 2025-05-02 PROCEDURE — 99285 EMERGENCY DEPT VISIT HI MDM: CPT | Mod: GF | Performed by: NURSE PRACTITIONER

## 2025-05-02 PROCEDURE — 85025 COMPLETE CBC W/AUTO DIFF WBC: CPT | Performed by: NURSE PRACTITIONER

## 2025-05-02 PROCEDURE — 25000003 PHARM REV CODE 250: Performed by: NURSE PRACTITIONER

## 2025-05-02 PROCEDURE — 93005 ELECTROCARDIOGRAM TRACING: CPT

## 2025-05-02 RX ORDER — FLUCONAZOLE 150 MG/1
150 TABLET ORAL DAILY
COMMUNITY
Start: 2025-04-29

## 2025-05-02 RX ORDER — ALLOPURINOL 100 MG/1
100 TABLET ORAL DAILY
COMMUNITY
Start: 2024-11-25

## 2025-05-02 RX ORDER — DOXYCYCLINE HYCLATE 100 MG
100 TABLET ORAL 2 TIMES DAILY
COMMUNITY
Start: 2025-05-02

## 2025-05-02 RX ORDER — CHLORPROMAZINE HYDROCHLORIDE 25 MG/1
25 TABLET, FILM COATED ORAL DAILY
COMMUNITY
Start: 2025-04-28

## 2025-05-02 RX ADMIN — SODIUM CHLORIDE 500 ML: 9 INJECTION, SOLUTION INTRAVENOUS at 08:05

## 2025-05-03 NOTE — ED PROVIDER NOTES
Encounter Date: 5/2/2025       History     Chief Complaint   Patient presents with    Altered Mental Status     X 2 days reports frequent UTIs. Seen by home health today and prescribed antibiotic     80 yr old WF with PMH of arthritis, T2DM, HLD, HTN, hypothyroid to ED with c/o urinary frequency and confusion at times for the past 2 days.  Pt alert and oriented to person and year.  Her son states home health started on antibiotics for UTI today but she has only had one dose.     The history is provided by the patient.   Illness   The current episode started two days ago. The problem has been unchanged. Nothing relieves the symptoms. Nothing aggravates the symptoms. Pertinent negatives include no abdominal pain, no diarrhea, no nausea, no vomiting, no cough and no shortness of breath. Services received include medications given. Recent Medical Care: home health obtained UA and PCP sent abx.     Review of patient's allergies indicates:   Allergen Reactions    Bactrim [sulfamethoxazole-trimethoprim]     Norvasc [amlodipine]     Omnicef [cefdinir]     Penicillin      Past Medical History:   Diagnosis Date    Arthritis     Diabetes mellitus, type 2     Hyperlipidemia     Hypertension     Hypothyroidism     Transient cerebral ischemic attack, unspecified 04/14/2021     Past Surgical History:   Procedure Laterality Date    BLADDER SUSPENSION      unsure of the dates - patient has had this done 2 times    CATARACT EXTRACTION Right     patient unsure    HYSTERECTOMY       Family History   Problem Relation Name Age of Onset    Hypertension Mother      Hypertension Father      Hypertension Sister       Social History[1]  Review of Systems   Constitutional: Negative.    Respiratory:  Negative for cough and shortness of breath.    Gastrointestinal:  Negative for abdominal pain, diarrhea, nausea and vomiting.   Skin: Negative.    Psychiatric/Behavioral:  Positive for confusion.        Physical Exam     Initial Vitals   BP Pulse  Resp Temp SpO2   05/02/25 1927 05/02/25 1927 05/02/25 1927 05/02/25 1941 05/02/25 1927   (!) 194/111 98 19 97.9 °F (36.6 °C) 99 %      MAP       --                Physical Exam    Nursing note and vitals reviewed.  Constitutional: She appears well-developed and well-nourished. She is Obese .   Cardiovascular:  Normal rate and regular rhythm.           Murmur heard.  Pulmonary/Chest: Breath sounds normal.   Abdominal: Abdomen is soft. There is no abdominal tenderness.   Musculoskeletal:         General: Normal range of motion.     Neurological: She is alert and oriented to person, place, and time.   Skin: Skin is warm and dry.   Psychiatric: She has a normal mood and affect.         Medical Screening Exam   See Full Note    ED Course   Procedures  Labs Reviewed   URINALYSIS - Abnormal       Result Value    Color, UA Yellow      Clarity, UA Clear      pH, UA 6.5      Leukocytes, UA Small (*)     Nitrites, UA Negative      Protein,  (*)     Glucose, UA Negative      Ketones, UA Negative      Urobilinogen, UA 0.2      Bilirubin, UA Negative      Blood, UA Negative      Specific [place], UA 1.015     COMPREHENSIVE METABOLIC PANEL - Abnormal    Sodium 137      Potassium 4.5      Chloride 103      CO2 21 (*)     Anion Gap 18 (*)     Glucose 171 (*)     BUN 17      Creatinine 1.12 (*)     BUN/Creatinine Ratio 15      Calcium 10.4 (*)     Total Protein 8.5 (*)     Albumin 4.0      Globulin 4.5 (*)     A/G Ratio 0.9      Bilirubin, Total 0.7      Alk Phos 91      ALT 21      AST 36      eGFR 50 (*)    CBC WITH DIFFERENTIAL - Abnormal    WBC 9.41      RBC 4.82      Hemoglobin 13.6      Hematocrit 44.2      MCV 91.7      MCH 28.2      MCHC 30.8 (*)     RDW 14.7 (*)     Platelet Count 254      MPV 10.0      Neutrophils % 58.1      Lymphocytes % 31.8      Neutrophils, Abs 5.47      Lymphocytes, Absolute 2.99      Diff Type Auto      Monocytes % 8.5 (*)     Eosinophils % 1.3      Basophils % 0.3      Monocytes, Absolute 0.80       Eosinophils, Absolute 0.12      Basophils, Absolute 0.03     URINALYSIS, MICROSCOPIC - Abnormal    WBC, UA 5-10 (*)     RBC, UA 0-3      Bacteria, UA Few (*)    LACTIC ACID, PLASMA - Normal    Lactic Acid 1.3     CULTURE, BLOOD   CULTURE, BLOOD   CBC W/ AUTO DIFFERENTIAL    Narrative:     The following orders were created for panel order CBC Auto Differential.  Procedure                               Abnormality         Status                     ---------                               -----------         ------                     CBC with Differential[[id number]]       Abnormal            Final result                 Please view results for these tests on the individual orders.   HEMOGLOBIN A1C    Hemoglobin A1C 7.0      Estimated Average Glucose 154            Imaging Results              CT Head Without Contrast (Final result)  Result time 05/02/25 20:59:06      Final result by Ion Fritz MD (05/02/25 20:59:06)                   Impression:      1. No acute intracranial process with no significant change.  2. Involutional changes with chronic microvascular ischemic changes with small probable remote lacunar infarcts of the left basal ganglia and possibly the posteromedial right occipital lobe      Electronically signed by: Ion Fritz  Date:    05/02/2025  Time:    20:59               Narrative:    EXAMINATION:  CT HEAD WITHOUT CONTRAST    CLINICAL HISTORY:  Mental status change, unknown cause;    TECHNIQUE:  Low dose axial CT images obtained throughout the head without intravenous contrast. Sagittal and coronal reconstructions were performed.    COMPARISON:  02/17/2025    FINDINGS:  Intracranial compartment:    No midline shift or acute hydrocephalus.  No extra-axial blood or fluid collections.    Moderate involutional changes and chronic microvascular ischemic changes in the periventricular and subcortical white matter.  Small probable remote lacunar infarcts of the left caudate and posterior  left lateral basal ganglia.  Small remote lacunar infarct or chronic microvascular ischemic changes in the posteromedial right occipital lobe similar to the prior study.    No parenchymal mass, hemorrhage, edema or major vascular distribution infarct.    Skull/extracranial contents (limited evaluation): No fracture. Mastoid air cells and paranasal sinuses are essentially clear.                                       X-Ray Chest AP Portable (Final result)  Result time 05/02/25 21:13:47      Final result by Ion Fritz MD (05/02/25 21:13:47)                   Impression:      No acute abnormality.      Electronically signed by: Ion Fritz  Date:    05/02/2025  Time:    21:13               Narrative:    EXAMINATION:  XR CHEST AP PORTABLE    CLINICAL HISTORY:  Sepsis;    TECHNIQUE:  Single frontal view of the chest was performed.    COMPARISON:  02/17/2025    FINDINGS:  The lungs are clear, with normal appearance of pulmonary vasculature and no pleural effusion or pneumothorax.    The cardiac silhouette is normal in size. The hilar and mediastinal contours are unremarkable.    Bones are intact.                                       Medications   sodium chloride 0.9% bolus 500 mL 500 mL (500 mLs Intravenous New Bag 5/2/25 2040)     Medical Decision Making  80 yr old WF with PMH of arthritis, T2DM, HLD, HTN, hypothyroid to ED with c/o urinary frequency and confusion at times for the past 2 days.  Pt alert and oriented to person and year.  Her son states home health started on antibiotics for UTI today but she has only had one dose.   Symptoms improved with IV bolus.  Discussed admit for more hydration.  Pt prefers to drink at home.  Son states will take to f/u with PCP Monday.      Amount and/or Complexity of Data Reviewed  Labs: ordered. Decision-making details documented in ED Course.     Details: Creat 1.12 - admits not drinking enough.    Glucose 170, however average 156    Radiology: ordered.     Details:  Moderate involutional changes and chronic microvascular ischemic changes in the periventricular and subcortical white matter.  Small probable remote lacunar infarcts of the left caudate and posterior left lateral basal ganglia.  Small remote lacunar infarct or chronic microvascular ischemic changes in the posteromedial right occipital lobe similar to the prior study.  ECG/medicine tests: ordered.     Details: Sinus rhythm with 1st degree AV block.  HR 86               ED Course as of 05/02/25 2141   Fri May 02, 2025   2134 Reports is feeling better.  Discussed POC with pt and son who decline admit.  Will prepare for DC with instruction for f/u with PCP and return for worsening condition or emergency needs.  [CG]   2137 Creatinine(!): 1.12  Mild elevation.  Pt admits hasn't been drinking enough water.     [CG]      ED Course User Index  [CG] Brandie Bajwa FNP                           Clinical Impression:   Final diagnoses:  [R41.82] AMS (altered mental status)  [E86.0] Dehydration (Primary)  [R41.0] Confusion        ED Disposition Condition    Discharge Stable          ED Prescriptions    None       Follow-up Information       Follow up With Specialties Details Why Contact Info    Sarah Dhillon FNP Family Medicine Go in 2 days  [address] [place] [address]  [place] [address]  [phone]                   [1]   Social History  Tobacco Use    Smoking status: Never    Smokeless tobacco: Never   Substance Use Topics    Alcohol use: Never    Drug use: Never        Brandie Bajwa FNP  05/02/25 2141

## 2025-05-03 NOTE — DISCHARGE INSTRUCTIONS
Hold metformin tonight.  Drink more water.  See your primary care provider Monday.  Return for worsening condition or emergency needs.

## 2025-05-09 LAB
BACTERIA BLD CULT: NORMAL
BACTERIA BLD CULT: NORMAL